# Patient Record
Sex: MALE | Race: WHITE | Employment: FULL TIME | URBAN - METROPOLITAN AREA
[De-identification: names, ages, dates, MRNs, and addresses within clinical notes are randomized per-mention and may not be internally consistent; named-entity substitution may affect disease eponyms.]

---

## 2019-04-08 ENCOUNTER — OFFICE VISIT (OUTPATIENT)
Dept: FAMILY MEDICINE CLINIC | Age: 64
End: 2019-04-08
Payer: COMMERCIAL

## 2019-04-08 VITALS
SYSTOLIC BLOOD PRESSURE: 132 MMHG | TEMPERATURE: 98.5 F | DIASTOLIC BLOOD PRESSURE: 70 MMHG | WEIGHT: 156 LBS | HEART RATE: 80 BPM | OXYGEN SATURATION: 99 % | RESPIRATION RATE: 17 BRPM | HEIGHT: 69 IN | BODY MASS INDEX: 23.11 KG/M2

## 2019-04-08 DIAGNOSIS — H72.92 PERFORATED EAR DRUM, LEFT: Primary | ICD-10-CM

## 2019-04-08 PROCEDURE — 99213 OFFICE O/P EST LOW 20 MIN: CPT | Performed by: NURSE PRACTITIONER

## 2019-04-08 RX ORDER — AMOXICILLIN 500 MG/1
500 CAPSULE ORAL 3 TIMES DAILY
Qty: 30 CAPSULE | Refills: 0 | Status: SHIPPED | OUTPATIENT
Start: 2019-04-08 | End: 2019-04-18

## 2019-04-08 ASSESSMENT — ENCOUNTER SYMPTOMS
COUGH: 0
RHINORRHEA: 1

## 2019-04-08 NOTE — PROGRESS NOTES
[address] [facility]   [address] [facility]  [address] [facility] [address]  Dept: [phone]  Dept Fax: [phone]    Cheri Trevizo is a 59 y.o. male who presents to the urgent care today for his medical conditions/complaints as notedbelow. Cheri Trevizo is c/o of Tinnitus (L ear ringing constantly, pt states yesterday blew nose and cleaned ear with Q-tip and noticed blood on Qtip. )      HPI:     67-year-old male patient presents for complaints of possible perforated left eardrum. Felt some moisture in his ear after showering yesterday, use Q-tip and shortly thereafter had some bloody drainage from the left ear. Has continued to drain off and on today. Does have some cold-like symptoms. Prior to the perforation he did not have any pain. He does have loss of hearing since the perforation. Otalgia    There is pain in the left ear. This is a new problem. The current episode started yesterday. The problem occurs constantly. The problem has been unchanged. There has been no fever. The pain is at a severity of 2/10. The pain is mild. Associated symptoms include ear discharge, hearing loss and rhinorrhea. Pertinent negatives include no coughing or headaches. He has tried nothing for the symptoms. The treatment provided no relief. There is no history of a chronic ear infection, hearing loss or a tympanostomy tube. Past Medical History:   Diagnosis Date    Abdominal pain     Colon polyp     Pulmonary nodules 9/17/2013    Thyroid nodule 9/17/2013        Current Outpatient Medications   Medication Sig Dispense Refill    amoxicillin (AMOXIL) 500 MG capsule Take 1 capsule by mouth 3 times daily for 10 days 30 capsule 0    Ascorbic Acid (VITAMIN C) 500 MG tablet Take 500 mg by mouth daily.  Multiple Vitamin (MULTIVITAMIN PO) Take  by mouth.  Cyanocobalamin (VITAMIN B 12 PO) Take  by mouth.  VITAMIN E PO Take  by mouth Daily.       fish oil-omega-3 fatty acids 1000 MG capsule Take 2 g by mouth daily. No current facility-administered medications for this visit. No Known Allergies    Subjective:      Review of Systems   HENT: Positive for ear discharge, ear pain, hearing loss and rhinorrhea. Respiratory: Negative for cough. Neurological: Negative for headaches. All other systems reviewed and are negative. 14 systems reviewed and negative except as listed in HPI. Objective:     Physical Exam   Constitutional: He is oriented to person, place, and time. He appears well-developed and well-nourished. No distress. nontoxic   HENT:   Head: Normocephalic and atraumatic. Right Ear: External ear normal.   Left Ear: External ear normal.   Mouth/Throat: No oropharyngeal exudate. + post nasal drip, thick yellow  Swallows without difficulty  Clear mucous nemo nares  rt tm pearly grey with good cone light  Left tympanic membrane perforated small amount of bloody drainage noted around the eardrum and on the floor of the canal near the eardrum. Canal itself appears intact. No pinnae or tragal tenderness no swelling. Eyes: Pupils are equal, round, and reactive to light. Conjunctivae and EOM are normal. Right eye exhibits no discharge. Left eye exhibits no discharge. Neck: Normal range of motion. Neck supple. Cardiovascular: Normal rate, regular rhythm and normal heart sounds. Pulmonary/Chest: Effort normal and breath sounds normal. No respiratory distress. He has no wheezes. He has no rales. He exhibits no tenderness. Abdominal: Soft. Bowel sounds are normal. He exhibits no distension. There is no tenderness. Musculoskeletal: Normal range of motion. He exhibits no tenderness or deformity. Lymphadenopathy:     He has no cervical adenopathy. Neurological: He is alert and oriented to person, place, and time. He exhibits normal muscle tone. Coordination normal.   Skin: Skin is warm and dry. No rash noted.  He is not diaphoretic. Psychiatric: He has a normal mood and affect. His behavior is normal.   Nursing note and vitals reviewed. /70 (Site: Right Upper Arm, Position: Sitting, Cuff Size: Medium Adult)   Pulse 80   Temp 98.5 °F (36.9 °C) (Tympanic)   Resp 17   Ht 5' 9.49\" (1.765 m)   Wt 156 lb (70.8 kg)   SpO2 99%   BMI 22.71 kg/m²     Assessment:       Diagnosis Orders   1. Perforated ear drum, left         Plan:    Discussed in detail the care of perforated eardrum need for close follow-up and no fluids in the ear. Prescription for amoxicillin for sinus symptoms and prophylactically for the perforated eardrum. Return for Make an Appt. with your Primary Care in 1 week. Orders Placed This Encounter   Medications    amoxicillin (AMOXIL) 500 MG capsule     Sig: Take 1 capsule by mouth 3 times daily for 10 days     Dispense:  30 capsule     Refill:  0         Patient given educational materials - see patient instructions. Discussed use, benefit, and side effects of prescribed medications. All patient questions answered. Pt voicedunderstanding.     Electronically signed by DUDLEY Hassan CNP on 4/8/2019 at 4:34 PM

## 2019-04-08 NOTE — PATIENT INSTRUCTIONS
No foreign body in ear  No water in your ear until cleared by Dr. Daquan Pascual ball with vaseline set gently inside ear when showering, no fluid in ear  No swimming  Follow up with Dr. Sherif Campos for ear recheck and clearance in 1 week  Over the counter nasal decongestant spray and antihistamine of your choice (ex: allegra, claritin, zyrtec)  Patient Education        Perforated Eardrum: Care Instructions  Your Care Instructions    A tear or hole in the membrane of the middle ear is called a perforated or ruptured eardrum. This can happen if an infection builds up inside the ear or if the eardrum gets injured. You may find it hard to hear out of that ear or may hear a buzzing sound. You may have an earache or have fluids that drain from the ear. Your eardrum should heal on its own in a few weeks, and you should hear normally then. If you have an infection, your doctor may prescribe antibiotics. You may need pain relief medicine for your earache. Your doctor will check to see if your eardrum has healed. If not, you may need surgery to repair the eardrum. Follow-up care is a key part of your treatment and safety. Be sure to make and go to all appointments, and call your doctor if you are having problems. It's also a good idea to know your test results and keep a list of the medicines you take. How can you care for yourself at home? · If your doctor prescribed antibiotics, take them as directed. Do not stop taking them just because you feel better. You need to take the full course of antibiotics. · Take an over-the-counter pain medicine, such as acetaminophen (Tylenol), ibuprofen (Advil, Motrin), or naproxen (Aleve), as needed. Read and follow all instructions on the label. · Do not take two or more pain medicines at the same time unless the doctor told you to. Many pain medicines have acetaminophen, which is Tylenol. Too much acetaminophen (Tylenol) can be harmful.   · To ease pain, put a warm washcloth or a heating this instruction, always ask your healthcare professional. [place] [address] any warranty or liability for your use of this information.

## 2019-04-29 ENCOUNTER — OFFICE VISIT (OUTPATIENT)
Dept: FAMILY MEDICINE CLINIC | Age: 64
End: 2019-04-29
Payer: COMMERCIAL

## 2019-04-29 VITALS
HEART RATE: 77 BPM | OXYGEN SATURATION: 99 % | WEIGHT: 162 LBS | TEMPERATURE: 98.3 F | RESPIRATION RATE: 16 BRPM | BODY MASS INDEX: 23.99 KG/M2 | SYSTOLIC BLOOD PRESSURE: 139 MMHG | HEIGHT: 69 IN | DIASTOLIC BLOOD PRESSURE: 89 MMHG

## 2019-04-29 DIAGNOSIS — H72.92 PERFORATED TYMPANIC MEMBRANE, LEFT: Primary | ICD-10-CM

## 2019-04-29 DIAGNOSIS — H90.12 CONDUCTIVE HEARING LOSS OF LEFT EAR WITH UNRESTRICTED HEARING OF RIGHT EAR: ICD-10-CM

## 2019-04-29 PROCEDURE — 99214 OFFICE O/P EST MOD 30 MIN: CPT | Performed by: NURSE PRACTITIONER

## 2019-04-29 ASSESSMENT — ENCOUNTER SYMPTOMS
SORE THROAT: 1
CHEST TIGHTNESS: 0
COUGH: 0
EYE REDNESS: 0
EYE DISCHARGE: 0
SINUS PRESSURE: 0
SHORTNESS OF BREATH: 0
VOICE CHANGE: 0
WHEEZING: 0

## 2019-04-29 ASSESSMENT — PATIENT HEALTH QUESTIONNAIRE - PHQ9
SUM OF ALL RESPONSES TO PHQ QUESTIONS 1-9: 0
SUM OF ALL RESPONSES TO PHQ QUESTIONS 1-9: 0
2. FEELING DOWN, DEPRESSED OR HOPELESS: 0
1. LITTLE INTEREST OR PLEASURE IN DOING THINGS: 0
SUM OF ALL RESPONSES TO PHQ9 QUESTIONS 1 & 2: 0

## 2019-04-29 NOTE — PATIENT INSTRUCTIONS
Patient Education        Perforated Eardrum: Care Instructions  Your Care Instructions    A tear or hole in the membrane of the middle ear is called a perforated or ruptured eardrum. This can happen if an infection builds up inside the ear or if the eardrum gets injured. You may find it hard to hear out of that ear or may hear a buzzing sound. You may have an earache or have fluids that drain from the ear. Your eardrum should heal on its own in a few weeks, and you should hear normally then. If you have an infection, your doctor may prescribe antibiotics. You may need pain relief medicine for your earache. Your doctor will check to see if your eardrum has healed. If not, you may need surgery to repair the eardrum. Follow-up care is a key part of your treatment and safety. Be sure to make and go to all appointments, and call your doctor if you are having problems. It's also a good idea to know your test results and keep a list of the medicines you take. How can you care for yourself at home? · If your doctor prescribed antibiotics, take them as directed. Do not stop taking them just because you feel better. You need to take the full course of antibiotics. · Take an over-the-counter pain medicine, such as acetaminophen (Tylenol), ibuprofen (Advil, Motrin), or naproxen (Aleve), as needed. Read and follow all instructions on the label. · Do not take two or more pain medicines at the same time unless the doctor told you to. Many pain medicines have acetaminophen, which is Tylenol. Too much acetaminophen (Tylenol) can be harmful. · To ease pain, put a warm washcloth or a heating pad set on low on your ear. You may have some drainage from the ear. · Be careful when taking over-the-counter cold or flu medicines and Tylenol at the same time. Many of these medicines have acetaminophen, which is Tylenol. Read the labels to make sure that you are not taking more than the recommended dose.  Too much Tylenol can be harmful. · Keep your ears dry. ? Take baths until your doctor says you can take showers again. ? When you wash your hair, use cotton lightly coated with petroleum jelly as an earplug. Do not use plastic earplugs. ? Do not swim until your doctor says you can.  ? If you get water in your ears, turn your head to each side and pull the earlobe in different directions. This will help the water run out. If your ears are still wet, use a hair dryer set on the lowest heat. Hold the dryer several inches from your ear. · Do not put anything into your ear canal. For example, do not use a cotton swab to clean the inside of your ear. It can damage your ear. If you think you have something inside your ear, ask your doctor to check it. When should you call for help? Call your doctor now or seek immediate medical care if:    · You have signs of infection, such as:  ? Increased pain, swelling, warmth, or redness. ? Pus draining from the ear. ? A fever.    Watch closely for changes in your health, and be sure to contact your doctor if:    · You have changes in hearing.     · You do not get better as expected. Where can you learn more? Go to https://TotalTakeoutpevendome 1699eb.MarketRiders. org and sign in to your Scarosso account. Enter D379 in the [employer] box to learn more about \"Perforated Eardrum: Care Instructions. \"     If you do not have an account, please click on the \"Sign Up Now\" link. Current as of: March 27, 2018  Content Version: 11.9  © 5682-3301 [employer]. Care instructions adapted under license by [facility] ([facility]). If you have questions about a medical condition or this instruction, always ask your healthcare professional. [place] [address] any warranty or liability for your use of this information.

## 2019-04-29 NOTE — PROGRESS NOTES
[address] [facility] WALK-IN FAMILY MEDICINE   [address] [facility]  [address] [facility] [address]  Dept: [phone]  Dept Fax: [phone]     Victorino Alvarez is a 59 y.o. male who presents to the urgent care today for his medicalconditions/complaints as noted below. Victorino Alvarez is c/o of Hearing Loss (left ear, s/p left ear drum perforation)    HPI:      Otalgia    There is pain in the left ear. This is a new problem. Episode onset: 3 weeks ago. The problem has been gradually worsening. There has been no fever. Associated symptoms include hearing loss (left) and a sore throat (over the past week, almost gone). Pertinent negatives include no coughing, ear discharge, headaches or rash. Treatments tried: amoxicillin, hot tea with honey. The treatment provided no relief. States that he was in our office approximately 3 weeks ago with a perforated ear drum. He was put on amoxicillin and has kept it dry. He states that the symptoms are gradually improving but his hearing out the left ear is not great, although it is better. He would like to have his ear rechecked. Past Medical History:   Diagnosis Date    Abdominal pain     Colon polyp     Pulmonary nodules 9/17/2013    Thyroid nodule 9/17/2013      Current Outpatient Medications   Medication Sig Dispense Refill    Ascorbic Acid (VITAMIN C) 500 MG tablet Take 500 mg by mouth daily.  Multiple Vitamin (MULTIVITAMIN PO) Take  by mouth.  Cyanocobalamin (VITAMIN B 12 PO) Take  by mouth.  VITAMIN E PO Take  by mouth Daily.  fish oil-omega-3 fatty acids 1000 MG capsule Take 2 g by mouth daily. No current facility-administered medications for this visit. No Known Allergies    Reviewed PMH, SH, and FH with the patient and updated. Subjective:      Review of Systems   Constitutional: Negative for chills, fatigue and fever.    HENT: Positive for congestion (improve), ear pain, hearing loss (left) and sore throat (over the past week, almost gone). Negative for ear discharge, postnasal drip, sinus pressure, sneezing and voice change. Eyes: Negative for discharge and redness. Respiratory: Negative for cough, chest tightness, shortness of breath and wheezing. Cardiovascular: Negative. Negative for chest pain. Musculoskeletal: Negative for myalgias. Skin: Negative for rash. Neurological: Negative for dizziness, weakness, light-headedness and headaches. Hematological: Negative for adenopathy. All other systems reviewed and are negative. Objective:      Physical Exam   Constitutional: He appears well-developed and well-nourished. No distress. HENT:   Head: Normocephalic. Right Ear: Tympanic membrane and external ear normal.   Left Ear: External ear normal. Tympanic membrane is injected (mild), scarred (healing perforation noted) and erythematous (mild). Nose: Nose normal. Right sinus exhibits no maxillary sinus tenderness and no frontal sinus tenderness. Left sinus exhibits no maxillary sinus tenderness and no frontal sinus tenderness. Mouth/Throat: Posterior oropharyngeal erythema (mild) present. No oropharyngeal exudate. Eyes: Right eye exhibits no discharge. Left eye exhibits no discharge. Cardiovascular: Normal rate, regular rhythm and normal heart sounds. No murmur heard. Pulmonary/Chest: Effort normal and breath sounds normal. No respiratory distress. He has no wheezes. He has no rales. Lymphadenopathy:     He has no cervical adenopathy. Skin: Skin is warm. No rash noted. He is not diaphoretic. Nursing note and vitals reviewed. /89 (Site: Left Upper Arm, Position: Sitting, Cuff Size: Medium Adult)   Pulse 77   Temp 98.3 °F (36.8 °C) (Oral)   Resp 16   Ht 5' 9\" (1.753 m)   Wt 162 lb (73.5 kg)   SpO2 99%   BMI 23.92 kg/m²     Assessment:       Diagnosis Orders   1. Perforated tympanic membrane, left     2.  Conductive hearing loss of left ear with unrestricted hearing of right ear       Plan:      Perforation of left tympanic membrane healing as anticipated. Continue to keep ear dry. Warm compresses as desired for ear pain. Educational materials provided on AVS.  Follow up if symptoms do not improve/worsen. Patient given educational materials - see patient instructions. Discussed use, benefit, and side effects of prescribed medications. All patientquestions answered. Pt voiced understanding.     Electronically signed by DUDLEY Figueroa CNP on 4/29/2019at 4:26 PM

## 2020-01-17 ENCOUNTER — OFFICE VISIT (OUTPATIENT)
Dept: FAMILY MEDICINE CLINIC | Age: 65
End: 2020-01-17
Payer: COMMERCIAL

## 2020-01-17 VITALS
BODY MASS INDEX: 22.59 KG/M2 | SYSTOLIC BLOOD PRESSURE: 137 MMHG | HEART RATE: 64 BPM | WEIGHT: 153 LBS | TEMPERATURE: 98 F | OXYGEN SATURATION: 98 % | DIASTOLIC BLOOD PRESSURE: 80 MMHG

## 2020-01-17 LAB
INFLUENZA A ANTIBODY: NEGATIVE
INFLUENZA B ANTIBODY: NEGATIVE

## 2020-01-17 PROCEDURE — 99213 OFFICE O/P EST LOW 20 MIN: CPT | Performed by: NURSE PRACTITIONER

## 2020-01-17 PROCEDURE — 87804 INFLUENZA ASSAY W/OPTIC: CPT | Performed by: NURSE PRACTITIONER

## 2020-01-17 ASSESSMENT — ENCOUNTER SYMPTOMS
SWOLLEN GLANDS: 0
WHEEZING: 0
DIARRHEA: 0
COUGH: 1
VOMITING: 0
SORE THROAT: 0
NAUSEA: 0
SINUS PAIN: 0
RHINORRHEA: 1
ABDOMINAL PAIN: 0

## 2020-01-17 ASSESSMENT — PATIENT HEALTH QUESTIONNAIRE - PHQ9: DEPRESSION UNABLE TO ASSESS: PT REFUSES

## 2020-01-17 NOTE — LETTER
[facility]   [facility] [address] [facility] [address]  Phone: [phone]  Fax: [phone]    DUDLEY Santacruz CNP        January 17, 2020     Patient: Salomón Boo   YOB: 1955   Date of Visit: 1/17/2020       To Whom It May Concern: It is my medical opinion that Zana Shah may return to work on 1/20/2020. If you have any questions or concerns, please don't hesitate to call.     Sincerely,        DUDLEY Santacruz CNP

## 2020-01-17 NOTE — PATIENT INSTRUCTIONS
serious illness. · Drink plenty of fluids, enough so that your urine is light yellow or clear like water. Hot fluids, such as tea or soup, may help relieve congestion in your nose and throat. If you have kidney, heart, or liver disease and have to limit fluids, talk with your doctor before you increase the amount of fluids you drink. · Try to clear mucus from your lungs by breathing deeply and coughing. · Gargle with warm salt water once an hour. This can help reduce swelling and throat pain. Use 1 teaspoon of salt mixed in 1 cup of warm water. · Do not smoke or allow others to smoke around you. If you need help quitting, talk to your doctor about stop-smoking programs and medicines. These can increase your chances of quitting for good. To avoid spreading the virus  · Cough or sneeze into a tissue. Then throw the tissue away. · If you don't have a tissue, use your hand to cover your cough or sneeze. Then clean your hand. You can also cough into your sleeve. · Wash your hands often. Use soap and warm water. Wash for 15 to 20 seconds each time. · If you don't have soap and water near you, you can clean your hands with alcohol wipes or gel. When should you call for help? Call your doctor now or seek immediate medical care if:    · You have a new or higher fever.     · Your fever lasts more than 48 hours.     · You have trouble breathing.     · You have a fever with a stiff neck or a severe headache.     · You are sensitive to light.     · You feel very sleepy or confused.    Watch closely for changes in your health, and be sure to contact your doctor if:    · You do not get better as expected. Where can you learn more? Go to https://Shopintoityoueb.OnVantage. org and sign in to your Boloco account. Enter I765 in the Mocoplex box to learn more about \"Viral Respiratory Infection: Care Instructions. \"     If you do not have an account, please click on the \"Sign Up Now\" link.   Current as of: June 9, 2019  Content Version: 12.3  © 8647-6523 Healthwise, Incorporated. Care instructions adapted under license by [facility] ([facility]). If you have questions about a medical condition or this instruction, always ask your healthcare professional. [address] any warranty or liability for your use of this information.

## 2020-01-17 NOTE — PROGRESS NOTES
[place] [address] FAMILY MEDICINE   [address] [address]  Dept: [phone]  Dept Fax: [phone]    Jackie Marcos is a 62 y.o. male who presents to the urgent care today for his medical conditions/complaints as notedbelow. Jackie Marcos is c/o of Generalized Body Aches (runny nose, sneezing, change in stool color, chills, sore throat since yesterday)      HPI:     62 yr old male presents for sneezing, clr runny nose, mild body aches, occasional np cough. No fevers. Sx for 2 days, gradual onset. Tried mucinex cold and flu, mild helpful. No sob or wheezing. No known illness exposure. URI    This is a new problem. The current episode started in the past 7 days (x2 days). The problem has been unchanged. There has been no fever. Associated symptoms include coughing, rhinorrhea and sneezing. Pertinent negatives include no abdominal pain, chest pain, congestion, diarrhea, dysuria, ear pain, headaches, joint pain, joint swelling, nausea, neck pain, plugged ear sensation, rash, sinus pain, sore throat, swollen glands, vomiting or wheezing. The treatment provided mild relief. Past Medical History:   Diagnosis Date    Abdominal pain     Colon polyp     Pulmonary nodules 9/17/2013    Thyroid nodule 9/17/2013        Current Outpatient Medications   Medication Sig Dispense Refill    Phenylephrine-DM-GG-APAP (MUCINEX FAST-MAX COLD FLU PO) Take by mouth      Ascorbic Acid (VITAMIN C) 500 MG tablet Take 500 mg by mouth daily.  Multiple Vitamin (MULTIVITAMIN PO) Take  by mouth.  Cyanocobalamin (VITAMIN B 12 PO) Take  by mouth.  VITAMIN E PO Take  by mouth Daily.  fish oil-omega-3 fatty acids 1000 MG capsule Take 2 g by mouth daily. No current facility-administered medications for this visit. No Known Allergies    Subjective:      Review of Systems   HENT: Positive for rhinorrhea and sneezing.  Negative for

## 2020-09-02 ENCOUNTER — HOSPITAL ENCOUNTER (EMERGENCY)
Age: 65
Discharge: HOME | End: 2020-09-02
Attending: EMERGENCY MEDICINE
Payer: COMMERCIAL

## 2020-09-02 VITALS
TEMPERATURE: 97.9 F | DIASTOLIC BLOOD PRESSURE: 75 MMHG | RESPIRATION RATE: 16 BRPM | HEIGHT: 71 IN | OXYGEN SATURATION: 98 % | HEART RATE: 76 BPM | SYSTOLIC BLOOD PRESSURE: 149 MMHG | WEIGHT: 162 LBS | BODY MASS INDEX: 22.68 KG/M2

## 2020-09-02 LAB
ABSOLUTE EOS #: 0.7 K/UL (ref 0–0.4)
ABSOLUTE IMMATURE GRANULOCYTE: ABNORMAL K/UL (ref 0–0.3)
ABSOLUTE LYMPH #: 0.8 K/UL (ref 1–4.8)
ABSOLUTE MONO #: 0.7 K/UL (ref 0.1–1.3)
ALBUMIN SERPL-MCNC: 3.7 G/DL (ref 3.5–5.2)
ALBUMIN/GLOBULIN RATIO: ABNORMAL (ref 1–2.5)
ALP BLD-CCNC: 68 U/L (ref 40–129)
ALT SERPL-CCNC: 21 U/L (ref 5–41)
ANION GAP SERPL CALCULATED.3IONS-SCNC: 9 MMOL/L (ref 9–17)
AST SERPL-CCNC: 21 U/L
BASOPHILS # BLD: 0 % (ref 0–2)
BASOPHILS ABSOLUTE: 0 K/UL (ref 0–0.2)
BILIRUB SERPL-MCNC: 0.76 MG/DL (ref 0.3–1.2)
BUN BLDV-MCNC: 17 MG/DL (ref 8–23)
BUN/CREAT BLD: ABNORMAL (ref 9–20)
CALCIUM SERPL-MCNC: 9.4 MG/DL (ref 8.6–10.4)
CHLORIDE BLD-SCNC: 105 MMOL/L (ref 98–107)
CO2: 24 MMOL/L (ref 20–31)
CREAT SERPL-MCNC: 0.56 MG/DL (ref 0.7–1.2)
DIFFERENTIAL TYPE: ABNORMAL
EOSINOPHILS RELATIVE PERCENT: 11 % (ref 0–4)
GFR AFRICAN AMERICAN: >60 ML/MIN
GFR NON-AFRICAN AMERICAN: >60 ML/MIN
GFR SERPL CREATININE-BSD FRML MDRD: ABNORMAL ML/MIN/{1.73_M2}
GFR SERPL CREATININE-BSD FRML MDRD: ABNORMAL ML/MIN/{1.73_M2}
GLUCOSE BLD-MCNC: 101 MG/DL (ref 70–99)
HCT VFR BLD CALC: 43.2 % (ref 41–53)
HEMOGLOBIN: 15.1 G/DL (ref 13.5–17.5)
IMMATURE GRANULOCYTES: ABNORMAL %
INR BLD: 1
LYMPHOCYTES # BLD: 12 % (ref 24–44)
MCH RBC QN AUTO: 27.9 PG (ref 26–34)
MCHC RBC AUTO-ENTMCNC: 34.9 G/DL (ref 31–37)
MCV RBC AUTO: 79.9 FL (ref 80–100)
MONOCYTES # BLD: 11 % (ref 1–7)
NRBC AUTOMATED: ABNORMAL PER 100 WBC
PDW BLD-RTO: 16.3 % (ref 11.5–14.9)
PLATELET # BLD: 154 K/UL (ref 150–450)
PLATELET ESTIMATE: ABNORMAL
PMV BLD AUTO: 7.7 FL (ref 6–12)
POTASSIUM SERPL-SCNC: 3.9 MMOL/L (ref 3.7–5.3)
PROTHROMBIN TIME: 12.7 SEC (ref 11.8–14.6)
RBC # BLD: 5.41 M/UL (ref 4.5–5.9)
RBC # BLD: ABNORMAL 10*6/UL
SEG NEUTROPHILS: 66 % (ref 36–66)
SEGMENTED NEUTROPHILS ABSOLUTE COUNT: 4.3 K/UL (ref 1.3–9.1)
SODIUM BLD-SCNC: 138 MMOL/L (ref 135–144)
TOTAL PROTEIN: 6.1 G/DL (ref 6.4–8.3)
WBC # BLD: 6.5 K/UL (ref 3.5–11)
WBC # BLD: ABNORMAL 10*3/UL

## 2020-09-02 PROCEDURE — 90715 TDAP VACCINE 7 YRS/> IM: CPT | Performed by: EMERGENCY MEDICINE

## 2020-09-02 PROCEDURE — 90471 IMMUNIZATION ADMIN: CPT | Performed by: EMERGENCY MEDICINE

## 2020-09-02 PROCEDURE — 99283 EMERGENCY DEPT VISIT LOW MDM: CPT

## 2020-09-02 PROCEDURE — 85610 PROTHROMBIN TIME: CPT

## 2020-09-02 PROCEDURE — 36415 COLL VENOUS BLD VENIPUNCTURE: CPT

## 2020-09-02 PROCEDURE — 85025 COMPLETE CBC W/AUTO DIFF WBC: CPT

## 2020-09-02 PROCEDURE — 80053 COMPREHEN METABOLIC PANEL: CPT

## 2020-09-02 PROCEDURE — 6360000002 HC RX W HCPCS: Performed by: EMERGENCY MEDICINE

## 2020-09-02 RX ADMIN — TETANUS TOXOID, REDUCED DIPHTHERIA TOXOID AND ACELLULAR PERTUSSIS VACCINE, ADSORBED 0.5 ML: 5; 2.5; 8; 8; 2.5 SUSPENSION INTRAMUSCULAR at 09:32

## 2020-09-03 ASSESSMENT — ENCOUNTER SYMPTOMS: SHORTNESS OF BREATH: 0

## 2020-12-21 ENCOUNTER — HOSPITAL ENCOUNTER (INPATIENT)
Age: 65
LOS: 3 days | Discharge: HOME | DRG: 603 | End: 2020-12-24
Attending: EMERGENCY MEDICINE | Admitting: INTERNAL MEDICINE
Payer: COMMERCIAL

## 2020-12-21 PROBLEM — I89.1 LYMPHANGITIS: Status: ACTIVE | Noted: 2020-12-21

## 2020-12-21 LAB
ABSOLUTE EOS #: 0.1 K/UL (ref 0–0.4)
ABSOLUTE IMMATURE GRANULOCYTE: ABNORMAL K/UL (ref 0–0.3)
ABSOLUTE LYMPH #: 0.9 K/UL (ref 1–4.8)
ABSOLUTE MONO #: 1.4 K/UL (ref 0.1–1.3)
ANION GAP SERPL CALCULATED.3IONS-SCNC: 11 MMOL/L (ref 9–17)
BASOPHILS # BLD: 1 % (ref 0–2)
BASOPHILS ABSOLUTE: 0.1 K/UL (ref 0–0.2)
BNP INTERPRETATION: NORMAL
BUN BLDV-MCNC: 18 MG/DL (ref 8–23)
BUN/CREAT BLD: ABNORMAL (ref 9–20)
CALCIUM SERPL-MCNC: 9.1 MG/DL (ref 8.6–10.4)
CHLORIDE BLD-SCNC: 95 MMOL/L (ref 98–107)
CO2: 26 MMOL/L (ref 20–31)
CREAT SERPL-MCNC: 0.74 MG/DL (ref 0.7–1.2)
DIFFERENTIAL TYPE: ABNORMAL
EOSINOPHILS RELATIVE PERCENT: 0 % (ref 0–4)
GFR AFRICAN AMERICAN: >60 ML/MIN
GFR NON-AFRICAN AMERICAN: >60 ML/MIN
GFR SERPL CREATININE-BSD FRML MDRD: ABNORMAL ML/MIN/{1.73_M2}
GFR SERPL CREATININE-BSD FRML MDRD: ABNORMAL ML/MIN/{1.73_M2}
GLUCOSE BLD-MCNC: 123 MG/DL (ref 70–99)
HCT VFR BLD CALC: 44.2 % (ref 41–53)
HEMOGLOBIN: 14.4 G/DL (ref 13.5–17.5)
IMMATURE GRANULOCYTES: ABNORMAL %
LYMPHOCYTES # BLD: 6 % (ref 24–44)
MCH RBC QN AUTO: 26.1 PG (ref 26–34)
MCHC RBC AUTO-ENTMCNC: 32.6 G/DL (ref 31–37)
MCV RBC AUTO: 80.1 FL (ref 80–100)
MONOCYTES # BLD: 10 % (ref 1–7)
NRBC AUTOMATED: ABNORMAL PER 100 WBC
PDW BLD-RTO: 15.9 % (ref 11.5–14.9)
PLATELET # BLD: 185 K/UL (ref 150–450)
PLATELET ESTIMATE: ABNORMAL
PMV BLD AUTO: 7.4 FL (ref 6–12)
POTASSIUM SERPL-SCNC: 4.6 MMOL/L (ref 3.7–5.3)
PRO-BNP: 210 PG/ML
RBC # BLD: 5.52 M/UL (ref 4.5–5.9)
RBC # BLD: ABNORMAL 10*6/UL
SEG NEUTROPHILS: 83 % (ref 36–66)
SEGMENTED NEUTROPHILS ABSOLUTE COUNT: 11.6 K/UL (ref 1.3–9.1)
SODIUM BLD-SCNC: 132 MMOL/L (ref 135–144)
WBC # BLD: 14 K/UL (ref 3.5–11)
WBC # BLD: ABNORMAL 10*3/UL

## 2020-12-21 PROCEDURE — 36415 COLL VENOUS BLD VENIPUNCTURE: CPT

## 2020-12-21 PROCEDURE — 96374 THER/PROPH/DIAG INJ IV PUSH: CPT

## 2020-12-21 PROCEDURE — 1200000000 HC SEMI PRIVATE

## 2020-12-21 PROCEDURE — 83880 ASSAY OF NATRIURETIC PEPTIDE: CPT

## 2020-12-21 PROCEDURE — 6360000002 HC RX W HCPCS: Performed by: PHYSICIAN ASSISTANT

## 2020-12-21 PROCEDURE — 85025 COMPLETE CBC W/AUTO DIFF WBC: CPT

## 2020-12-21 PROCEDURE — 2580000003 HC RX 258: Performed by: PHYSICIAN ASSISTANT

## 2020-12-21 PROCEDURE — 99284 EMERGENCY DEPT VISIT MOD MDM: CPT

## 2020-12-21 PROCEDURE — 87040 BLOOD CULTURE FOR BACTERIA: CPT

## 2020-12-21 PROCEDURE — 80048 BASIC METABOLIC PNL TOTAL CA: CPT

## 2020-12-21 RX ORDER — KETOROLAC TROMETHAMINE 30 MG/ML
30 INJECTION, SOLUTION INTRAMUSCULAR; INTRAVENOUS ONCE
Status: COMPLETED | OUTPATIENT
Start: 2020-12-21 | End: 2020-12-21

## 2020-12-21 RX ORDER — CLINDAMYCIN PHOSPHATE 900 MG/50ML
900 INJECTION INTRAVENOUS ONCE
Status: DISCONTINUED | OUTPATIENT
Start: 2020-12-21 | End: 2020-12-21

## 2020-12-21 RX ORDER — SODIUM CHLORIDE 9 MG/ML
INJECTION, SOLUTION INTRAVENOUS CONTINUOUS
Status: DISCONTINUED | OUTPATIENT
Start: 2020-12-21 | End: 2020-12-24 | Stop reason: HOSPADM

## 2020-12-21 RX ORDER — SODIUM CHLORIDE 0.9 % (FLUSH) 0.9 %
10 SYRINGE (ML) INJECTION PRN
Status: DISCONTINUED | OUTPATIENT
Start: 2020-12-21 | End: 2020-12-24 | Stop reason: HOSPADM

## 2020-12-21 RX ORDER — HYDROCODONE BITARTRATE AND ACETAMINOPHEN 5; 325 MG/1; MG/1
1 TABLET ORAL EVERY 4 HOURS PRN
Status: DISCONTINUED | OUTPATIENT
Start: 2020-12-21 | End: 2020-12-24 | Stop reason: HOSPADM

## 2020-12-21 RX ORDER — SODIUM CHLORIDE 0.9 % (FLUSH) 0.9 %
10 SYRINGE (ML) INJECTION EVERY 12 HOURS SCHEDULED
Status: DISCONTINUED | OUTPATIENT
Start: 2020-12-21 | End: 2020-12-24 | Stop reason: HOSPADM

## 2020-12-21 RX ORDER — ACETAMINOPHEN 325 MG/1
650 TABLET ORAL EVERY 4 HOURS PRN
Status: DISCONTINUED | OUTPATIENT
Start: 2020-12-21 | End: 2020-12-24 | Stop reason: HOSPADM

## 2020-12-21 RX ORDER — HYDROCODONE BITARTRATE AND ACETAMINOPHEN 5; 325 MG/1; MG/1
2 TABLET ORAL EVERY 4 HOURS PRN
Status: DISCONTINUED | OUTPATIENT
Start: 2020-12-21 | End: 2020-12-24 | Stop reason: HOSPADM

## 2020-12-21 RX ORDER — ONDANSETRON 4 MG/1
4 TABLET, ORALLY DISINTEGRATING ORAL EVERY 8 HOURS PRN
Status: DISCONTINUED | OUTPATIENT
Start: 2020-12-21 | End: 2020-12-24 | Stop reason: HOSPADM

## 2020-12-21 RX ADMIN — Medication 10 ML: at 21:32

## 2020-12-21 RX ADMIN — VANCOMYCIN HYDROCHLORIDE 1750 MG: 1 INJECTION, POWDER, LYOPHILIZED, FOR SOLUTION INTRAVENOUS at 21:17

## 2020-12-21 RX ADMIN — KETOROLAC TROMETHAMINE 30 MG: 30 INJECTION, SOLUTION INTRAMUSCULAR; INTRAVENOUS at 17:16

## 2020-12-21 RX ADMIN — SODIUM CHLORIDE: 9 INJECTION, SOLUTION INTRAVENOUS at 21:32

## 2020-12-21 RX ADMIN — AMPICILLIN SODIUM AND SULBACTAM SODIUM 3 G: 2; 1 INJECTION, POWDER, FOR SOLUTION INTRAMUSCULAR; INTRAVENOUS at 19:49

## 2020-12-21 ASSESSMENT — PAIN SCALES - GENERAL
PAINLEVEL_OUTOF10: 7
PAINLEVEL_OUTOF10: 7

## 2020-12-21 NOTE — ED PROVIDER NOTES
[address] [facility]  Emergency Department  Independent Attestation     Pt Name: Marissa Vaughn  MRN: [id number]  [place] 1955  Date of evaluation: 12/21/20       Marissa Vaughn is a 60 y.o. male who presents with Leg Swelling (Left lower extremity.)      I was personally available for consultation in the Emergency Department.     Ok Waddell DO  Attending Emergency Physician  [address] [facility]      (Please note that portions of this note were completed with a voice recognition program.  Efforts were made to edit the dictations but occasionally words are mis-transcribed.)        Ok Waddell DO  12/21/20 7512

## 2020-12-21 NOTE — PROGRESS NOTES
Pharmacy Note  Vancomycin Consult    Devaughn Van is a 60 y.o. male started on Vancomycin for cellulitis; consult received from Terese Rizzo, [address], to manage therapy. Also receiving the following antibiotics: ampicillin-sulbactam.    Patient Active Problem List   Diagnosis    Abdominal pain    Colon polyp    Neurofibromatosis ([address]    Thyroid nodule    Pulmonary nodules    Lymphangitis       Allergies:  Patient has no known allergies. Temp max: 97.8 F (36.6 C)    Recent Labs     12/21/20  1715   BUN 18       Recent Labs     12/21/20  1715   CREATININE 0.74       Recent Labs     12/21/20  1715   WBC 14.0*       No intake or output data in the 24 hours ending 12/21/20 1852    Culture Date      Source                       Results  Pending    Ht Readings from Last 1 Encounters:   12/21/20 5' 11\" (1.803 m)        Wt Readings from Last 1 Encounters:   12/21/20 162 lb (73.5 kg)         Body mass index is 22.59 kg/m². Estimated Creatinine Clearance: 105 mL/min (based on SCr of 0.74 mg/dL). Goal Trough Level: 10-20 mcg/mL    Assessment/Plan:  Will initiate Vancomycin with a one time loading dose of 1750 mg x1, followed by 1000 mg IV every 12 hours. Timing of trough level will be determined based on culture results, renal function, and clinical response. Thank you for the consult. Will continue to follow.   Sherley Donato, PharmD, BCPS

## 2020-12-21 NOTE — ED PROVIDER NOTES
CYANOCOBALAMIN (VITAMIN B 12 PO)    Take  by mouth. FISH OIL-OMEGA-3 FATTY ACIDS 1000 MG CAPSULE    Take 2 g by mouth daily. MULTIPLE VITAMIN (MULTIVITAMIN PO)    Take  by mouth. PHENYLEPHRINE-DM-GG-APAP (MUCINEX FAST-MAX COLD FLU PO)    Take by mouth    VITAMIN E PO    Take  by mouth Daily. ALLERGIES     Patient has no known allergies. FAMILY HISTORY     History reviewed. No pertinent family history. No family status information on file. None otherwise stated in nurses notes    SOCIAL HISTORY      reports that he quit smoking about 19 years ago. He has a 30.00 pack-year smoking history. He has never used smokeless tobacco. He reports current alcohol use. lives at home with others     PHYSICAL EXAM    (up to 7 for level 4, 8 or more for level 5)     ED Triage Vitals   BP Temp Temp Source Pulse Resp SpO2 Height Weight   12/21/20 1628 12/21/20 1627 12/21/20 1627 12/21/20 1627 12/21/20 1627 12/21/20 1627 12/21/20 1627 12/21/20 1627   (!) 140/82 97.8 °F (36.6 °C) Temporal 98 16 96 % 5' 11\" (1.803 m) 162 lb (73.5 kg)       Physical Exam   Nursing note and vitals reviewed. Constitutional: Oriented to person, place, and time and well-developed, well-nourished. Head: Normocephalic and atraumatic. Ear: External ears normal.   Nose: Nose normal and midline. Eyes: Conjunctivae and EOM are normal. Pupils are equal, round, and reactive to light. Neck: Normal range of motion. Neck supple. Throat: Posterior pharynx is without erythema or exudates, airway is patent, no swelling  Cardiovascular: Normal rate, regular rhythm, normal heart sounds and intact distal pulses. Pulmonary/Chest: Effort normal and breath sounds normal. No respiratory distress. No wheezes. No rales. No chest tenderness. Abdominal: Soft. Bowel sounds are normal. No distension and no mass. There is no tenderness. There is no rebound and no guarding.    Musculoskeletal: Examination of left leg reveals Normal range of motion. 5/5 strength. Brisk cap refill. Distal sensation intact. 2/2 dp and pt pulses. Neurological: Alert and oriented to person, place, and time. GCS score is 15. Skin: erythema over the anterior aspect of left lower leg with lymphangitis up inner leg to groin. There is swelling of LLE. No calf tenderness or erythema. Chronic venous stasis noted. Psychiatric: Mood, memory, affect and judgment normal.           DIAGNOSTIC RESULTS     EKG: All EKG's are interpreted by the Emergency Department Physician who either signs or Co-signs this chart in the absence of a cardiologist.        RADIOLOGY:   All plain film, CT, MRI, and formal ultrasound images (except ED bedside ultrasound) are read by the radiologist, see reports below, unless otherwise noted in MDM or here. No orders to display       No results found. LABS:  Labs Reviewed   CBC WITH AUTO DIFFERENTIAL - Abnormal; Notable for the following components:       Result Value    WBC 14.0 (*)     RDW 15.9 (*)     Seg Neutrophils 83 (*)     Lymphocytes 6 (*)     Monocytes 10 (*)     Segs Absolute 11.60 (*)     Absolute Lymph # 0.90 (*)     Absolute Mono # 1.40 (*)     All other components within normal limits   BASIC METABOLIC PANEL - Abnormal; Notable for the following components:    Glucose 123 (*)     Sodium 132 (*)     Chloride 95 (*)     All other components within normal limits   CULTURE, BLOOD 1   CULTURE, BLOOD 1   BRAIN NATRIURETIC PEPTIDE       All other labs were within normal range or not returned as of this dictation.     EMERGENCY DEPARTMENT COURSE and DIFFERENTIAL DIAGNOSIS/MDM:   Vitals:    Vitals:    12/21/20 1627 12/21/20 1628   BP:  (!) 140/82   Pulse: 98    Resp: 16    Temp: 97.8 °F (36.6 °C)    TempSrc: Temporal    SpO2: 96%    Weight: 162 lb (73.5 kg)    Height: 5' 11\" (1.803 m)          Patient instructed to return to the emergency room if symptoms worsen, return, or any other concern right away which is agreed by the patient    ED MEDS:  Orders Placed This Encounter   Medications    ketorolac (TORADOL) injection 30 mg    DISCONTD: clindamycin (CLEOCIN) 900 mg in dextrose 5 % 50 mL IVPB     Order Specific Question:   Antimicrobial Indications     Answer:   Skin and Soft Tissue Infection    ampicillin-sulbactam (UNASYN) 3 g ivpb minibag     Order Specific Question:   Antimicrobial Indications     Answer:   Skin and Soft Tissue Infection         CONSULTS:  IP CONSULT TO INTERNAL MEDICINE  PHARMACY TO DOSE VANCOMYCIN    PROCEDURES:  None      FINAL IMPRESSION      1. Lymphangitis    2. Cellulitis of left lower extremity          DISPOSITION/PLAN   DISPOSITION Admitted    PATIENT REFERRED TO:  No follow-up provider specified. DISCHARGE MEDICATIONS:  New Prescriptions    No medications on file         Summation      Patient Course:  . Cellulitis of LLE with lymphangitis to groin. There is no calf tenderness or erythema. Chronic venous stasis noted. 2/2 dp and pt pulses. Elevated WBC 14.0. Admit to internal medicine. It is after hours for venous doppler scans. Can get in AM if needed. Spoke with Dr. Manasa Espinoza who accepted admission. Will start on unasyn and vanco.   Residents are capped. Pt stable. ED Medications administered this visit:    Medications   ampicillin-sulbactam (UNASYN) 3 g ivpb minibag (has no administration in time range)   ketorolac (TORADOL) injection 30 mg (30 mg Intravenous Given 12/21/20 1716)       New Prescriptions from this visit:    New Prescriptions    No medications on file       Follow-up:  No follow-up provider specified. Final Impression:   1. Lymphangitis    2.  Cellulitis of left lower extremity               (Please note that portions of this note were completed with a voice recognition program.  Efforts were made to edit the dictations but occasionally words are mis-transcribed.)      (Please note that portions of this note were completed with a voice recognition program.  Efforts were made to edit the dictations but occasionally words are mis-transcribed.)    DELFIN Blanco PA-C  12/21/20 1790

## 2020-12-21 NOTE — ED NOTES
Mode of arrival (squad #, walk in, police, etc) : walk in        Chief complaint(s): left leg pain        Arrival Note (brief scenario, treatment PTA, etc). : Pt arrives to ED c/o left lower leg pain. Pt denies any recent injury to the area. Pt states 3 years ago he cut his shin with a pieve of machinery. Pt states it has constantly been a wound and getting worse. Pt is currently c/o pain over the anterior aspect of his left lower leg with swelling. Pt denies any numbness, tingling, calf pain, sob, or fevers. Redness noted to interior aspect of left knee and radiates up thigh. Pt states he thinks he bumped it on something at work and that is why its red. Pt is A&Ox4, eupneic, PWD. GCS=15. Call light in reach. C= \"Have you ever felt that you should Cut down on your drinking? \"  No  A= \"Have people Annoyed you by criticizing your drinking? \"  No  G= \"Have you ever felt bad or Guilty about your drinking? \"  No  E= \"Have you ever had a drink as an Eye-opener first thing in the morning to steady your nerves or to help a hangover? \"  No      Deferred []      Reason for deferring: N/A    *If yes to two or more: probable alcohol abuse. Rajinder Pulliam RN  12/21/20 1967

## 2020-12-22 PROBLEM — L03.116 CELLULITIS OF LEFT LOWER LEG: Status: ACTIVE | Noted: 2020-12-22

## 2020-12-22 PROCEDURE — 6360000002 HC RX W HCPCS: Performed by: INTERNAL MEDICINE

## 2020-12-22 PROCEDURE — 6360000002 HC RX W HCPCS: Performed by: PHYSICIAN ASSISTANT

## 2020-12-22 PROCEDURE — 2580000003 HC RX 258: Performed by: INTERNAL MEDICINE

## 2020-12-22 PROCEDURE — 1200000000 HC SEMI PRIVATE

## 2020-12-22 PROCEDURE — 99254 IP/OBS CNSLTJ NEW/EST MOD 60: CPT | Performed by: INTERNAL MEDICINE

## 2020-12-22 PROCEDURE — 99222 1ST HOSP IP/OBS MODERATE 55: CPT | Performed by: INTERNAL MEDICINE

## 2020-12-22 PROCEDURE — 6370000000 HC RX 637 (ALT 250 FOR IP): Performed by: PHYSICIAN ASSISTANT

## 2020-12-22 RX ADMIN — AMPICILLIN SODIUM AND SULBACTAM SODIUM 3 G: 2; 1 INJECTION, POWDER, FOR SOLUTION INTRAMUSCULAR; INTRAVENOUS at 18:38

## 2020-12-22 RX ADMIN — VANCOMYCIN HYDROCHLORIDE 1000 MG: 1 INJECTION, POWDER, LYOPHILIZED, FOR SOLUTION INTRAVENOUS at 09:14

## 2020-12-22 RX ADMIN — VANCOMYCIN HYDROCHLORIDE 1000 MG: 1 INJECTION, POWDER, LYOPHILIZED, FOR SOLUTION INTRAVENOUS at 21:52

## 2020-12-22 RX ADMIN — HYDROCODONE BITARTRATE AND ACETAMINOPHEN 2 TABLET: 5; 325 TABLET ORAL at 03:48

## 2020-12-22 RX ADMIN — AMPICILLIN SODIUM AND SULBACTAM SODIUM 3 G: 2; 1 INJECTION, POWDER, FOR SOLUTION INTRAMUSCULAR; INTRAVENOUS at 11:35

## 2020-12-22 RX ADMIN — ENOXAPARIN SODIUM 40 MG: 40 INJECTION SUBCUTANEOUS at 07:37

## 2020-12-22 RX ADMIN — HYDROCODONE BITARTRATE AND ACETAMINOPHEN 2 TABLET: 5; 325 TABLET ORAL at 20:05

## 2020-12-22 ASSESSMENT — PAIN DESCRIPTION - PAIN TYPE
TYPE: ACUTE PAIN

## 2020-12-22 ASSESSMENT — PAIN DESCRIPTION - ORIENTATION
ORIENTATION: LEFT
ORIENTATION: LEFT
ORIENTATION: LEFT;LOWER

## 2020-12-22 ASSESSMENT — PAIN DESCRIPTION - FREQUENCY: FREQUENCY: CONTINUOUS

## 2020-12-22 ASSESSMENT — PAIN SCALES - GENERAL
PAINLEVEL_OUTOF10: 7
PAINLEVEL_OUTOF10: 8
PAINLEVEL_OUTOF10: 0
PAINLEVEL_OUTOF10: 3

## 2020-12-22 ASSESSMENT — PAIN DESCRIPTION - LOCATION
LOCATION: LEG

## 2020-12-22 ASSESSMENT — PAIN DESCRIPTION - DESCRIPTORS: DESCRIPTORS: TIGHTNESS

## 2020-12-22 NOTE — ED NOTES
Admission Dx: lymphangitis    Pts Chief Complaints on Arrival: left lower leg pain    ADL's - Self-care    Pending Diagnostics:  Antibiotics, will need doppler tomorrow        Vitals: Current vital signs:  /66   Pulse 86   Temp 98.6 °F (37 °C) (Oral)   Resp 18   Ht 5' 11\" (1.803 m)   Wt 162 lb (73.5 kg)   SpO2 98%   BMI 22.59 kg/m²                             Jose Luis Ny RN  12/21/20 0329

## 2020-12-22 NOTE — PLAN OF CARE
Problem: Pain:  Goal: Pain level will decrease  Description: Pain level will decrease  12/22/2020 1624 by Francisca Villafana RN  Outcome: Ongoing     Problem: Pain:  Goal: Control of acute pain  Description: Control of acute pain  12/22/2020 1624 by Francisca Villafana RN  Outcome: Ongoing     Problem: Pain:  Goal: Control of chronic pain  Description: Control of chronic pain  12/22/2020 1624 by Francisca Villafana RN  Outcome: Ongoing     Problem: Skin Integrity:  Goal: Will show no infection signs and symptoms  Description: Will show no infection signs and symptoms  12/22/2020 1624 by Francisca Villafana RN  Outcome: Ongoing     Problem: Skin Integrity:  Goal: Absence of new skin breakdown  Description: Absence of new skin breakdown  12/22/2020 1624 by Francisca Villafana RN  Outcome: Ongoing

## 2020-12-22 NOTE — CONSULTS
[facility] -   Infectious diseases evaluation  admission date 12/21/2020    reason for consultation:   Left leg cellulitis    Impression :   Current:  · Left leg cellulitis  · Neurofibromatosis    Recommendations   · Continue IV vancomycin and Unasyn for now  · Lower extremity Doppler  · Leg compression and elevation if Doppler negative  · Follow blood cultures  · Follow CBC, vancomycin level and renal function      History of Present Illness:   Initial history:  Kervin Rosas is a 60y.o.-year-old male with history of neurofibromatosis presented to the hospital with worsening distal left leg swelling, pain and redness for several days extend to the upper thigh. He denied any recent trauma or insect bites but he had a remote injury to the left leg at work around 3 years ago. He denied any fever or chills, denied nausea or vomiting, no diarrhea no other complaints. He denied history of diabetes or previous skin and soft tissue infection. The patient denied any animal bites or cat scratch. Interval changes  12/22/2020   Patient Vitals for the past 8 hrs:   BP Temp Temp src Pulse Resp SpO2   12/22/20 1434 126/69 98.4 °F (36.9 °C) Oral 68 16 98 %         I have personally reviewed the past medical history, past surgical history, medications, social history, and family history, and I haveupdated the database accordingly. Allergies:   Patient has no known allergies. Review of Systems:     Review of Systems  As per history present illness, other than above 12 system reviewed were negative  Physical Examination :       Physical Exam  Constitutional:       General: He is not in acute distress. Appearance: Normal appearance. HENT:      Head: Normocephalic and atraumatic. Right Ear: External ear normal.      Left Ear: External ear normal.      Mouth/Throat:      Pharynx: Oropharynx is clear. No posterior oropharyngeal erythema.    Eyes: Conjunctiva/sclera: Conjunctivae normal.      Pupils: Pupils are equal, round, and reactive to light. Neck:      Musculoskeletal: Neck supple. No neck rigidity. Cardiovascular:      Rate and Rhythm: Normal rate and regular rhythm. Heart sounds: Normal heart sounds. Pulmonary:      Effort: Pulmonary effort is normal. No respiratory distress. Abdominal:      General: Abdomen is flat. Bowel sounds are normal.   Musculoskeletal:         General: Swelling present. Left lower leg: Edema present. Skin:     Findings: Erythema present. Comments: Left leg erythema and induration extend to the upper thigh, no significant fluctuation or crepitance, no open wounds. Multiple fibromas   Neurological:      General: No focal deficit present. Mental Status: He is alert and oriented to person, place, and time.    Psychiatric:         Mood and Affect: Mood normal.         Behavior: Behavior normal.         Past Medical History:     Past Medical History:   Diagnosis Date    Abdominal pain     Colon polyp     Pulmonary nodules 9/17/2013    Thyroid nodule 9/17/2013       Past Surgical  History:     Past Surgical History:   Procedure Laterality Date    COLONOSCOPY  12/12/11    POLYPECTOMY    COLONOSCOPY  10/23/07    POLYPECTOMY    CYST REMOVAL      \"back side\"   and face and neck    POLYPECTOMY  12/12/11       Medications:      vancomycin (VANCOCIN) 1250 mg in D5W 250 mL IVPB (ADDAVIAL)  1,000 mg Intravenous Q12H    ampicillin-sulbactam  3 g Intravenous Q8H    sodium chloride flush  10 mL Intravenous 2 times per day    enoxaparin  40 mg Subcutaneous Daily    vancomycin (VANCOCIN) intermittent dosing (placeholder)   Other RX Placeholder    influenza virus vaccine  0.5 mL Intramuscular Prior to discharge       Social History:     Social History     Socioeconomic History    Marital status:      Spouse name: Not on file    Number of children: Not on file    Years of education: Not on file  Highest education level: Not on file   Occupational History    Not on file   Social Needs    Financial resource strain: Not on file    Food insecurity     Worry: Not on file     Inability: Not on file    Transportation needs     Medical: Not on file     Non-medical: Not on file   Tobacco Use    Smoking status: Former Smoker     Packs/day: 1.00     Years: 30.00     Pack years: 30.00     Quit date: 2001     Years since quittin.4    Smokeless tobacco: Never Used   Substance and Sexual Activity    Alcohol use: Yes    Drug use: Not on file    Sexual activity: Not on file   Lifestyle    Physical activity     Days per week: Not on file     Minutes per session: Not on file    Stress: Not on file   Relationships    Social connections     Talks on phone: Not on file     Gets together: Not on file     Attends Protestant service: Not on file     Active member of club or organization: Not on file     Attends meetings of clubs or organizations: Not on file     Relationship status: Not on file    Intimate partner violence     Fear of current or ex partner: Not on file     Emotionally abused: Not on file     Physically abused: Not on file     Forced sexual activity: Not on file   Other Topics Concern    Not on file   Social History Narrative    Not on file       Family History:   History reviewed. No pertinent family history. Medical Decision Making:   I have independently reviewed/ordered the following labs:    CBC with Differential:   Recent Labs     20  1715   WBC 14.0*   HGB 14.4   HCT 44.2      LYMPHOPCT 6*   MONOPCT 10*     BMP:  Recent Labs     20  1715   *   K 4.6   CL 95*   CO2 26   BUN 18   CREATININE 0.74     Hepatic Function Panel: No results for input(s): PROT, LABALBU, BILIDIR, IBILI, BILITOT, ALKPHOS, ALT, AST in the last 72 hours. No results for input(s): RPR in the last 72 hours. No results for input(s): HIV in the last 72 hours.   No results for input(s): BC in the last 72 hours. Lab Results   Component Value Date    CREATININE 0.74 12/21/2020    GLUCOSE 123 12/21/2020    GLUCOSE 87 04/28/2012       Detailed results: Thank you for allowing us to participate in the care of this patient. Please call with questions. This note is created with the assistance of a speech recognition program.  While intending to generate adocument that actually reflects the content of the visit, the document can still have some errors including those of syntax and sound a like substitutions which may escape proof reading. It such instances, actual meaningcan be extrapolated by contextual diversion.     Lisa Dunlap MD  Office: [phone]  Perfect serve / office [phone]

## 2020-12-22 NOTE — CARE COORDINATION
CASE MANAGEMENT NOTE:    Admission Date:  12/21/2020 Odilon Cochran is a 60 y.o.  male    Admitted for : Lymphangitis [I89.1]    Met with:  Patient    PCP:  Dr. Tawny Machuca:  [employer] Miguel A Baltazar)      Current Residence/ Living Arrangements:  independently at home             Current Services PTA:  No    Is patient agreeable to VNS: No    Freedom of choice provided:  Yes    List of [address] provided: No    VNS chosen:  No    DME:  crutches    Home Oxygen: No    Nebulizer: No    CPAP/BIPAP: No    Supplier: N/A    Potential Assistance Needed: No    SNF needed: No    Freedom of choice and list provided: NA    Pharmacy:  [facility]       Does Patient want to use MEDS to BEDS? No    Is patient currently receiving oral anticoagulation therapy? No    Is the Patient an ISIDRO KAN [facility] [place] with Readmission Risk Score greater than 14%? No  If yes, pt needs a follow up appointment made within 7 days. Family Members/Caregivers that pt would like involved in their care:    Yes    If yes, list name here:  Sister Santhosh Schmitt    Transportation Provider:  Patient             Is patient in Isolation/One on One/Altered Mental Status? No  If yes, skip next question. If no, would they like an I-Pad to  use? No  If yes, call [phone]. Discharge Plan:  12/22: Akihl Baltazar) - Patient is from [facility] alone. He is independent and drives. DME - crutches. Declines need for VNS. Would like F/U appt scheduled with Dr. Gaye Lucio. On IV vanco, IV Unasyn and IV fluids - ID consult.  //JAMES                 Electronically signed by: Chandana Morales RN on 12/22/2020 at 1:10 PM

## 2020-12-22 NOTE — PROGRESS NOTES
Medication History completed:    New medications: vitamin D    Medications discontinued: Mucinex fast max     Changes to dosing: none    Stated allergies: NKDA    Other pertinent information: Medications confirmed with patient. He denies taking prescription medications.     Thank you,  Miguel Shaikh, PharmD, BCPS  [phone]

## 2020-12-22 NOTE — FLOWSHEET NOTE
12/22/20 1333   Encounter Summary   Services provided to: Patient   Referral/Consult From: [facility] Family members   Continue Visiting   (12/22/20)   Complexity of Encounter Moderate   Length of Encounter 15 minutes   Spiritual Assessment Completed Yes   Routine   Intervention [place]   Spiritual/Samaritan   Type Spiritual support   Assessment Coping; Approachable; Hopeful; Anxious   Intervention Active listening;Explored feelings, thoughts, concerns;Explored coping resources;Nurtured hope;Prayer;Discussed meaning/purpose;Discussed relationship with God   Outcome Shared reminiscences; Shared life review;Engaged in conversation;Comfort;Expressed gratitude

## 2020-12-22 NOTE — PLAN OF CARE
Problem: Pain:  Goal: Pain level will decrease  Description: Pain level will decrease  Outcome: Ongoing  Note: Patient medicated for pain as ordered with effectivenss   Goal: Control of acute pain  Description: Control of acute pain  Outcome: Ongoing  Goal: Control of chronic pain  Description: Control of chronic pain  Outcome: Ongoing     Problem: Skin Integrity:  Goal: Will show no infection signs and symptoms  Description: Will show no infection signs and symptoms  Outcome: Ongoing  Note: Monitor skin breakdown and wound healing  Goal: Absence of new skin breakdown  Description: Absence of new skin breakdown  Outcome: Ongoing

## 2020-12-22 NOTE — ED NOTES
Rn called pharmacy and let them know we are still waiting for them to send up unasyn.      Lisha Loco RN  12/21/20 2936

## 2020-12-22 NOTE — H&P
History and Physical Service  [facility]    HISTORY AND PHYSICAL EXAMINATION            Date:   12/22/2020  Patient name:  Stewart Guadalupe  MRN:   [id number]  Account:  [de-identified]  YOB: 1955  PCP:    Gracie Pereira MD  Code Status:    Full Code    Chief Complaint:     Left low leg cellulitis    History Obtained From:     patient    History of Present Illness: The patient is a 60 y.o. Non-/non  male who presents swelling pain erythema of the left lower leg  Started a few days ago  Getting worse  As streaks going up the thigh  Patient is feeling ill and sick    Vitals:    12/21/20 1902 12/21/20 2031 12/21/20 2339 12/22/20 0636   BP: 119/66 98/78 110/89 115/60   Pulse: 86 79 97 66   Resp: 18 19 19 17   Temp: 98.6 °F (37 °C) 98.2 °F (36.8 °C) 99.9 °F (37.7 °C) 97.9 °F (36.6 °C)   TempSrc: Oral Oral Oral Oral   SpO2: 98% 97% 98% 96%   Weight:  146 lb 13.2 oz (66.6 kg)     Height:                 Past Medical History:     Past Medical History:   Diagnosis Date    Abdominal pain     Colon polyp     Pulmonary nodules 9/17/2013    Thyroid nodule 9/17/2013        Past Surgical History:     Past Surgical History:   Procedure Laterality Date    COLONOSCOPY  12/12/11    POLYPECTOMY    COLONOSCOPY  10/23/07    POLYPECTOMY    CYST REMOVAL      \"back side\"   and face and neck    POLYPECTOMY  12/12/11        Medications Prior to Admission:     Prior to Admission medications    Medication Sig Start Date End Date Taking? Authorizing Provider   vitamin D (CHOLECALCIFEROL) 25 MCG (1000 UT) TABS tablet Take 1,000 Units by mouth daily   Yes Historical Provider, MD   Ascorbic Acid (VITAMIN C) 500 MG tablet Take 500 mg by mouth daily. Yes Historical Provider, MD   Multiple Vitamin (MULTIVITAMIN PO) Take  by mouth.      Yes Historical Provider, MD   Cyanocobalamin (VITAMIN B 12) 500 MCG TABS Take 500 mcg by mouth daily    Yes Historical Provider, MD vitamin E 400 UNIT capsule Take 400 Units by mouth Daily    Yes José Miguel Canada MD   fish oil-omega-3 fatty acids 1000 MG capsule Take 1 g by mouth daily    Yes Historical Provider, MD        Allergies:     Patient has no known allergies. Social History:     Tobacco:    reports that he quit smoking about 19 years ago. He has a 30.00 pack-year smoking history. He has never used smokeless tobacco.  Alcohol:      reports current alcohol use. Drug Use:  has no history on file for drug. Family History:     History reviewed. No pertinent family history. Review of Systems:     Positive and Negative as described in HPI. CONSTITUTIONAL:  negative for fevers, chills, sweats, fatigue, weight loss  HEENT:  negative for vision, hearing changes, runny nose, throat pain  RESPIRATORY:  negative for shortness of breath, cough, congestion, wheezing. CARDIOVASCULAR:  negative for chest pain, palpitations. GASTROINTESTINAL:  negative for nausea, vomiting, diarrhea, constipation, change in bowel habits, abdominal pain   GENITOURINARY:  negative for difficulty of urination, burning with urination, frequency   INTEGUMENT:  negative for rash, skin lesions, easy bruising   HEMATOLOGIC/LYMPHATIC:  negative for swelling/edema   ALLERGIC/IMMUNOLOGIC:  negative for urticaria , itching  ENDOCRINE:  negative increase in drinking, increase in urination, hot or cold intolerance  MUSCULOSKELETAL:  negative joint pains, muscle aches, swelling of joints  NEUROLOGICAL:  negative for headaches, dizziness, lightheadedness, numbness, pain, tingling extremities  BEHAVIOR/PSYCH:  negative for depression, anxiety    Physical Exam:   /60   Pulse 66   Temp 97.9 °F (36.6 °C) (Oral)   Resp 17   Ht 5' 11\" (1.803 m)   Wt 146 lb 13.2 oz (66.6 kg)   SpO2 96%   BMI 20.48 kg/m²   No results for input(s): POCGLU in the last 72 hours.     General Appearance:  alert, well appearing, and in no acute distress  Mental status: oriented to person, place, and time with normal affect  Head:  normocephalic, atraumatic. Eye: no icterus, redness, pupils equal and reactive, extraocular eye movements intact, conjunctiva clear  Ear: normal external ear, no discharge, hearing intact  Nose:  no drainage noted  Mouth: mucous membranes moist  Neck: supple, no carotid bruits, thyroid not palpable  Lungs: Bilateral equal air entry, clear to ausculation, no wheezing, rales or rhonchi, normal effort  Cardiovascular: normal rate, regular rhythm, no murmur, gallop, rub.   Abdomen: Soft, nontender, nondistended, normal bowel sounds, no hepatomegaly or splenomegaly  Neurologic: There are no new focal motor or sensory deficits, normal muscle tone and bulk, no abnormal sensation, normal speech, cranial nerves II through XII grossly intact  Skin: No gross lesions, rashes, bruising or bleeding on exposed skin area  Extremities: Findings suggestive of cellulitis of the left lower extremity with lymphangitic spread to her thigh  Psych: normal affect     Investigations:      Laboratory Testing:  Recent Results (from the past 24 hour(s))   CBC with DIFF    Collection Time: 12/21/20  5:15 PM   Result Value Ref Range    WBC 14.0 (H) 3.5 - 11.0 k/uL    RBC 5.52 4.5 - 5.9 m/uL    Hemoglobin 14.4 13.5 - 17.5 g/dL    Hematocrit 44.2 41 - 53 %    MCV 80.1 80 - 100 fL    MCH 26.1 26 - 34 pg    MCHC 32.6 31 - 37 g/dL    RDW 15.9 (H) 11.5 - 14.9 %    Platelets 801 592 - 861 k/uL    MPV 7.4 6.0 - 12.0 fL    NRBC Automated NOT REPORTED per 100 WBC    Differential Type NOT REPORTED     Seg Neutrophils 83 (H) 36 - 66 %    Lymphocytes 6 (L) 24 - 44 %    Monocytes 10 (H) 1 - 7 %    Eosinophils % 0 0 - 4 %    Basophils 1 0 - 2 %    Immature Granulocytes NOT REPORTED 0 %    Segs Absolute 11.60 (H) 1.3 - 9.1 k/uL    Absolute Lymph # 0.90 (L) 1.0 - 4.8 k/uL    Absolute Mono # 1.40 (H) 0.1 - 1.3 k/uL    Absolute Eos # 0.10 0.0 - 0.4 k/uL    Basophils Absolute 0.10 0.0 - 0.2 k/uL    Absolute Immature Granulocyte NOT REPORTED 0.00 - 0.30 k/uL    WBC Morphology NOT REPORTED     RBC Morphology NOT REPORTED     Platelet Estimate NOT REPORTED    Basic Metabolic Prof    Collection Time: 12/21/20  5:15 PM   Result Value Ref Range    Glucose 123 (H) 70 - 99 mg/dL    BUN 18 8 - 23 mg/dL    CREATININE 0.74 0.70 - 1.20 mg/dL    Bun/Cre Ratio NOT REPORTED 9 - 20    Calcium 9.1 8.6 - 10.4 mg/dL    Sodium 132 (L) 135 - 144 mmol/L    Potassium 4.6 3.7 - 5.3 mmol/L    Chloride 95 (L) 98 - 107 mmol/L    CO2 26 20 - 31 mmol/L    Anion Gap 11 9 - 17 mmol/L    GFR Non-African American >60 >60 mL/min    GFR African American >60 >60 mL/min    GFR Comment          GFR Staging NOT REPORTED    Brain Natriuretic Peptide    Collection Time: 12/21/20  5:15 PM   Result Value Ref Range    Pro- <300 pg/mL    BNP Interpretation Pro-BNP Reference Range:    Culture, Blood 1    Collection Time: 12/21/20  6:35 PM    Specimen: Blood   Result Value Ref Range    Specimen Description . BLOOD RT HAND 10ML     Special Requests NOT REPORTED     Culture NO GROWTH 14 HOURS    Culture, Blood 1    Collection Time: 12/21/20  6:46 PM    Specimen: Blood   Result Value Ref Range    Specimen Description . BLOOD     Special Requests 10CC EACH LT [facility]     Culture NO GROWTH 14 HOURS        Recent Labs     12/21/20  1715   HGB 14.4   HCT 44.2   WBC 14.0*   MCV 80.1   *   K 4.6   CL 95*   CO2 26   BUN 18   CREATININE 0.74   GLUCOSE 123*       Hematology:  Recent Labs     12/21/20  1715   WBC 14.0*   RBC 5.52   HGB 14.4   HCT 44.2   MCV 80.1   MCH 26.1   MCHC 32.6   RDW 15.9*      MPV 7.4     Chemistry:  Recent Labs     12/21/20  1715   *   K 4.6   CL 95*   CO2 26   GLUCOSE 123*   BUN 18   CREATININE 0.74   ANIONGAP 11   LABGLOM >60   GFRAA >60   CALCIUM 9.1   PROBNP 210     No results for input(s): PROT, LABALBU, LABA1C, P8PQWDJ, S2PUEKT, FT4, TSH, AST, ALT, LDH, GGT, ALKPHOS, LABGGT, BILITOT, BILIDIR, AMMONIA, AMYLASE, LIPASE, LACTATE, CHOL, HDL, LDLCHOLESTEROL, CHOLHDLRATIO, TRIG, VLDL, QGI21KK, PHENYTOIN, PHENYF, URICACID, POCGLU in the last 72 hours. Imaging/Diagnostics:       No results found. Impressions :      1. Active Problems:    Lymphangitis    Cellulitis of left lower leg  Resolved Problems:    * No resolved hospital problems. *        2.  has a past medical history of Abdominal pain, Colon polyp, Pulmonary nodules (9/17/2013), and Thyroid nodule (9/17/2013). Plans:     1.   We will treat with vancomycin and Unasyn  ID consulted    Current Facility-Administered Medications   Medication Dose Route Frequency Provider Last Rate Last Admin    vancomycin (VANCOCIN) 1,000 mg in dextrose 5 % 250 mL IVPB (ADDAVIAL)  1,000 mg Intravenous Q12H Roma Godoy MD   1,000 mg at 12/22/20 0914    ampicillin-sulbactam (UNASYN) 3 g ivpb minibag  3 g Intravenous Yana Brown MD   Stopped at 12/22/20 1216    sodium chloride flush 0.9 % injection 10 mL  10 mL Intravenous 2 times per day JERRI Hoffmann-C   10 mL at 12/21/20 2132    sodium chloride flush 0.9 % injection 10 mL  10 mL Intravenous PRN Rob Prescott PA-C        enoxaparin (LOVENOX) injection 40 mg  40 mg Subcutaneous Daily JERRI Hoffmann-C   40 mg at 12/22/20 5457    acetaminophen (TYLENOL) tablet 650 mg  650 mg Oral Q4H PRN Rob Prescott PA-C        HYDROcodone-acetaminophen (NORCO) 5-325 MG per tablet 1 tablet  1 tablet Oral Q4H PRN Rob Prescott PA-C        Or    HYDROcodone-acetaminophen (NORCO) 5-325 MG per tablet 2 tablet  2 tablet Oral Q4H PRN JERRI Hoffmann-C   2 tablet at 12/22/20 0348    ondansetron (ZOFRAN-ODT) disintegrating tablet 4 mg  4 mg Oral Q8H PRN JERRI Hoffmann-C        0.9 % sodium chloride infusion   Intravenous Continuous JERRI Hoffmann-C 100 mL/hr at 12/21/20 2132 New Bag at 12/21/20 2132    vancomycin (VANCOCIN) intermittent dosing (placeholder)   Other RX Placeholder Rob Prescott PA-C        influenza quadrivalent split vaccine (FLUZONE;FLUARIX;FLULAVAL;AFLURIA) injection 0.5 mL  0.5 mL Intramuscular Prior to discharge MD Giovana Fall MD  12/22/2020  1:36 PM

## 2020-12-22 NOTE — CARE COORDINATION
Follow up appointment made for patient with Dr. Sloane Lora on 1/7 at 11:00 AM.  Notified patient of date and time. Patient verbalizes understanding. Appointment entered into discharge navigator.     Electronically signed by Barry Wisdom RN on 12/22/2020 at 1:21 PM

## 2020-12-23 LAB
BUN BLDV-MCNC: 10 MG/DL (ref 8–23)
CREAT SERPL-MCNC: 0.63 MG/DL (ref 0.7–1.2)
GFR AFRICAN AMERICAN: >60 ML/MIN
GFR NON-AFRICAN AMERICAN: >60 ML/MIN
GFR SERPL CREATININE-BSD FRML MDRD: ABNORMAL ML/MIN/{1.73_M2}
GFR SERPL CREATININE-BSD FRML MDRD: ABNORMAL ML/MIN/{1.73_M2}
VANCOMYCIN TROUGH DATE LAST DOSE: NORMAL
VANCOMYCIN TROUGH DOSE AMOUNT: NORMAL
VANCOMYCIN TROUGH TIME LAST DOSE: 824
VANCOMYCIN TROUGH: 10.8 UG/ML (ref 10–20)

## 2020-12-23 PROCEDURE — 93970 EXTREMITY STUDY: CPT

## 2020-12-23 PROCEDURE — 6370000000 HC RX 637 (ALT 250 FOR IP): Performed by: PHYSICIAN ASSISTANT

## 2020-12-23 PROCEDURE — 36415 COLL VENOUS BLD VENIPUNCTURE: CPT

## 2020-12-23 PROCEDURE — 82565 ASSAY OF CREATININE: CPT

## 2020-12-23 PROCEDURE — 99232 SBSQ HOSP IP/OBS MODERATE 35: CPT | Performed by: INTERNAL MEDICINE

## 2020-12-23 PROCEDURE — 6360000002 HC RX W HCPCS: Performed by: PHYSICIAN ASSISTANT

## 2020-12-23 PROCEDURE — 2580000003 HC RX 258: Performed by: PHYSICIAN ASSISTANT

## 2020-12-23 PROCEDURE — 2580000003 HC RX 258: Performed by: INTERNAL MEDICINE

## 2020-12-23 PROCEDURE — 1200000000 HC SEMI PRIVATE

## 2020-12-23 PROCEDURE — 80202 ASSAY OF VANCOMYCIN: CPT

## 2020-12-23 PROCEDURE — 6360000002 HC RX W HCPCS: Performed by: INTERNAL MEDICINE

## 2020-12-23 PROCEDURE — 84520 ASSAY OF UREA NITROGEN: CPT

## 2020-12-23 RX ORDER — CLINDAMYCIN HYDROCHLORIDE 300 MG/1
300 CAPSULE ORAL 3 TIMES DAILY
Qty: 18 CAPSULE | Refills: 0 | Status: SHIPPED | OUTPATIENT
Start: 2020-12-23 | End: 2020-12-29

## 2020-12-23 RX ADMIN — VANCOMYCIN HYDROCHLORIDE 1000 MG: 1 INJECTION, POWDER, LYOPHILIZED, FOR SOLUTION INTRAVENOUS at 21:44

## 2020-12-23 RX ADMIN — AMPICILLIN SODIUM AND SULBACTAM SODIUM 3 G: 2; 1 INJECTION, POWDER, FOR SOLUTION INTRAMUSCULAR; INTRAVENOUS at 02:59

## 2020-12-23 RX ADMIN — ENOXAPARIN SODIUM 40 MG: 40 INJECTION SUBCUTANEOUS at 08:24

## 2020-12-23 RX ADMIN — HYDROCODONE BITARTRATE AND ACETAMINOPHEN 2 TABLET: 5; 325 TABLET ORAL at 06:09

## 2020-12-23 RX ADMIN — VANCOMYCIN HYDROCHLORIDE 1000 MG: 1 INJECTION, POWDER, LYOPHILIZED, FOR SOLUTION INTRAVENOUS at 08:24

## 2020-12-23 RX ADMIN — AMPICILLIN SODIUM AND SULBACTAM SODIUM 3 G: 2; 1 INJECTION, POWDER, FOR SOLUTION INTRAMUSCULAR; INTRAVENOUS at 10:12

## 2020-12-23 RX ADMIN — SODIUM CHLORIDE: 9 INJECTION, SOLUTION INTRAVENOUS at 06:09

## 2020-12-23 RX ADMIN — SODIUM CHLORIDE: 9 INJECTION, SOLUTION INTRAVENOUS at 20:00

## 2020-12-23 ASSESSMENT — PAIN DESCRIPTION - LOCATION
LOCATION: LEG
LOCATION: LEG

## 2020-12-23 ASSESSMENT — PAIN DESCRIPTION - ORIENTATION
ORIENTATION: LEFT
ORIENTATION: LEFT

## 2020-12-23 ASSESSMENT — PAIN SCALES - GENERAL
PAINLEVEL_OUTOF10: 5
PAINLEVEL_OUTOF10: 0
PAINLEVEL_OUTOF10: 7

## 2020-12-23 ASSESSMENT — PAIN DESCRIPTION - PAIN TYPE
TYPE: ACUTE PAIN
TYPE: ACUTE PAIN

## 2020-12-23 NOTE — PROGRESS NOTES
Pharmacy Note  Vancomycin Consult    Vancomycin Day: 3  Dose = 1000 mg every 12 hours. Plan:  Trough level tonight at 2000 hrs. Patient's labs, cultures, vitals, and vancomycin regimen reviewed. No changes today. Jung Oliver. Ph.  12/23/2020  9:20 AM

## 2020-12-23 NOTE — PLAN OF CARE
Problem: Pain:  Goal: Pain level will decrease  Description: Pain level will decrease  12/23/2020 1516 by Zahraa Cuevas RN  Outcome: Ongoing  Note: Pain assessed with each interaction. Meds given, see MAR. Will continue to monitor. Problem: Skin Integrity:  Goal: Will show no infection signs and symptoms  Description: Will show no infection signs and symptoms  12/23/2020 1516 by Zahraa Cuevas RN  Outcome: Ongoing  Note: Pt skin integrity remained intact, no new alterations noted. Head to toe completed, see chart assessment.

## 2020-12-23 NOTE — PROGRESS NOTES
[facility]    Progress Note     12/23/2020    12:46 PM    Name:   Sloane De La O  MRN:     [id number]     Acct:      [de-identified]   Room:   2061/2061-01  IP Day:  2  Admit Date:  12/21/2020  5:00 PM    PCP:   Jean Bennett MD  Code Status:  Full Code    Subjective:     C/C:   Chief Complaint   Patient presents with    Leg Swelling     Left lower extremity. Active Problems:    Lymphangitis    Cellulitis of left lower leg  Resolved Problems:    * No resolved hospital problems. *      Interval History Status: improved. Improving   On vanco .   Off unasyn    id input noted ;     [facility] -   Infectious diseases evaluation  admission date 12/21/2020     Recommendations ·   Continue IV vancomycin   · Discontinue Unasyn   · Oral clindamycin 300 mg 3 times daily on discharge until 12/29/2020  · Lower extremity Doppler pending  · Follow blood cultures  Follow CBC, vancomycin level and renal function  Discussed with discharge planning       Significant last 24 hr data reviewed ;   Vitals:    12/22/20 0636 12/22/20 1434 12/22/20 1831 12/23/20 0715   BP: 115/60 126/69 126/65 (!) 125/57   Pulse: 66 68 75 72   Resp: 17 16 16 18   Temp: 97.9 °F (36.6 °C) 98.4 °F (36.9 °C) 98.8 °F (37.1 °C) 98 °F (36.7 °C)   TempSrc: Oral Oral Oral Oral   SpO2: 96% 98% 98% 95%   Weight:       Height:          Recent Results (from the past 24 hour(s))   BUN & CREATININE    Collection Time: 12/23/20  5:56 AM   Result Value Ref Range    BUN 10 8 - 23 mg/dL    CREATININE 0.63 (L) 0.70 - 1.20 mg/dL    GFR Non-African American >60 >60 mL/min    GFR African American >60 >60 mL/min    GFR Comment          GFR Staging NOT REPORTED      No results for input(s): POCGLU in the last 72 hours. No results found.           HPI:   See history in H and P      Review of Systems:     Constitutional:  negative for chills, fevers, sweats  Respiratory: negative for cough, dyspnea on exertion, hemoptysis, shortness of breath, wheezing  Cardiovascular:  negative for chest pain, chest pressure/discomfort, lower extremity edema, palpitations  Gastrointestinal:  negative for abdominal pain, constipation, diarrhea, nausea, vomiting  Neurological:  negative for dizziness, headache  Data:     Past Medical History:  no change     Social History:  no change    Family History: @no change    Vitals:      I/O (24Hr):     Intake/Output Summary (Last 24 hours) at 12/23/2020 1246  Last data filed at 12/23/2020 1020  Gross per 24 hour   Intake 3847 ml   Output 1425 ml   Net 2422 ml       Labs:    URINE ANALYSIS: No results found for: LABURIN     CBC:  Lab Results   Component Value Date    WBC 14.0 12/21/2020    HGB 14.4 12/21/2020     12/21/2020     04/28/2012        BMP:    Lab Results   Component Value Date     12/21/2020    K 4.6 12/21/2020    CL 95 12/21/2020    CO2 26 12/21/2020    BUN 10 12/23/2020    CREATININE 0.63 12/23/2020    GLUCOSE 123 12/21/2020    GLUCOSE 87 04/28/2012      LIVER PROFILE:  Lab Results   Component Value Date    ALT 21 09/02/2020    AST 21 09/02/2020    PROT 6.1 09/02/2020    BILITOT 0.76 09/02/2020    BILIDIR 0.24 08/03/2012    LABALBU 3.7 09/02/2020    LABALBU 3.0 04/28/2012               Radiology:      Physical Examination:        General appearance:  alert, cooperative and no distress  Mental Status:  oriented to person, place and time and normal affect  Lungs:  clear to auscultation bilaterally, normal effort  Heart:  regular rate and rhythm, no murmur  Abdomen:  soft, nontender, nondistended, normal bowel sounds, no masses, hepatomegaly, splenomegaly  Extremities:  no edema, redness, tenderness in the calves  Skin:  no gross lesions, rashes, induration    Assessment:        Primary Problem  <principal problem not specified>    Active Hospital Problems    Diagnosis Date Noted    Cellulitis of left lower leg [C38.658] 12/22/2020    Lymphangitis [I89.1] 12/21/2020       Plan:        1. Improved . 2. Id input noted      [facility] -   Infectious diseases evaluation  admission date 12/21/2020     reason for consultation:   Left leg cellulitis     Impression :   Current:  · Left leg cellulitis  · Neurofibromatosis     Recommendations   · Continue IV vancomycin   · Discontinue Unasyn   · Oral clindamycin 300 mg 3 times daily on discharge until 12/29/2020  · Lower extremity Doppler pending  · Follow blood cultures  Follow CBC, vancomycin level and renal function  Discussed with discharge planning  3. Medications:      Allergies:  No Known Allergies    Current Meds:   Scheduled Meds:    vancomycin (VANCOCIN) 1250 mg in D5W 250 mL IVPB (ADDAVIAL)  1,000 mg Intravenous Q12H    sodium chloride flush  10 mL Intravenous 2 times per day    enoxaparin  40 mg Subcutaneous Daily    vancomycin (VANCOCIN) intermittent dosing (placeholder)   Other RX Placeholder    influenza virus vaccine  0.5 mL Intramuscular Prior to discharge     Continuous Infusions:    sodium chloride 100 mL/hr at 12/23/20 0827     PRN Meds: sodium chloride flush, acetaminophen, HYDROcodone 5 mg - acetaminophen **OR** HYDROcodone 5 mg - acetaminophen, ondansetron       Tamar Small MD  12/23/2020  12:46 PM

## 2020-12-23 NOTE — PROGRESS NOTES
Head: Normocephalic and atraumatic. Right Ear: External ear normal.      Left Ear: External ear normal.      Mouth/Throat:      Pharynx: Oropharynx is clear. No posterior oropharyngeal erythema. Eyes:      Conjunctiva/sclera: Conjunctivae normal.      Pupils: Pupils are equal, round, and reactive to light. Neck:      Musculoskeletal: Neck supple. No neck rigidity. Cardiovascular:      Rate and Rhythm: Normal rate and regular rhythm. Heart sounds: Normal heart sounds. Pulmonary:      Effort: Pulmonary effort is normal. No respiratory distress. Abdominal:      General: Abdomen is flat. Bowel sounds are normal.   Musculoskeletal:         General: Swelling present. Left lower leg: Edema present. Skin:     Findings: Erythema present. Comments: Left leg erythema and induration improved, no significant fluctuation or crepitance, no open wounds. Multiple fibromas   Neurological:      General: No focal deficit present. Mental Status: He is alert and oriented to person, place, and time.    Psychiatric:         Mood and Affect: Mood normal.         Behavior: Behavior normal.         Past Medical History:     Past Medical History:   Diagnosis Date    Abdominal pain     Colon polyp     Pulmonary nodules 9/17/2013    Thyroid nodule 9/17/2013       Past Surgical  History:     Past Surgical History:   Procedure Laterality Date    COLONOSCOPY  12/12/11    POLYPECTOMY    COLONOSCOPY  10/23/07    POLYPECTOMY    CYST REMOVAL      \"back side\"   and face and neck    POLYPECTOMY  12/12/11       Medications:      vancomycin (VANCOCIN) 1250 mg in D5W 250 mL IVPB (ADDAVIAL)  1,000 mg Intravenous Q12H    ampicillin-sulbactam  3 g Intravenous Q8H    sodium chloride flush  10 mL Intravenous 2 times per day    enoxaparin  40 mg Subcutaneous Daily    vancomycin (VANCOCIN) intermittent dosing (placeholder)   Other RX Placeholder    influenza virus vaccine  0.5 mL Intramuscular Prior to discharge Social History:     Social History     Socioeconomic History    Marital status:      Spouse name: Not on file    Number of children: Not on file    Years of education: Not on file    Highest education level: Not on file   Occupational History    Not on file   Social Needs    Financial resource strain: Not on file    Food insecurity     Worry: Not on file     Inability: Not on file    Transportation needs     Medical: Not on file     Non-medical: Not on file   Tobacco Use    Smoking status: Former Smoker     Packs/day: 1.00     Years: 30.00     Pack years: 30.00     Quit date: 2001     Years since quittin.4    Smokeless tobacco: Never Used   Substance and Sexual Activity    Alcohol use: Yes    Drug use: Not on file    Sexual activity: Not on file   Lifestyle    Physical activity     Days per week: Not on file     Minutes per session: Not on file    Stress: Not on file   Relationships    Social connections     Talks on phone: Not on file     Gets together: Not on file     Attends Worship service: Not on file     Active member of club or organization: Not on file     Attends meetings of clubs or organizations: Not on file     Relationship status: Not on file    Intimate partner violence     Fear of current or ex partner: Not on file     Emotionally abused: Not on file     Physically abused: Not on file     Forced sexual activity: Not on file   Other Topics Concern    Not on file   Social History Narrative    Not on file       Family History:   History reviewed. No pertinent family history.    Medical Decision Making:   I have independently reviewed/ordered the following labs:    CBC with Differential:   Recent Labs     20  1715   WBC 14.0*   HGB 14.4   HCT 44.2      LYMPHOPCT 6*   MONOPCT 10*     BMP:  Recent Labs     20  1715 20  0556   *  --    K 4.6  --    CL 95*  --    CO2 26  --    BUN 18 10   CREATININE 0.74 0.63*     Hepatic Function Panel: No results for input(s): PROT, LABALBU, BILIDIR, IBILI, BILITOT, ALKPHOS, ALT, AST in the last 72 hours. No results for input(s): RPR in the last 72 hours. No results for input(s): HIV in the last 72 hours. No results for input(s): BC in the last 72 hours. Lab Results   Component Value Date    CREATININE 0.63 12/23/2020    GLUCOSE 123 12/21/2020    GLUCOSE 87 04/28/2012       Detailed results: Thank you for allowing us to participate in the care of this patient. Please call with questions. This note is created with the assistance of a speech recognition program.  While intending to generate adocument that actually reflects the content of the visit, the document can still have some errors including those of syntax and sound a like substitutions which may escape proof reading. It such instances, actual meaningcan be extrapolated by contextual diversion.     Chen Santoro MD  Office: [phone]  Perfect serve / office [phone]

## 2020-12-24 VITALS
DIASTOLIC BLOOD PRESSURE: 78 MMHG | BODY MASS INDEX: 20.56 KG/M2 | RESPIRATION RATE: 17 BRPM | HEART RATE: 73 BPM | WEIGHT: 146.83 LBS | SYSTOLIC BLOOD PRESSURE: 147 MMHG | TEMPERATURE: 98.1 F | HEIGHT: 71 IN | OXYGEN SATURATION: 97 %

## 2020-12-24 LAB
ANION GAP SERPL CALCULATED.3IONS-SCNC: 9 MMOL/L (ref 9–17)
BUN BLDV-MCNC: 8 MG/DL (ref 8–23)
BUN/CREAT BLD: ABNORMAL (ref 9–20)
CALCIUM SERPL-MCNC: 8.5 MG/DL (ref 8.6–10.4)
CHLORIDE BLD-SCNC: 103 MMOL/L (ref 98–107)
CO2: 25 MMOL/L (ref 20–31)
CREAT SERPL-MCNC: 0.65 MG/DL (ref 0.7–1.2)
GFR AFRICAN AMERICAN: >60 ML/MIN
GFR NON-AFRICAN AMERICAN: >60 ML/MIN
GFR SERPL CREATININE-BSD FRML MDRD: ABNORMAL ML/MIN/{1.73_M2}
GFR SERPL CREATININE-BSD FRML MDRD: ABNORMAL ML/MIN/{1.73_M2}
GLUCOSE BLD-MCNC: 92 MG/DL (ref 70–99)
HCT VFR BLD CALC: 37.4 % (ref 41–53)
HEMOGLOBIN: 12.7 G/DL (ref 13.5–17.5)
MCH RBC QN AUTO: 27.1 PG (ref 26–34)
MCHC RBC AUTO-ENTMCNC: 34 G/DL (ref 31–37)
MCV RBC AUTO: 79.6 FL (ref 80–100)
NRBC AUTOMATED: ABNORMAL PER 100 WBC
PDW BLD-RTO: 15.8 % (ref 11.5–14.9)
PLATELET # BLD: 162 K/UL (ref 150–450)
PMV BLD AUTO: 7.8 FL (ref 6–12)
POTASSIUM SERPL-SCNC: 4 MMOL/L (ref 3.7–5.3)
RBC # BLD: 4.69 M/UL (ref 4.5–5.9)
SODIUM BLD-SCNC: 137 MMOL/L (ref 135–144)
WBC # BLD: 8 K/UL (ref 3.5–11)

## 2020-12-24 PROCEDURE — 80048 BASIC METABOLIC PNL TOTAL CA: CPT

## 2020-12-24 PROCEDURE — 2580000003 HC RX 258: Performed by: PHYSICIAN ASSISTANT

## 2020-12-24 PROCEDURE — 6360000002 HC RX W HCPCS: Performed by: INTERNAL MEDICINE

## 2020-12-24 PROCEDURE — 2580000003 HC RX 258: Performed by: INTERNAL MEDICINE

## 2020-12-24 PROCEDURE — 6360000002 HC RX W HCPCS: Performed by: PHYSICIAN ASSISTANT

## 2020-12-24 PROCEDURE — 99239 HOSP IP/OBS DSCHRG MGMT >30: CPT | Performed by: INTERNAL MEDICINE

## 2020-12-24 PROCEDURE — 85027 COMPLETE CBC AUTOMATED: CPT

## 2020-12-24 PROCEDURE — 99232 SBSQ HOSP IP/OBS MODERATE 35: CPT | Performed by: INTERNAL MEDICINE

## 2020-12-24 PROCEDURE — 36415 COLL VENOUS BLD VENIPUNCTURE: CPT

## 2020-12-24 RX ADMIN — VANCOMYCIN HYDROCHLORIDE 1000 MG: 1 INJECTION, POWDER, LYOPHILIZED, FOR SOLUTION INTRAVENOUS at 09:04

## 2020-12-24 RX ADMIN — SODIUM CHLORIDE: 9 INJECTION, SOLUTION INTRAVENOUS at 05:30

## 2020-12-24 RX ADMIN — ENOXAPARIN SODIUM 40 MG: 40 INJECTION SUBCUTANEOUS at 09:04

## 2020-12-24 RX ADMIN — Medication 10 ML: at 13:44

## 2020-12-24 NOTE — PROGRESS NOTES
[facility] -   Infectious diseases evaluation  admission date 12/21/2020    reason for consultation:   Left leg cellulitis    Impression :   Current:  · Left leg cellulitis  · Neurofibromatosis  · Age indeterminate deep venous thrombosis identified in the popliteal vein. Recommendations     · Oral clindamycin 300 mg 3 times daily until 12/29/2020  · The need for anticoagulation to be addressed by primary . · Discussed with discharge planning      History of Present Illness:   Initial history:  Naun Mckenzie is a 60y.o.-year-old male with history of neurofibromatosis presented to the hospital with worsening distal left leg swelling, pain and redness for several days extend to the upper thigh. He denied any recent trauma or insect bites but he had a remote injury to the left leg at work around 3 years ago. He denied history of diabetes or previous skin and soft tissue infection. The patient denied any animal bites or cat scratch. Interval changes  12/24/2020   He continues to improve, less swelling or redness to the left leg, denied pain, denied fever or chills, no nausea or vomiting no other complaints  Venous Doppler showed age indeterminate deep venous thrombosis identified in the popliteal vein on the right, no DVT on the left. Patient Vitals for the past 8 hrs:   BP Temp Temp src Pulse Resp SpO2   12/24/20 0656 (!) 147/78 98.1 °F (36.7 °C) Oral 73 17 97 %         I have personally reviewed the past medical history, past surgical history, medications, social history, and family history, and I haveupdated the database accordingly. Allergies:   Patient has no known allergies. Review of Systems:     Review of Systems  As per history present illness, other than above 12 system reviewed were negative  Physical Examination :       Physical Exam  Constitutional:       General: He is not in acute distress. Appearance: Normal appearance.    HENT: Head: Normocephalic and atraumatic. Right Ear: External ear normal.      Left Ear: External ear normal.      Mouth/Throat:      Pharynx: Oropharynx is clear. No posterior oropharyngeal erythema. Eyes:      Conjunctiva/sclera: Conjunctivae normal.      Pupils: Pupils are equal, round, and reactive to light. Neck:      Musculoskeletal: Neck supple. No neck rigidity. Cardiovascular:      Rate and Rhythm: Normal rate and regular rhythm. Heart sounds: Normal heart sounds. Pulmonary:      Effort: Pulmonary effort is normal. No respiratory distress. Abdominal:      General: Abdomen is flat. Bowel sounds are normal.   Musculoskeletal:         General: Swelling present. Left lower leg: Edema present. Skin:     Findings: Erythema present. Comments: Left leg erythema and induration improved, no significant fluctuation or crepitance, no open wounds. Multiple fibromas   Neurological:      General: No focal deficit present. Mental Status: He is alert and oriented to person, place, and time.    Psychiatric:         Mood and Affect: Mood normal.         Behavior: Behavior normal.         Past Medical History:     Past Medical History:   Diagnosis Date    Abdominal pain     Colon polyp     Pulmonary nodules 9/17/2013    Thyroid nodule 9/17/2013       Past Surgical  History:     Past Surgical History:   Procedure Laterality Date    COLONOSCOPY  12/12/11    POLYPECTOMY    COLONOSCOPY  10/23/07    POLYPECTOMY    CYST REMOVAL      \"back side\"   and face and neck    POLYPECTOMY  12/12/11       Medications:      vancomycin (VANCOCIN) 1250 mg in D5W 250 mL IVPB (ADDAVIAL)  1,000 mg Intravenous Q12H    sodium chloride flush  10 mL Intravenous 2 times per day    enoxaparin  40 mg Subcutaneous Daily    vancomycin (VANCOCIN) intermittent dosing (placeholder)   Other RX Placeholder       Social History:     Social History     Socioeconomic History    Marital status:      Spouse name: Not on file    Number of children: Not on file    Years of education: Not on file    Highest education level: Not on file   Occupational History    Not on file   Social Needs    Financial resource strain: Not on file    Food insecurity     Worry: Not on file     Inability: Not on file    Transportation needs     Medical: Not on file     Non-medical: Not on file   Tobacco Use    Smoking status: Former Smoker     Packs/day: 1.00     Years: 30.00     Pack years: 30.00     Quit date: 2001     Years since quittin.4    Smokeless tobacco: Never Used   Substance and Sexual Activity    Alcohol use: Yes    Drug use: Not on file    Sexual activity: Not on file   Lifestyle    Physical activity     Days per week: Not on file     Minutes per session: Not on file    Stress: Not on file   Relationships    Social connections     Talks on phone: Not on file     Gets together: Not on file     Attends Evangelical service: Not on file     Active member of club or organization: Not on file     Attends meetings of clubs or organizations: Not on file     Relationship status: Not on file    Intimate partner violence     Fear of current or ex partner: Not on file     Emotionally abused: Not on file     Physically abused: Not on file     Forced sexual activity: Not on file   Other Topics Concern    Not on file   Social History Narrative    Not on file       Family History:   History reviewed. No pertinent family history.    Medical Decision Making:   I have independently reviewed/ordered the following labs:    CBC with Differential:   Recent Labs     20  1715 20  0526   WBC 14.0* 8.0   HGB 14.4 12.7*   HCT 44.2 37.4*    162   LYMPHOPCT 6*  --    MONOPCT 10*  --      BMP:  Recent Labs     20  1715 20  0556 20  0526   *  --  137   K 4.6  --  4.0   CL 95*  --  103   CO2 26  --  25   BUN 18 10 8   CREATININE 0.74 0.63* 0.65*     Hepatic Function Panel: No results for input(s): PROT, LABALBU, BILIDIR, IBILI, BILITOT, ALKPHOS, ALT, AST in the last 72 hours. No results for input(s): RPR in the last 72 hours. No results for input(s): HIV in the last 72 hours. No results for input(s): BC in the last 72 hours. Lab Results   Component Value Date    CREATININE 0.65 12/24/2020    GLUCOSE 92 12/24/2020    GLUCOSE 87 04/28/2012       Detailed results: Thank you for allowing us to participate in the care of this patient. Please call with questions. This note is created with the assistance of a speech recognition program.  While intending to generate adocument that actually reflects the content of the visit, the document can still have some errors including those of syntax and sound a like substitutions which may escape proof reading. It such instances, actual meaningcan be extrapolated by contextual diversion.     Fern Landaverde MD  Office: [phone]  Perfect serve / office [phone]

## 2020-12-24 NOTE — DISCHARGE SUMMARY
[place] [address] Internal Medicine    Discharge Summary     Patient ID: Malaika Salas  :  1955   MRN: [id number]     ACCOUNT:  [de-identified]   Patient's PCP: Chad Ledesma MD  Admit Date: 2020   Discharge Date:    Length of Stay: 3  Code Status:  Full Code  Admitting Physician: Lorri Moralez MD  Discharge Physician: Lorri Moralez MD     Active Discharge Diagnoses:     Primary Problem  <principal problem not specified>      [place] Problems    Diagnosis Date Noted    Cellulitis of left lower leg [Q46.658] 2020    Lymphangitis [I89.1] 2020       Admission Condition:  poor     Discharged Condition: fair    Hospital Stay:     Hospital Course:  Malaika Salas is a 60 y.o. male who was admitted for the management of   .     , presented with Leg Swelling (Left lower extremity.)      ,                         <principal problem not specified>;                               Active Problems:    Lymphangitis    Cellulitis of left lower leg  Resolved Problems:    * No resolved hospital problems. *       Significant therapeutic interventions:     Patient was admitted with cellulitis of the left lower lobe with lymphangitis progressing onto his thighs  Initially started on Vanco and Unasyn  Subsequently continued on Vanco and clindamycin  Improved rapidly  Being discharged on clindamycin      Active Problems:    Lymphangitis    Cellulitis of left lower leg  Resolved Problems:    * No resolved hospital problems. *      Interval History Status: improved.        Improving   On vanco .   Off unasyn    id input noted ;     [facility] -   Infectious diseases evaluation  admission date 2020     Recommendations ·   Continue IV vancomycin   · Discontinue Unasyn   · Oral clindamycin 300 mg 3 times daily on discharge until 2020  · Lower extremity Doppler pending  · Follow blood cultures  Follow CBC, vancomycin level and renal function  Discussed with discharge planning            Significant Diagnostic Studies:   Labs / Micro:       Results for orders placed or performed during the hospital encounter of 12/21/20   Culture, Blood 1    Specimen: Blood   Result Value Ref Range    Specimen Description . BLOOD     Special Requests 10CC EACH LT AC     Culture NO GROWTH 3 DAYS    Culture, Blood 1    Specimen: Blood   Result Value Ref Range    Specimen Description . BLOOD RT HAND 10ML     Special Requests NOT REPORTED     Culture NO GROWTH 3 DAYS    CBC with DIFF   Result Value Ref Range    WBC 14.0 (H) 3.5 - 11.0 k/uL    RBC 5.52 4.5 - 5.9 m/uL    Hemoglobin 14.4 13.5 - 17.5 g/dL    Hematocrit 44.2 41 - 53 %    MCV 80.1 80 - 100 fL    MCH 26.1 26 - 34 pg    MCHC 32.6 31 - 37 g/dL    RDW 15.9 (H) 11.5 - 14.9 %    Platelets 474 566 - 041 k/uL    MPV 7.4 6.0 - 12.0 fL    NRBC Automated NOT REPORTED per 100 WBC    Differential Type NOT REPORTED     Seg Neutrophils 83 (H) 36 - 66 %    Lymphocytes 6 (L) 24 - 44 %    Monocytes 10 (H) 1 - 7 %    Eosinophils % 0 0 - 4 %    Basophils 1 0 - 2 %    Immature Granulocytes NOT REPORTED 0 %    Segs Absolute 11.60 (H) 1.3 - 9.1 k/uL    Absolute Lymph # 0.90 (L) 1.0 - 4.8 k/uL    Absolute Mono # 1.40 (H) 0.1 - 1.3 k/uL    Absolute Eos # 0.10 0.0 - 0.4 k/uL    Basophils Absolute 0.10 0.0 - 0.2 k/uL    Absolute Immature Granulocyte NOT REPORTED 0.00 - 0.30 k/uL    WBC Morphology NOT REPORTED     RBC Morphology NOT REPORTED     Platelet Estimate NOT REPORTED    Basic Metabolic Prof   Result Value Ref Range    Glucose 123 (H) 70 - 99 mg/dL    BUN 18 8 - 23 mg/dL    CREATININE 0.74 0.70 - 1.20 mg/dL    Bun/Cre Ratio NOT REPORTED 9 - 20    Calcium 9.1 8.6 - 10.4 mg/dL    Sodium 132 (L) 135 - 144 mmol/L    Potassium 4.6 3.7 - 5.3 mmol/L    Chloride 95 (L) 98 - 107 mmol/L    CO2 26 20 - 31 mmol/L    Anion Gap 11 9 - 17 mmol/L    GFR Non-African American >60 >60 mL/min GFR African American >60 >60 mL/min    GFR Comment          GFR Staging NOT REPORTED    Brain Natriuretic Peptide   Result Value Ref Range    Pro- <300 pg/mL    BNP Interpretation Pro-BNP Reference Range:    BUN & CREATININE   Result Value Ref Range    BUN 10 8 - 23 mg/dL    CREATININE 0.63 (L) 0.70 - 1.20 mg/dL    GFR Non-African American >60 >60 mL/min    GFR African American >60 >60 mL/min    GFR Comment          GFR Staging NOT REPORTED    VANCOMYCIN, TROUGH   Result Value Ref Range    Vancomycin Tr 10.8 10.0 - 20.0 ug/mL    Vancomycin Trough Dose amount 1000 MG     Vancomycin Trough Date last dose 12,232,020     Vancomycin Trough Time last dose 824    CBC   Result Value Ref Range    WBC 8.0 3.5 - 11.0 k/uL    RBC 4.69 4.5 - 5.9 m/uL    Hemoglobin 12.7 (L) 13.5 - 17.5 g/dL    Hematocrit 37.4 (L) 41 - 53 %    MCV 79.6 (L) 80 - 100 fL    MCH 27.1 26 - 34 pg    MCHC 34.0 31 - 37 g/dL    RDW 15.8 (H) 11.5 - 14.9 %    Platelets 098 780 - 487 k/uL    MPV 7.8 6.0 - 12.0 fL    NRBC Automated NOT REPORTED per 100 WBC   Basic Metabolic Prof   Result Value Ref Range    Glucose 92 70 - 99 mg/dL    BUN 8 8 - 23 mg/dL    CREATININE 0.65 (L) 0.70 - 1.20 mg/dL    Bun/Cre Ratio NOT REPORTED 9 - 20    Calcium 8.5 (L) 8.6 - 10.4 mg/dL    Sodium 137 135 - 144 mmol/L    Potassium 4.0 3.7 - 5.3 mmol/L    Chloride 103 98 - 107 mmol/L    CO2 25 20 - 31 mmol/L    Anion Gap 9 9 - 17 mmol/L    GFR Non-African American >60 >60 mL/min    GFR African American >60 >60 mL/min    GFR Comment          GFR Staging NOT REPORTED         {Radiology:    Vl Lower Extremity Bilateral Venous Duplex    Result Date: 12/23/2020    [facility]  Vascular Lower Extremities DVT Study Procedure   Patient Name    [address]      Date of Study             12/23/2020                  JUSTIN BUSTOS   Date of Birth   1955   Gender                    Male   Age             60 year(s)   Race                         Room Number     [address]60 Corporate ID #  [id number]   Patient Acct #  [de-identified]   MR #            [id number]       Sonographer               Shelia Pate   Accession #     [id number]   Interpreting Physician    Ulises Number   Referring Nurse              Referring Physician       Leanne Isaac 134  Practitioner  Procedure Type of Study:   Veins: Lower Extremities DVT Study, Venous Scan Lower Bilateral.  Indications for Study:Leg Swelling. Patient Status: In Patient. Technical Quality:Limited visualization. Limitation reason:Bedside exam .  Conclusions   Summary   Simultaneous real time imaging utilizing B-Mode, color doppler and  spectral waveform analysis was performed on the bilateral lower  extremities for venous examination of the deep and superficial systems. Findings are:   Right:  Technically limited study as stated above. Age indeterminate deep venous thrombosis identified in the popliteal vein. Left:  No evidence of deep or superficial venous thrombosis. Signature   ----------------------------------------------------------------  Electronically signed by Elliott Arzate(Sonographer) on  12/23/2020 12:00 PM  ----------------------------------------------------------------   ----------------------------------------------------------------  Electronically signed by Ana Chávez(Interpreting  physician) on 12/23/2020 02:00 PM  ----------------------------------------------------------------  Findings:   Right Impression:                       Left Impression:  Non visualization of the posterior      The common femoral, femoral,  tibial and peroneal veins. popliteal and tibial veins                                          demonstrate normal compressibility  Partial compressibility of the          and augmentation. popliteal and tibioperoneal veins with  hypoechoic intraluminal content and     Normal compressibility of the  continuous Doppler response. great saphenous vein.    Remaining deep veins +------------------------------------+----------+---------------+----------+ ! GSV Thigh                           ! Yes       ! Yes            ! None      ! +------------------------------------+----------+---------------+----------+ ! GSV Knee                            ! Yes       ! Yes            ! None      ! +------------------------------------+----------+---------------+----------+ ! GSV Ankle                           ! Yes       ! Yes            ! None      ! +------------------------------------+----------+---------------+----------+ ! SSV                                 ! Yes       ! Yes            ! None      ! +------------------------------------+----------+---------------+----------+ Right Doppler Measurements +-------------------------+----------+------+------------------------------+ ! Location                 ! Signal    !Reflux! Reflux (msec)                 ! +-------------------------+----------+------+------------------------------+ ! Common Femoral           !Phasic    !      !                              ! +-------------------------+----------+------+------------------------------+ ! Prox Femoral             !Phasic    !      !                              ! +-------------------------+----------+------+------------------------------+ ! Popliteal                !Continuous!      !                              ! +-------------------------+----------+------+------------------------------+ Left Lower Extremities DVT Study Measurements Left 2D Measurements +------------------------------------+----------+---------------+----------+ ! Location                            ! Visualized! Compressibility! Thrombosis! +------------------------------------+----------+---------------+----------+ ! Common Femoral                      !Yes       ! Yes            ! None      ! +------------------------------------+----------+---------------+----------+ ! Prox Femoral                        !Yes       ! Yes            ! None      ! +------------------------------------+----------+---------------+----------+ ! Mid Femoral                         !Yes       ! Yes            ! None      ! +------------------------------------+----------+---------------+----------+ ! Dist Femoral                        !Yes       ! Yes            ! None      ! +------------------------------------+----------+---------------+----------+ ! Popliteal                           !Yes       ! Yes            ! None      ! +------------------------------------+----------+---------------+----------+ ! Sapheno Femoral Junction            ! Yes       ! Yes            ! None      ! +------------------------------------+----------+---------------+----------+ ! PTV                                 ! Yes       ! Yes            ! None      ! +------------------------------------+----------+---------------+----------+ ! Peroneal                            !Yes       ! Yes            ! None      ! +------------------------------------+----------+---------------+----------+ ! Gastroc                             ! Yes       ! Yes            ! None      ! +------------------------------------+----------+---------------+----------+ ! GSV Thigh                           ! Yes       ! Yes            ! None      ! +------------------------------------+----------+---------------+----------+ ! GSV Knee                            ! Yes       ! Yes            ! None      ! +------------------------------------+----------+---------------+----------+ ! GSV Ankle                           ! Yes       ! Yes            ! None      ! +------------------------------------+----------+---------------+----------+ ! SSV                                 ! Yes       ! Yes            ! None      ! +------------------------------------+----------+---------------+----------+ Left Doppler Measurements +---------------------------+------+------+--------------------------------+ ! Location                   ! Signal!Reflux! Reflux (msec) ! +---------------------------+------+------+--------------------------------+ ! Common Femoral             !Phasic!      !                                ! +---------------------------+------+------+--------------------------------+ ! Prox Femoral               !Phasic!      !                                ! +---------------------------+------+------+--------------------------------+ ! Popliteal                  !Phasic!      !                                ! +---------------------------+------+------+--------------------------------+       Consultations:    Consults:     Final Specialist Recommendations/Findings:   IP CONSULT TO INTERNAL MEDICINE  PHARMACY TO DOSE VANCOMYCIN  IP CONSULT TO INFECTIOUS DISEASES      The patient was seen and examined on day of discharge and this discharge summary is in conjunction with any daily progress note from day of discharge. Discharge plan:     Disposition: Home    Physician Follow Up:   With PCP and as specified     Requiring Further Evaluation/Follow Up POST HOSPITALIZATION/Incidental Findings:    Diet: regular     Activity: As tolerated    I  Discharge Medications:      Medication List      START taking these medications    clindamycin 300 MG capsule  Commonly known as: CLEOCIN  Take 1 capsule by mouth 3 times daily for 6 days        CONTINUE taking these medications    fish oil-omega-3 fatty acids 1000 MG capsule     MULTIVITAMIN PO     Vitamin B 12 500 MCG Tabs     vitamin C 500 MG tablet  Commonly known as: ASCORBIC ACID     vitamin D 25 MCG (1000 UT) Tabs tablet  Commonly known as: CHOLECALCIFEROL     vitamin E 400 UNIT capsule           Where to Get Your Medications      These medications were sent to [facility], [address] [facility]  [address], [address]    Phone: [phone]   · clindamycin 300 MG capsule           Time spent on discharge planning ;          [] less than 30 minutes .         [x]   more  than 30 minutes . Ellectronically signed by   Burke Bennett MD      Thank you Dr. Yoli Greenwood MD for the opportunity to be involved in this patient's care. Please note that this chart was generated using voice recognition Dragon dictation software. Although every effort was made to ensure the accuracy of this automated transcription, some errors in transcription may have occurred.

## 2020-12-24 NOTE — PROGRESS NOTES
[facility]    Progress Note     12/24/2020    12:45 PM    Name:   Nicholas Becerra  MRN:     [id number]     Acct:      [de-identified]   Room:   2061/2061-01   Day:  3  Admit Date:  12/21/2020  5:00 PM    PCP:   Caleb Martinez MD  Code Status:  Full Code    Subjective:     C/C:   Chief Complaint   Patient presents with    Leg Swelling     Left lower extremity. Active Problems:    Lymphangitis    Cellulitis of left lower leg  Resolved Problems:    * No resolved hospital problems. *      Interval History Status: improved.        Improving   On vanco .   Off unasyn    id input noted ;     [facility] -   Infectious diseases evaluation  admission date 12/21/2020     Recommendations ·   Continue IV vancomycin   · Discontinue Unasyn   · Oral clindamycin 300 mg 3 times daily on discharge until 12/29/2020  · Lower extremity Doppler pending  · Follow blood cultures  Follow CBC, vancomycin level and renal function  Discussed with discharge planning       Significant last 24 hr data reviewed ;   Vitals:    12/23/20 0715 12/23/20 1330 12/23/20 1826 12/24/20 0656   BP: (!) 125/57 132/71 (!) 146/58 (!) 147/78   Pulse: 72 68 86 73   Resp: 18 16 18 17   Temp: 98 °F (36.7 °C) 97.9 °F (36.6 °C) 98.7 °F (37.1 °C) 98.1 °F (36.7 °C)   TempSrc: Oral Oral Oral Oral   SpO2: 95% 97% 99% 97%   Weight:       Height:          Recent Results (from the past 24 hour(s))   VANCOMYCIN, TROUGH    Collection Time: 12/23/20  8:02 PM   Result Value Ref Range    Vancomycin Tr 10.8 10.0 - 20.0 ug/mL    Vancomycin Trough Dose amount 1000 MG     Vancomycin Trough Date last dose 12,232,020     Vancomycin Trough Time last dose 824    CBC    Collection Time: 12/24/20  5:26 AM   Result Value Ref Range    WBC 8.0 3.5 - 11.0 k/uL    RBC 4.69 4.5 - 5.9 m/uL    Hemoglobin 12.7 (L) 13.5 - 17.5 g/dL    Hematocrit 37.4 (L) 41 - 53 %    MCV 79.6 (L) 80 - 100 fL    MCH 27.1 GLUCOSE 87 04/28/2012      LIVER PROFILE:  Lab Results   Component Value Date    ALT 21 09/02/2020    AST 21 09/02/2020    PROT 6.1 09/02/2020    BILITOT 0.76 09/02/2020    BILIDIR 0.24 08/03/2012    LABALBU 3.7 09/02/2020    LABALBU 3.0 04/28/2012               Radiology:      Physical Examination:        General appearance:  alert, cooperative and no distress  Mental Status:  oriented to person, place and time and normal affect  Lungs:  clear to auscultation bilaterally, normal effort  Heart:  regular rate and rhythm, no murmur  Abdomen:  soft, nontender, nondistended, normal bowel sounds, no masses, hepatomegaly, splenomegaly  Extremities:  no edema, redness, tenderness in the calves  Skin:  no gross lesions, rashes, induration    Assessment:        Primary Problem  <principal problem not specified>    Active Hospital Problems    Diagnosis Date Noted    Cellulitis of left lower leg [L03.116] 12/22/2020    Lymphangitis [I89.1] 12/21/2020       Plan:        12/24/20    · Discharge home today on clindamycin 300 mg three times a day for seven more days 1.           1. Improved . 2. Id input noted      [facility] -   Infectious diseases evaluation  admission date 12/21/2020     reason for consultation:   Left leg cellulitis     Impression :   Current:  · Left leg cellulitis  · Neurofibromatosis     Recommendations   · Continue IV vancomycin   · Discontinue Unasyn   · Oral clindamycin 300 mg 3 times daily on discharge until 12/29/2020  · Lower extremity Doppler pending  · Follow blood cultures  Follow CBC, vancomycin level and renal function  Discussed with discharge planning  3. Medications:      Allergies:  No Known Allergies    Current Meds:   Scheduled Meds:    vancomycin (VANCOCIN) 1250 mg in D5W 250 mL IVPB (ADDAVIAL)  1,000 mg Intravenous Q12H    sodium chloride flush  10 mL Intravenous 2 times per day    enoxaparin  40 mg Subcutaneous Daily    vancomycin (VANCOCIN) intermittent dosing (placeholder)   Other RX Placeholder    influenza virus vaccine  0.5 mL Intramuscular Prior to discharge     Continuous Infusions:    sodium chloride 100 mL/hr at 12/24/20 0530     PRN Meds: sodium chloride flush, acetaminophen, HYDROcodone 5 mg - acetaminophen **OR** HYDROcodone 5 mg - acetaminophen, ondansetron       Marlene Woods MD  12/24/2020  12:45 PM

## 2020-12-24 NOTE — PLAN OF CARE
Problem: Pain:  Description: Pain management should include both nonpharmacologic and pharmacologic interventions. Goal: Pain level will decrease  Description: Pain level will decrease  12/24/2020 1400 by Catalina Johnson RN  Outcome: Completed  12/24/2020 0430 by Andi Valle RN  Outcome: Ongoing  Note: No complaints of pain or discomfort this shift. Goal: Control of acute pain  Description: Control of acute pain  12/24/2020 1400 by Catalina Johnson RN  Outcome: Completed  12/24/2020 0430 by Andi Valle RN  Outcome: Ongoing  Goal: Control of chronic pain  Description: Control of chronic pain  12/24/2020 1400 by Catalina Johnson RN  Outcome: Completed  12/24/2020 0430 by Andi Valle RN  Outcome: Ongoing     Problem: Skin Integrity:  Goal: Will show no infection signs and symptoms  Description: Will show no infection signs and symptoms  12/24/2020 1400 by Catalina Johnson RN  Outcome: Completed  12/24/2020 0430 by Andi Valle RN  Outcome: Ongoing  Note: Patient has no signs or symptoms of infection. Goal: Absence of new skin breakdown  Description: Absence of new skin breakdown  12/24/2020 1400 by Catalina Johnson RN  Outcome: Completed  12/24/2020 0430 by Andi Valle RN  Outcome: Ongoing  Note: Skin assessment as charted.

## 2020-12-24 NOTE — CARE COORDINATION
DISCHARGE PLANNING NOTE:    Plan is for this patient to return to home. Should discharge home later today on PO clindamycin.      Electronically signed by Phu Jane RN on 12/24/2020 at 10:53 AM

## 2020-12-24 NOTE — PLAN OF CARE
Problem: Pain:  Description: Pain management should include both nonpharmacologic and pharmacologic interventions. Goal: Pain level will decrease  Description: Pain level will decrease  12/24/2020 0430 by Virginia Ellington RN  Outcome: Ongoing  Note: No complaints of pain or discomfort this shift. 12/23/2020 1516 by Vivien Ornelas RN  Outcome: Ongoing  Note: Pain assessed with each interaction. Meds given, see MAR. Will continue to monitor. Problem: Skin Integrity:  Goal: Will show no infection signs and symptoms  Description: Will show no infection signs and symptoms  12/24/2020 0430 by Virginia Ellington RN  Outcome: Ongoing  Note: Patient has no signs or symptoms of infection. 12/23/2020 1516 by Vivien Ornelas RN  Outcome: Ongoing  Note: Pt skin integrity remained intact, no new alterations noted. Head to toe completed, see chart assessment. Problem: Skin Integrity:  Goal: Absence of new skin breakdown  Description: Absence of new skin breakdown  Outcome: Ongoing  Note: Skin assessment as charted.

## 2020-12-27 LAB
CULTURE: NORMAL
CULTURE: NORMAL
Lab: NORMAL
Lab: NORMAL
SPECIMEN DESCRIPTION: NORMAL
SPECIMEN DESCRIPTION: NORMAL

## 2021-01-07 ENCOUNTER — HOSPITAL ENCOUNTER (OUTPATIENT)
Age: 66
Setting detail: SPECIMEN
Discharge: HOME | End: 2021-01-07
Payer: COMMERCIAL

## 2021-01-07 ENCOUNTER — OFFICE VISIT (OUTPATIENT)
Dept: FAMILY MEDICINE CLINIC | Age: 66
End: 2021-01-07
Payer: COMMERCIAL

## 2021-01-07 VITALS
DIASTOLIC BLOOD PRESSURE: 76 MMHG | WEIGHT: 147 LBS | SYSTOLIC BLOOD PRESSURE: 120 MMHG | HEART RATE: 74 BPM | TEMPERATURE: 97.2 F | HEIGHT: 70 IN | RESPIRATION RATE: 18 BRPM | BODY MASS INDEX: 21.05 KG/M2

## 2021-01-07 DIAGNOSIS — L03.116 CELLULITIS OF LEFT LOWER LEG: ICD-10-CM

## 2021-01-07 DIAGNOSIS — R30.0 DYSURIA: Primary | ICD-10-CM

## 2021-01-07 DIAGNOSIS — R35.0 URINARY FREQUENCY: ICD-10-CM

## 2021-01-07 LAB
BILIRUBIN, POC: NEGATIVE
BLOOD URINE, POC: NEGATIVE
CLARITY, POC: ABNORMAL
COLOR, POC: ABNORMAL
GLUCOSE URINE, POC: NEGATIVE
KETONES, POC: ABNORMAL
LEUKOCYTE EST, POC: NEGATIVE
NITRITE, POC: NEGATIVE
PH, POC: 6.5
PROTEIN, POC: NEGATIVE
SPECIFIC GRAVITY, POC: 1.02
UROBILINOGEN, POC: NEGATIVE

## 2021-01-07 PROCEDURE — 81003 URINALYSIS AUTO W/O SCOPE: CPT | Performed by: FAMILY MEDICINE

## 2021-01-07 PROCEDURE — 99203 OFFICE O/P NEW LOW 30 MIN: CPT | Performed by: FAMILY MEDICINE

## 2021-01-07 SDOH — ECONOMIC STABILITY: FOOD INSECURITY: WITHIN THE PAST 12 MONTHS, YOU WORRIED THAT YOUR FOOD WOULD RUN OUT BEFORE YOU GOT MONEY TO BUY MORE.: NEVER TRUE

## 2021-01-07 SDOH — ECONOMIC STABILITY: FOOD INSECURITY: WITHIN THE PAST 12 MONTHS, THE FOOD YOU BOUGHT JUST DIDN'T LAST AND YOU DIDN'T HAVE MONEY TO GET MORE.: NEVER TRUE

## 2021-01-07 ASSESSMENT — PATIENT HEALTH QUESTIONNAIRE - PHQ9
SUM OF ALL RESPONSES TO PHQ QUESTIONS 1-9: 0
2. FEELING DOWN, DEPRESSED OR HOPELESS: 0
SUM OF ALL RESPONSES TO PHQ QUESTIONS 1-9: 0

## 2021-01-07 ASSESSMENT — ENCOUNTER SYMPTOMS
BLOOD IN STOOL: 0
CHEST TIGHTNESS: 0
ABDOMINAL PAIN: 0
SHORTNESS OF BREATH: 0

## 2021-01-07 NOTE — PROGRESS NOTES
Subjective:      Patient ID: Karlee Grimes is a 60 y.o. male. HPI  Visit Information    Have you changed or started any medications since your last visit including any over-the-counter medicines, vitamins, or herbal medicines? no   Have you stopped taking any of your medications? Is so, why? -  no  Are you having any side effects from any of your medications? - no    Have you seen any other physician or provider since your last visit? yes -    Have you had any other diagnostic tests since your last visit? yes -    Have you been seen in the emergency room and/or had an admission in a hospital since we last saw you?  yes -    Have you had your routine dental cleaning in the past 6 months?  no     Do you have an active MyChart account? If no, what is the barrier? No:     Patient Care Team:  Mariann Hernandez MD as PCP - General (Family Medicine)  Mariann Hernandez MD as PCP [facility]  Mary Jane Haley MD as Consulting Physician (Gastroenterology)  Brunilda Kanner (Endocrinology)  Tera Sparks MD as Consulting Physician (Pulmonology)    Medical History Review  Past Medical, Family, and Social History reviewed and does contribute to the patient presenting condition    Health Maintenance   Topic Date Due    Hepatitis C screen  1955    HIV screen  08/30/1971    Shingles Vaccine (1 of 2) 08/30/2006    Lipid screen  08/30/2016    Colon cancer screen colonoscopy  10/23/2017    Flu vaccine (1) 09/01/2020    DTaP/Tdap/Td vaccine (2 - Td) 09/02/2030    Hepatitis A vaccine  Aged Out    Hepatitis B vaccine  Aged Out    Hib vaccine  Aged Out    Meningococcal (ACWY) vaccine  Aged Out    Pneumococcal 0-64 years Vaccine  Aged Out     Patient is a 71-year-old white male who presents for his initial office visit here for follow-up of recent hospitalization for cellulitis of left leg. He states that his cellulitis has resolved. Patient was last seen by me over 7 years ago.   He states he is currently on no routine medication but takes some OTC supplements. He also states that about 2 days ago he started having some urinary frequency with mild burning on urination. He states that he drank some cranberry juice yesterday and is symptoms have improved. He denies any fever, chills, flank pain, hematuria, chest pain or shortness of breath. Review of Systems   Constitutional: Negative for chills and fever. HENT: Negative for congestion. Respiratory: Negative for chest tightness and shortness of breath. Cardiovascular: Negative for chest pain. Gastrointestinal: Negative for abdominal pain and blood in stool. Genitourinary: Positive for dysuria and frequency. Negative for flank pain and hematuria. Skin: Negative for rash. Neurological: Negative for dizziness. Objective:   Physical Exam  Vitals signs and nursing note reviewed. Constitutional:       General: He is not in acute distress. Appearance: He is well-developed. HENT:      Head: Normocephalic and atraumatic. Right Ear: Tympanic membrane, ear canal and external ear normal.      Left Ear: Tympanic membrane, ear canal and external ear normal.      Nose: Nose normal.      Mouth/Throat:      Mouth: Mucous membranes are moist.      Pharynx: Oropharynx is clear. Eyes:      General: No scleral icterus. Right eye: No discharge. Left eye: No discharge. Conjunctiva/sclera: Conjunctivae normal.   Neck:      Musculoskeletal: Neck supple. Cardiovascular:      Rate and Rhythm: Normal rate and regular rhythm. Heart sounds: Normal heart sounds. Pulmonary:      Effort: Pulmonary effort is normal. No respiratory distress. Breath sounds: Normal breath sounds. No wheezing. Abdominal:      General: There is no distension. Palpations: Abdomen is soft. Tenderness: There is no abdominal tenderness. Musculoskeletal:      Left lower leg: He exhibits no tenderness. No edema.    Skin: General: Skin is warm and dry. Findings: No erythema or rash. Neurological:      Mental Status: He is alert and oriented to person, place, and time. Psychiatric:         Mood and Affect: Mood normal.         Behavior: Behavior normal.         Assessment:        Diagnosis Orders   1. Dysuria  POCT Urinalysis No Micro (Auto)    Culture, Urine   2. Urinary frequency  POCT Urinalysis No Micro (Auto)    Culture, Urine   3. Cellulitis of left lower leg               Plan:      Orders Placed This Encounter   Procedures    Culture, Urine     Standing Status:   Future     Standing Expiration Date:   1/7/2022     Order Specific Question:   Specify (ex-cath, midstream, cysto, etc)? Answer:   CLEAN CATCH    POCT Urinalysis No Micro (Auto)   UA, C&S ordered   Patient declines colonoscopy and lab work because he states his insurance will not cover it and he cannot afford it.    Patient encouraged to maintain proper hydration  Follow-up in 6 months or sooner if needed

## 2021-01-08 DIAGNOSIS — R35.0 URINARY FREQUENCY: ICD-10-CM

## 2021-01-08 DIAGNOSIS — R30.0 DYSURIA: ICD-10-CM

## 2021-01-09 LAB
CULTURE: ABNORMAL
Lab: ABNORMAL
SPECIMEN DESCRIPTION: ABNORMAL

## 2021-01-11 RX ORDER — SULFAMETHOXAZOLE AND TRIMETHOPRIM 800; 160 MG/1; MG/1
1 TABLET ORAL 2 TIMES DAILY
Qty: 20 TABLET | Refills: 0 | Status: SHIPPED | OUTPATIENT
Start: 2021-01-11 | End: 2021-01-21

## 2021-07-20 ENCOUNTER — OFFICE VISIT (OUTPATIENT)
Dept: FAMILY MEDICINE CLINIC | Age: 66
End: 2021-07-20

## 2021-07-20 VITALS
WEIGHT: 145.2 LBS | RESPIRATION RATE: 16 BRPM | BODY MASS INDEX: 21.51 KG/M2 | TEMPERATURE: 98.2 F | HEIGHT: 69 IN | HEART RATE: 64 BPM | DIASTOLIC BLOOD PRESSURE: 76 MMHG | SYSTOLIC BLOOD PRESSURE: 100 MMHG

## 2021-07-20 DIAGNOSIS — Z00.00 ANNUAL PHYSICAL EXAM: Primary | ICD-10-CM

## 2021-07-20 PROCEDURE — 99396 PREV VISIT EST AGE 40-64: CPT | Performed by: FAMILY MEDICINE

## 2021-07-20 ASSESSMENT — ENCOUNTER SYMPTOMS
CHEST TIGHTNESS: 0
SHORTNESS OF BREATH: 0
BLOOD IN STOOL: 0
ABDOMINAL PAIN: 0

## 2021-07-20 NOTE — PROGRESS NOTES
Subjective:      Patient ID: Naun Mckenzie is a 60 y.o. male. Visit Information    Have you changed or started any medications since your last visit including any over-the-counter medicines, vitamins, or herbal medicines? no   Are you having any side effects from any of your medications? -  no  Have you stopped taking any of your medications? Is so, why? -  no    Have you seen any other physician or provider since your last visit? No  Have you had any other diagnostic tests since your last visit? No  Have you been seen in the emergency room and/or had an admission to a hospital since we last saw you? No  Have you had your routine dental cleaning in the past 6 months? no    Have you activated your [employer] account? If not, what are your barriers? No: declined     Patient Care Team:  Wai Campbell MD as PCP - General (Family Medicine)  Wai Campbell MD as PCP - [facility]  Loc Bryant MD as Consulting Physician (Gastroenterology)  Marlene Multani (Endocrinology)  Messi Alston MD as Consulting Physician (Pulmonology)    Medical History Review  Past Medical, Family, and Social History reviewed and does contribute to the patient presenting condition    Health Maintenance   Topic Date Due    Hepatitis C screen  Never done    HIV screen  Never done    Shingles Vaccine (1 of 2) Never done    Lipid screen  08/30/2016    Colon cancer screen colonoscopy  10/23/2017    Flu vaccine (1) 09/01/2021    DTaP/Tdap/Td vaccine (2 - Td or Tdap) 09/02/2030    COVID-19 Vaccine  Completed    Hepatitis A vaccine  Aged Out    Hepatitis B vaccine  Aged Out    Hib vaccine  Aged Out    Meningococcal (ACWY) vaccine  Aged Out    Pneumococcal 0-64 years Vaccine  Aged Out     HPI  Patient is a 60-year-old white male with neurofibromatosis who presents for his annual physical exam.  He states overall he is feeling well and denies any fever, chills, chest pain, abdominal pain, shortness of breath.   He has a good appetite and remains active. Review of Systems   Constitutional: Negative for chills and fever. HENT: Negative for congestion. Respiratory: Negative for chest tightness and shortness of breath. Cardiovascular: Negative for chest pain. Gastrointestinal: Negative for abdominal pain and blood in stool. Genitourinary: Negative for dysuria and hematuria. Skin: Negative for rash. Neurological: Negative for dizziness. Psychiatric/Behavioral: Negative for confusion and dysphoric mood. Objective:   Physical Exam  Vitals and nursing note reviewed. Constitutional:       General: He is not in acute distress. Appearance: He is well-developed. HENT:      Head: Normocephalic and atraumatic. Right Ear: Tympanic membrane, ear canal and external ear normal.      Left Ear: Tympanic membrane, ear canal and external ear normal.      Nose: Nose normal.      Mouth/Throat:      Mouth: Mucous membranes are moist.      Pharynx: Oropharynx is clear. Eyes:      General: No scleral icterus. Right eye: No discharge. Left eye: No discharge. Extraocular Movements: Extraocular movements intact. Conjunctiva/sclera: Conjunctivae normal.      Pupils: Pupils are equal, round, and reactive to light. Cardiovascular:      Rate and Rhythm: Normal rate and regular rhythm. Heart sounds: Normal heart sounds. Pulmonary:      Effort: Pulmonary effort is normal. No respiratory distress. Breath sounds: Normal breath sounds. No wheezing. Abdominal:      General: There is no distension. Palpations: Abdomen is soft. Tenderness: There is no abdominal tenderness. Hernia: There is no hernia in the left inguinal area or right inguinal area. Genitourinary:     Penis: Normal.       Testes: Normal.   Musculoskeletal:      Cervical back: Neck supple. Right lower leg: No edema. Left lower leg: No edema. Skin:     General: Skin is warm and dry.       Findings: Lesion (  Multiple neurofibromas) present. No rash. Neurological:      General: No focal deficit present. Mental Status: He is alert and oriented to person, place, and time. Cranial Nerves: No cranial nerve deficit. Deep Tendon Reflexes: Reflexes normal.   Psychiatric:         Mood and Affect: Mood normal.         Behavior: Behavior normal.         Assessment:       Diagnosis Orders   1. Annual physical exam  CBC Auto Differential    Comprehensive Metabolic Panel    Lipid Panel    TSH with Reflex           Plan:      Orders Placed This Encounter   Procedures    CBC Auto Differential     Standing Status:   Future     Standing Expiration Date:   7/20/2022    Comprehensive Metabolic Panel     Fasting     Standing Status:   Future     Standing Expiration Date:   7/20/2022    Lipid Panel     Standing Status:   Future     Standing Expiration Date:   7/20/2022     Order Specific Question:   Is Patient Fasting?/# of Hours     Answer:    Fast 8-10 hours    TSH with Reflex     Standing Status:   Future     Standing Expiration Date:   7/20/2022         Follow-up in 6 months or sooner if needed

## 2021-09-07 DIAGNOSIS — Z00.00 ANNUAL PHYSICAL EXAM: ICD-10-CM

## 2021-09-07 LAB
ALBUMIN SERPL-MCNC: 4 G/DL
ALP BLD-CCNC: 65 U/L
ALT SERPL-CCNC: 19 U/L
ANION GAP SERPL CALCULATED.3IONS-SCNC: ABNORMAL MMOL/L
AST SERPL-CCNC: 26 U/L
BASOPHILS ABSOLUTE: 0.04 /ΜL
BASOPHILS RELATIVE PERCENT: 0.5 %
BILIRUB SERPL-MCNC: 1.7 MG/DL (ref 0.1–1.4)
BUN BLDV-MCNC: 16 MG/DL
CALCIUM SERPL-MCNC: 9.6 MG/DL
CHLORIDE BLD-SCNC: 102 MMOL/L
CHOLESTEROL, TOTAL: 153 MG/DL
CHOLESTEROL/HDL RATIO: 2.2
CO2: 27 MMOL/L
CREAT SERPL-MCNC: 0.8 MG/DL
EOSINOPHILS ABSOLUTE: 0.29 /ΜL
EOSINOPHILS RELATIVE PERCENT: 3.3 %
GFR CALCULATED: 97
GLUCOSE BLD-MCNC: 94 MG/DL
HCT VFR BLD CALC: 52.8 % (ref 41–53)
HDLC SERPL-MCNC: 69 MG/DL (ref 35–70)
HEMOGLOBIN: 17.2 G/DL (ref 13.5–17.5)
LDL CHOLESTEROL CALCULATED: 65 MG/DL (ref 0–160)
LYMPHOCYTES ABSOLUTE: 1.28 /ΜL
LYMPHOCYTES RELATIVE PERCENT: 14.6 %
MCH RBC QN AUTO: 27 PG
MCHC RBC AUTO-ENTMCNC: 32.6 G/DL
MCV RBC AUTO: 82.9 FL
MONOCYTES ABSOLUTE: 0.79 /ΜL
MONOCYTES RELATIVE PERCENT: 9 %
NEUTROPHILS ABSOLUTE: 6.33 /ΜL
NEUTROPHILS RELATIVE PERCENT: 72.4 %
NONHDLC SERPL-MCNC: ABNORMAL MG/DL
PDW BLD-RTO: 47 %
PLATELET # BLD: 208 K/ΜL
PMV BLD AUTO: ABNORMAL FL
POTASSIUM SERPL-SCNC: 3.9 MMOL/L
RBC # BLD: 6.37 10^6/ΜL
SODIUM BLD-SCNC: 142 MMOL/L
TOTAL PROTEIN: 6.5
TRIGL SERPL-MCNC: 94 MG/DL
TSH SERPL DL<=0.05 MIU/L-ACNC: 4.04 UIU/ML
VLDLC SERPL CALC-MCNC: 19 MG/DL
WBC # BLD: 8.74 10^3/ML

## 2022-01-24 ENCOUNTER — OFFICE VISIT (OUTPATIENT)
Dept: FAMILY MEDICINE CLINIC | Age: 67
End: 2022-01-24
Payer: MEDICARE

## 2022-01-24 VITALS
BODY MASS INDEX: 23.22 KG/M2 | HEART RATE: 74 BPM | DIASTOLIC BLOOD PRESSURE: 88 MMHG | SYSTOLIC BLOOD PRESSURE: 138 MMHG | TEMPERATURE: 96.6 F | RESPIRATION RATE: 18 BRPM | WEIGHT: 155 LBS

## 2022-01-24 DIAGNOSIS — Q85.00 NEUROFIBROMATOSIS (HCC): ICD-10-CM

## 2022-01-24 DIAGNOSIS — L30.9 DERMATITIS OF LOWER EXTREMITY: Primary | ICD-10-CM

## 2022-01-24 PROCEDURE — 99213 OFFICE O/P EST LOW 20 MIN: CPT | Performed by: FAMILY MEDICINE

## 2022-01-24 RX ORDER — TRIAMCINOLONE ACETONIDE 1 MG/G
CREAM TOPICAL
Qty: 45 G | Refills: 0 | Status: SHIPPED | OUTPATIENT
Start: 2022-01-24

## 2022-01-24 SDOH — ECONOMIC STABILITY: FOOD INSECURITY: WITHIN THE PAST 12 MONTHS, THE FOOD YOU BOUGHT JUST DIDN'T LAST AND YOU DIDN'T HAVE MONEY TO GET MORE.: NEVER TRUE

## 2022-01-24 SDOH — ECONOMIC STABILITY: FOOD INSECURITY: WITHIN THE PAST 12 MONTHS, YOU WORRIED THAT YOUR FOOD WOULD RUN OUT BEFORE YOU GOT MONEY TO BUY MORE.: NEVER TRUE

## 2022-01-24 ASSESSMENT — PATIENT HEALTH QUESTIONNAIRE - PHQ9
2. FEELING DOWN, DEPRESSED OR HOPELESS: 0
SUM OF ALL RESPONSES TO PHQ QUESTIONS 1-9: 0
SUM OF ALL RESPONSES TO PHQ9 QUESTIONS 1 & 2: 0
1. LITTLE INTEREST OR PLEASURE IN DOING THINGS: 0
SUM OF ALL RESPONSES TO PHQ QUESTIONS 1-9: 0

## 2022-01-24 ASSESSMENT — ENCOUNTER SYMPTOMS
ABDOMINAL PAIN: 0
CHEST TIGHTNESS: 0
SHORTNESS OF BREATH: 0

## 2022-01-24 ASSESSMENT — SOCIAL DETERMINANTS OF HEALTH (SDOH): HOW HARD IS IT FOR YOU TO PAY FOR THE VERY BASICS LIKE FOOD, HOUSING, MEDICAL CARE, AND HEATING?: NOT HARD AT ALL

## 2022-01-24 NOTE — PROGRESS NOTES
Subjective:      Patient ID: Nara Harley is a 73 y.o. male. Visit Information    Have you changed or started any medications since your last visit including any over-the-counter medicines, vitamins, or herbal medicines? no   Have you stopped taking any of your medications? Is so, why? -  no  Are you having any side effects from any of your medications? - no    Have you seen any other physician or provider since your last visit?  no   Have you had any other diagnostic tests since your last visit?  no   Have you been seen in the emergency room and/or had an admission in a hospital since we last saw you?  no   Have you had your routine dental cleaning in the past 6 months?  no     Do you have an active MyChart account? If no, what is the barrier?   No:     Patient Care Team:  Elizabeth Blank MD as PCP - General (Family Medicine)  Elizabeth Blank MD as PCP - [address] [facility] [place] [address] [facility]  Mercy Joseph MD as Consulting Physician (Gastroenterology)  Silvano Kign (Endocrinology)  Yolis Moe MD as Consulting Physician (Pulmonology)    Medical History Review  Past Medical, Family, and Social History reviewed and does contribute to the patient presenting condition    Health Maintenance   Topic Date Due    AAA screen  Never done    Hepatitis C screen  Never done    Depression Screen  Never done    HIV screen  Never done    Shingles Vaccine (1 of 2) Never done    Colon cancer screen colonoscopy  10/23/2017    Pneumococcal 65+ years Vaccine (1 of 1 - PPSV23) Never done    Flu vaccine (1) Never done    COVID-19 Vaccine (3 - Booster for Ho Peter series) 09/07/2021    Lipid screen  09/07/2026    DTaP/Tdap/Td vaccine (2 - Td or Tdap) 09/02/2030    Hepatitis A vaccine  Aged Out    Hepatitis B vaccine  Aged Out    Hib vaccine  Aged Out    Meningococcal (ACWY) vaccine  Aged Out               HPI  Patient is a 58-year-old white male with neurofibromatosis who presents for complaint of itchy rash on his left lower leg. He states that a year ago he had cellulitis of the same leg but he has no cellulitis or infection presently. He denies any fever, chills, chest pain or shortness of breath. He states that he is currently not taking any prescription medication. Review of Systems   Constitutional: Negative for chills and fever. Respiratory: Negative for chest tightness and shortness of breath. Cardiovascular: Negative for chest pain. Gastrointestinal: Negative for abdominal pain. Skin: Positive for rash (  Left lower leg). Objective:   Physical Exam  Vitals and nursing note reviewed. Constitutional:       General: He is not in acute distress. Appearance: He is well-developed. HENT:      Head: Normocephalic and atraumatic. Right Ear: External ear normal.      Left Ear: External ear normal.      Nose: Nose normal.   Eyes:      General: No scleral icterus. Right eye: No discharge. Left eye: No discharge. Conjunctiva/sclera: Conjunctivae normal.   Cardiovascular:      Rate and Rhythm: Normal rate and regular rhythm. Heart sounds: Normal heart sounds. Pulmonary:      Effort: Pulmonary effort is normal. No respiratory distress. Breath sounds: Normal breath sounds. No wheezing. Abdominal:      General: There is no distension. Palpations: Abdomen is soft. Tenderness: There is no abdominal tenderness. Musculoskeletal:      Cervical back: Neck supple. Skin:     General: Skin is warm and dry. Findings: Rash (  Dry, pinkish rash over anterior aspect of left lower leg. No evidence of infection noted.) present. Neurological:      Mental Status: He is alert and oriented to person, place, and time. Psychiatric:         Mood and Affect: Mood normal.         Behavior: Behavior normal.         Assessment:       Diagnosis Orders   1. Dermatitis of lower extremity  triamcinolone (KENALOG) 0.1 % cream   2.  Neurofibromatosis ([facility]             Plan: Orders Placed This Encounter   Medications    triamcinolone (KENALOG) 0.1 % cream     Sig: Apply topically 2 times daily.      Dispense:  45 g     Refill:  0      Follow-up in 6 months or sooner if if needed

## 2022-04-22 ENCOUNTER — TELEPHONE (OUTPATIENT)
Dept: FAMILY MEDICINE CLINIC | Age: 67
End: 2022-04-22

## 2022-04-22 NOTE — TELEPHONE ENCOUNTER
----- Message from Poppy Smith sent at 4/22/2022  1:02 PM EDT -----  Subject: Message to Provider    QUESTIONS  Information for Provider? Pt called back attempting to return a missed   call. Unsure of what call may have been about and there were no encounters   to provide insight. Please return call to pt, may also leave a voice mail.     ---------------------------------------------------------------------------  --------------  [address]  What is the best way for the office to contact you? OK to leave message on   voicemail  Preferred Call Back Phone Number? [phone]  ---------------------------------------------------------------------------  --------------  SCRIPT ANSWERS  Relationship to Patient?  Self

## 2022-04-22 NOTE — TELEPHONE ENCOUNTER
PATIENT WAS INFORMED THAT THE CALL WAS PROBABLY A REMINDER TO GET A COLONOSCOPY DONE WHICH PATIENT ADAMANTLY DECLINED.

## 2022-07-25 ENCOUNTER — OFFICE VISIT (OUTPATIENT)
Dept: FAMILY MEDICINE CLINIC | Age: 67
End: 2022-07-25
Payer: MEDICARE

## 2022-07-25 VITALS
WEIGHT: 159.2 LBS | TEMPERATURE: 97.7 F | HEIGHT: 70 IN | BODY MASS INDEX: 22.79 KG/M2 | SYSTOLIC BLOOD PRESSURE: 126 MMHG | DIASTOLIC BLOOD PRESSURE: 82 MMHG | HEART RATE: 84 BPM

## 2022-07-25 DIAGNOSIS — Q85.00 NEUROFIBROMATOSIS (HCC): Primary | ICD-10-CM

## 2022-07-25 DIAGNOSIS — Z13.6 SCREENING FOR AAA (ABDOMINAL AORTIC ANEURYSM): ICD-10-CM

## 2022-07-25 DIAGNOSIS — Z12.5 SCREENING PSA (PROSTATE SPECIFIC ANTIGEN): ICD-10-CM

## 2022-07-25 DIAGNOSIS — Z12.11 COLON CANCER SCREENING: Primary | ICD-10-CM

## 2022-07-25 DIAGNOSIS — Z87.891 EX-CIGARETTE SMOKER: ICD-10-CM

## 2022-07-25 PROCEDURE — 1123F ACP DISCUSS/DSCN MKR DOCD: CPT | Performed by: FAMILY MEDICINE

## 2022-07-25 PROCEDURE — 99213 OFFICE O/P EST LOW 20 MIN: CPT | Performed by: FAMILY MEDICINE

## 2022-07-25 PROCEDURE — 3288F FALL RISK ASSESSMENT DOCD: CPT | Performed by: FAMILY MEDICINE

## 2022-07-25 ASSESSMENT — PATIENT HEALTH QUESTIONNAIRE - PHQ9
SUM OF ALL RESPONSES TO PHQ QUESTIONS 1-9: 0
SUM OF ALL RESPONSES TO PHQ QUESTIONS 1-9: 0
2. FEELING DOWN, DEPRESSED OR HOPELESS: 0
SUM OF ALL RESPONSES TO PHQ QUESTIONS 1-9: 0
SUM OF ALL RESPONSES TO PHQ QUESTIONS 1-9: 0
SUM OF ALL RESPONSES TO PHQ9 QUESTIONS 1 & 2: 0
1. LITTLE INTEREST OR PLEASURE IN DOING THINGS: 0

## 2022-07-25 ASSESSMENT — ENCOUNTER SYMPTOMS
BLOOD IN STOOL: 0
SHORTNESS OF BREATH: 0
CHEST TIGHTNESS: 0
ABDOMINAL PAIN: 0

## 2022-07-25 NOTE — PROGRESS NOTES
Subjective:      Patient ID: Jono Rivera is a 73 y.o. male. HPI  Visit Information    Have you changed or started any medications since your last visit including any over-the-counter medicines, vitamins, or herbal medicines? no   Are you having any side effects from any of your medications? -  no  Have you stopped taking any of your medications? Is so, why? -  no    Have you seen any other physician or provider since your last visit? No  Have you had any other diagnostic tests since your last visit? No  Have you been seen in the emergency room and/or had an admission to a hospital since we last saw you? No  Have you had your routine dental cleaning in the past 6 months? no    Have you activated your [employer] account? If not, what are your barriers? No:      Patient Care Team:  Jazzmine Bennett MD as PCP - General (Family Medicine)  Jazzmine Bennett MD as PCP - [facility]  Kiara Adame MD as Consulting Physician (Gastroenterology)  Gabriella Reyes (Endocrinology)  Lori Yoo MD as Consulting Physician (Pulmonology)    Medical History Review  Past Medical, Family, and Social History reviewed and does contribute to the patient presenting condition    Health Maintenance   Topic Date Due    HIV screen  Never done    Hepatitis C screen  Never done    Prostate Specific Antigen (PSA) Screening or Monitoring  Never done    Colorectal Cancer Screen  10/23/2017    Pneumococcal 65+ years Vaccine (1 - PCV) Never done    AAA screen  Never done    COVID-19 Vaccine (4 - Booster for Ho Peter series) 04/23/2022    Flu vaccine (1) 09/01/2022    Depression Screen  01/24/2023    Lipids  09/07/2026    DTaP/Tdap/Td vaccine (2 - Td or Tdap) 09/02/2030    Shingles vaccine  Completed    Hepatitis A vaccine  Aged Out    Hepatitis B vaccine  Aged Out    Hib vaccine  Aged Out    Meningococcal (ACWY) vaccine  Aged Out     Patient is a 78-year-old white male who presents for neurofibromatosis.   Patient is also an ex Neurofibromatosis ([facility] [address]        2. Ex-cigarette smoker  US SCREENING FOR AAA      3. Screening PSA (prostate specific antigen)  PSA Screening      4. Screening for AAA (abdominal aortic aneurysm)  US SCREENING FOR AAA              Plan:      Orders Placed This Encounter   Procedures    US SCREENING FOR AAA     This procedure can be scheduled via [employer]. Access your [employer] account by visiting [email or web address].      Standing Status:   Future     Standing Expiration Date:   7/25/2023    PSA Screening     Standing Status:   Future     Standing Expiration Date:   7/25/2023            Patient referred to GI for screening colonoscopy  Follow-up in 2 to 3 months

## 2022-07-29 ENCOUNTER — HOSPITAL ENCOUNTER (OUTPATIENT)
Dept: VASCULAR LAB | Age: 67
Discharge: HOME | End: 2022-07-29
Payer: MEDICARE

## 2022-07-29 DIAGNOSIS — Z87.891 EX-CIGARETTE SMOKER: ICD-10-CM

## 2022-07-29 DIAGNOSIS — Z13.6 SCREENING FOR AAA (ABDOMINAL AORTIC ANEURYSM): ICD-10-CM

## 2022-07-29 PROCEDURE — 76706 US ABDL AORTA SCREEN AAA: CPT

## 2022-08-15 ENCOUNTER — OFFICE VISIT (OUTPATIENT)
Dept: FAMILY MEDICINE CLINIC | Age: 67
End: 2022-08-15
Payer: MEDICARE

## 2022-08-15 VITALS
BODY MASS INDEX: 23.46 KG/M2 | HEIGHT: 69 IN | TEMPERATURE: 97.9 F | SYSTOLIC BLOOD PRESSURE: 130 MMHG | DIASTOLIC BLOOD PRESSURE: 88 MMHG | WEIGHT: 158.4 LBS | HEART RATE: 74 BPM

## 2022-08-15 DIAGNOSIS — Z00.00 MEDICARE ANNUAL WELLNESS VISIT, INITIAL: Primary | ICD-10-CM

## 2022-08-15 PROCEDURE — 1123F ACP DISCUSS/DSCN MKR DOCD: CPT | Performed by: NURSE PRACTITIONER

## 2022-08-15 PROCEDURE — G0438 PPPS, INITIAL VISIT: HCPCS | Performed by: NURSE PRACTITIONER

## 2022-08-15 SDOH — ECONOMIC STABILITY: FOOD INSECURITY: WITHIN THE PAST 12 MONTHS, THE FOOD YOU BOUGHT JUST DIDN'T LAST AND YOU DIDN'T HAVE MONEY TO GET MORE.: NEVER TRUE

## 2022-08-15 SDOH — ECONOMIC STABILITY: FOOD INSECURITY: WITHIN THE PAST 12 MONTHS, YOU WORRIED THAT YOUR FOOD WOULD RUN OUT BEFORE YOU GOT MONEY TO BUY MORE.: NEVER TRUE

## 2022-08-15 ASSESSMENT — PATIENT HEALTH QUESTIONNAIRE - PHQ9
1. LITTLE INTEREST OR PLEASURE IN DOING THINGS: 0
SUM OF ALL RESPONSES TO PHQ QUESTIONS 1-9: 0
2. FEELING DOWN, DEPRESSED OR HOPELESS: 0
SUM OF ALL RESPONSES TO PHQ QUESTIONS 1-9: 0
SUM OF ALL RESPONSES TO PHQ QUESTIONS 1-9: 0
SUM OF ALL RESPONSES TO PHQ9 QUESTIONS 1 & 2: 0
SUM OF ALL RESPONSES TO PHQ QUESTIONS 1-9: 0

## 2022-08-15 ASSESSMENT — SOCIAL DETERMINANTS OF HEALTH (SDOH): HOW HARD IS IT FOR YOU TO PAY FOR THE VERY BASICS LIKE FOOD, HOUSING, MEDICAL CARE, AND HEATING?: NOT HARD AT ALL

## 2022-08-15 NOTE — PROGRESS NOTES
Medicare Annual Wellness Visit    [facility] is here for Medicare AWV (Welcome to [facility])    Assessment & Plan    Recommendations for Preventive Services Due: see orders and patient instructions/AVS.  Recommended screening schedule for the next 5-10 years is provided to the patient in written form: see Patient Instructions/AVS.     No follow-ups on file. Subjective   Medicare wellness visit     Patient's complete Health Risk Assessment and screening values have been reviewed and are found in Flowsheets. The following problems were reviewed today and where indicated follow up appointments were made and/or referrals ordered. Positive Risk Factor Screenings with Interventions:    Fall Risk:  Do you feel unsteady or are you worried about falling? : no  2 or more falls in past year?: (!) yes  Fall with injury in past year?: no   Fall Risk Interventions:    Home safety tips provided    Cognitive:   Words recalled: 1 Word Recalled  Clock Drawing Test (CDT): (!) Abnormal  Total Score Interpretation: Abnormal Mini-Cog  Did the patient refuse to take the cognition test?: No  Cognitive Impairment Interventions:  Patient declines any further evaluation/treatment for cognitive impairment           General Health and ACP:  General  In general, how would you say your health is?: Very Good  In the past 7 days, have you experienced any of the following: New or Increased Pain, New or Increased Fatigue, Loneliness, Social Isolation, Stress or Anger?: No  Do you get the social and emotional support that you need?: Yes  Do you have a Living Will?: (!) No    Advance Directives       Power of [address] Will ACP-Advance Directive ACP-Power of     Not on File Not on File Not on File Not on File        General Health Risk Interventions:  Pt is provided with materials regarding living will    Health Habits/Nutrition:  Physical Activity: Inactive    Days of Exercise per Week: 1 day    Minutes of Exercise per Session: 0 min     Have you lost any weight without trying in the past 3 months?: No  Body mass index: 23.73  Have you seen the dentist within the past year?: (!) No  Health Habits/Nutrition Interventions:  Dental exam overdue:  patient encouraged to make appointment with his/her dentist    Hearing/Vision:  Do you or your family notice any trouble with your hearing that hasn't been managed with hearing aids?: (!) Yes  Do you have difficulty driving, watching TV, or doing any of your daily activities because of your eyesight?: No  Have you had an eye exam within the past year?: Yes  No results found. Hearing/Vision Interventions:  Hearing concerns:  patient declines any further evaluation/treatment for hearing issues            Objective   Vitals:    08/15/22 1043   BP: 130/88   Pulse: 74   Temp: 97.9 °F (36.6 °C)   Weight: 158 lb 6.4 oz (71.8 kg)   Height: 5' 8.5\" (1.74 m)      Body mass index is 23.73 kg/m². General Appearance: obesity,  alert and oriented tx3, well developed and well- nourished, in no acute distress  Skin: warm and dry, no rash or erythema  Head: normocephalic and atraumatic  Eyes: pupils equal, round, and reactive to light, extraocular eye movements intact, conjunctivae normal  Pulmonary/Chest: clear to auscultation bilaterally- no wheezes, rales or rhonchi, normal air movement, no respiratory distress  Cardiovascular: normal rate, regular rhythm, normal S1 and S2, no murmurs, rubs, clicks, or gallops, distal pulses intact, no carotid bruits  Abdomen: soft, non-tender, non-distended, normal bowel sounds, no masses or organomegaly  Extremities: no cyanosis, clubbing or edema  Musculoskeletal: normal range of motion, no joint swelling, deformity or tenderness  Neurologic: Foot exam: no sores ulcers in bilateral feet. Monofilament test normal bilaterally. No Known Allergies  Prior to Visit Medications    Medication Sig Taking?  Authorizing Provider   vitamin D (CHOLECALCIFEROL) 25 MCG (1000 UT) TABS tablet Take 1,000 Units by mouth daily Yes Historical Provider, MD   Ascorbic Acid (VITAMIN C) 500 MG tablet Take 500 mg by mouth daily. Yes Historical Provider, MD   Multiple Vitamin (MULTIVITAMIN PO) Take  by mouth. Yes Historical Provider, MD   Cyanocobalamin (VITAMIN B 12) 500 MCG TABS Take 500 mcg by mouth daily  Yes Historical Provider, MD   vitamin E 400 UNIT capsule Take 400 Units by mouth Daily  Yes Cameron Booker MD   fish oil-omega-3 fatty acids 1000 MG capsule Take 1 g by mouth daily  Yes Historical Provider, MD   triamcinolone (KENALOG) 0.1 % cream Apply topically 2 times daily.   Patient not taking: No sig reported  Cameron Booker MD       CareTeam (Including outside providers/suppliers regularly involved in providing care):   Patient Care Team:  Cameron Booker MD as PCP - General (Family Medicine)  Cameron Booker MD as PCP - [facility] Empaneled Provider  Davian Portillo MD as Consulting Physician (Gastroenterology)  Jodi Peralta (Endocrinology)  Mario Telles MD as Consulting Physician (Pulmonology)     Reviewed and updated this visit:  Tobacco  Allergies  Meds  Med Hx  Surg Hx  Soc Hx  Fam Hx

## 2022-10-07 ENCOUNTER — HOSPITAL ENCOUNTER (OUTPATIENT)
Age: 67
Setting detail: SPECIMEN
Discharge: HOME | End: 2022-10-07

## 2022-10-07 ENCOUNTER — OFFICE VISIT (OUTPATIENT)
Dept: FAMILY MEDICINE CLINIC | Age: 67
End: 2022-10-07
Payer: MEDICARE

## 2022-10-07 VITALS
DIASTOLIC BLOOD PRESSURE: 84 MMHG | HEART RATE: 79 BPM | SYSTOLIC BLOOD PRESSURE: 137 MMHG | TEMPERATURE: 97.1 F | OXYGEN SATURATION: 99 %

## 2022-10-07 DIAGNOSIS — R39.15 URGENCY OF URINATION: Primary | ICD-10-CM

## 2022-10-07 DIAGNOSIS — R39.9 UTI SYMPTOMS: ICD-10-CM

## 2022-10-07 DIAGNOSIS — R30.0 DYSURIA: ICD-10-CM

## 2022-10-07 LAB
BILIRUBIN, POC: ABNORMAL
BLOOD URINE, POC: ABNORMAL
CLARITY, POC: ABNORMAL
COLOR, POC: ABNORMAL
GLUCOSE URINE, POC: NEGATIVE
KETONES, POC: NEGATIVE
LEUKOCYTE EST, POC: ABNORMAL
NITRITE, POC: NEGATIVE
PH, POC: 5.5
PROTEIN, POC: 100
SPECIFIC GRAVITY, POC: >=1.03
UROBILINOGEN, POC: 1

## 2022-10-07 PROCEDURE — 81003 URINALYSIS AUTO W/O SCOPE: CPT | Performed by: NURSE PRACTITIONER

## 2022-10-07 PROCEDURE — 99213 OFFICE O/P EST LOW 20 MIN: CPT | Performed by: NURSE PRACTITIONER

## 2022-10-07 PROCEDURE — 1123F ACP DISCUSS/DSCN MKR DOCD: CPT | Performed by: NURSE PRACTITIONER

## 2022-10-07 RX ORDER — CEPHALEXIN 500 MG/1
500 CAPSULE ORAL 3 TIMES DAILY
Qty: 30 CAPSULE | Refills: 0 | Status: SHIPPED | OUTPATIENT
Start: 2022-10-07 | End: 2022-10-17

## 2022-10-07 ASSESSMENT — ENCOUNTER SYMPTOMS
NAUSEA: 1
VOMITING: 0

## 2022-10-07 NOTE — PROGRESS NOTES
[address] [address]  Dept: [phone]  Dept Fax: [phone]    Pablo Suárez is a 78 y.o. male who presents to the urgent care today for his medical conditions/complaints as notedbelow. Pablo Suárez is c/o of Urinary Urgency (Pt stated that he has also had burning w/urination, onset 3 days )      HPI:     78 yr old male presents for burning with urination for 3 days. No back pain, mild nausea 1 day, no fevers  Hx uti in past, last was approx 1 year ago, was not drinking a lot and feels dehydration cause his sx. Feels well. Denies chance std    Dysuria   This is a new problem. The current episode started in the past 7 days (x3d). The problem occurs every urination. The problem has been unchanged. The quality of the pain is described as burning. The pain is at a severity of 1/10. The pain is mild. There has been no fever. He is Not sexually active. Associated symptoms include frequency, hematuria, hesitancy, nausea and urgency. Pertinent negatives include no chills, discharge, flank pain, possible pregnancy (dark, no clots), sweats or vomiting. He has tried nothing for the symptoms. The treatment provided no relief. There is no history of kidney stones, recurrent UTIs, a single kidney or urinary stasis. Past Medical History:   Diagnosis Date    Abdominal pain     Colon polyp     Pulmonary nodules 9/17/2013    Thyroid nodule 9/17/2013        Current Outpatient Medications   Medication Sig Dispense Refill    cephALEXin (KEFLEX) 500 MG capsule Take 1 capsule by mouth 3 times daily for 10 days 30 capsule 0    triamcinolone (KENALOG) 0.1 % cream Apply topically 2 times daily.  (Patient not taking: No sig reported) 45 g 0    vitamin D (CHOLECALCIFEROL) 25 MCG (1000 UT) TABS tablet Take 1,000 Units by mouth daily (Patient not taking: Reported on 10/7/2022)      Ascorbic Acid (VITAMIN C) 500 MG tablet Take 500 mg by mouth daily. (Patient not taking: Reported on 10/7/2022)      Multiple Vitamin (MULTIVITAMIN PO) Take  by mouth. (Patient not taking: Reported on 10/7/2022)      Cyanocobalamin (VITAMIN B 12) 500 MCG TABS Take 500 mcg by mouth daily  (Patient not taking: Reported on 10/7/2022)      vitamin E 400 UNIT capsule Take 400 Units by mouth Daily  (Patient not taking: Reported on 10/7/2022)      fish oil-omega-3 fatty acids 1000 MG capsule Take 1 g by mouth daily  (Patient not taking: Reported on 10/7/2022)       No current facility-administered medications for this visit. No Known Allergies    Subjective:      Review of Systems   Constitutional:  Negative for chills. Gastrointestinal:  Positive for nausea. Negative for vomiting. Genitourinary:  Positive for dysuria, frequency, hematuria, hesitancy and urgency. Negative for flank pain. All other systems reviewed and are negative. 14 systems reviewed and negative except as listed in HPI. Objective:     Physical Exam  Vitals and nursing note reviewed. Constitutional:       General: He is not in acute distress. Appearance: Normal appearance. He is well-developed. He is not ill-appearing, toxic-appearing or diaphoretic. Comments: nontoxic   HENT:      Head: Normocephalic and atraumatic. Right Ear: External ear normal.      Left Ear: External ear normal.   Eyes:      General: No scleral icterus. Right eye: No discharge. Left eye: No discharge. Conjunctiva/sclera: Conjunctivae normal.      Pupils: Pupils are equal, round, and reactive to light. Cardiovascular:      Rate and Rhythm: Normal rate. Pulmonary:      Effort: Pulmonary effort is normal.   Abdominal:      General: Bowel sounds are normal. There is no distension. Palpations: Abdomen is soft. There is no mass. Tenderness: There is no abdominal tenderness.  There is no right CVA tenderness, left CVA tenderness, guarding or rebound. Hernia: No hernia is present. Genitourinary:     Comments: Deferred, denies issues  Musculoskeletal:         General: No tenderness or deformity. Normal range of motion. Skin:     General: Skin is warm and dry. Capillary Refill: Capillary refill takes less than 2 seconds. Findings: No rash ( no rash to visible skin). Neurological:      General: No focal deficit present. Mental Status: He is alert. Motor: No abnormal muscle tone. Coordination: Coordination normal.   Psychiatric:         Mood and Affect: Mood normal.         Behavior: Behavior normal.     /84   Pulse 79   Temp 97.1 °F (36.2 °C)   SpO2 99%     Assessment:       Diagnosis Orders   1. Urgency of urination  POCT Urinalysis No Micro (Auto)    Culture, Urine      2. Dysuria  Culture, Urine      3. UTI symptoms            Plan:      Results for POC orders placed in visit on 10/07/22   POCT Urinalysis No Micro (Auto)   Result Value Ref Range    Color, UA april     Clarity, UA cloudy     Glucose, UA POC negative     Bilirubin, UA small     Ketones, UA negative     Spec Grav, UA >=1.030     Blood, UA POC large     pH, UA 5.5     Protein, UA      Urobilinogen, UA 1.0     Leukocytes, UA small     Nitrite, UA negative      POCT as above  Urine culture pending  Based on sx and PMH uti in distant past, will tx as uti while waiting for culture  Keflex rx  Increase water intake  Return for make appt for urine recheck after antibiotics done. Orders Placed This Encounter   Medications    cephALEXin (KEFLEX) 500 MG capsule     Sig: Take 1 capsule by mouth 3 times daily for 10 days     Dispense:  30 capsule     Refill:  0         Patient given educational materials - see patient instructions. Discussed use, benefit, and side effects of prescribed medications. All patient questions answered. Pt voicedunderstanding.     Electronically signed by DUDLEY Sales CNP on 10/7/2022 at 2:04 PM

## 2022-10-10 LAB
CULTURE: ABNORMAL
SPECIMEN DESCRIPTION: ABNORMAL

## 2022-10-10 RX ORDER — CIPROFLOXACIN 500 MG/1
500 TABLET, FILM COATED ORAL 2 TIMES DAILY
Qty: 20 TABLET | Refills: 0 | Status: SHIPPED | OUTPATIENT
Start: 2022-10-10 | End: 2022-10-20

## 2022-10-31 ENCOUNTER — TELEPHONE (OUTPATIENT)
Dept: GASTROENTEROLOGY | Age: 67
End: 2022-10-31

## 2022-11-01 ENCOUNTER — TELEPHONE (OUTPATIENT)
Dept: GASTROENTEROLOGY | Age: 67
End: 2022-11-01

## 2022-11-01 NOTE — TELEPHONE ENCOUNTER
Established patient of Dr Gamaliel Jain last seen 8/3/12 for history of abdominal pain. Called patient to schedule and declined for now. He is not working and not sure if he wants to go through with another one. Writer thanked and call ended. First attempt and sending back to the referring provider.     History of colon polyps

## 2022-11-01 NOTE — TELEPHONE ENCOUNTER
Established patient of Dr Misha Evans last seen 8/3/12 for history of abdominal pain. Called patient to schedule and declined for now. He is not working and not sure if he wants to go through with another one. Writer thanked and call ended. First attempt and sending back to the referring provider.     History of colon polyps

## 2022-11-01 NOTE — TELEPHONE ENCOUNTER
There is no reason for urgent colon, pt is established with Jenifer Farrar, he can be scheduled for Jenifer Farrar next available.

## 2023-01-27 ENCOUNTER — OFFICE VISIT (OUTPATIENT)
Dept: FAMILY MEDICINE CLINIC | Age: 68
End: 2023-01-27

## 2023-01-27 VITALS
DIASTOLIC BLOOD PRESSURE: 74 MMHG | WEIGHT: 168.4 LBS | TEMPERATURE: 97 F | HEART RATE: 77 BPM | OXYGEN SATURATION: 96 % | RESPIRATION RATE: 14 BRPM | BODY MASS INDEX: 25.23 KG/M2 | SYSTOLIC BLOOD PRESSURE: 120 MMHG

## 2023-01-27 DIAGNOSIS — Q85.00 NEUROFIBROMATOSIS (HCC): Primary | ICD-10-CM

## 2023-01-27 DIAGNOSIS — R42 VERTIGO: Primary | ICD-10-CM

## 2023-01-27 DIAGNOSIS — R42 VERTIGO: ICD-10-CM

## 2023-01-27 RX ORDER — MECLIZINE HYDROCHLORIDE 25 MG/1
25 TABLET ORAL 3 TIMES DAILY PRN
Qty: 30 TABLET | Refills: 0 | Status: SHIPPED | OUTPATIENT
Start: 2023-01-27 | End: 2023-02-06

## 2023-01-27 ASSESSMENT — PATIENT HEALTH QUESTIONNAIRE - PHQ9
SUM OF ALL RESPONSES TO PHQ QUESTIONS 1-9: 0
1. LITTLE INTEREST OR PLEASURE IN DOING THINGS: 0
SUM OF ALL RESPONSES TO PHQ QUESTIONS 1-9: 0

## 2023-01-27 ASSESSMENT — ENCOUNTER SYMPTOMS
BLOOD IN STOOL: 0
ABDOMINAL PAIN: 0
SHORTNESS OF BREATH: 0
CHEST TIGHTNESS: 0

## 2023-01-27 NOTE — PROGRESS NOTES
Subjective:      Patient ID: Po Arauz is a 78 y.o. male. Visit Information    Have you changed or started any medications since your last visit including any over-the-counter medicines, vitamins, or herbal medicines? yes - see med list   Are you having any side effects from any of your medications? -  no  Have you stopped taking any of your medications? Is so, why? -  yes - see med last    Have you seen any other physician or provider since your last visit? No  Have you had any other diagnostic tests since your last visit? No  Have you been seen in the emergency room and/or had an admission to a hospital since we last saw you? No  Have you had your routine dental cleaning in the past 6 months? no    Have you activated your [employer] account? If not, what are your barriers? No: no capability       Patient Care Team:  Nila Ruiz MD as PCP - General (Family Medicine)  Nila Ruiz MD as PCP - [facility]  Rommel Cabrera MD as Consulting Physician (Gastroenterology)  Hiral Bajwa (Endocrinology)  Dave Multani MD as Consulting Physician (Pulmonology)    Medical History Review  Past Medical, Family, and Social History reviewed and does contribute to the patient presenting condition    Health Maintenance   Topic Date Due    Hepatitis C screen  Never done    Colorectal Cancer Screen  10/23/2017    Pneumococcal 65+ years Vaccine (1 - PCV) Never done    Flu vaccine (1) 08/01/2022    Depression Screen  08/15/2023    Lipids  09/07/2026    DTaP/Tdap/Td vaccine (2 - Td or Tdap) 09/02/2030    Shingles vaccine  Completed    COVID-19 Vaccine  Completed    AAA screen  Completed    Hepatitis A vaccine  Aged Out    Hib vaccine  Aged Out    Meningococcal (ACWY) vaccine  Aged Out       HPI  Patient is a 79-year-old white male who presents for neurofibromatosis. He also states that for over a month he has been having dizziness sometimes when he moves his head.   He states that his surroundings will start spinning. He denies any trauma to his head. He is currently not taking any medication. Review of Systems   Constitutional:  Negative for chills and fever. Respiratory:  Negative for chest tightness and shortness of breath. Cardiovascular:  Negative for chest pain. Gastrointestinal:  Negative for abdominal pain and blood in stool. Genitourinary:  Negative for dysuria and hematuria. Skin:  Negative for rash. Neurological:  Positive for dizziness. Psychiatric/Behavioral:  Negative for confusion. Objective:   Physical Exam  Vitals and nursing note reviewed. Constitutional:       General: He is not in acute distress. Appearance: He is well-developed. HENT:      Head: Normocephalic and atraumatic. Right Ear: Tympanic membrane, ear canal and external ear normal.      Left Ear: Tympanic membrane, ear canal and external ear normal.      Nose: Nose normal.      Mouth/Throat:      Mouth: Mucous membranes are moist.      Pharynx: Oropharynx is clear. Eyes:      General: No scleral icterus. Right eye: No discharge. Left eye: No discharge. Extraocular Movements: Extraocular movements intact. Conjunctiva/sclera: Conjunctivae normal.   Cardiovascular:      Rate and Rhythm: Normal rate and regular rhythm. Heart sounds: Normal heart sounds. Pulmonary:      Effort: Pulmonary effort is normal. No respiratory distress. Breath sounds: Normal breath sounds. No wheezing. Abdominal:      General: There is no distension. Palpations: Abdomen is soft. Tenderness: There is no abdominal tenderness. Musculoskeletal:      Cervical back: Neck supple. Skin:     General: Skin is warm and dry. Findings: No rash. Neurological:      Mental Status: He is alert and oriented to person, place, and time. Psychiatric:         Mood and Affect: Mood normal.         Behavior: Behavior normal.       Assessment:      1. Neurofibromatosis ([facility] [address]      2. Vertigo  Patient referred to ENT  - meclizine (ANTIVERT) 25 MG tablet; Take 1 tablet by mouth 3 times daily as needed for Dizziness (Dizziness)  Dispense: 30 tablet;  Refill: 0          Plan:      Orders Placed This Encounter   Medications    meclizine (ANTIVERT) 25 MG tablet     Sig: Take 1 tablet by mouth 3 times daily as needed for Dizziness (Dizziness)     Dispense:  30 tablet     Refill:  0   Patient referred to ENT  Follow-up in 6 months or sooner if needed

## 2023-06-19 ENCOUNTER — OFFICE VISIT (OUTPATIENT)
Dept: FAMILY MEDICINE CLINIC | Age: 68
End: 2023-06-19
Payer: MEDICARE

## 2023-06-19 VITALS
HEART RATE: 84 BPM | RESPIRATION RATE: 20 BRPM | TEMPERATURE: 98.8 F | OXYGEN SATURATION: 94 % | WEIGHT: 174.8 LBS | SYSTOLIC BLOOD PRESSURE: 138 MMHG | BODY MASS INDEX: 26.19 KG/M2 | DIASTOLIC BLOOD PRESSURE: 86 MMHG

## 2023-06-19 DIAGNOSIS — R03.0 ELEVATED BP WITHOUT DIAGNOSIS OF HYPERTENSION: ICD-10-CM

## 2023-06-19 DIAGNOSIS — Z12.11 SCREENING FOR COLON CANCER: ICD-10-CM

## 2023-06-19 DIAGNOSIS — B36.8 TINEA INCOGNITO: Primary | ICD-10-CM

## 2023-06-19 PROCEDURE — 1123F ACP DISCUSS/DSCN MKR DOCD: CPT | Performed by: NURSE PRACTITIONER

## 2023-06-19 PROCEDURE — 99213 OFFICE O/P EST LOW 20 MIN: CPT | Performed by: NURSE PRACTITIONER

## 2023-06-19 RX ORDER — CLOTRIMAZOLE 1 %
CREAM (GRAM) TOPICAL
Qty: 45 G | Refills: 1 | Status: SHIPPED | OUTPATIENT
Start: 2023-06-19 | End: 2023-06-26

## 2023-06-19 SDOH — ECONOMIC STABILITY: FOOD INSECURITY: WITHIN THE PAST 12 MONTHS, THE FOOD YOU BOUGHT JUST DIDN'T LAST AND YOU DIDN'T HAVE MONEY TO GET MORE.: NEVER TRUE

## 2023-06-19 SDOH — ECONOMIC STABILITY: FOOD INSECURITY: WITHIN THE PAST 12 MONTHS, YOU WORRIED THAT YOUR FOOD WOULD RUN OUT BEFORE YOU GOT MONEY TO BUY MORE.: NEVER TRUE

## 2023-06-19 SDOH — ECONOMIC STABILITY: INCOME INSECURITY: HOW HARD IS IT FOR YOU TO PAY FOR THE VERY BASICS LIKE FOOD, HOUSING, MEDICAL CARE, AND HEATING?: NOT HARD AT ALL

## 2023-06-19 SDOH — ECONOMIC STABILITY: HOUSING INSECURITY
IN THE LAST 12 MONTHS, WAS THERE A TIME WHEN YOU DID NOT HAVE A STEADY PLACE TO SLEEP OR SLEPT IN A SHELTER (INCLUDING NOW)?: NO

## 2023-06-19 ASSESSMENT — ENCOUNTER SYMPTOMS
SHORTNESS OF BREATH: 0
NAUSEA: 0
COUGH: 0
ABDOMINAL PAIN: 0

## 2023-06-19 ASSESSMENT — PATIENT HEALTH QUESTIONNAIRE - PHQ9
SUM OF ALL RESPONSES TO PHQ QUESTIONS 1-9: 0
2. FEELING DOWN, DEPRESSED OR HOPELESS: 0
SUM OF ALL RESPONSES TO PHQ QUESTIONS 1-9: 0
SUM OF ALL RESPONSES TO PHQ QUESTIONS 1-9: 0
1. LITTLE INTEREST OR PLEASURE IN DOING THINGS: 0
SUM OF ALL RESPONSES TO PHQ QUESTIONS 1-9: 0
SUM OF ALL RESPONSES TO PHQ9 QUESTIONS 1 & 2: 0

## 2023-06-19 NOTE — PROGRESS NOTES
Subjective:      Patient ID: Neftali Dorman is a 78 y.o. male. Visit Information    Have you changed or started any medications since your last visit including any over-the-counter medicines, vitamins, or herbal medicines? no   Are you having any side effects from any of your medications? -  no  Have you stopped taking any of your medications? Is so, why? -  no    Have you seen any other physician or provider since your last visit? Yes - Records Obtained  Have you had any other diagnostic tests since your last visit? Yes - Records Obtained  Have you been seen in the emergency room and/or had an admission to a hospital since we last saw you? No  Have you had your routine dental cleaning in the past 6 months? no    Have you activated your [employer] account? If not, what are your barriers? Yes     Patient Care Team:  Selwyn Hernandez MD as PCP - General (Family Medicine)  Selwyn Hernandez MD as PCP - Empaneled Provider  Lissy Easton MD as Consulting Physician (Gastroenterology)  Maria Teresa Granger (Endocrinology)  Elgin Ashraf MD as Consulting Physician (Pulmonology)    Medical History Review  Past Medical, Family, and Social History reviewed and does contribute to the patient presenting condition    Health Maintenance   Topic Date Due    Hepatitis C screen  Never done    Colorectal Cancer Screen  10/23/2017    Pneumococcal 65+ years Vaccine (1 - PCV) Never done    Flu vaccine (Season Ended) 08/01/2023    Depression Screen  01/27/2024    Lipids  09/07/2026    DTaP/Tdap/Td vaccine (2 - Td or Tdap) 09/02/2030    Shingles vaccine  Completed    COVID-19 Vaccine  Completed    AAA screen  Completed    Hepatitis A vaccine  Aged Out    Hib vaccine  Aged Out    Meningococcal (ACWY) vaccine  Aged Out       HPI    78year old male presents with rash in left groin for a month and states it is itching at times. Pt doesn't know how he got it but admits he tends to sweat a lot.  He hasn't changed soaps detergents or skin

## 2023-07-27 ENCOUNTER — OFFICE VISIT (OUTPATIENT)
Dept: FAMILY MEDICINE CLINIC | Age: 68
End: 2023-07-27

## 2023-07-27 VITALS
SYSTOLIC BLOOD PRESSURE: 144 MMHG | OXYGEN SATURATION: 95 % | DIASTOLIC BLOOD PRESSURE: 80 MMHG | BODY MASS INDEX: 26.28 KG/M2 | HEIGHT: 69 IN | HEART RATE: 73 BPM | WEIGHT: 177.4 LBS

## 2023-07-27 DIAGNOSIS — R42 DIZZINESS: ICD-10-CM

## 2023-07-27 DIAGNOSIS — Z12.5 SCREENING PSA (PROSTATE SPECIFIC ANTIGEN): ICD-10-CM

## 2023-07-27 DIAGNOSIS — Q85.00 NEUROFIBROMATOSIS (HCC): Primary | ICD-10-CM

## 2023-07-27 ASSESSMENT — ENCOUNTER SYMPTOMS
CHEST TIGHTNESS: 0
BLOOD IN STOOL: 0
SHORTNESS OF BREATH: 0
ABDOMINAL PAIN: 0

## 2023-07-27 NOTE — PROGRESS NOTES
Comprehensive Metabolic Panel    Magnesium      2. Dizziness  CBC with Auto Differential    Comprehensive Metabolic Panel    Magnesium      3.  Screening PSA (prostate specific antigen)  PSA Screening              Plan:      Orders Placed This Encounter   Procedures    CBC with Auto Differential     Standing Status:   Future     Standing Expiration Date:   7/27/2024    Comprehensive Metabolic Panel     Fasting     Standing Status:   Future     Standing Expiration Date:   7/27/2024    Magnesium     Standing Status:   Future     Standing Expiration Date:   7/27/2024    PSA Screening     Standing Status:   Future     Standing Expiration Date:   7/27/2024          Follow-up as planned with ENT for his dizziness  Follow-up  here in 6 months or sooner if needed

## 2023-08-04 ENCOUNTER — HOSPITAL ENCOUNTER (OUTPATIENT)
Age: 68
Discharge: HOME | End: 2023-08-04
Payer: MEDICARE

## 2023-08-04 DIAGNOSIS — Q85.00 NEUROFIBROMATOSIS (HCC): ICD-10-CM

## 2023-08-04 DIAGNOSIS — R42 DIZZINESS: ICD-10-CM

## 2023-08-04 DIAGNOSIS — D75.1 POLYCYTHEMIA: Primary | ICD-10-CM

## 2023-08-04 DIAGNOSIS — Z12.5 SCREENING PSA (PROSTATE SPECIFIC ANTIGEN): ICD-10-CM

## 2023-08-04 LAB
ALBUMIN SERPL-MCNC: 4.1 G/DL (ref 3.5–5.2)
ALP SERPL-CCNC: 88 U/L (ref 40–129)
ALT SERPL-CCNC: 20 U/L (ref 5–41)
ANION GAP SERPL CALCULATED.3IONS-SCNC: 11 MMOL/L (ref 9–17)
AST SERPL-CCNC: 18 U/L
BASOPHILS # BLD: 0.1 K/UL (ref 0–0.2)
BASOPHILS NFR BLD: 1 % (ref 0–2)
BILIRUB SERPL-MCNC: 1.9 MG/DL (ref 0.3–1.2)
BUN SERPL-MCNC: 17 MG/DL (ref 8–23)
CALCIUM SERPL-MCNC: 9.2 MG/DL (ref 8.6–10.4)
CHLORIDE SERPL-SCNC: 104 MMOL/L (ref 98–107)
CO2 SERPL-SCNC: 24 MMOL/L (ref 20–31)
CREAT SERPL-MCNC: 0.9 MG/DL (ref 0.7–1.2)
EOSINOPHIL # BLD: 0.4 K/UL (ref 0–0.4)
EOSINOPHILS RELATIVE PERCENT: 6 % (ref 0–4)
ERYTHROCYTE [DISTWIDTH] IN BLOOD BY AUTOMATED COUNT: 16.4 % (ref 11.5–14.9)
GFR SERPL CREATININE-BSD FRML MDRD: >60 ML/MIN/1.73M2
GLUCOSE SERPL-MCNC: 92 MG/DL (ref 70–99)
HCT VFR BLD AUTO: 55.2 % (ref 41–53)
HGB BLD-MCNC: 18.4 G/DL (ref 13.5–17.5)
LYMPHOCYTES NFR BLD: 1.2 K/UL (ref 1–4.8)
LYMPHOCYTES RELATIVE PERCENT: 17 % (ref 24–44)
MAGNESIUM SERPL-MCNC: 2.2 MG/DL (ref 1.6–2.6)
MCH RBC QN AUTO: 26.9 PG (ref 26–34)
MCHC RBC AUTO-ENTMCNC: 33.4 G/DL (ref 31–37)
MCV RBC AUTO: 80.5 FL (ref 80–100)
MONOCYTES NFR BLD: 0.7 K/UL (ref 0.1–1.3)
MONOCYTES NFR BLD: 10 % (ref 1–7)
NEUTROPHILS NFR BLD: 66 % (ref 36–66)
NEUTS SEG NFR BLD: 4.7 K/UL (ref 1.3–9.1)
PLATELET # BLD AUTO: 183 K/UL (ref 150–450)
PMV BLD AUTO: 7.8 FL (ref 6–12)
POTASSIUM SERPL-SCNC: 3.9 MMOL/L (ref 3.7–5.3)
PROT SERPL-MCNC: 6.7 G/DL (ref 6.4–8.3)
PSA SERPL-MCNC: 1.14 NG/ML
RBC # BLD AUTO: 6.86 M/UL (ref 4.5–5.9)
SODIUM SERPL-SCNC: 139 MMOL/L (ref 135–144)
WBC OTHER # BLD: 7.1 K/UL (ref 3.5–11)

## 2023-08-04 PROCEDURE — 83735 ASSAY OF MAGNESIUM: CPT

## 2023-08-04 PROCEDURE — 80053 COMPREHEN METABOLIC PANEL: CPT

## 2023-08-04 PROCEDURE — 36415 COLL VENOUS BLD VENIPUNCTURE: CPT

## 2023-08-04 PROCEDURE — G0103 PSA SCREENING: HCPCS

## 2023-08-04 PROCEDURE — 85025 COMPLETE CBC W/AUTO DIFF WBC: CPT

## 2023-10-20 ENCOUNTER — HOSPITAL ENCOUNTER (OUTPATIENT)
Dept: MRI IMAGING | Age: 68
End: 2023-10-20
Payer: MEDICARE

## 2023-10-20 DIAGNOSIS — R94.120 ABNORMAL AUDITORY FUNCTION STUDY: ICD-10-CM

## 2023-10-20 DIAGNOSIS — R42 DIZZINESS AND GIDDINESS: ICD-10-CM

## 2023-10-20 PROCEDURE — 70553 MRI BRAIN STEM W/O & W/DYE: CPT

## 2023-10-20 PROCEDURE — 6360000004 HC RX CONTRAST MEDICATION: Performed by: NURSE PRACTITIONER

## 2023-10-20 PROCEDURE — A9579 GAD-BASE MR CONTRAST NOS,1ML: HCPCS | Performed by: NURSE PRACTITIONER

## 2023-10-20 RX ORDER — SODIUM CHLORIDE 0.9 % (FLUSH) 0.9 %
10 SYRINGE (ML) INJECTION 2 TIMES DAILY
Status: DISCONTINUED | OUTPATIENT
Start: 2023-10-20 | End: 2023-10-23 | Stop reason: HOSPADM

## 2023-10-20 RX ADMIN — GADOTERIDOL 15 ML: 279.3 INJECTION, SOLUTION INTRAVENOUS at 16:29

## 2024-01-29 ENCOUNTER — OFFICE VISIT (OUTPATIENT)
Dept: FAMILY MEDICINE CLINIC | Age: 69
End: 2024-01-29
Payer: MEDICARE

## 2024-01-29 VITALS
WEIGHT: 175 LBS | DIASTOLIC BLOOD PRESSURE: 60 MMHG | OXYGEN SATURATION: 94 % | HEART RATE: 102 BPM | BODY MASS INDEX: 26.22 KG/M2 | SYSTOLIC BLOOD PRESSURE: 86 MMHG | RESPIRATION RATE: 16 BRPM

## 2024-01-29 DIAGNOSIS — D75.1 POLYCYTHEMIA: ICD-10-CM

## 2024-01-29 DIAGNOSIS — Z11.59 NEED FOR HEPATITIS C SCREENING TEST: ICD-10-CM

## 2024-01-29 DIAGNOSIS — Z13.220 LIPID SCREENING: ICD-10-CM

## 2024-01-29 DIAGNOSIS — Q85.00 NEUROFIBROMATOSIS (HCC): Primary | ICD-10-CM

## 2024-01-29 PROCEDURE — 99214 OFFICE O/P EST MOD 30 MIN: CPT | Performed by: FAMILY MEDICINE

## 2024-01-29 PROCEDURE — 1123F ACP DISCUSS/DSCN MKR DOCD: CPT | Performed by: FAMILY MEDICINE

## 2024-01-29 SDOH — ECONOMIC STABILITY: INCOME INSECURITY: HOW HARD IS IT FOR YOU TO PAY FOR THE VERY BASICS LIKE FOOD, HOUSING, MEDICAL CARE, AND HEATING?: NOT HARD AT ALL

## 2024-01-29 SDOH — ECONOMIC STABILITY: FOOD INSECURITY: WITHIN THE PAST 12 MONTHS, THE FOOD YOU BOUGHT JUST DIDN'T LAST AND YOU DIDN'T HAVE MONEY TO GET MORE.: NEVER TRUE

## 2024-01-29 SDOH — ECONOMIC STABILITY: FOOD INSECURITY: WITHIN THE PAST 12 MONTHS, YOU WORRIED THAT YOUR FOOD WOULD RUN OUT BEFORE YOU GOT MONEY TO BUY MORE.: NEVER TRUE

## 2024-01-29 ASSESSMENT — PATIENT HEALTH QUESTIONNAIRE - PHQ9
SUM OF ALL RESPONSES TO PHQ QUESTIONS 1-9: 0
SUM OF ALL RESPONSES TO PHQ QUESTIONS 1-9: 0
SUM OF ALL RESPONSES TO PHQ9 QUESTIONS 1 & 2: 0
1. LITTLE INTEREST OR PLEASURE IN DOING THINGS: 0
2. FEELING DOWN, DEPRESSED OR HOPELESS: 0
SUM OF ALL RESPONSES TO PHQ QUESTIONS 1-9: 0
SUM OF ALL RESPONSES TO PHQ QUESTIONS 1-9: 0

## 2024-01-29 ASSESSMENT — ENCOUNTER SYMPTOMS
CHEST TIGHTNESS: 0
ABDOMINAL PAIN: 0
BLOOD IN STOOL: 0
SHORTNESS OF BREATH: 0

## 2024-01-29 NOTE — PROGRESS NOTES
Prostate Specific Antigen (PSA) Screening or Monitoring  Discontinued   Patient is a 68-year-old white male who presents for neurofibromatosis.  He also has a history of polycythemia for which he sees his hematologist.  He denies any fever, chills, chest pain or shortness of breath.  Review of Systems   Constitutional:  Negative for chills and fever.   HENT:  Negative for congestion.    Respiratory:  Negative for chest tightness and shortness of breath.    Cardiovascular:  Negative for chest pain.   Gastrointestinal:  Negative for abdominal pain and blood in stool.   Genitourinary:  Negative for dysuria and hematuria.   Skin:  Negative for rash.   Psychiatric/Behavioral:  Negative for confusion and dysphoric mood.        Objective:   Physical Exam  Vitals and nursing note reviewed.   Constitutional:       General: He is not in acute distress.     Appearance: He is well-developed.   HENT:      Head: Normocephalic and atraumatic.      Right Ear: Tympanic membrane, ear canal and external ear normal.      Left Ear: Tympanic membrane, ear canal and external ear normal.      Nose: Nose normal.      Mouth/Throat:      Mouth: Mucous membranes are moist.      Pharynx: Oropharynx is clear.   Eyes:      General: No scleral icterus.        Right eye: No discharge.         Left eye: No discharge.      Conjunctiva/sclera: Conjunctivae normal.   Cardiovascular:      Rate and Rhythm: Normal rate and regular rhythm.      Heart sounds: Normal heart sounds.   Pulmonary:      Effort: Pulmonary effort is normal. No respiratory distress.      Breath sounds: Normal breath sounds. No wheezing.   Abdominal:      General: There is no distension.      Palpations: Abdomen is soft.      Tenderness: There is no abdominal tenderness.   Musculoskeletal:      Cervical back: Neck supple.   Skin:     General: Skin is warm and dry.      Findings: No rash.   Neurological:      Mental Status: He is alert and oriented to person, place, and time.

## 2024-01-31 ENCOUNTER — HOSPITAL ENCOUNTER (OUTPATIENT)
Age: 69
Discharge: HOME | End: 2024-01-31
Payer: MEDICARE

## 2024-01-31 DIAGNOSIS — Q85.00 NEUROFIBROMATOSIS (HCC): ICD-10-CM

## 2024-01-31 DIAGNOSIS — D75.1 POLYCYTHEMIA: ICD-10-CM

## 2024-01-31 DIAGNOSIS — Z13.220 LIPID SCREENING: ICD-10-CM

## 2024-01-31 DIAGNOSIS — Z11.59 NEED FOR HEPATITIS C SCREENING TEST: ICD-10-CM

## 2024-01-31 LAB
ALBUMIN SERPL-MCNC: 3.5 G/DL (ref 3.5–5.2)
ALP SERPL-CCNC: 103 U/L (ref 40–129)
ALT SERPL-CCNC: 24 U/L (ref 5–41)
ANION GAP SERPL CALCULATED.3IONS-SCNC: 15 MMOL/L (ref 9–17)
AST SERPL-CCNC: 17 U/L
BASOPHILS # BLD: 0 K/UL (ref 0–0.2)
BASOPHILS NFR BLD: 0 % (ref 0–2)
BILIRUB SERPL-MCNC: 2.6 MG/DL (ref 0.3–1.2)
BUN SERPL-MCNC: 21 MG/DL (ref 8–23)
CALCIUM SERPL-MCNC: 9.3 MG/DL (ref 8.6–10.4)
CHLORIDE SERPL-SCNC: 104 MMOL/L (ref 98–107)
CHOLEST SERPL-MCNC: 145 MG/DL
CHOLESTEROL/HDL RATIO: 3.6
CO2 SERPL-SCNC: 20 MMOL/L (ref 20–31)
CREAT SERPL-MCNC: 1.1 MG/DL (ref 0.7–1.2)
EOSINOPHIL # BLD: 3.18 K/UL (ref 0–0.4)
EOSINOPHILS RELATIVE PERCENT: 25 % (ref 0–4)
ERYTHROCYTE [DISTWIDTH] IN BLOOD BY AUTOMATED COUNT: 16.4 % (ref 11.5–14.9)
GFR SERPL CREATININE-BSD FRML MDRD: >60 ML/MIN/1.73M2
GLUCOSE SERPL-MCNC: 98 MG/DL (ref 70–99)
HCT VFR BLD AUTO: 55.6 % (ref 41–53)
HCV AB SERPL QL IA: NONREACTIVE
HDLC SERPL-MCNC: 40 MG/DL
HGB BLD-MCNC: 18.4 G/DL (ref 13.5–17.5)
LDLC SERPL CALC-MCNC: 77 MG/DL (ref 0–130)
LYMPHOCYTES NFR BLD: 0.89 K/UL (ref 1–4.8)
LYMPHOCYTES RELATIVE PERCENT: 7 % (ref 24–44)
MCH RBC QN AUTO: 26.5 PG (ref 26–34)
MCHC RBC AUTO-ENTMCNC: 33 G/DL (ref 31–37)
MCV RBC AUTO: 80.3 FL (ref 80–100)
MONOCYTES NFR BLD: 0.76 K/UL (ref 0.1–1.3)
MONOCYTES NFR BLD: 6 % (ref 1–7)
MORPHOLOGY: ABNORMAL
NEUTROPHILS NFR BLD: 62 % (ref 36–66)
NEUTS SEG NFR BLD: 7.87 K/UL (ref 1.3–9.1)
PLATELET # BLD AUTO: 160 K/UL (ref 150–450)
PMV BLD AUTO: 8.3 FL (ref 6–12)
POTASSIUM SERPL-SCNC: 3.7 MMOL/L (ref 3.7–5.3)
PROT SERPL-MCNC: 6.4 G/DL (ref 6.4–8.3)
RBC # BLD AUTO: 6.93 M/UL (ref 4.5–5.9)
RETICS # AUTO: 0.06 M/UL (ref 0.02–0.1)
RETICS/RBC NFR AUTO: 0.9 % (ref 0.5–2)
SODIUM SERPL-SCNC: 139 MMOL/L (ref 135–144)
TRIGL SERPL-MCNC: 140 MG/DL
WBC OTHER # BLD: 12.7 K/UL (ref 3.5–11)

## 2024-01-31 PROCEDURE — 36415 COLL VENOUS BLD VENIPUNCTURE: CPT

## 2024-01-31 PROCEDURE — 85045 AUTOMATED RETICULOCYTE COUNT: CPT

## 2024-01-31 PROCEDURE — 86803 HEPATITIS C AB TEST: CPT

## 2024-01-31 PROCEDURE — 80053 COMPREHEN METABOLIC PANEL: CPT

## 2024-01-31 PROCEDURE — 85025 COMPLETE CBC W/AUTO DIFF WBC: CPT

## 2024-01-31 PROCEDURE — 80061 LIPID PANEL: CPT

## 2024-02-01 LAB
PATH REV BLD -IMP: NORMAL
SURGICAL PATHOLOGY REPORT: NORMAL

## 2024-08-02 ENCOUNTER — OFFICE VISIT (OUTPATIENT)
Dept: FAMILY MEDICINE CLINIC | Age: 69
End: 2024-08-02

## 2024-08-02 VITALS
RESPIRATION RATE: 16 BRPM | DIASTOLIC BLOOD PRESSURE: 82 MMHG | SYSTOLIC BLOOD PRESSURE: 130 MMHG | HEART RATE: 93 BPM | BODY MASS INDEX: 23.68 KG/M2 | HEIGHT: 70 IN | WEIGHT: 165.4 LBS | OXYGEN SATURATION: 94 %

## 2024-08-02 DIAGNOSIS — Z53.20 COLONOSCOPY REFUSED: ICD-10-CM

## 2024-08-02 DIAGNOSIS — Q85.00 NEUROFIBROMATOSIS (HCC): ICD-10-CM

## 2024-08-02 DIAGNOSIS — J40 BRONCHITIS: Primary | ICD-10-CM

## 2024-08-02 DIAGNOSIS — Z28.21 PNEUMOCOCCAL VACCINATION DECLINED: ICD-10-CM

## 2024-08-02 RX ORDER — GUAIFENESIN AND DEXTROMETHORPHAN HYDROBROMIDE 600; 30 MG/1; MG/1
TABLET, EXTENDED RELEASE ORAL
Qty: 28 TABLET | Refills: 1 | Status: SHIPPED | OUTPATIENT
Start: 2024-08-02

## 2024-08-02 RX ORDER — AZITHROMYCIN 250 MG/1
TABLET, FILM COATED ORAL
Qty: 6 TABLET | Refills: 0 | Status: SHIPPED | OUTPATIENT
Start: 2024-08-02

## 2024-08-02 SDOH — ECONOMIC STABILITY: FOOD INSECURITY: WITHIN THE PAST 12 MONTHS, YOU WORRIED THAT YOUR FOOD WOULD RUN OUT BEFORE YOU GOT MONEY TO BUY MORE.: NEVER TRUE

## 2024-08-02 SDOH — ECONOMIC STABILITY: INCOME INSECURITY: HOW HARD IS IT FOR YOU TO PAY FOR THE VERY BASICS LIKE FOOD, HOUSING, MEDICAL CARE, AND HEATING?: NOT HARD AT ALL

## 2024-08-02 SDOH — ECONOMIC STABILITY: FOOD INSECURITY: WITHIN THE PAST 12 MONTHS, THE FOOD YOU BOUGHT JUST DIDN'T LAST AND YOU DIDN'T HAVE MONEY TO GET MORE.: NEVER TRUE

## 2024-08-02 ASSESSMENT — ENCOUNTER SYMPTOMS
CHEST TIGHTNESS: 0
ABDOMINAL PAIN: 0
BLOOD IN STOOL: 0
WHEEZING: 0
COUGH: 1
SHORTNESS OF BREATH: 0

## 2024-08-02 ASSESSMENT — PATIENT HEALTH QUESTIONNAIRE - PHQ9
SUM OF ALL RESPONSES TO PHQ QUESTIONS 1-9: 0
2. FEELING DOWN, DEPRESSED OR HOPELESS: NOT AT ALL
SUM OF ALL RESPONSES TO PHQ9 QUESTIONS 1 & 2: 0
SUM OF ALL RESPONSES TO PHQ QUESTIONS 1-9: 0
SUM OF ALL RESPONSES TO PHQ QUESTIONS 1-9: 0
1. LITTLE INTEREST OR PLEASURE IN DOING THINGS: NOT AT ALL
SUM OF ALL RESPONSES TO PHQ QUESTIONS 1-9: 0

## 2024-08-02 NOTE — PROGRESS NOTES
Polycythemia    Pneumococcal vaccination declined    Colonoscopy refused       Past Medical History:   Diagnosis Date    Abdominal pain     Colon polyp     Pulmonary nodules 2013    Thyroid nodule 2013       Past Surgical History:   Procedure Laterality Date    COLONOSCOPY  11    POLYPECTOMY    COLONOSCOPY  10/23/07    POLYPECTOMY    CYST REMOVAL      \"back side\"   and face and neck    POLYPECTOMY  11        Social History     Socioeconomic History    Marital status:      Spouse name: None    Number of children: None    Years of education: None    Highest education level: None   Tobacco Use    Smoking status: Former     Current packs/day: 0.00     Average packs/day: 1 pack/day for 30.0 years (30.0 ttl pk-yrs)     Types: Cigarettes     Start date: 1971     Quit date: 2001     Years since quittin.1     Passive exposure: Never    Smokeless tobacco: Never   Vaping Use    Vaping Use: Never used   Substance and Sexual Activity    Alcohol use: Yes     Comment: occasionally     Drug use: Never     Social Determinants of Health     Financial Resource Strain: Low Risk  (2024)    Overall Financial Resource Strain (CARDIA)     Difficulty of Paying Living Expenses: Not hard at all   Food Insecurity: No Food Insecurity (2024)    Hunger Vital Sign     Worried About Running Out of Food in the Last Year: Never true     Ran Out of Food in the Last Year: Never true   Transportation Needs: Unknown (2024)    PRAPARE - Transportation     Lack of Transportation (Non-Medical): No   Physical Activity: Inactive (8/15/2022)    Exercise Vital Sign     Days of Exercise per Week: 1 day     Minutes of Exercise per Session: 0 min   Housing Stability: Unknown (2024)    Housing Stability Vital Sign     Unstable Housing in the Last Year: No        Family History   Problem Relation Age of Onset    Cancer Mother     Cancer Father     Heart Attack Father         PHYSICAL EXAM:     /82

## 2024-08-23 ENCOUNTER — TELEPHONE (OUTPATIENT)
Dept: FAMILY MEDICINE CLINIC | Age: 69
End: 2024-08-23

## 2024-08-23 RX ORDER — ALBUTEROL SULFATE 90 UG/1
2 AEROSOL, METERED RESPIRATORY (INHALATION) 4 TIMES DAILY PRN
Qty: 18 G | Refills: 1 | Status: SHIPPED | OUTPATIENT
Start: 2024-08-23

## 2024-08-23 RX ORDER — GUAIFENESIN AND DEXTROMETHORPHAN HYDROBROMIDE 600; 30 MG/1; MG/1
TABLET, EXTENDED RELEASE ORAL
Qty: 28 TABLET | Refills: 1 | Status: SHIPPED | OUTPATIENT
Start: 2024-08-23

## 2024-08-23 RX ORDER — METHYLPREDNISOLONE 4 MG/1
TABLET ORAL
Qty: 1 KIT | Refills: 0 | Status: SHIPPED | OUTPATIENT
Start: 2024-08-23

## 2024-08-23 NOTE — TELEPHONE ENCOUNTER
The patient completed the Zpak that was prescribed for bronchitis.  He is having a cough with clear drainage and congestion with clear drainage.  He is having wheezing and says sometimes it is difficult to catch his breath.  His pharmacy is [facility].  Please advise.

## 2024-08-23 NOTE — TELEPHONE ENCOUNTER
Mucinex DM, albuterol HFA inhaler, Medrol Dosepak ordered.  If no improvement, patient is to go to the ER and follow-up with his pulmonologist.

## 2024-10-15 PROBLEM — D45 POLYCYTHEMIA VERA (HCC): Status: ACTIVE | Noted: 2024-03-22

## 2024-10-16 ENCOUNTER — OFFICE VISIT (OUTPATIENT)
Dept: FAMILY MEDICINE CLINIC | Age: 69
End: 2024-10-16
Payer: MEDICARE

## 2024-10-16 VITALS
BODY MASS INDEX: 23.48 KG/M2 | HEIGHT: 70 IN | WEIGHT: 164 LBS | DIASTOLIC BLOOD PRESSURE: 64 MMHG | RESPIRATION RATE: 14 BRPM | SYSTOLIC BLOOD PRESSURE: 130 MMHG | HEART RATE: 80 BPM | TEMPERATURE: 98.6 F | OXYGEN SATURATION: 94 %

## 2024-10-16 DIAGNOSIS — Z12.12 SCREENING FOR COLORECTAL CANCER: ICD-10-CM

## 2024-10-16 DIAGNOSIS — Z12.11 SCREENING FOR COLORECTAL CANCER: ICD-10-CM

## 2024-10-16 DIAGNOSIS — D45 POLYCYTHEMIA VERA (HCC): ICD-10-CM

## 2024-10-16 DIAGNOSIS — Z00.00 MEDICARE ANNUAL WELLNESS VISIT, SUBSEQUENT: Primary | ICD-10-CM

## 2024-10-16 PROCEDURE — 1123F ACP DISCUSS/DSCN MKR DOCD: CPT | Performed by: NURSE PRACTITIONER

## 2024-10-16 PROCEDURE — G0439 PPPS, SUBSEQ VISIT: HCPCS | Performed by: NURSE PRACTITIONER

## 2024-10-16 ASSESSMENT — PATIENT HEALTH QUESTIONNAIRE - PHQ9
SUM OF ALL RESPONSES TO PHQ QUESTIONS 1-9: 0
1. LITTLE INTEREST OR PLEASURE IN DOING THINGS: NOT AT ALL
SUM OF ALL RESPONSES TO PHQ QUESTIONS 1-9: 0
2. FEELING DOWN, DEPRESSED OR HOPELESS: NOT AT ALL
SUM OF ALL RESPONSES TO PHQ QUESTIONS 1-9: 0
SUM OF ALL RESPONSES TO PHQ QUESTIONS 1-9: 0
SUM OF ALL RESPONSES TO PHQ9 QUESTIONS 1 & 2: 0

## 2024-10-16 ASSESSMENT — LIFESTYLE VARIABLES
HOW OFTEN DO YOU HAVE A DRINK CONTAINING ALCOHOL: 2-4 TIMES A MONTH
HOW MANY STANDARD DRINKS CONTAINING ALCOHOL DO YOU HAVE ON A TYPICAL DAY: 3 OR 4

## 2024-10-16 NOTE — PATIENT INSTRUCTIONS
list of these orders (if applicable) as well as your Preventive Care list are included within your After Visit Summary for your review.    Other Preventive Recommendations:    A preventive eye exam performed by an eye specialist is recommended every 1-2 years to screen for glaucoma; cataracts, macular degeneration, and other eye disorders.  A preventive dental visit is recommended every 6 months.  Try to get at least 150 minutes of exercise per week or 10,000 steps per day on a pedometer .  Order or download the FREE \"Exercise & Physical Activity: Your Everyday Guide\" from [employer] [place] [employer]. Call 1-681.883.4583 or search [employer] [place] [employer] online.  You need 2845-6689 mg of calcium and 7637-4971 IU of vitamin D per day. It is possible to meet your calcium requirement with diet alone, but a vitamin D supplement is usually necessary to meet this goal.  When exposed to the sun, use a sunscreen that protects against both UVA and UVB radiation with an SPF of 30 or greater. Reapply every 2 to 3 hours or after sweating, drying off with a towel, or swimming.  Always wear a seat belt when traveling in a car. Always wear a helmet when riding a bicycle or motorcycle.

## 2024-11-07 ENCOUNTER — APPOINTMENT (OUTPATIENT)
Dept: MRI IMAGING | Age: 69
DRG: 065 | End: 2024-11-07
Payer: MEDICARE

## 2024-11-07 ENCOUNTER — APPOINTMENT (OUTPATIENT)
Dept: CT IMAGING | Age: 69
DRG: 065 | End: 2024-11-07
Payer: MEDICARE

## 2024-11-07 ENCOUNTER — HOSPITAL ENCOUNTER (INPATIENT)
Age: 69
LOS: 12 days | Discharge: NURSING, REHAB OR LONG-TERM CARE | DRG: 065 | End: 2024-11-19
Attending: EMERGENCY MEDICINE | Admitting: FAMILY MEDICINE
Payer: MEDICARE

## 2024-11-07 DIAGNOSIS — R53.1 LEFT-SIDED WEAKNESS: ICD-10-CM

## 2024-11-07 DIAGNOSIS — R26.81 UNSTABLE GAIT: ICD-10-CM

## 2024-11-07 DIAGNOSIS — I63.9 CEREBROVASCULAR ACCIDENT (CVA), UNSPECIFIED MECHANISM (HCC): Primary | ICD-10-CM

## 2024-11-07 LAB
ALBUMIN SERPL-MCNC: 3.2 G/DL (ref 3.5–5.2)
ALP SERPL-CCNC: 88 U/L (ref 40–129)
ALT SERPL-CCNC: 14 U/L (ref 10–50)
ANION GAP SERPL CALCULATED.3IONS-SCNC: 9 MMOL/L (ref 9–16)
AST SERPL-CCNC: 20 U/L (ref 10–50)
BASOPHILS # BLD: 0 K/UL (ref 0–0.2)
BASOPHILS NFR BLD: 1 % (ref 0–2)
BILIRUB SERPL-MCNC: 1 MG/DL (ref 0–1.2)
BUN SERPL-MCNC: 17 MG/DL (ref 8–23)
CALCIUM SERPL-MCNC: 8.3 MG/DL (ref 8.6–10.4)
CHLORIDE SERPL-SCNC: 104 MMOL/L (ref 98–107)
CO2 SERPL-SCNC: 22 MMOL/L (ref 20–31)
CREAT SERPL-MCNC: 0.9 MG/DL (ref 0.7–1.2)
EKG ATRIAL RATE: 71 BPM
EKG P AXIS: 39 DEGREES
EKG P-R INTERVAL: 184 MS
EKG Q-T INTERVAL: 432 MS
EKG QRS DURATION: 90 MS
EKG QTC CALCULATION (BAZETT): 469 MS
EKG R AXIS: 51 DEGREES
EKG T AXIS: 65 DEGREES
EKG VENTRICULAR RATE: 71 BPM
EOSINOPHIL # BLD: 0.5 K/UL (ref 0–0.4)
EOSINOPHILS RELATIVE PERCENT: 8 % (ref 0–4)
ERYTHROCYTE [DISTWIDTH] IN BLOOD BY AUTOMATED COUNT: 15.5 % (ref 11.5–14.9)
GFR, ESTIMATED: >90 ML/MIN/1.73M2
GLUCOSE BLD-MCNC: 128 MG/DL (ref 75–110)
GLUCOSE SERPL-MCNC: 106 MG/DL (ref 74–99)
HCT VFR BLD AUTO: 51.3 % (ref 41–53)
HGB BLD-MCNC: 17.5 G/DL (ref 13.5–17.5)
INR PPP: 1.1
LYMPHOCYTES NFR BLD: 0.9 K/UL (ref 1–4.8)
LYMPHOCYTES RELATIVE PERCENT: 16 % (ref 24–44)
MCH RBC QN AUTO: 28.1 PG (ref 26–34)
MCHC RBC AUTO-ENTMCNC: 34.1 G/DL (ref 31–37)
MCV RBC AUTO: 82.4 FL (ref 80–100)
MONOCYTES NFR BLD: 0.6 K/UL (ref 0.1–1.3)
MONOCYTES NFR BLD: 11 % (ref 1–7)
NEUTROPHILS NFR BLD: 64 % (ref 36–66)
NEUTS SEG NFR BLD: 3.5 K/UL (ref 1.3–9.1)
PLATELET # BLD AUTO: 164 K/UL (ref 150–450)
PMV BLD AUTO: 7.8 FL (ref 6–12)
POTASSIUM SERPL-SCNC: 4 MMOL/L (ref 3.7–5.3)
PROT SERPL-MCNC: 5.5 G/DL (ref 6.6–8.7)
PROTHROMBIN TIME: 14.7 SEC (ref 11.8–14.6)
RBC # BLD AUTO: 6.22 M/UL (ref 4.5–5.9)
SODIUM SERPL-SCNC: 135 MMOL/L (ref 136–145)
TROPONIN I SERPL HS-MCNC: 8 NG/L (ref 0–22)
WBC OTHER # BLD: 5.4 K/UL (ref 3.5–11)

## 2024-11-07 PROCEDURE — 80053 COMPREHEN METABOLIC PANEL: CPT

## 2024-11-07 PROCEDURE — 2580000003 HC RX 258: Performed by: EMERGENCY MEDICINE

## 2024-11-07 PROCEDURE — 6370000000 HC RX 637 (ALT 250 FOR IP): Performed by: EMERGENCY MEDICINE

## 2024-11-07 PROCEDURE — 2060000000 HC ICU INTERMEDIATE R&B

## 2024-11-07 PROCEDURE — 92610 EVALUATE SWALLOWING FUNCTION: CPT

## 2024-11-07 PROCEDURE — 93005 ELECTROCARDIOGRAM TRACING: CPT | Performed by: EMERGENCY MEDICINE

## 2024-11-07 PROCEDURE — 2580000003 HC RX 258: Performed by: PSYCHIATRY & NEUROLOGY

## 2024-11-07 PROCEDURE — G0426 INPT/ED TELECONSULT50: HCPCS | Performed by: PSYCHIATRY & NEUROLOGY

## 2024-11-07 PROCEDURE — 70450 CT HEAD/BRAIN W/O DYE: CPT

## 2024-11-07 PROCEDURE — 93010 ELECTROCARDIOGRAM REPORT: CPT | Performed by: INTERNAL MEDICINE

## 2024-11-07 PROCEDURE — 70498 CT ANGIOGRAPHY NECK: CPT

## 2024-11-07 PROCEDURE — 70551 MRI BRAIN STEM W/O DYE: CPT

## 2024-11-07 PROCEDURE — 92523 SPEECH SOUND LANG COMPREHEN: CPT

## 2024-11-07 PROCEDURE — 99285 EMERGENCY DEPT VISIT HI MDM: CPT

## 2024-11-07 PROCEDURE — 84484 ASSAY OF TROPONIN QUANT: CPT

## 2024-11-07 PROCEDURE — 99223 1ST HOSP IP/OBS HIGH 75: CPT | Performed by: PSYCHIATRY & NEUROLOGY

## 2024-11-07 PROCEDURE — 6360000004 HC RX CONTRAST MEDICATION: Performed by: EMERGENCY MEDICINE

## 2024-11-07 PROCEDURE — 85610 PROTHROMBIN TIME: CPT

## 2024-11-07 PROCEDURE — 82947 ASSAY GLUCOSE BLOOD QUANT: CPT

## 2024-11-07 PROCEDURE — 85025 COMPLETE CBC W/AUTO DIFF WBC: CPT

## 2024-11-07 PROCEDURE — 36415 COLL VENOUS BLD VENIPUNCTURE: CPT

## 2024-11-07 PROCEDURE — 4A03X5D MEASUREMENT OF ARTERIAL FLOW, INTRACRANIAL, EXTERNAL APPROACH: ICD-10-PCS

## 2024-11-07 RX ORDER — VITAMIN B COMPLEX
1000 TABLET ORAL DAILY
Status: ON HOLD | COMMUNITY

## 2024-11-07 RX ORDER — CHLORAL HYDRATE 500 MG
CAPSULE ORAL DAILY
Status: ON HOLD | COMMUNITY

## 2024-11-07 RX ORDER — POLYETHYLENE GLYCOL 3350 17 G/17G
17 POWDER, FOR SOLUTION ORAL DAILY PRN
Status: DISCONTINUED | OUTPATIENT
Start: 2024-11-07 | End: 2024-11-19 | Stop reason: HOSPADM

## 2024-11-07 RX ORDER — SODIUM CHLORIDE 0.9 % (FLUSH) 0.9 %
5-40 SYRINGE (ML) INJECTION PRN
Status: DISCONTINUED | OUTPATIENT
Start: 2024-11-07 | End: 2024-11-19 | Stop reason: HOSPADM

## 2024-11-07 RX ORDER — ROSUVASTATIN CALCIUM 40 MG/1
40 TABLET, COATED ORAL NIGHTLY
Status: DISCONTINUED | OUTPATIENT
Start: 2024-11-08 | End: 2024-11-19 | Stop reason: HOSPADM

## 2024-11-07 RX ORDER — LABETALOL HYDROCHLORIDE 5 MG/ML
10 INJECTION, SOLUTION INTRAVENOUS EVERY 10 MIN PRN
Status: DISCONTINUED | OUTPATIENT
Start: 2024-11-07 | End: 2024-11-19 | Stop reason: HOSPADM

## 2024-11-07 RX ORDER — CLOPIDOGREL BISULFATE 75 MG/1
300 TABLET ORAL ONCE
Status: COMPLETED | OUTPATIENT
Start: 2024-11-07 | End: 2024-11-07

## 2024-11-07 RX ORDER — ROSUVASTATIN CALCIUM 40 MG/1
40 TABLET, COATED ORAL ONCE
Status: COMPLETED | OUTPATIENT
Start: 2024-11-07 | End: 2024-11-07

## 2024-11-07 RX ORDER — CLOPIDOGREL BISULFATE 75 MG/1
75 TABLET ORAL DAILY
Status: DISCONTINUED | OUTPATIENT
Start: 2024-11-07 | End: 2024-11-07

## 2024-11-07 RX ORDER — ASPIRIN 81 MG/1
81 TABLET ORAL DAILY
Status: DISCONTINUED | OUTPATIENT
Start: 2024-11-08 | End: 2024-11-19 | Stop reason: HOSPADM

## 2024-11-07 RX ORDER — 0.9 % SODIUM CHLORIDE 0.9 %
100 INTRAVENOUS SOLUTION INTRAVENOUS ONCE
Status: COMPLETED | OUTPATIENT
Start: 2024-11-07 | End: 2024-11-07

## 2024-11-07 RX ORDER — ASPIRIN 81 MG/1
81 TABLET ORAL DAILY
Status: DISCONTINUED | OUTPATIENT
Start: 2024-11-07 | End: 2024-11-07

## 2024-11-07 RX ORDER — MULTIVIT WITH MINERALS/LUTEIN
1000 TABLET ORAL DAILY
Status: ON HOLD | COMMUNITY

## 2024-11-07 RX ORDER — SODIUM CHLORIDE 0.9 % (FLUSH) 0.9 %
10 SYRINGE (ML) INJECTION PRN
Status: DISCONTINUED | OUTPATIENT
Start: 2024-11-07 | End: 2024-11-19 | Stop reason: HOSPADM

## 2024-11-07 RX ORDER — ASPIRIN 325 MG
325 TABLET ORAL DAILY
Status: DISCONTINUED | OUTPATIENT
Start: 2024-11-07 | End: 2024-11-07

## 2024-11-07 RX ORDER — CLOPIDOGREL BISULFATE 75 MG/1
75 TABLET ORAL DAILY
Status: DISCONTINUED | OUTPATIENT
Start: 2024-11-08 | End: 2024-11-19 | Stop reason: HOSPADM

## 2024-11-07 RX ORDER — SODIUM CHLORIDE 9 MG/ML
INJECTION, SOLUTION INTRAVENOUS PRN
Status: DISCONTINUED | OUTPATIENT
Start: 2024-11-07 | End: 2024-11-19 | Stop reason: HOSPADM

## 2024-11-07 RX ORDER — ONDANSETRON 4 MG/1
4 TABLET, ORALLY DISINTEGRATING ORAL EVERY 8 HOURS PRN
Status: DISCONTINUED | OUTPATIENT
Start: 2024-11-07 | End: 2024-11-19 | Stop reason: HOSPADM

## 2024-11-07 RX ORDER — ROSUVASTATIN CALCIUM 40 MG/1
40 TABLET, COATED ORAL NIGHTLY
Status: DISCONTINUED | OUTPATIENT
Start: 2024-11-07 | End: 2024-11-07

## 2024-11-07 RX ORDER — ONDANSETRON 2 MG/ML
4 INJECTION INTRAMUSCULAR; INTRAVENOUS EVERY 6 HOURS PRN
Status: DISCONTINUED | OUTPATIENT
Start: 2024-11-07 | End: 2024-11-19 | Stop reason: HOSPADM

## 2024-11-07 RX ORDER — SODIUM CHLORIDE 0.9 % (FLUSH) 0.9 %
5-40 SYRINGE (ML) INJECTION EVERY 12 HOURS SCHEDULED
Status: DISCONTINUED | OUTPATIENT
Start: 2024-11-07 | End: 2024-11-19 | Stop reason: HOSPADM

## 2024-11-07 RX ORDER — IOPAMIDOL 755 MG/ML
75 INJECTION, SOLUTION INTRAVASCULAR
Status: COMPLETED | OUTPATIENT
Start: 2024-11-07 | End: 2024-11-07

## 2024-11-07 RX ADMIN — ASPIRIN 325 MG: 325 TABLET ORAL at 12:27

## 2024-11-07 RX ADMIN — SODIUM CHLORIDE, PRESERVATIVE FREE 10 ML: 5 INJECTION INTRAVENOUS at 12:08

## 2024-11-07 RX ADMIN — SODIUM CHLORIDE 100 ML: 9 INJECTION, SOLUTION INTRAVENOUS at 12:08

## 2024-11-07 RX ADMIN — CLOPIDOGREL BISULFATE 300 MG: 75 TABLET ORAL at 12:27

## 2024-11-07 RX ADMIN — IOPAMIDOL 75 ML: 755 INJECTION, SOLUTION INTRAVENOUS at 12:07

## 2024-11-07 RX ADMIN — SODIUM CHLORIDE, PRESERVATIVE FREE 10 ML: 5 INJECTION INTRAVENOUS at 20:09

## 2024-11-07 RX ADMIN — ROSUVASTATIN 40 MG: 40 TABLET, FILM COATED ORAL at 15:38

## 2024-11-07 ASSESSMENT — LIFESTYLE VARIABLES
HOW MANY STANDARD DRINKS CONTAINING ALCOHOL DO YOU HAVE ON A TYPICAL DAY: 1 OR 2
HOW OFTEN DO YOU HAVE A DRINK CONTAINING ALCOHOL: MONTHLY OR LESS

## 2024-11-07 ASSESSMENT — PAIN - FUNCTIONAL ASSESSMENT: PAIN_FUNCTIONAL_ASSESSMENT: NONE - DENIES PAIN

## 2024-11-07 ASSESSMENT — PAIN SCALES - GENERAL: PAINLEVEL_OUTOF10: 0

## 2024-11-07 NOTE — ED PROVIDER NOTES
including hemorrhage or ischemia. [MM]   1232 I spoke again with Dr. Suarez this is to go ahead with the previous plan of permissive hypertension with an MRI of the brain and a loading dose of Plavix and aspirin.  He has also recommended Crestor 40 mg. [MM]      ED Course User Index  [MM] Domingo Domingo Razo I, DO   I spoke to Dr. Khan who accepts admission.  Patient and his wife are agreeable with the above plan.    CRITICAL CARE:         PROCEDURES:    Procedures      DATA FOR LAB AND RADIOLOGY TESTS ORDERED BELOW ARE REVIEWED BY THE ED CLINICIAN:    RADIOLOGY: All x-rays, CT, MRI, and formal ultrasound images (except ED bedside ultrasound) are read by the radiologist, see reports below, unless otherwise noted in MDM or here.  Reports below are reviewed by myself.  CT CERVICAL SPINE WO CONTRAST   Final Result   Multilevel degenerative changes of the cervical spine as detailed above.         MRI BRAIN WO CONTRAST   Preliminary Result   2 small areas of restricted diffusion in the right basal ganglia consistent   with acute areas of infarct.  No other areas restricted diffusion are   identified.      Cerebral atrophy.  Mild chronic small vessel ischemic disease.      Multiple subcutaneous nodules in the soft tissues of the head and upper neck.         CTA HEAD NECK W CONTRAST   Final Result   Mild atherosclerosis, otherwise unremarkable CT angiogram of the head and   neck.         CT HEAD WO CONTRAST   Final Result   No acute intracranial process identified.      The findings were sent to the Radiology Results Communication Center at 12:18   pm on 11/7/2024 to be communicated to a licensed caregiver.  Negative results   were subsequently relayed to Dr. Lane by the CORE team at 12:23 p.m. on   11/07/2024.             LABS: Lab orders shown below, the results are reviewed by myself, and all abnormals are listed below.  Labs Reviewed   CBC WITH AUTO DIFFERENTIAL - Abnormal; Notable for the following

## 2024-11-07 NOTE — CONSULTS
[facility] Neurology   IN-PATIENT SERVICE      NEUROLOGY CONSULT  NOTE            Date:   11/7/2024  Patient name:  Nitesh Ren  Date of admission:  11/7/2024  YOB: 1955      Chief Complaint:     Chief Complaint   Patient presents with    Extremity Weakness       Reason for Consult:      L sided weakness     History of Present Illness:     The patient is a 69 y.o. male who presents with Extremity Weakness  . The patient was seen and examined and the chart was reviewed.  Patient woke up this morning at 7 AM and noticed that the left side of his body was weaker than usual.  Noticed that he was unable to  things with his left hand.  Also was having some difficulty walking.  States he went to bed last night without any issues.  He presented to the ED later on in the morning.  He was evaluated by the stroke team and loaded on dual antiplatelets.  CT of the head without acute abnormalities.  CTA head and neck with mild atherosclerotic disease.  No LVO or significant stenosis noted.  Initial SBP in the 150s.  Highest reading has been 172/88.  Reportedly having NIHSS of 5.  Undergoing MRI of the brain confirming acute infarct in the right basal ganglia, corona radiata region.  He has history of smoking for several years although he quit back in 2001.  Denies any cardiac history.  States he may have hypertension, hyperlipidemia although undiagnosed.  He does have history of neurofibromatosis although states he does not follow-up with anyone for this.  He has just come up to the floor after having his MRI of the brain done.    Past Medical History:     Past Medical History:   Diagnosis Date    Abdominal pain     Colon polyp     Pulmonary nodules 9/17/2013    Thyroid nodule 9/17/2013        Past Surgical History:     Past Surgical History:   Procedure Laterality Date    COLONOSCOPY  12/12/11    POLYPECTOMY    COLONOSCOPY  10/23/07    POLYPECTOMY    CYST REMOVAL      \"back side\"   and face and neck

## 2024-11-07 NOTE — H&P
Hospital Medicine  History and Physical                                                                                                                                                 Full Code    Patient:  Nitesh Ren  MRN: [id number]                                                                                                                                                                     CHIEF COMPLAINT:  left sided weakness    History Obtained From:  patient, and sister  PCP: Bhupendra Hinds MD    HISTORY OF PRESENT ILLNESS:   The patient is a 69 y.o. male who presents with h/o of left side weakness, pt is left handed who was at home and was unable to hold cup of coffe in left hand, pt was seen in ER, pt has been smoker who quit smoking in 2012, was a smoker for >39 yrs.   Pt says he was diagnosed with polycythemia and see  hematology in [facility].    H/o of neurofibroma    Past Medical History:        Diagnosis Date    Abdominal pain     Colon polyp     Pulmonary nodules 9/17/2013    Thyroid nodule 9/17/2013       Past Surgical History:        Procedure Laterality Date    COLONOSCOPY  12/12/11    POLYPECTOMY    COLONOSCOPY  10/23/07    POLYPECTOMY    CYST REMOVAL      \"back side\"   and face and neck    POLYPECTOMY  12/12/11       Medications Prior to Admission:    Prior to Admission medications    Medication Sig Start Date End Date Taking? Authorizing Provider   vitamin E 1000 units capsule Take 1 capsule by mouth daily   Yes ProviderFady MD   Vitamin D (CHOLECALCIFEROL) 25 MCG (1000 UT) TABS tablet Take 1 tablet by mouth daily   Yes Provider, MD Fady   Omega-3 Fatty Acids (FISH OIL) 1000 MG capsule Take by mouth daily   Yes ProviderFady MD   albuterol sulfate HFA (VENTOLIN HFA) 108 (90 Base) MCG/ACT inhaler Inhale 2 puffs into the lungs 4 times daily as needed for Wheezing  Patient not taking: Reported on 10/16/2024 8/23/24   Bhupendra Hinds MD       Current

## 2024-11-08 ENCOUNTER — APPOINTMENT (OUTPATIENT)
Dept: CT IMAGING | Age: 69
DRG: 065 | End: 2024-11-08
Payer: MEDICARE

## 2024-11-08 ENCOUNTER — APPOINTMENT (OUTPATIENT)
Age: 69
DRG: 065 | End: 2024-11-08
Attending: PSYCHIATRY & NEUROLOGY
Payer: MEDICARE

## 2024-11-08 PROBLEM — I63.9 CEREBROVASCULAR ACCIDENT (CVA) (HCC): Status: ACTIVE | Noted: 2024-11-08

## 2024-11-08 LAB
ANION GAP SERPL CALCULATED.3IONS-SCNC: 8 MMOL/L (ref 9–16)
BUN SERPL-MCNC: 12 MG/DL (ref 8–23)
CALCIUM SERPL-MCNC: 9.1 MG/DL (ref 8.6–10.4)
CHLORIDE SERPL-SCNC: 105 MMOL/L (ref 98–107)
CHOLEST SERPL-MCNC: 134 MG/DL (ref 0–199)
CHOLESTEROL/HDL RATIO: 2.6
CO2 SERPL-SCNC: 27 MMOL/L (ref 20–31)
CREAT SERPL-MCNC: 1 MG/DL (ref 0.7–1.2)
ECHO AO ROOT DIAM: 3.3 CM
ECHO AO ROOT INDEX: 1.72 CM/M2
ECHO AV AREA PEAK VELOCITY: 4 CM2
ECHO AV AREA VTI: 3.9 CM2
ECHO AV AREA/BSA PEAK VELOCITY: 2.1 CM2/M2
ECHO AV AREA/BSA VTI: 2 CM2/M2
ECHO AV MEAN GRADIENT: 2 MMHG
ECHO AV MEAN VELOCITY: 0.7 M/S
ECHO AV PEAK GRADIENT: 4 MMHG
ECHO AV PEAK VELOCITY: 1 M/S
ECHO AV VELOCITY RATIO: 0.8
ECHO AV VTI: 19.7 CM
ECHO BSA: 1.92 M2
ECHO EST RA PRESSURE: 3 MMHG
ECHO LA AREA 2C: 14.9 CM2
ECHO LA AREA 4C: 14.6 CM2
ECHO LA DIAMETER INDEX: 1.72 CM/M2
ECHO LA DIAMETER: 3.3 CM
ECHO LA MAJOR AXIS: 5 CM
ECHO LA MINOR AXIS: 5.1 CM
ECHO LA TO AORTIC ROOT RATIO: 1
ECHO LA VOL BP: 35 ML (ref 18–58)
ECHO LA VOL MOD A2C: 36 ML (ref 18–58)
ECHO LA VOL MOD A4C: 33 ML (ref 18–58)
ECHO LA VOL/BSA BIPLANE: 18 ML/M2 (ref 16–34)
ECHO LA VOLUME INDEX MOD A2C: 19 ML/M2 (ref 16–34)
ECHO LA VOLUME INDEX MOD A4C: 17 ML/M2 (ref 16–34)
ECHO LV E' LATERAL VELOCITY: 9.8 CM/S
ECHO LV E' SEPTAL VELOCITY: 6 CM/S
ECHO LV EDV A2C: 69 ML
ECHO LV EDV A4C: 113 ML
ECHO LV EDV INDEX A4C: 59 ML/M2
ECHO LV EDV NDEX A2C: 36 ML/M2
ECHO LV EF PHYSICIAN: 65 %
ECHO LV EJECTION FRACTION A2C: 61 %
ECHO LV EJECTION FRACTION A4C: 63 %
ECHO LV EJECTION FRACTION BIPLANE: 62 % (ref 55–100)
ECHO LV ESV A2C: 27 ML
ECHO LV ESV A4C: 42 ML
ECHO LV ESV INDEX A2C: 14 ML/M2
ECHO LV ESV INDEX A4C: 22 ML/M2
ECHO LV FRACTIONAL SHORTENING: 38 % (ref 28–44)
ECHO LV INTERNAL DIMENSION DIASTOLE INDEX: 2.5 CM/M2
ECHO LV INTERNAL DIMENSION DIASTOLIC: 4.8 CM (ref 4.2–5.9)
ECHO LV INTERNAL DIMENSION SYSTOLIC INDEX: 1.56 CM/M2
ECHO LV INTERNAL DIMENSION SYSTOLIC: 3 CM
ECHO LV IVSD: 1.4 CM (ref 0.6–1)
ECHO LV MASS 2D: 259.6 G (ref 88–224)
ECHO LV MASS INDEX 2D: 135.2 G/M2 (ref 49–115)
ECHO LV POSTERIOR WALL DIASTOLIC: 1.3 CM (ref 0.6–1)
ECHO LV RELATIVE WALL THICKNESS RATIO: 0.54
ECHO LVOT AREA: 4.9 CM2
ECHO LVOT AV VTI INDEX: 0.79
ECHO LVOT DIAM: 2.5 CM
ECHO LVOT MEAN GRADIENT: 1 MMHG
ECHO LVOT PEAK GRADIENT: 3 MMHG
ECHO LVOT PEAK VELOCITY: 0.8 M/S
ECHO LVOT STROKE VOLUME INDEX: 39.6 ML/M2
ECHO LVOT SV: 76 ML
ECHO LVOT VTI: 15.5 CM
ECHO MV A VELOCITY: 1.15 M/S
ECHO MV E DECELERATION TIME (DT): 197 MS
ECHO MV E VELOCITY: 0.73 M/S
ECHO MV E/A RATIO: 0.63
ECHO MV E/E' LATERAL: 7.45
ECHO MV E/E' RATIO (AVERAGED): 9.81
ECHO MV E/E' SEPTAL: 12.17
ECHO RA AREA 4C: 10.3 CM2
ECHO RA END SYSTOLIC VOLUME APICAL 4 CHAMBER INDEX BSA: 11 ML/M2
ECHO RA VOLUME: 21 ML
ECHO RIGHT VENTRICULAR SYSTOLIC PRESSURE (RVSP): 22 MMHG
ECHO RV TAPSE: 2 CM (ref 1.7–?)
ECHO TV REGURGITANT MAX VELOCITY: 2.2 M/S
ECHO TV REGURGITANT PEAK GRADIENT: 19 MMHG
ERYTHROCYTE [DISTWIDTH] IN BLOOD BY AUTOMATED COUNT: 15.7 % (ref 11.5–14.9)
EST. AVERAGE GLUCOSE BLD GHB EST-MCNC: 103 MG/DL
GFR, ESTIMATED: 82 ML/MIN/1.73M2
GLUCOSE SERPL-MCNC: 91 MG/DL (ref 74–99)
HBA1C MFR BLD: 5.2 % (ref 4–6)
HCT VFR BLD AUTO: 54.5 % (ref 41–53)
HDLC SERPL-MCNC: 52 MG/DL
HGB BLD-MCNC: 18.3 G/DL (ref 13.5–17.5)
LDLC SERPL CALC-MCNC: 64 MG/DL (ref 0–100)
MCH RBC QN AUTO: 27.9 PG (ref 26–34)
MCHC RBC AUTO-ENTMCNC: 33.5 G/DL (ref 31–37)
MCV RBC AUTO: 83.2 FL (ref 80–100)
NONINV COLON CA DNA+OCC BLD SCRN STL QL: POSITIVE
PLATELET # BLD AUTO: 175 K/UL (ref 150–450)
PMV BLD AUTO: 7.8 FL (ref 6–12)
POTASSIUM SERPL-SCNC: 4.4 MMOL/L (ref 3.7–5.3)
RBC # BLD AUTO: 6.56 M/UL (ref 4.5–5.9)
SODIUM SERPL-SCNC: 140 MMOL/L (ref 136–145)
TRIGL SERPL-MCNC: 89 MG/DL (ref 0–149)
WBC OTHER # BLD: 7.2 K/UL (ref 3.5–11)

## 2024-11-08 PROCEDURE — 80048 BASIC METABOLIC PNL TOTAL CA: CPT

## 2024-11-08 PROCEDURE — 72125 CT NECK SPINE W/O DYE: CPT

## 2024-11-08 PROCEDURE — 97530 THERAPEUTIC ACTIVITIES: CPT

## 2024-11-08 PROCEDURE — 99232 SBSQ HOSP IP/OBS MODERATE 35: CPT | Performed by: PSYCHIATRY & NEUROLOGY

## 2024-11-08 PROCEDURE — 92526 ORAL FUNCTION THERAPY: CPT

## 2024-11-08 PROCEDURE — 97116 GAIT TRAINING THERAPY: CPT

## 2024-11-08 PROCEDURE — 2060000000 HC ICU INTERMEDIATE R&B

## 2024-11-08 PROCEDURE — 6360000004 HC RX CONTRAST MEDICATION: Performed by: PSYCHIATRY & NEUROLOGY

## 2024-11-08 PROCEDURE — 97129 THER IVNTJ 1ST 15 MIN: CPT

## 2024-11-08 PROCEDURE — 97162 PT EVAL MOD COMPLEX 30 MIN: CPT

## 2024-11-08 PROCEDURE — C8929 TTE W OR WO FOL WCON,DOPPLER: HCPCS

## 2024-11-08 PROCEDURE — 85027 COMPLETE CBC AUTOMATED: CPT

## 2024-11-08 PROCEDURE — 99223 1ST HOSP IP/OBS HIGH 75: CPT | Performed by: GENERAL PRACTICE

## 2024-11-08 PROCEDURE — 2580000003 HC RX 258: Performed by: PSYCHIATRY & NEUROLOGY

## 2024-11-08 PROCEDURE — 80061 LIPID PANEL: CPT

## 2024-11-08 PROCEDURE — 6370000000 HC RX 637 (ALT 250 FOR IP): Performed by: PSYCHIATRY & NEUROLOGY

## 2024-11-08 PROCEDURE — 83036 HEMOGLOBIN GLYCOSYLATED A1C: CPT

## 2024-11-08 PROCEDURE — 97166 OT EVAL MOD COMPLEX 45 MIN: CPT

## 2024-11-08 PROCEDURE — 6370000000 HC RX 637 (ALT 250 FOR IP): Performed by: FAMILY MEDICINE

## 2024-11-08 PROCEDURE — 6360000002 HC RX W HCPCS: Performed by: FAMILY MEDICINE

## 2024-11-08 PROCEDURE — 97535 SELF CARE MNGMENT TRAINING: CPT

## 2024-11-08 PROCEDURE — 36415 COLL VENOUS BLD VENIPUNCTURE: CPT

## 2024-11-08 RX ORDER — ENOXAPARIN SODIUM 100 MG/ML
40 INJECTION SUBCUTANEOUS DAILY
Status: DISCONTINUED | OUTPATIENT
Start: 2024-11-08 | End: 2024-11-19 | Stop reason: HOSPADM

## 2024-11-08 RX ADMIN — PERFLUTREN 2.5 ML: 6.52 INJECTION, SUSPENSION INTRAVENOUS at 11:52

## 2024-11-08 RX ADMIN — ROSUVASTATIN 40 MG: 40 TABLET, FILM COATED ORAL at 20:39

## 2024-11-08 RX ADMIN — ASPIRIN 81 MG: 81 TABLET, COATED ORAL at 08:44

## 2024-11-08 RX ADMIN — SODIUM CHLORIDE, PRESERVATIVE FREE 10 ML: 5 INJECTION INTRAVENOUS at 08:44

## 2024-11-08 RX ADMIN — SODIUM CHLORIDE, PRESERVATIVE FREE 10 ML: 5 INJECTION INTRAVENOUS at 20:39

## 2024-11-08 RX ADMIN — ENOXAPARIN SODIUM 40 MG: 100 INJECTION SUBCUTANEOUS at 18:16

## 2024-11-08 RX ADMIN — Medication 3 MG: at 20:39

## 2024-11-08 RX ADMIN — CLOPIDOGREL BISULFATE 75 MG: 75 TABLET ORAL at 08:44

## 2024-11-08 NOTE — DISCHARGE INSTR - COC
Continuity of Care Form    Patient Name: Nitesh Ren   :  1955  MRN:  [id number]    Admit date:  2024  Discharge date:     Code Status Order: Full Code   Advance Directives:   Advance Care Flowsheet Documentation             Admitting Physician:  David Khan MD  PCP: Bhupendra Hinds MD    Discharging Nurse:  Discharging Hospital Unit/Room#: 2125/2125-01  Discharging Unit Phone Number: [phone]    Emergency Contact:   Extended Emergency Contact Information  Primary Emergency Contact: Greer Angeles  Home Phone: [phone]  Relation: Brother/Sister  Secondary Emergency Contact: Everardo Montes  Home Phone: [phone]  Relation: Brother/Sister    Past Surgical History:  Past Surgical History:   Procedure Laterality Date    COLONOSCOPY  11    POLYPECTOMY    COLONOSCOPY  10/23/07    POLYPECTOMY    CYST REMOVAL      \"back side\"   and face and neck    POLYPECTOMY  11       Immunization History:   Immunization History   Administered Date(s) Administered    COVID-19, PFIZER Bivalent, DO NOT Dilute, (age 12y+), IM, 30 mcg/0.3 mL 01/10/2023    COVID-19, PFIZER PURPLE top, DILUTE for use, (age 12 y+), 30mcg/0.3mL 2021, 2021, 2021    COVID-19, PFIZER, (age 12y+), IM, 30mcg/0.3mL 10/12/2023    Influenza, FLUAD, (age 65 y+), IM, Quadv, 0.5mL 2022    TDaP, ADACEL (age 10y-64y), BOOSTRIX (age 10y+), IM, 0.5mL 2020    Zoster Recombinant (Shingrix) 2022, 2022       Active Problems:  Patient Active Problem List   Diagnosis Code    Abdominal pain R10.9    Colon polyp K63.5    Neurofibromatosis (HCC) Q85.00    Thyroid nodule E04.1    Pulmonary nodules R91.8    Lymphangitis I89.1    Cellulitis of left lower leg L03.116    Ex-cigarette smoker Z87.891    Polycythemia D75.1    Pneumococcal vaccination declined Z28.21    Colonoscopy refused Z53.20    Polycythemia vera (HCC) D45    Left-sided weakness R53.1       Isolation/Infection:   Isolation

## 2024-11-08 NOTE — CARE COORDINATION
Case Management Assessment  Initial Evaluation    Date/Time of Evaluation: 11/8/2024 1:15 PM  Assessment Completed by: Diane Rosenthal RN    If patient is discharged prior to next notation, then this note serves as note for discharge by case management.    Patient Name: Nitesh Ren                   YOB: 1955  Diagnosis: Left-sided weakness [R53.1]                   Date / Time: 11/7/2024 11:46 AM    Patient Admission Status: Inpatient   Readmission Risk (Low < 19, Mod (19-27), High > 27): Readmission Risk Score: 11.1    Current PCP: Bhupendra Hinds MD  PCP verified by CM? No    Chart Reviewed: Yes      History Provided by: Patient  Patient Orientation: Alert and Oriented    Patient Cognition: Alert    Hospitalization in the last 30 days (Readmission):  No    If yes, Readmission Assessment in CM Navigator will be completed.    Advance Directives:      Code Status: Full Code   Patient's Primary Decision Maker is: Legal Next of Kin    Primary Decision Maker: BridgetteGreer - Brother/Sister - [phone]    Discharge Planning:    Patient lives with: Alone Type of Home: House  Primary Care Giver: Self  Patient Support Systems include: Family Members   Current Financial resources: Medicare  Current community resources: None  Current services prior to admission: None            Current DME:              Type of Home Care services:  None    ADLS  Prior functional level: Independent in ADLs/IADLs  Current functional level: Independent in ADLs/IADLs    PT AM-PAC: 14 /24  OT AM-PAC: 15 /24    Family can provide assistance at DC: Yes  Would you like Case Management to discuss the discharge plan with any other family members/significant others, and if so, who? No  Plans to Return to Present Housing: Unknown at present  Other Identified Issues/Barriers to RETURNING to current housing: no  Potential Assistance needed at discharge: N/A            Potential DME:    Patient expects to discharge to:

## 2024-11-08 NOTE — CONSULTS
Physical Medicine & Rehabilitation  Consult Note      Admitting Physician:   David Khan MD    Primary Care Provider:   Bhupendra Hinds MD     Reason for Consult:  Acute Inpatient Rehabilitation    Chief Complaint: Left sided weakness    History of Present Illness:  Referring Provider is requesting an evaluation for appropriate placement upon discharge from acute care. History from chart review and patient interview.    Nitesh Ren is a 69 y.o. LHD male admitted to [facility] on 11/7/2024.      69 year old male with a history of hypertension, hyperlipidemia, tobacco use, polycythemia, neurofibromatosis and retinal detachment in May 2024 s/p reattachment who presented to the emergency department on 11/07/2024 with complaints of left sided weakness upon waking. He reports he was unable to pick things up with his left hand and was having difficulty with ambulation. Stroke alert was called in the ED and patient was loaded on dual antiplatelet. Initial CT head showed no acute abnormalities and CTA head/neck showed mild atherosclerotic disease. Subsequent brain MRI identified acute infarction in right basal ganglia. Patient evaluated by Neurology who ordered dual antiplatelet regimen x21 days followed by monotherapy.     Review of Systems:  Constitutional: negative for anorexia, chills, fatigue, fevers, sweats and weight loss  Eyes: negative for redness and visual disturbance  Ears, nose, mouth, throat, and face: negative for earaches, sore throat and tinnitus  Respiratory: negative for cough and shortness of breath  Cardiovascular: negative for chest pain, dyspnea and palpitations  Gastrointestinal: negative for abdominal pain, change in bowel habits, constipation, nausea and vomiting  Genitourinary:negative for dysuria, frequency, hesitancy and urinary incontinence  Integument/breast: negative for pruritus and rash  Musculoskeletal:negative for stiff joints, left sided weakness  Neurological:

## 2024-11-09 PROBLEM — I63.81 RIGHT-SIDED LACUNAR STROKE (HCC): Status: ACTIVE | Noted: 2024-11-09

## 2024-11-09 LAB
ANION GAP SERPL CALCULATED.3IONS-SCNC: 9 MMOL/L (ref 9–16)
BASOPHILS # BLD: 0 K/UL (ref 0–0.2)
BASOPHILS NFR BLD: 1 % (ref 0–2)
BUN SERPL-MCNC: 15 MG/DL (ref 8–23)
CALCIUM SERPL-MCNC: 9.2 MG/DL (ref 8.6–10.4)
CHLORIDE SERPL-SCNC: 106 MMOL/L (ref 98–107)
CO2 SERPL-SCNC: 23 MMOL/L (ref 20–31)
CREAT SERPL-MCNC: 1 MG/DL (ref 0.7–1.2)
EOSINOPHIL # BLD: 0.5 K/UL (ref 0–0.4)
EOSINOPHILS RELATIVE PERCENT: 8 % (ref 0–4)
ERYTHROCYTE [DISTWIDTH] IN BLOOD BY AUTOMATED COUNT: 16 % (ref 11.5–14.9)
GFR, ESTIMATED: 82 ML/MIN/1.73M2
GLUCOSE SERPL-MCNC: 90 MG/DL (ref 74–99)
HCT VFR BLD AUTO: 55.2 % (ref 41–53)
HGB BLD-MCNC: 18.8 G/DL (ref 13.5–17.5)
LYMPHOCYTES NFR BLD: 1 K/UL (ref 1–4.8)
LYMPHOCYTES RELATIVE PERCENT: 15 % (ref 24–44)
MCH RBC QN AUTO: 27.9 PG (ref 26–34)
MCHC RBC AUTO-ENTMCNC: 34.1 G/DL (ref 31–37)
MCV RBC AUTO: 81.7 FL (ref 80–100)
MONOCYTES NFR BLD: 0.7 K/UL (ref 0.1–1.3)
MONOCYTES NFR BLD: 11 % (ref 1–7)
NEUTROPHILS NFR BLD: 65 % (ref 36–66)
NEUTS SEG NFR BLD: 4.3 K/UL (ref 1.3–9.1)
PLATELET # BLD AUTO: 173 K/UL (ref 150–450)
PMV BLD AUTO: 7.8 FL (ref 6–12)
POTASSIUM SERPL-SCNC: 3.9 MMOL/L (ref 3.7–5.3)
RBC # BLD AUTO: 6.76 M/UL (ref 4.5–5.9)
SODIUM SERPL-SCNC: 138 MMOL/L (ref 136–145)
WBC OTHER # BLD: 6.5 K/UL (ref 3.5–11)

## 2024-11-09 PROCEDURE — 97116 GAIT TRAINING THERAPY: CPT

## 2024-11-09 PROCEDURE — 36415 COLL VENOUS BLD VENIPUNCTURE: CPT

## 2024-11-09 PROCEDURE — 97530 THERAPEUTIC ACTIVITIES: CPT

## 2024-11-09 PROCEDURE — 80048 BASIC METABOLIC PNL TOTAL CA: CPT

## 2024-11-09 PROCEDURE — 85025 COMPLETE CBC W/AUTO DIFF WBC: CPT

## 2024-11-09 PROCEDURE — 6360000002 HC RX W HCPCS: Performed by: FAMILY MEDICINE

## 2024-11-09 PROCEDURE — 2580000003 HC RX 258: Performed by: PSYCHIATRY & NEUROLOGY

## 2024-11-09 PROCEDURE — 2060000000 HC ICU INTERMEDIATE R&B

## 2024-11-09 PROCEDURE — 6370000000 HC RX 637 (ALT 250 FOR IP): Performed by: PSYCHIATRY & NEUROLOGY

## 2024-11-09 RX ADMIN — ROSUVASTATIN 40 MG: 40 TABLET, FILM COATED ORAL at 19:33

## 2024-11-09 RX ADMIN — SODIUM CHLORIDE, PRESERVATIVE FREE 10 ML: 5 INJECTION INTRAVENOUS at 08:50

## 2024-11-09 RX ADMIN — CLOPIDOGREL BISULFATE 75 MG: 75 TABLET ORAL at 08:50

## 2024-11-09 RX ADMIN — SODIUM CHLORIDE, PRESERVATIVE FREE 10 ML: 5 INJECTION INTRAVENOUS at 19:34

## 2024-11-09 RX ADMIN — ENOXAPARIN SODIUM 40 MG: 100 INJECTION SUBCUTANEOUS at 08:50

## 2024-11-09 RX ADMIN — ASPIRIN 81 MG: 81 TABLET, COATED ORAL at 08:50

## 2024-11-10 PROCEDURE — 2060000000 HC ICU INTERMEDIATE R&B

## 2024-11-10 PROCEDURE — 97530 THERAPEUTIC ACTIVITIES: CPT

## 2024-11-10 PROCEDURE — 2580000003 HC RX 258: Performed by: PSYCHIATRY & NEUROLOGY

## 2024-11-10 PROCEDURE — 97535 SELF CARE MNGMENT TRAINING: CPT

## 2024-11-10 PROCEDURE — 6370000000 HC RX 637 (ALT 250 FOR IP): Performed by: PSYCHIATRY & NEUROLOGY

## 2024-11-10 PROCEDURE — 6360000002 HC RX W HCPCS: Performed by: FAMILY MEDICINE

## 2024-11-10 RX ORDER — CARBOXYMETHYLCELLULOSE SODIUM 5 MG/ML
1 SOLUTION/ DROPS OPHTHALMIC 3 TIMES DAILY
Status: DISCONTINUED | OUTPATIENT
Start: 2024-11-10 | End: 2024-11-19 | Stop reason: HOSPADM

## 2024-11-10 RX ADMIN — SODIUM CHLORIDE, PRESERVATIVE FREE 10 ML: 5 INJECTION INTRAVENOUS at 07:17

## 2024-11-10 RX ADMIN — ROSUVASTATIN 40 MG: 40 TABLET, FILM COATED ORAL at 21:38

## 2024-11-10 RX ADMIN — ASPIRIN 81 MG: 81 TABLET, COATED ORAL at 07:15

## 2024-11-10 RX ADMIN — CLOPIDOGREL BISULFATE 75 MG: 75 TABLET ORAL at 07:15

## 2024-11-10 RX ADMIN — ENOXAPARIN SODIUM 40 MG: 100 INJECTION SUBCUTANEOUS at 07:15

## 2024-11-10 RX ADMIN — SODIUM CHLORIDE, PRESERVATIVE FREE 10 ML: 5 INJECTION INTRAVENOUS at 21:38

## 2024-11-10 NOTE — CARE COORDINATION
ONGOING DISCHARGE PLAN:    Patient is alert and oriented x4.    Spoke with patient regarding discharge plan and patient confirms that plan is still to follow for DC to ARU.    Pt. Had PM & R consult. Appears Pt. Is appropriated.     Referral has been sent.     Will need Pre-cert.     Will continue to follow for additional discharge needs.    If patient is discharged prior to next notation, then this note serves as note for discharge by case management.    Electronically signed by Veronica Jaimes RN on 11/10/2024 at 12:35 PM

## 2024-11-11 ENCOUNTER — APPOINTMENT (OUTPATIENT)
Dept: CT IMAGING | Age: 69
DRG: 065 | End: 2024-11-11
Payer: MEDICARE

## 2024-11-11 PROCEDURE — 97530 THERAPEUTIC ACTIVITIES: CPT

## 2024-11-11 PROCEDURE — 6370000000 HC RX 637 (ALT 250 FOR IP): Performed by: PSYCHIATRY & NEUROLOGY

## 2024-11-11 PROCEDURE — 2060000000 HC ICU INTERMEDIATE R&B

## 2024-11-11 PROCEDURE — 97110 THERAPEUTIC EXERCISES: CPT

## 2024-11-11 PROCEDURE — 70450 CT HEAD/BRAIN W/O DYE: CPT

## 2024-11-11 PROCEDURE — 6370000000 HC RX 637 (ALT 250 FOR IP): Performed by: FAMILY MEDICINE

## 2024-11-11 PROCEDURE — 99232 SBSQ HOSP IP/OBS MODERATE 35: CPT | Performed by: PHYSICAL MEDICINE & REHABILITATION

## 2024-11-11 PROCEDURE — 6360000002 HC RX W HCPCS: Performed by: FAMILY MEDICINE

## 2024-11-11 PROCEDURE — 97116 GAIT TRAINING THERAPY: CPT

## 2024-11-11 PROCEDURE — 92507 TX SP LANG VOICE COMM INDIV: CPT

## 2024-11-11 PROCEDURE — 2580000003 HC RX 258: Performed by: PSYCHIATRY & NEUROLOGY

## 2024-11-11 PROCEDURE — 6370000000 HC RX 637 (ALT 250 FOR IP)

## 2024-11-11 PROCEDURE — 97129 THER IVNTJ 1ST 15 MIN: CPT

## 2024-11-11 RX ADMIN — CARBOXYMETHYLCELLULOSE SODIUM 1 DROP: 5 SOLUTION/ DROPS OPHTHALMIC at 09:26

## 2024-11-11 RX ADMIN — CARBOXYMETHYLCELLULOSE SODIUM 1 DROP: 5 SOLUTION/ DROPS OPHTHALMIC at 21:09

## 2024-11-11 RX ADMIN — SODIUM CHLORIDE, PRESERVATIVE FREE 10 ML: 5 INJECTION INTRAVENOUS at 21:08

## 2024-11-11 RX ADMIN — ROSUVASTATIN 40 MG: 40 TABLET, FILM COATED ORAL at 21:08

## 2024-11-11 RX ADMIN — CLOPIDOGREL BISULFATE 75 MG: 75 TABLET ORAL at 09:25

## 2024-11-11 RX ADMIN — ASPIRIN 81 MG: 81 TABLET, COATED ORAL at 09:25

## 2024-11-11 RX ADMIN — ENOXAPARIN SODIUM 40 MG: 100 INJECTION SUBCUTANEOUS at 09:25

## 2024-11-11 RX ADMIN — Medication 3 MG: at 22:34

## 2024-11-11 RX ADMIN — SODIUM CHLORIDE, PRESERVATIVE FREE 10 ML: 5 INJECTION INTRAVENOUS at 09:26

## 2024-11-11 RX ADMIN — CARBOXYMETHYLCELLULOSE SODIUM 1 DROP: 5 SOLUTION/ DROPS OPHTHALMIC at 15:42

## 2024-11-11 NOTE — CARE COORDINATION
ACUTE INPATIENT REHABILITATION  Financial Disclosure Statement: Eligibility and Benefit Verification    Patient Name: Nitesh Ren MRN: [id number]     Disclosure Statement  [facility] Acute Inpatient Rehabilitation at [facility] provides 24 hour individualized service to patients with functional limitations due to, but not limited to stroke, brain injury, spinal cord injury, major multiple trauma, hip fracture, amputation, and neurological disorders.  Acute Inpatient Rehabilitation provides rehabilitative nursing, physician coverage, as well as physical therapy, occupational therapy, speech therapy, recreational therapy and other services as deemed necessary by our Board Certified Physical Medicine and Rehabilitation Physicians, Dr. William Chin and Dr. Ele Melendez and Dr. Svetlana Henriquez.    [facility] Acute [facility]patient [facility] is fully accredited by the Commission on Accreditation of Rehabilitation Facilities (CARF) and The Joint Commission (TJC).    At a minimum, you will receive 5 days of therapy services totaling at least 15 hours per week. Your treatment program will consist of physical therapy at least 7.5 hours per week; occupational therapy 7.5 hours per week; and speech therapy 1.5 hours per week, as applicable.    Your estimated length of stay is currently:  Approximately 2 Weeks  Please Note: Estimated length of stay is based on individual condition and Acute Inpatient Rehabilitation specific needs. Length of stay may vary based upon interdisciplinary team assessment, insurance approval, and patient progression.    Your insurance coverage has been verified as follows:   Date Verified: 11/11/2024   Method:  Phone Call: Call Ref# [id number], Representative: Lilly OLIVEROS     Primary Insurance: [employer] Medicare  Member/Subscriber ID: [id number]   Coverage: Per Benefit Period  Plan Effective Date: 02/01/2022 Status: Active  Deductible: None  Co-Pay:   Days 1-6:

## 2024-11-11 NOTE — CARE COORDINATION
Per PM&R physiatrist Dr. Ele Melendez, patient appropriate for Acute Inpatient Rehabilitation level of care.    Eligibility & Benefits Verified - See Note    Prior authorization request for admission initiated with primary insurance [employer] via: Phone Call: Call Ref# [id number], Representative: Feliberto BURRIS    Pending Case Reference/Authorization # [id number]    Clinical documentation submitted via Fax to [phone]    Updated Marta LSW:  [employer]  at this time.

## 2024-11-11 NOTE — CARE COORDINATION
DISCHARGE PLANNING NOTE:    LSW following for discharge to ARU. PM&R completed, patient is appropriate for inpatient rehab per Dr. Melendez. Message from Coretta in admissions, insurance authorization was submitted today.

## 2024-11-11 NOTE — CARE COORDINATION
Acute Inpatient Rehabilitation referral received via Fax    \"Inpatient Consult to PM&R - Physiatry [[id number]]\" Consult Order Status: Verified as ordered, patient confirmed on consult list    PM&R physiatrist Dr. Ele Melendez on service for PM&R consult.    Updated Marta LSW:  [employer]ia [employer]  at this time.

## 2024-11-12 PROBLEM — I63.81 BASAL GANGLIA STROKE (HCC): Status: ACTIVE | Noted: 2024-11-08

## 2024-11-12 LAB
ANION GAP SERPL CALCULATED.3IONS-SCNC: 11 MMOL/L (ref 9–16)
BASOPHILS # BLD: 0.09 K/UL (ref 0–0.2)
BASOPHILS NFR BLD: 1 % (ref 0–2)
BUN SERPL-MCNC: 23 MG/DL (ref 8–23)
CALCIUM SERPL-MCNC: 9.4 MG/DL (ref 8.6–10.4)
CHLORIDE SERPL-SCNC: 103 MMOL/L (ref 98–107)
CO2 SERPL-SCNC: 23 MMOL/L (ref 20–31)
CREAT SERPL-MCNC: 1 MG/DL (ref 0.7–1.2)
EOSINOPHIL # BLD: 0.37 K/UL (ref 0–0.4)
EOSINOPHILS RELATIVE PERCENT: 4 % (ref 0–4)
ERYTHROCYTE [DISTWIDTH] IN BLOOD BY AUTOMATED COUNT: 16.5 % (ref 11.5–14.9)
GFR, ESTIMATED: 82 ML/MIN/1.73M2
GLUCOSE SERPL-MCNC: 134 MG/DL (ref 74–99)
HCT VFR BLD AUTO: 57.7 % (ref 41–53)
HGB BLD-MCNC: 19.5 G/DL (ref 13.5–17.5)
LYMPHOCYTES NFR BLD: 1.3 K/UL (ref 1–4.8)
LYMPHOCYTES RELATIVE PERCENT: 14 % (ref 24–44)
MCH RBC QN AUTO: 27.8 PG (ref 26–34)
MCHC RBC AUTO-ENTMCNC: 33.8 G/DL (ref 31–37)
MCV RBC AUTO: 82.3 FL (ref 80–100)
MONOCYTES NFR BLD: 1.02 K/UL (ref 0.1–1.3)
MONOCYTES NFR BLD: 11 % (ref 1–7)
MORPHOLOGY: ABNORMAL
NEUTROPHILS NFR BLD: 70 % (ref 36–66)
NEUTS SEG NFR BLD: 6.52 K/UL (ref 1.3–9.1)
PLATELET # BLD AUTO: 189 K/UL (ref 150–450)
PMV BLD AUTO: 8 FL (ref 6–12)
POTASSIUM SERPL-SCNC: 4.3 MMOL/L (ref 3.7–5.3)
RBC # BLD AUTO: 7.01 M/UL (ref 4.5–5.9)
SODIUM SERPL-SCNC: 137 MMOL/L (ref 136–145)
WBC OTHER # BLD: 9.3 K/UL (ref 3.5–11)

## 2024-11-12 PROCEDURE — 97110 THERAPEUTIC EXERCISES: CPT

## 2024-11-12 PROCEDURE — 6370000000 HC RX 637 (ALT 250 FOR IP): Performed by: FAMILY MEDICINE

## 2024-11-12 PROCEDURE — 6360000002 HC RX W HCPCS: Performed by: FAMILY MEDICINE

## 2024-11-12 PROCEDURE — 97129 THER IVNTJ 1ST 15 MIN: CPT

## 2024-11-12 PROCEDURE — 6370000000 HC RX 637 (ALT 250 FOR IP): Performed by: NURSE PRACTITIONER

## 2024-11-12 PROCEDURE — 80048 BASIC METABOLIC PNL TOTAL CA: CPT

## 2024-11-12 PROCEDURE — 2580000003 HC RX 258: Performed by: PSYCHIATRY & NEUROLOGY

## 2024-11-12 PROCEDURE — 2060000000 HC ICU INTERMEDIATE R&B

## 2024-11-12 PROCEDURE — 92507 TX SP LANG VOICE COMM INDIV: CPT

## 2024-11-12 PROCEDURE — 85025 COMPLETE CBC W/AUTO DIFF WBC: CPT

## 2024-11-12 PROCEDURE — 99223 1ST HOSP IP/OBS HIGH 75: CPT | Performed by: INTERNAL MEDICINE

## 2024-11-12 PROCEDURE — 6370000000 HC RX 637 (ALT 250 FOR IP)

## 2024-11-12 PROCEDURE — 97116 GAIT TRAINING THERAPY: CPT

## 2024-11-12 PROCEDURE — 97535 SELF CARE MNGMENT TRAINING: CPT

## 2024-11-12 PROCEDURE — 97530 THERAPEUTIC ACTIVITIES: CPT

## 2024-11-12 PROCEDURE — 36415 COLL VENOUS BLD VENIPUNCTURE: CPT

## 2024-11-12 PROCEDURE — 99232 SBSQ HOSP IP/OBS MODERATE 35: CPT

## 2024-11-12 PROCEDURE — 97168 OT RE-EVAL EST PLAN CARE: CPT

## 2024-11-12 PROCEDURE — 99233 SBSQ HOSP IP/OBS HIGH 50: CPT | Performed by: PSYCHIATRY & NEUROLOGY

## 2024-11-12 PROCEDURE — 6370000000 HC RX 637 (ALT 250 FOR IP): Performed by: PSYCHIATRY & NEUROLOGY

## 2024-11-12 RX ORDER — ERYTHROMYCIN 5 MG/G
OINTMENT OPHTHALMIC EVERY 6 HOURS SCHEDULED
Status: DISCONTINUED | OUTPATIENT
Start: 2024-11-12 | End: 2024-11-19 | Stop reason: HOSPADM

## 2024-11-12 RX ORDER — AMLODIPINE BESYLATE 2.5 MG/1
2.5 TABLET ORAL DAILY
Status: DISCONTINUED | OUTPATIENT
Start: 2024-11-12 | End: 2024-11-13

## 2024-11-12 RX ORDER — ACETAMINOPHEN 325 MG/1
650 TABLET ORAL EVERY 4 HOURS PRN
Status: DISCONTINUED | OUTPATIENT
Start: 2024-11-12 | End: 2024-11-19 | Stop reason: HOSPADM

## 2024-11-12 RX ADMIN — SODIUM CHLORIDE, PRESERVATIVE FREE 10 ML: 5 INJECTION INTRAVENOUS at 20:24

## 2024-11-12 RX ADMIN — CARBOXYMETHYLCELLULOSE SODIUM 1 DROP: 5 SOLUTION/ DROPS OPHTHALMIC at 12:41

## 2024-11-12 RX ADMIN — ACETAMINOPHEN 650 MG: 325 TABLET ORAL at 03:28

## 2024-11-12 RX ADMIN — CARBOXYMETHYLCELLULOSE SODIUM 1 DROP: 5 SOLUTION/ DROPS OPHTHALMIC at 20:24

## 2024-11-12 RX ADMIN — ERYTHROMYCIN: 5 OINTMENT OPHTHALMIC at 19:51

## 2024-11-12 RX ADMIN — ACETAMINOPHEN 650 MG: 325 TABLET ORAL at 15:50

## 2024-11-12 RX ADMIN — CLOPIDOGREL BISULFATE 75 MG: 75 TABLET ORAL at 09:58

## 2024-11-12 RX ADMIN — Medication 3 MG: at 20:23

## 2024-11-12 RX ADMIN — AMLODIPINE BESYLATE 2.5 MG: 2.5 TABLET ORAL at 14:11

## 2024-11-12 RX ADMIN — CARBOXYMETHYLCELLULOSE SODIUM 1 DROP: 5 SOLUTION/ DROPS OPHTHALMIC at 09:58

## 2024-11-12 RX ADMIN — SODIUM CHLORIDE, PRESERVATIVE FREE 10 ML: 5 INJECTION INTRAVENOUS at 09:58

## 2024-11-12 RX ADMIN — ENOXAPARIN SODIUM 40 MG: 100 INJECTION SUBCUTANEOUS at 09:58

## 2024-11-12 RX ADMIN — ASPIRIN 81 MG: 81 TABLET, COATED ORAL at 09:58

## 2024-11-12 RX ADMIN — ROSUVASTATIN 40 MG: 40 TABLET, FILM COATED ORAL at 20:23

## 2024-11-12 ASSESSMENT — PAIN SCALES - GENERAL
PAINLEVEL_OUTOF10: 0
PAINLEVEL_OUTOF10: 1
PAINLEVEL_OUTOF10: 0
PAINLEVEL_OUTOF10: 3
PAINLEVEL_OUTOF10: 0
PAINLEVEL_OUTOF10: 0
PAINLEVEL_OUTOF10: 1
PAINLEVEL_OUTOF10: 3
PAINLEVEL_OUTOF10: 0

## 2024-11-12 ASSESSMENT — PAIN DESCRIPTION - LOCATION
LOCATION: NECK

## 2024-11-12 ASSESSMENT — PAIN DESCRIPTION - DESCRIPTORS
DESCRIPTORS: ACHING
DESCRIPTORS: ACHING

## 2024-11-12 ASSESSMENT — PAIN DESCRIPTION - PAIN TYPE: TYPE: CHRONIC PAIN

## 2024-11-12 NOTE — CARE COORDINATION
DISCHARGE PLANNING NOTE:    LSW following for discharge to ARU. PM&R completed, patient is appropriate for inpatient rehab per Dr. Melendez. insurance authorization was submitted 11/11. Message left for admissions to follow up on status of pre cert. Still pending at this time per Keysha in admissions.

## 2024-11-13 LAB
ANION GAP SERPL CALCULATED.3IONS-SCNC: 9 MMOL/L (ref 9–16)
BASOPHILS # BLD: 0 K/UL (ref 0–0.2)
BASOPHILS NFR BLD: 1 % (ref 0–2)
BUN SERPL-MCNC: 26 MG/DL (ref 8–23)
CALCIUM SERPL-MCNC: 8.9 MG/DL (ref 8.6–10.4)
CHLORIDE SERPL-SCNC: 105 MMOL/L (ref 98–107)
CO2 SERPL-SCNC: 24 MMOL/L (ref 20–31)
CREAT SERPL-MCNC: 1.1 MG/DL (ref 0.7–1.2)
EOSINOPHIL # BLD: 0.5 K/UL (ref 0–0.4)
EOSINOPHILS RELATIVE PERCENT: 7 % (ref 0–4)
ERYTHROCYTE [DISTWIDTH] IN BLOOD BY AUTOMATED COUNT: 16 % (ref 11.5–14.9)
GFR, ESTIMATED: 73 ML/MIN/1.73M2
GLUCOSE SERPL-MCNC: 118 MG/DL (ref 74–99)
HCT VFR BLD AUTO: 54.2 % (ref 41–53)
HGB BLD-MCNC: 18.7 G/DL (ref 13.5–17.5)
LYMPHOCYTES NFR BLD: 1.5 K/UL (ref 1–4.8)
LYMPHOCYTES RELATIVE PERCENT: 19 % (ref 24–44)
MCH RBC QN AUTO: 28.1 PG (ref 26–34)
MCHC RBC AUTO-ENTMCNC: 34.4 G/DL (ref 31–37)
MCV RBC AUTO: 81.7 FL (ref 80–100)
MONOCYTES NFR BLD: 0.9 K/UL (ref 0.1–1.3)
MONOCYTES NFR BLD: 11 % (ref 1–7)
NEUTROPHILS NFR BLD: 62 % (ref 36–66)
NEUTS SEG NFR BLD: 5.1 K/UL (ref 1.3–9.1)
PLATELET # BLD AUTO: 173 K/UL (ref 150–450)
PMV BLD AUTO: 8.2 FL (ref 6–12)
POTASSIUM SERPL-SCNC: 3.9 MMOL/L (ref 3.7–5.3)
RBC # BLD AUTO: 6.64 M/UL (ref 4.5–5.9)
SODIUM SERPL-SCNC: 138 MMOL/L (ref 136–145)
WBC OTHER # BLD: 8.1 K/UL (ref 3.5–11)

## 2024-11-13 PROCEDURE — 6360000002 HC RX W HCPCS: Performed by: FAMILY MEDICINE

## 2024-11-13 PROCEDURE — 6370000000 HC RX 637 (ALT 250 FOR IP): Performed by: FAMILY MEDICINE

## 2024-11-13 PROCEDURE — 36415 COLL VENOUS BLD VENIPUNCTURE: CPT

## 2024-11-13 PROCEDURE — 97530 THERAPEUTIC ACTIVITIES: CPT

## 2024-11-13 PROCEDURE — 97164 PT RE-EVAL EST PLAN CARE: CPT

## 2024-11-13 PROCEDURE — 97129 THER IVNTJ 1ST 15 MIN: CPT

## 2024-11-13 PROCEDURE — 2060000000 HC ICU INTERMEDIATE R&B

## 2024-11-13 PROCEDURE — 6370000000 HC RX 637 (ALT 250 FOR IP): Performed by: PSYCHIATRY & NEUROLOGY

## 2024-11-13 PROCEDURE — 99232 SBSQ HOSP IP/OBS MODERATE 35: CPT | Performed by: INTERNAL MEDICINE

## 2024-11-13 PROCEDURE — 85025 COMPLETE CBC W/AUTO DIFF WBC: CPT

## 2024-11-13 PROCEDURE — 2580000003 HC RX 258: Performed by: PSYCHIATRY & NEUROLOGY

## 2024-11-13 PROCEDURE — 99232 SBSQ HOSP IP/OBS MODERATE 35: CPT

## 2024-11-13 PROCEDURE — 80048 BASIC METABOLIC PNL TOTAL CA: CPT

## 2024-11-13 PROCEDURE — 6370000000 HC RX 637 (ALT 250 FOR IP)

## 2024-11-13 PROCEDURE — 97535 SELF CARE MNGMENT TRAINING: CPT

## 2024-11-13 PROCEDURE — 92507 TX SP LANG VOICE COMM INDIV: CPT

## 2024-11-13 PROCEDURE — 99231 SBSQ HOSP IP/OBS SF/LOW 25: CPT | Performed by: PSYCHIATRY & NEUROLOGY

## 2024-11-13 RX ORDER — AMLODIPINE BESYLATE 5 MG/1
5 TABLET ORAL DAILY
Status: DISCONTINUED | OUTPATIENT
Start: 2024-11-13 | End: 2024-11-14

## 2024-11-13 RX ORDER — LISINOPRIL 5 MG/1
2.5 TABLET ORAL DAILY
Status: DISCONTINUED | OUTPATIENT
Start: 2024-11-13 | End: 2024-11-19 | Stop reason: HOSPADM

## 2024-11-13 RX ADMIN — ERYTHROMYCIN: 5 OINTMENT OPHTHALMIC at 17:19

## 2024-11-13 RX ADMIN — ROSUVASTATIN 40 MG: 40 TABLET, FILM COATED ORAL at 20:06

## 2024-11-13 RX ADMIN — ENOXAPARIN SODIUM 40 MG: 100 INJECTION SUBCUTANEOUS at 09:02

## 2024-11-13 RX ADMIN — ERYTHROMYCIN: 5 OINTMENT OPHTHALMIC at 05:35

## 2024-11-13 RX ADMIN — AMLODIPINE BESYLATE 5 MG: 5 TABLET ORAL at 10:02

## 2024-11-13 RX ADMIN — ASPIRIN 81 MG: 81 TABLET, COATED ORAL at 09:02

## 2024-11-13 RX ADMIN — CARBOXYMETHYLCELLULOSE SODIUM 1 DROP: 5 SOLUTION/ DROPS OPHTHALMIC at 09:03

## 2024-11-13 RX ADMIN — CARBOXYMETHYLCELLULOSE SODIUM 1 DROP: 5 SOLUTION/ DROPS OPHTHALMIC at 20:07

## 2024-11-13 RX ADMIN — Medication 3 MG: at 20:06

## 2024-11-13 RX ADMIN — CARBOXYMETHYLCELLULOSE SODIUM 1 DROP: 5 SOLUTION/ DROPS OPHTHALMIC at 14:26

## 2024-11-13 RX ADMIN — CLOPIDOGREL BISULFATE 75 MG: 75 TABLET ORAL at 09:02

## 2024-11-13 RX ADMIN — SODIUM CHLORIDE, PRESERVATIVE FREE 10 ML: 5 INJECTION INTRAVENOUS at 09:02

## 2024-11-13 RX ADMIN — ERYTHROMYCIN: 5 OINTMENT OPHTHALMIC at 12:11

## 2024-11-13 RX ADMIN — ERYTHROMYCIN: 5 OINTMENT OPHTHALMIC at 00:00

## 2024-11-13 RX ADMIN — LISINOPRIL 2.5 MG: 5 TABLET ORAL at 10:02

## 2024-11-13 NOTE — CONSULTS
_                         Today's Date: 11/12/2024  Patient Name: Nitesh Ren  Date of admission: 11/7/2024 11:46 AM  Patient's age: 69 y.o., 1955  Admission Dx: Left-sided weakness [R53.1]      Requesting Physician: David Khan MD    CHIEF COMPLAINT: Left-sided weakness.    History Obtained From:  patient, electronic medical record    HISTORY OF PRESENT ILLNESS:      The patient is a 69 y.o.  male who is admitted to the hospital for further management of left-sided weakness.  Patient presented with weakness and numbness of the left side of the body.  Symptoms were getting worse and he had left hemiplegia and patient is unable to move the left arm.  Still having slight movement of the leg.  He had facial palsy.  No dysarthria.  No headaches.  No seizure activities.  Patient was hospitalized in had neurology evaluation and follow-up.  Patient's labs showed polycythemia with hemoglobin of 19.5.  Patient is a former smoker.  He quit smoking 23 years ago.  He had 20-pack-year smoking.  Patient had persistent erythrocytosis.  He had full evaluation by [facility] hematology which included JAK2 gene mutation and bone marrow tests.  He had further evaluation with next generation sequencing.  Although myeloproliferative disorder was not completely ruled out but at the same time it was never confirmed.    Past Medical History:   has a past medical history of Abdominal pain, Cerebrovascular accident (CVA) (HCC), Colon polyp, Pulmonary nodules, and Thyroid nodule.    Past Surgical History:   has a past surgical history that includes polypectomy (12/12/11); cyst removal; Colonoscopy (12/12/11); and Colonoscopy (10/23/07).   Family History: family history includes Cancer in his father and mother; Heart Attack in his father.  Social History:   reports that he quit smoking about 23 years ago. His smoking use included cigarettes. He started smoking about

## 2024-11-13 NOTE — CARE COORDINATION
DISCHARGE PLANNING NOTE:    Writer is following for potential discharge to [facility]. Writer placed call to Coretta in admissions to check authorization status, authorization is pending at this time. Writer met with pt for update on insurance authorization. All questions answered at this time.  Electronically signed by JULIAN Flores on 11/13/2024 at 12:25 PM

## 2024-11-14 LAB
ANION GAP SERPL CALCULATED.3IONS-SCNC: 11 MMOL/L (ref 9–16)
BASOPHILS # BLD: 0.1 K/UL (ref 0–0.2)
BASOPHILS NFR BLD: 1 % (ref 0–2)
BUN SERPL-MCNC: 29 MG/DL (ref 8–23)
CALCIUM SERPL-MCNC: 8.8 MG/DL (ref 8.6–10.4)
CHLORIDE SERPL-SCNC: 106 MMOL/L (ref 98–107)
CO2 SERPL-SCNC: 20 MMOL/L (ref 20–31)
CREAT SERPL-MCNC: 1.1 MG/DL (ref 0.7–1.2)
EOSINOPHIL # BLD: 0.5 K/UL (ref 0–0.4)
EOSINOPHILS RELATIVE PERCENT: 6 % (ref 0–4)
ERYTHROCYTE [DISTWIDTH] IN BLOOD BY AUTOMATED COUNT: 15.9 % (ref 11.5–14.9)
GFR, ESTIMATED: 73 ML/MIN/1.73M2
GLUCOSE SERPL-MCNC: 97 MG/DL (ref 74–99)
HCT VFR BLD AUTO: 53.2 % (ref 41–53)
HGB BLD-MCNC: 18.1 G/DL (ref 13.5–17.5)
LYMPHOCYTES NFR BLD: 1.6 K/UL (ref 1–4.8)
LYMPHOCYTES RELATIVE PERCENT: 18 % (ref 24–44)
MCH RBC QN AUTO: 27.4 PG (ref 26–34)
MCHC RBC AUTO-ENTMCNC: 34.1 G/DL (ref 31–37)
MCV RBC AUTO: 80.4 FL (ref 80–100)
MONOCYTES NFR BLD: 0.9 K/UL (ref 0.1–1.3)
MONOCYTES NFR BLD: 10 % (ref 1–7)
NEUTROPHILS NFR BLD: 65 % (ref 36–66)
NEUTS SEG NFR BLD: 6 K/UL (ref 1.3–9.1)
PLATELET # BLD AUTO: 169 K/UL (ref 150–450)
PMV BLD AUTO: 8.3 FL (ref 6–12)
POTASSIUM SERPL-SCNC: 4 MMOL/L (ref 3.7–5.3)
RBC # BLD AUTO: 6.61 M/UL (ref 4.5–5.9)
SODIUM SERPL-SCNC: 137 MMOL/L (ref 136–145)
WBC OTHER # BLD: 9 K/UL (ref 3.5–11)

## 2024-11-14 PROCEDURE — 36415 COLL VENOUS BLD VENIPUNCTURE: CPT

## 2024-11-14 PROCEDURE — 97535 SELF CARE MNGMENT TRAINING: CPT

## 2024-11-14 PROCEDURE — 6370000000 HC RX 637 (ALT 250 FOR IP): Performed by: NURSE PRACTITIONER

## 2024-11-14 PROCEDURE — 2060000000 HC ICU INTERMEDIATE R&B

## 2024-11-14 PROCEDURE — 6370000000 HC RX 637 (ALT 250 FOR IP): Performed by: FAMILY MEDICINE

## 2024-11-14 PROCEDURE — 6360000002 HC RX W HCPCS: Performed by: FAMILY MEDICINE

## 2024-11-14 PROCEDURE — 2580000003 HC RX 258: Performed by: PSYCHIATRY & NEUROLOGY

## 2024-11-14 PROCEDURE — 6370000000 HC RX 637 (ALT 250 FOR IP): Performed by: PSYCHIATRY & NEUROLOGY

## 2024-11-14 PROCEDURE — 92507 TX SP LANG VOICE COMM INDIV: CPT

## 2024-11-14 PROCEDURE — 85025 COMPLETE CBC W/AUTO DIFF WBC: CPT

## 2024-11-14 PROCEDURE — 6370000000 HC RX 637 (ALT 250 FOR IP)

## 2024-11-14 PROCEDURE — 80048 BASIC METABOLIC PNL TOTAL CA: CPT

## 2024-11-14 PROCEDURE — 97530 THERAPEUTIC ACTIVITIES: CPT

## 2024-11-14 PROCEDURE — 99239 HOSP IP/OBS DSCHRG MGMT >30: CPT

## 2024-11-14 PROCEDURE — 99232 SBSQ HOSP IP/OBS MODERATE 35: CPT | Performed by: INTERNAL MEDICINE

## 2024-11-14 PROCEDURE — 99232 SBSQ HOSP IP/OBS MODERATE 35: CPT | Performed by: STUDENT IN AN ORGANIZED HEALTH CARE EDUCATION/TRAINING PROGRAM

## 2024-11-14 RX ORDER — ROSUVASTATIN CALCIUM 40 MG/1
40 TABLET, COATED ORAL NIGHTLY
Qty: 30 TABLET | Refills: 3 | Status: ON HOLD | OUTPATIENT
Start: 2024-11-14

## 2024-11-14 RX ORDER — ERYTHROMYCIN 5 MG/G
OINTMENT OPHTHALMIC
Qty: 1 EACH | Refills: 0 | Status: ON HOLD | OUTPATIENT
Start: 2024-11-14 | End: 2024-11-24

## 2024-11-14 RX ORDER — LISINOPRIL 2.5 MG/1
2.5 TABLET ORAL DAILY
Qty: 30 TABLET | Refills: 1 | Status: ON HOLD | OUTPATIENT
Start: 2024-11-15

## 2024-11-14 RX ORDER — AMLODIPINE BESYLATE 10 MG/1
10 TABLET ORAL DAILY
Status: DISCONTINUED | OUTPATIENT
Start: 2024-11-15 | End: 2024-11-19 | Stop reason: HOSPADM

## 2024-11-14 RX ORDER — CARBOXYMETHYLCELLULOSE SODIUM 5 MG/ML
1 SOLUTION/ DROPS OPHTHALMIC 3 TIMES DAILY
Qty: 15 ML | Refills: 0 | Status: ON HOLD | OUTPATIENT
Start: 2024-11-14

## 2024-11-14 RX ORDER — CLOPIDOGREL BISULFATE 75 MG/1
75 TABLET ORAL DAILY
Qty: 14 TABLET | Refills: 0 | Status: ON HOLD | OUTPATIENT
Start: 2024-11-15 | End: 2024-11-29

## 2024-11-14 RX ORDER — AMLODIPINE BESYLATE 10 MG/1
10 TABLET ORAL DAILY
Qty: 30 TABLET | Refills: 1 | Status: ON HOLD | OUTPATIENT
Start: 2024-11-15

## 2024-11-14 RX ORDER — ASPIRIN 81 MG/1
81 TABLET ORAL DAILY
Qty: 30 TABLET | Refills: 2 | Status: ON HOLD | OUTPATIENT
Start: 2024-11-15

## 2024-11-14 RX ADMIN — ROSUVASTATIN 40 MG: 40 TABLET, FILM COATED ORAL at 20:14

## 2024-11-14 RX ADMIN — AMLODIPINE BESYLATE 5 MG: 5 TABLET ORAL at 09:23

## 2024-11-14 RX ADMIN — ERYTHROMYCIN: 5 OINTMENT OPHTHALMIC at 12:40

## 2024-11-14 RX ADMIN — ACETAMINOPHEN 650 MG: 325 TABLET ORAL at 04:13

## 2024-11-14 RX ADMIN — ERYTHROMYCIN: 5 OINTMENT OPHTHALMIC at 06:09

## 2024-11-14 RX ADMIN — CLOPIDOGREL BISULFATE 75 MG: 75 TABLET ORAL at 09:22

## 2024-11-14 RX ADMIN — ACETAMINOPHEN 650 MG: 325 TABLET ORAL at 23:57

## 2024-11-14 RX ADMIN — Medication 3 MG: at 20:56

## 2024-11-14 RX ADMIN — ENOXAPARIN SODIUM 40 MG: 100 INJECTION SUBCUTANEOUS at 09:23

## 2024-11-14 RX ADMIN — ERYTHROMYCIN: 5 OINTMENT OPHTHALMIC at 00:40

## 2024-11-14 RX ADMIN — ERYTHROMYCIN: 5 OINTMENT OPHTHALMIC at 23:57

## 2024-11-14 RX ADMIN — SODIUM CHLORIDE, PRESERVATIVE FREE 10 ML: 5 INJECTION INTRAVENOUS at 09:23

## 2024-11-14 RX ADMIN — ASPIRIN 81 MG: 81 TABLET, COATED ORAL at 09:22

## 2024-11-14 RX ADMIN — CARBOXYMETHYLCELLULOSE SODIUM 1 DROP: 5 SOLUTION/ DROPS OPHTHALMIC at 09:25

## 2024-11-14 RX ADMIN — CARBOXYMETHYLCELLULOSE SODIUM 1 DROP: 5 SOLUTION/ DROPS OPHTHALMIC at 15:27

## 2024-11-14 RX ADMIN — SODIUM CHLORIDE, PRESERVATIVE FREE 10 ML: 5 INJECTION INTRAVENOUS at 20:15

## 2024-11-14 RX ADMIN — ERYTHROMYCIN: 5 OINTMENT OPHTHALMIC at 18:04

## 2024-11-14 RX ADMIN — CARBOXYMETHYLCELLULOSE SODIUM 1 DROP: 5 SOLUTION/ DROPS OPHTHALMIC at 20:15

## 2024-11-14 RX ADMIN — LISINOPRIL 2.5 MG: 5 TABLET ORAL at 09:22

## 2024-11-14 ASSESSMENT — PAIN SCALES - GENERAL
PAINLEVEL_OUTOF10: 1
PAINLEVEL_OUTOF10: 4
PAINLEVEL_OUTOF10: 4

## 2024-11-14 ASSESSMENT — PAIN - FUNCTIONAL ASSESSMENT: PAIN_FUNCTIONAL_ASSESSMENT: ACTIVITIES ARE NOT PREVENTED

## 2024-11-14 ASSESSMENT — PAIN DESCRIPTION - ORIENTATION
ORIENTATION: DISTAL
ORIENTATION: LOWER

## 2024-11-14 ASSESSMENT — PAIN DESCRIPTION - LOCATION
LOCATION: NECK
LOCATION: NECK

## 2024-11-14 ASSESSMENT — PAIN DESCRIPTION - DESCRIPTORS
DESCRIPTORS: ACHING
DESCRIPTORS: ACHING

## 2024-11-14 NOTE — CARE COORDINATION
DISCHARGE PLANNING NOTE:    LSW following for discharge to ARU. PM&R completed, patient is appropriate for inpatient rehab per Dr. Melendez. insurance authorization was submitted 11/11. Still pending at this time per Keysha in admissions

## 2024-11-14 NOTE — DISCHARGE INSTRUCTIONS
Continue taking the medications as prescribed.  You need to follow-up with your PCP.  Continue working with PT/OT and speech therapy.

## 2024-11-14 NOTE — CARE COORDINATION
Writer geovani Neuro apt w/ Dr. Coretta Garcia, on 2/17/24 @ 11:00 AM. Information was placed on AVS.

## 2024-11-14 NOTE — CARE COORDINATION
11:14: Per [employer] virtual clinical services, patient's prior authorization remains pending at this time. Will continue to follow for updates.    Electronically signed by Keysha Palomino RN on 11/14/2024 at 11:19 AM

## 2024-11-15 PROCEDURE — 2580000003 HC RX 258: Performed by: PSYCHIATRY & NEUROLOGY

## 2024-11-15 PROCEDURE — 6370000000 HC RX 637 (ALT 250 FOR IP): Performed by: PSYCHIATRY & NEUROLOGY

## 2024-11-15 PROCEDURE — 97110 THERAPEUTIC EXERCISES: CPT

## 2024-11-15 PROCEDURE — 97530 THERAPEUTIC ACTIVITIES: CPT

## 2024-11-15 PROCEDURE — 6370000000 HC RX 637 (ALT 250 FOR IP)

## 2024-11-15 PROCEDURE — 97112 NEUROMUSCULAR REEDUCATION: CPT

## 2024-11-15 PROCEDURE — 99232 SBSQ HOSP IP/OBS MODERATE 35: CPT | Performed by: INTERNAL MEDICINE

## 2024-11-15 PROCEDURE — 6360000002 HC RX W HCPCS: Performed by: FAMILY MEDICINE

## 2024-11-15 PROCEDURE — 6370000000 HC RX 637 (ALT 250 FOR IP): Performed by: FAMILY MEDICINE

## 2024-11-15 PROCEDURE — 99233 SBSQ HOSP IP/OBS HIGH 50: CPT | Performed by: INTERNAL MEDICINE

## 2024-11-15 PROCEDURE — 92507 TX SP LANG VOICE COMM INDIV: CPT

## 2024-11-15 PROCEDURE — 97116 GAIT TRAINING THERAPY: CPT

## 2024-11-15 PROCEDURE — 2060000000 HC ICU INTERMEDIATE R&B

## 2024-11-15 RX ADMIN — SODIUM CHLORIDE, PRESERVATIVE FREE 10 ML: 5 INJECTION INTRAVENOUS at 20:20

## 2024-11-15 RX ADMIN — ERYTHROMYCIN: 5 OINTMENT OPHTHALMIC at 13:06

## 2024-11-15 RX ADMIN — CARBOXYMETHYLCELLULOSE SODIUM 1 DROP: 5 SOLUTION/ DROPS OPHTHALMIC at 14:50

## 2024-11-15 RX ADMIN — AMLODIPINE BESYLATE 10 MG: 10 TABLET ORAL at 09:26

## 2024-11-15 RX ADMIN — SODIUM CHLORIDE, PRESERVATIVE FREE 10 ML: 5 INJECTION INTRAVENOUS at 09:26

## 2024-11-15 RX ADMIN — CARBOXYMETHYLCELLULOSE SODIUM 1 DROP: 5 SOLUTION/ DROPS OPHTHALMIC at 09:27

## 2024-11-15 RX ADMIN — CLOPIDOGREL BISULFATE 75 MG: 75 TABLET ORAL at 09:26

## 2024-11-15 RX ADMIN — Medication 3 MG: at 22:47

## 2024-11-15 RX ADMIN — ASPIRIN 81 MG: 81 TABLET, COATED ORAL at 09:26

## 2024-11-15 RX ADMIN — ENOXAPARIN SODIUM 40 MG: 100 INJECTION SUBCUTANEOUS at 09:26

## 2024-11-15 RX ADMIN — CARBOXYMETHYLCELLULOSE SODIUM 1 DROP: 5 SOLUTION/ DROPS OPHTHALMIC at 20:21

## 2024-11-15 RX ADMIN — ROSUVASTATIN 40 MG: 40 TABLET, FILM COATED ORAL at 20:20

## 2024-11-15 RX ADMIN — ERYTHROMYCIN: 5 OINTMENT OPHTHALMIC at 18:34

## 2024-11-15 RX ADMIN — LISINOPRIL 2.5 MG: 5 TABLET ORAL at 09:26

## 2024-11-15 RX ADMIN — ERYTHROMYCIN: 5 OINTMENT OPHTHALMIC at 05:24

## 2024-11-15 ASSESSMENT — PAIN SCALES - WONG BAKER: WONGBAKER_NUMERICALRESPONSE: NO HURT

## 2024-11-15 NOTE — CARE COORDINATION
8:23 Called [employer] regarding status of Authorization [id number]. Authorization remains pending clinical review at this time. Per automated machine, auth determinations can take up to 14 days.     Updated JULIAN Lozano, at this time via [employer]    4:09 Called [employer] regarding status of Authorization [id number]. Per automated assistant, authorization remains pending clinical review at this time.     Updated JULIAN Lozano, at this time via [employer]

## 2024-11-15 NOTE — CARE COORDINATION
DISCHARGE PLANNING NOTE:      LSW following for discharge to ARU. PM&R completed, patient is appropriate for inpatient rehab per Dr. Melendez. insurance authorization was submitted 11/11. Still pending at this time per Coretta in admissions

## 2024-11-16 PROCEDURE — 2580000003 HC RX 258: Performed by: PSYCHIATRY & NEUROLOGY

## 2024-11-16 PROCEDURE — 6370000000 HC RX 637 (ALT 250 FOR IP): Performed by: PSYCHIATRY & NEUROLOGY

## 2024-11-16 PROCEDURE — 92507 TX SP LANG VOICE COMM INDIV: CPT

## 2024-11-16 PROCEDURE — 97530 THERAPEUTIC ACTIVITIES: CPT

## 2024-11-16 PROCEDURE — 97110 THERAPEUTIC EXERCISES: CPT

## 2024-11-16 PROCEDURE — 2060000000 HC ICU INTERMEDIATE R&B

## 2024-11-16 PROCEDURE — 99232 SBSQ HOSP IP/OBS MODERATE 35: CPT | Performed by: INTERNAL MEDICINE

## 2024-11-16 PROCEDURE — 6370000000 HC RX 637 (ALT 250 FOR IP): Performed by: FAMILY MEDICINE

## 2024-11-16 PROCEDURE — 6370000000 HC RX 637 (ALT 250 FOR IP)

## 2024-11-16 PROCEDURE — 97112 NEUROMUSCULAR REEDUCATION: CPT

## 2024-11-16 PROCEDURE — 6370000000 HC RX 637 (ALT 250 FOR IP): Performed by: NURSE PRACTITIONER

## 2024-11-16 PROCEDURE — 6360000002 HC RX W HCPCS: Performed by: FAMILY MEDICINE

## 2024-11-16 RX ADMIN — LISINOPRIL 2.5 MG: 5 TABLET ORAL at 08:59

## 2024-11-16 RX ADMIN — ROSUVASTATIN 40 MG: 40 TABLET, FILM COATED ORAL at 20:18

## 2024-11-16 RX ADMIN — ENOXAPARIN SODIUM 40 MG: 100 INJECTION SUBCUTANEOUS at 08:57

## 2024-11-16 RX ADMIN — CARBOXYMETHYLCELLULOSE SODIUM 1 DROP: 5 SOLUTION/ DROPS OPHTHALMIC at 14:54

## 2024-11-16 RX ADMIN — CARBOXYMETHYLCELLULOSE SODIUM 1 DROP: 5 SOLUTION/ DROPS OPHTHALMIC at 20:18

## 2024-11-16 RX ADMIN — ERYTHROMYCIN: 5 OINTMENT OPHTHALMIC at 16:48

## 2024-11-16 RX ADMIN — SODIUM CHLORIDE, PRESERVATIVE FREE 10 ML: 5 INJECTION INTRAVENOUS at 20:19

## 2024-11-16 RX ADMIN — ERYTHROMYCIN: 5 OINTMENT OPHTHALMIC at 04:29

## 2024-11-16 RX ADMIN — ACETAMINOPHEN 650 MG: 325 TABLET ORAL at 04:28

## 2024-11-16 RX ADMIN — AMLODIPINE BESYLATE 10 MG: 10 TABLET ORAL at 08:59

## 2024-11-16 RX ADMIN — ERYTHROMYCIN: 5 OINTMENT OPHTHALMIC at 22:01

## 2024-11-16 RX ADMIN — Medication 3 MG: at 23:15

## 2024-11-16 RX ADMIN — SODIUM CHLORIDE, PRESERVATIVE FREE 10 ML: 5 INJECTION INTRAVENOUS at 08:57

## 2024-11-16 RX ADMIN — CLOPIDOGREL BISULFATE 75 MG: 75 TABLET ORAL at 09:00

## 2024-11-16 RX ADMIN — CARBOXYMETHYLCELLULOSE SODIUM 1 DROP: 5 SOLUTION/ DROPS OPHTHALMIC at 08:55

## 2024-11-16 RX ADMIN — ASPIRIN 81 MG: 81 TABLET, COATED ORAL at 08:59

## 2024-11-16 RX ADMIN — ACETAMINOPHEN 650 MG: 325 TABLET ORAL at 16:48

## 2024-11-16 ASSESSMENT — PAIN SCALES - GENERAL
PAINLEVEL_OUTOF10: 4
PAINLEVEL_OUTOF10: 5
PAINLEVEL_OUTOF10: 0
PAINLEVEL_OUTOF10: 2

## 2024-11-16 ASSESSMENT — PAIN - FUNCTIONAL ASSESSMENT
PAIN_FUNCTIONAL_ASSESSMENT: ACTIVITIES ARE NOT PREVENTED
PAIN_FUNCTIONAL_ASSESSMENT: PREVENTS OR INTERFERES SOME ACTIVE ACTIVITIES AND ADLS

## 2024-11-16 ASSESSMENT — PAIN DESCRIPTION - DESCRIPTORS
DESCRIPTORS: ACHING
DESCRIPTORS: DISCOMFORT

## 2024-11-16 ASSESSMENT — PAIN DESCRIPTION - ORIENTATION
ORIENTATION: MID
ORIENTATION: POSTERIOR

## 2024-11-16 ASSESSMENT — PAIN DESCRIPTION - LOCATION
LOCATION: NECK
LOCATION: NECK

## 2024-11-17 LAB — URATE SERPL-MCNC: 7 MG/DL (ref 3.4–7)

## 2024-11-17 PROCEDURE — 2060000000 HC ICU INTERMEDIATE R&B

## 2024-11-17 PROCEDURE — 84550 ASSAY OF BLOOD/URIC ACID: CPT

## 2024-11-17 PROCEDURE — 6370000000 HC RX 637 (ALT 250 FOR IP)

## 2024-11-17 PROCEDURE — 6370000000 HC RX 637 (ALT 250 FOR IP): Performed by: FAMILY MEDICINE

## 2024-11-17 PROCEDURE — 2580000003 HC RX 258: Performed by: PSYCHIATRY & NEUROLOGY

## 2024-11-17 PROCEDURE — 99232 SBSQ HOSP IP/OBS MODERATE 35: CPT | Performed by: INTERNAL MEDICINE

## 2024-11-17 PROCEDURE — 92507 TX SP LANG VOICE COMM INDIV: CPT

## 2024-11-17 PROCEDURE — 6360000002 HC RX W HCPCS: Performed by: FAMILY MEDICINE

## 2024-11-17 PROCEDURE — 6370000000 HC RX 637 (ALT 250 FOR IP): Performed by: PSYCHIATRY & NEUROLOGY

## 2024-11-17 PROCEDURE — 36415 COLL VENOUS BLD VENIPUNCTURE: CPT

## 2024-11-17 PROCEDURE — 99233 SBSQ HOSP IP/OBS HIGH 50: CPT | Performed by: INTERNAL MEDICINE

## 2024-11-17 RX ADMIN — SODIUM CHLORIDE, PRESERVATIVE FREE 10 ML: 5 INJECTION INTRAVENOUS at 20:24

## 2024-11-17 RX ADMIN — LISINOPRIL 2.5 MG: 5 TABLET ORAL at 08:36

## 2024-11-17 RX ADMIN — ERYTHROMYCIN: 5 OINTMENT OPHTHALMIC at 12:37

## 2024-11-17 RX ADMIN — CARBOXYMETHYLCELLULOSE SODIUM 1 DROP: 5 SOLUTION/ DROPS OPHTHALMIC at 08:37

## 2024-11-17 RX ADMIN — CARBOXYMETHYLCELLULOSE SODIUM 1 DROP: 5 SOLUTION/ DROPS OPHTHALMIC at 20:24

## 2024-11-17 RX ADMIN — AMLODIPINE BESYLATE 10 MG: 10 TABLET ORAL at 08:36

## 2024-11-17 RX ADMIN — CLOPIDOGREL BISULFATE 75 MG: 75 TABLET ORAL at 08:36

## 2024-11-17 RX ADMIN — ASPIRIN 81 MG: 81 TABLET, COATED ORAL at 08:36

## 2024-11-17 RX ADMIN — CARBOXYMETHYLCELLULOSE SODIUM 1 DROP: 5 SOLUTION/ DROPS OPHTHALMIC at 15:37

## 2024-11-17 RX ADMIN — ERYTHROMYCIN: 5 OINTMENT OPHTHALMIC at 06:44

## 2024-11-17 RX ADMIN — SODIUM CHLORIDE, PRESERVATIVE FREE 10 ML: 5 INJECTION INTRAVENOUS at 08:28

## 2024-11-17 RX ADMIN — ROSUVASTATIN 40 MG: 40 TABLET, FILM COATED ORAL at 20:23

## 2024-11-17 RX ADMIN — ERYTHROMYCIN: 5 OINTMENT OPHTHALMIC at 23:43

## 2024-11-17 RX ADMIN — Medication 3 MG: at 20:24

## 2024-11-17 RX ADMIN — ERYTHROMYCIN: 5 OINTMENT OPHTHALMIC at 17:30

## 2024-11-17 RX ADMIN — ENOXAPARIN SODIUM 40 MG: 100 INJECTION SUBCUTANEOUS at 08:33

## 2024-11-17 ASSESSMENT — PAIN DESCRIPTION - LOCATION: LOCATION: NECK

## 2024-11-17 ASSESSMENT — PAIN DESCRIPTION - DESCRIPTORS: DESCRIPTORS: ACHING

## 2024-11-17 ASSESSMENT — PAIN DESCRIPTION - PAIN TYPE: TYPE: ACUTE PAIN

## 2024-11-17 ASSESSMENT — PAIN SCALES - GENERAL
PAINLEVEL_OUTOF10: 2
PAINLEVEL_OUTOF10: 2
PAINLEVEL_OUTOF10: 1

## 2024-11-17 ASSESSMENT — PAIN SCALES - WONG BAKER: WONGBAKER_NUMERICALRESPONSE: HURTS A LITTLE BIT

## 2024-11-18 LAB
ANION GAP SERPL CALCULATED.3IONS-SCNC: 9 MMOL/L (ref 9–16)
BUN SERPL-MCNC: 24 MG/DL (ref 8–23)
CALCIUM SERPL-MCNC: 9.1 MG/DL (ref 8.6–10.4)
CHLORIDE SERPL-SCNC: 103 MMOL/L (ref 98–107)
CO2 SERPL-SCNC: 24 MMOL/L (ref 20–31)
CREAT SERPL-MCNC: 0.9 MG/DL (ref 0.7–1.2)
ERYTHROCYTE [DISTWIDTH] IN BLOOD BY AUTOMATED COUNT: 15.9 % (ref 11.5–14.9)
GFR, ESTIMATED: >90 ML/MIN/1.73M2
GLUCOSE SERPL-MCNC: 88 MG/DL (ref 74–99)
HCT VFR BLD AUTO: 55.2 % (ref 41–53)
HGB BLD-MCNC: 18.7 G/DL (ref 13.5–17.5)
MCH RBC QN AUTO: 27.8 PG (ref 26–34)
MCHC RBC AUTO-ENTMCNC: 33.8 G/DL (ref 31–37)
MCV RBC AUTO: 82.2 FL (ref 80–100)
PLATELET # BLD AUTO: 188 K/UL (ref 150–450)
PMV BLD AUTO: 8.2 FL (ref 6–12)
POTASSIUM SERPL-SCNC: 4.2 MMOL/L (ref 3.7–5.3)
RBC # BLD AUTO: 6.72 M/UL (ref 4.5–5.9)
SODIUM SERPL-SCNC: 136 MMOL/L (ref 136–145)
WBC OTHER # BLD: 9.2 K/UL (ref 3.5–11)

## 2024-11-18 PROCEDURE — 2060000000 HC ICU INTERMEDIATE R&B

## 2024-11-18 PROCEDURE — 80048 BASIC METABOLIC PNL TOTAL CA: CPT

## 2024-11-18 PROCEDURE — 92507 TX SP LANG VOICE COMM INDIV: CPT

## 2024-11-18 PROCEDURE — 6370000000 HC RX 637 (ALT 250 FOR IP)

## 2024-11-18 PROCEDURE — 6370000000 HC RX 637 (ALT 250 FOR IP): Performed by: NURSE PRACTITIONER

## 2024-11-18 PROCEDURE — 99232 SBSQ HOSP IP/OBS MODERATE 35: CPT | Performed by: INTERNAL MEDICINE

## 2024-11-18 PROCEDURE — 97112 NEUROMUSCULAR REEDUCATION: CPT

## 2024-11-18 PROCEDURE — 2580000003 HC RX 258: Performed by: PSYCHIATRY & NEUROLOGY

## 2024-11-18 PROCEDURE — 6370000000 HC RX 637 (ALT 250 FOR IP): Performed by: PSYCHIATRY & NEUROLOGY

## 2024-11-18 PROCEDURE — 97530 THERAPEUTIC ACTIVITIES: CPT

## 2024-11-18 PROCEDURE — 97535 SELF CARE MNGMENT TRAINING: CPT

## 2024-11-18 PROCEDURE — 6370000000 HC RX 637 (ALT 250 FOR IP): Performed by: INTERNAL MEDICINE

## 2024-11-18 PROCEDURE — 97110 THERAPEUTIC EXERCISES: CPT

## 2024-11-18 PROCEDURE — 36415 COLL VENOUS BLD VENIPUNCTURE: CPT

## 2024-11-18 PROCEDURE — 99232 SBSQ HOSP IP/OBS MODERATE 35: CPT | Performed by: PHYSICAL MEDICINE & REHABILITATION

## 2024-11-18 PROCEDURE — 85027 COMPLETE CBC AUTOMATED: CPT

## 2024-11-18 PROCEDURE — 6370000000 HC RX 637 (ALT 250 FOR IP): Performed by: FAMILY MEDICINE

## 2024-11-18 PROCEDURE — 6360000002 HC RX W HCPCS: Performed by: FAMILY MEDICINE

## 2024-11-18 RX ORDER — BACLOFEN 10 MG/1
5 TABLET ORAL EVERY 8 HOURS PRN
Status: DISCONTINUED | OUTPATIENT
Start: 2024-11-18 | End: 2024-11-19 | Stop reason: HOSPADM

## 2024-11-18 RX ADMIN — ERYTHROMYCIN: 5 OINTMENT OPHTHALMIC at 23:49

## 2024-11-18 RX ADMIN — CLOPIDOGREL BISULFATE 75 MG: 75 TABLET ORAL at 07:57

## 2024-11-18 RX ADMIN — ROSUVASTATIN 40 MG: 40 TABLET, FILM COATED ORAL at 20:42

## 2024-11-18 RX ADMIN — BACLOFEN 5 MG: 10 TABLET ORAL at 21:37

## 2024-11-18 RX ADMIN — CARBOXYMETHYLCELLULOSE SODIUM 1 DROP: 5 SOLUTION/ DROPS OPHTHALMIC at 08:04

## 2024-11-18 RX ADMIN — ERYTHROMYCIN: 5 OINTMENT OPHTHALMIC at 12:21

## 2024-11-18 RX ADMIN — SODIUM CHLORIDE, PRESERVATIVE FREE 10 ML: 5 INJECTION INTRAVENOUS at 20:51

## 2024-11-18 RX ADMIN — CARBOXYMETHYLCELLULOSE SODIUM 1 DROP: 5 SOLUTION/ DROPS OPHTHALMIC at 14:04

## 2024-11-18 RX ADMIN — ASPIRIN 81 MG: 81 TABLET, COATED ORAL at 07:57

## 2024-11-18 RX ADMIN — ERYTHROMYCIN: 5 OINTMENT OPHTHALMIC at 05:36

## 2024-11-18 RX ADMIN — ACETAMINOPHEN 650 MG: 325 TABLET ORAL at 20:41

## 2024-11-18 RX ADMIN — ENOXAPARIN SODIUM 40 MG: 100 INJECTION SUBCUTANEOUS at 07:57

## 2024-11-18 RX ADMIN — ACETAMINOPHEN 650 MG: 325 TABLET ORAL at 03:11

## 2024-11-18 RX ADMIN — LISINOPRIL 2.5 MG: 5 TABLET ORAL at 07:57

## 2024-11-18 RX ADMIN — AMLODIPINE BESYLATE 10 MG: 10 TABLET ORAL at 07:57

## 2024-11-18 RX ADMIN — BACLOFEN 5 MG: 10 TABLET ORAL at 14:03

## 2024-11-18 RX ADMIN — Medication 3 MG: at 21:37

## 2024-11-18 RX ADMIN — CARBOXYMETHYLCELLULOSE SODIUM 1 DROP: 5 SOLUTION/ DROPS OPHTHALMIC at 23:48

## 2024-11-18 RX ADMIN — SODIUM CHLORIDE, PRESERVATIVE FREE 10 ML: 5 INJECTION INTRAVENOUS at 07:57

## 2024-11-18 ASSESSMENT — PAIN SCALES - GENERAL
PAINLEVEL_OUTOF10: 5
PAINLEVEL_OUTOF10: 5
PAINLEVEL_OUTOF10: 0
PAINLEVEL_OUTOF10: 0
PAINLEVEL_OUTOF10: 5
PAINLEVEL_OUTOF10: 4
PAINLEVEL_OUTOF10: 3

## 2024-11-18 ASSESSMENT — PAIN DESCRIPTION - LOCATION
LOCATION: LEG
LOCATION: LEG
LOCATION: NECK;LEG
LOCATION: ANKLE

## 2024-11-18 ASSESSMENT — PAIN DESCRIPTION - ORIENTATION
ORIENTATION: LEFT

## 2024-11-18 ASSESSMENT — PAIN DESCRIPTION - DESCRIPTORS
DESCRIPTORS: ACHING
DESCRIPTORS: ACHING

## 2024-11-18 NOTE — CARE COORDINATION
1:03 Called [employer] regarding status of Authorization [id number]. Authorization remains pending clinical review at this time.     Updated JULIAN Lozano, at this time via [employer]

## 2024-11-18 NOTE — CARE COORDINATION
Received Telephone Call from kiana Mcintyre [employer] representative Donnie. Call Ref#11/18/2024 4:03 PM    Intent to deny admission to Acute Inpatient Rehabilitation Stay under Auth/CaseRef# [id number]     Rationale to deny Acute Inpatient Rehabilitation level of care: \"No Rehab needs, defecits unclear, no order for complex wound care or IV meds\"    Peer to Peer Deadline: Schedule by 11/19/2024 12PM EST    Peer to Peer Instructions: Call [phone] op 4 to schedule peer to peer    Per [facility] physiatrist Dr. Ele Melendez, Pursue peer to peer to attempt to overturn denial: Scheduled for 11/19/2024 9AM to noon or 11/20/2024 12pm to 5pm    If applicable, number for appeals is [phone]    Updated Marta LSW:  Via Baihe  at this time.

## 2024-11-19 ENCOUNTER — HOSPITAL ENCOUNTER (INPATIENT)
Age: 69
LOS: 23 days | Discharge: HOME HEALTH | DRG: 057 | End: 2024-12-12
Attending: GENERAL PRACTICE | Admitting: GENERAL PRACTICE
Payer: MEDICARE

## 2024-11-19 VITALS
HEART RATE: 92 BPM | WEIGHT: 165.12 LBS | DIASTOLIC BLOOD PRESSURE: 113 MMHG | SYSTOLIC BLOOD PRESSURE: 129 MMHG | RESPIRATION RATE: 18 BRPM | HEIGHT: 70 IN | TEMPERATURE: 97.9 F | BODY MASS INDEX: 23.64 KG/M2 | OXYGEN SATURATION: 94 %

## 2024-11-19 PROBLEM — I69.352 HEMIPLEGIA AND HEMIPARESIS FOLLOWING CEREBRAL INFARCTION AFFECTING LEFT DOMINANT SIDE (HCC): Status: ACTIVE | Noted: 2024-11-19

## 2024-11-19 LAB
ANION GAP SERPL CALCULATED.3IONS-SCNC: 10 MMOL/L (ref 9–16)
BUN SERPL-MCNC: 26 MG/DL (ref 8–23)
CALCIUM SERPL-MCNC: 9.2 MG/DL (ref 8.6–10.4)
CHLORIDE SERPL-SCNC: 102 MMOL/L (ref 98–107)
CO2 SERPL-SCNC: 23 MMOL/L (ref 20–31)
CREAT SERPL-MCNC: 1 MG/DL (ref 0.7–1.2)
ERYTHROCYTE [DISTWIDTH] IN BLOOD BY AUTOMATED COUNT: 15.6 % (ref 11.5–14.9)
GFR, ESTIMATED: 82 ML/MIN/1.73M2
GLUCOSE SERPL-MCNC: 93 MG/DL (ref 74–99)
HCT VFR BLD AUTO: 52.5 % (ref 41–53)
HGB BLD-MCNC: 17.7 G/DL (ref 13.5–17.5)
MCH RBC QN AUTO: 27.5 PG (ref 26–34)
MCHC RBC AUTO-ENTMCNC: 33.8 G/DL (ref 31–37)
MCV RBC AUTO: 81.3 FL (ref 80–100)
PLATELET # BLD AUTO: 174 K/UL (ref 150–450)
PMV BLD AUTO: 8.1 FL (ref 6–12)
POTASSIUM SERPL-SCNC: 4.4 MMOL/L (ref 3.7–5.3)
RBC # BLD AUTO: 6.45 M/UL (ref 4.5–5.9)
SODIUM SERPL-SCNC: 135 MMOL/L (ref 136–145)
WBC OTHER # BLD: 9.6 K/UL (ref 3.5–11)

## 2024-11-19 PROCEDURE — 36415 COLL VENOUS BLD VENIPUNCTURE: CPT

## 2024-11-19 PROCEDURE — 6370000000 HC RX 637 (ALT 250 FOR IP): Performed by: INTERNAL MEDICINE

## 2024-11-19 PROCEDURE — 99239 HOSP IP/OBS DSCHRG MGMT >30: CPT | Performed by: INTERNAL MEDICINE

## 2024-11-19 PROCEDURE — 85027 COMPLETE CBC AUTOMATED: CPT

## 2024-11-19 PROCEDURE — 97535 SELF CARE MNGMENT TRAINING: CPT

## 2024-11-19 PROCEDURE — 6370000000 HC RX 637 (ALT 250 FOR IP)

## 2024-11-19 PROCEDURE — 2580000003 HC RX 258: Performed by: PSYCHIATRY & NEUROLOGY

## 2024-11-19 PROCEDURE — 97530 THERAPEUTIC ACTIVITIES: CPT

## 2024-11-19 PROCEDURE — 1180000000 HC REHAB R&B

## 2024-11-19 PROCEDURE — 6370000000 HC RX 637 (ALT 250 FOR IP): Performed by: PSYCHIATRY & NEUROLOGY

## 2024-11-19 PROCEDURE — 92507 TX SP LANG VOICE COMM INDIV: CPT

## 2024-11-19 PROCEDURE — 2580000003 HC RX 258: Performed by: INTERNAL MEDICINE

## 2024-11-19 PROCEDURE — 6360000002 HC RX W HCPCS: Performed by: FAMILY MEDICINE

## 2024-11-19 PROCEDURE — 80048 BASIC METABOLIC PNL TOTAL CA: CPT

## 2024-11-19 RX ORDER — ASPIRIN 81 MG/1
81 TABLET ORAL DAILY
Status: DISCONTINUED | OUTPATIENT
Start: 2024-11-20 | End: 2024-12-12 | Stop reason: HOSPADM

## 2024-11-19 RX ORDER — AMLODIPINE BESYLATE 10 MG/1
10 TABLET ORAL DAILY
Status: CANCELLED | OUTPATIENT
Start: 2024-11-20

## 2024-11-19 RX ORDER — CARBOXYMETHYLCELLULOSE SODIUM 5 MG/ML
1 SOLUTION/ DROPS OPHTHALMIC 3 TIMES DAILY
Status: DISCONTINUED | OUTPATIENT
Start: 2024-11-19 | End: 2024-12-12 | Stop reason: HOSPADM

## 2024-11-19 RX ORDER — SODIUM CHLORIDE 9 MG/ML
INJECTION, SOLUTION INTRAVENOUS PRN
Status: CANCELLED | OUTPATIENT
Start: 2024-11-19

## 2024-11-19 RX ORDER — ROSUVASTATIN CALCIUM 40 MG/1
40 TABLET, COATED ORAL NIGHTLY
Status: DISCONTINUED | OUTPATIENT
Start: 2024-11-19 | End: 2024-12-12 | Stop reason: HOSPADM

## 2024-11-19 RX ORDER — ACETAMINOPHEN 325 MG/1
650 TABLET ORAL EVERY 4 HOURS PRN
Status: CANCELLED | OUTPATIENT
Start: 2024-11-19

## 2024-11-19 RX ORDER — SODIUM CHLORIDE 0.9 % (FLUSH) 0.9 %
5-40 SYRINGE (ML) INJECTION EVERY 12 HOURS SCHEDULED
Status: CANCELLED | OUTPATIENT
Start: 2024-11-19

## 2024-11-19 RX ORDER — LISINOPRIL 5 MG/1
2.5 TABLET ORAL DAILY
Status: CANCELLED | OUTPATIENT
Start: 2024-11-20

## 2024-11-19 RX ORDER — SODIUM CHLORIDE 0.9 % (FLUSH) 0.9 %
10 SYRINGE (ML) INJECTION PRN
Status: CANCELLED | OUTPATIENT
Start: 2024-11-19

## 2024-11-19 RX ORDER — AMLODIPINE BESYLATE 10 MG/1
10 TABLET ORAL DAILY
Status: DISCONTINUED | OUTPATIENT
Start: 2024-11-20 | End: 2024-12-12 | Stop reason: HOSPADM

## 2024-11-19 RX ORDER — POLYETHYLENE GLYCOL 3350 17 G/17G
17 POWDER, FOR SOLUTION ORAL DAILY PRN
Status: CANCELLED | OUTPATIENT
Start: 2024-11-19

## 2024-11-19 RX ORDER — ASPIRIN 81 MG/1
81 TABLET ORAL DAILY
Status: CANCELLED | OUTPATIENT
Start: 2024-11-20

## 2024-11-19 RX ORDER — ERYTHROMYCIN 5 MG/G
OINTMENT OPHTHALMIC EVERY 6 HOURS SCHEDULED
Status: CANCELLED | OUTPATIENT
Start: 2024-11-19

## 2024-11-19 RX ORDER — BISACODYL 10 MG
10 SUPPOSITORY, RECTAL RECTAL DAILY PRN
Status: DISCONTINUED | OUTPATIENT
Start: 2024-11-19 | End: 2024-12-12 | Stop reason: HOSPADM

## 2024-11-19 RX ORDER — CARBOXYMETHYLCELLULOSE SODIUM 5 MG/ML
1 SOLUTION/ DROPS OPHTHALMIC 3 TIMES DAILY
Status: CANCELLED | OUTPATIENT
Start: 2024-11-19

## 2024-11-19 RX ORDER — POLYETHYLENE GLYCOL 3350 17 G/17G
17 POWDER, FOR SOLUTION ORAL DAILY
Status: CANCELLED | OUTPATIENT
Start: 2024-11-19

## 2024-11-19 RX ORDER — SODIUM CHLORIDE 0.9 % (FLUSH) 0.9 %
5-40 SYRINGE (ML) INJECTION PRN
Status: CANCELLED | OUTPATIENT
Start: 2024-11-19

## 2024-11-19 RX ORDER — ENOXAPARIN SODIUM 100 MG/ML
40 INJECTION SUBCUTANEOUS DAILY
Status: DISCONTINUED | OUTPATIENT
Start: 2024-11-20 | End: 2024-12-12 | Stop reason: HOSPADM

## 2024-11-19 RX ORDER — SODIUM CHLORIDE 9 MG/ML
INJECTION, SOLUTION INTRAVENOUS PRN
Status: DISCONTINUED | OUTPATIENT
Start: 2024-11-19 | End: 2024-12-12 | Stop reason: HOSPADM

## 2024-11-19 RX ORDER — ERYTHROMYCIN 5 MG/G
OINTMENT OPHTHALMIC EVERY 6 HOURS SCHEDULED
Status: DISCONTINUED | OUTPATIENT
Start: 2024-11-19 | End: 2024-12-12 | Stop reason: HOSPADM

## 2024-11-19 RX ORDER — ACETAMINOPHEN 325 MG/1
650 TABLET ORAL EVERY 4 HOURS PRN
Status: DISCONTINUED | OUTPATIENT
Start: 2024-11-19 | End: 2024-12-12 | Stop reason: HOSPADM

## 2024-11-19 RX ORDER — SENNOSIDES A AND B 8.6 MG/1
2 TABLET, FILM COATED ORAL DAILY PRN
Status: DISCONTINUED | OUTPATIENT
Start: 2024-11-19 | End: 2024-12-12 | Stop reason: HOSPADM

## 2024-11-19 RX ORDER — CLOPIDOGREL BISULFATE 75 MG/1
75 TABLET ORAL DAILY
Status: CANCELLED | OUTPATIENT
Start: 2024-11-20 | End: 2024-11-29

## 2024-11-19 RX ORDER — ROSUVASTATIN CALCIUM 40 MG/1
40 TABLET, COATED ORAL NIGHTLY
Status: CANCELLED | OUTPATIENT
Start: 2024-11-19

## 2024-11-19 RX ORDER — ACETAMINOPHEN 325 MG/1
650 TABLET ORAL EVERY 4 HOURS PRN
Status: DISCONTINUED | OUTPATIENT
Start: 2024-11-19 | End: 2024-11-19 | Stop reason: SDUPTHER

## 2024-11-19 RX ORDER — SODIUM CHLORIDE 0.9 % (FLUSH) 0.9 %
10 SYRINGE (ML) INJECTION PRN
Status: DISCONTINUED | OUTPATIENT
Start: 2024-11-19 | End: 2024-11-20

## 2024-11-19 RX ORDER — BACLOFEN 10 MG/1
5 TABLET ORAL EVERY 8 HOURS PRN
Status: DISCONTINUED | OUTPATIENT
Start: 2024-11-19 | End: 2024-11-23

## 2024-11-19 RX ORDER — BACLOFEN 5 MG/1
5 TABLET ORAL EVERY 8 HOURS PRN
Qty: 30 TABLET | Refills: 0 | Status: ON HOLD | OUTPATIENT
Start: 2024-11-19

## 2024-11-19 RX ORDER — SENNOSIDES A AND B 8.6 MG/1
2 TABLET, FILM COATED ORAL DAILY PRN
Status: CANCELLED | OUTPATIENT
Start: 2024-11-19

## 2024-11-19 RX ORDER — BISACODYL 10 MG
10 SUPPOSITORY, RECTAL RECTAL DAILY PRN
Status: CANCELLED | OUTPATIENT
Start: 2024-11-19

## 2024-11-19 RX ORDER — SODIUM CHLORIDE 0.9 % (FLUSH) 0.9 %
5-40 SYRINGE (ML) INJECTION EVERY 12 HOURS SCHEDULED
Status: DISCONTINUED | OUTPATIENT
Start: 2024-11-19 | End: 2024-11-20

## 2024-11-19 RX ORDER — BACLOFEN 10 MG/1
5 TABLET ORAL EVERY 8 HOURS PRN
Status: CANCELLED | OUTPATIENT
Start: 2024-11-19

## 2024-11-19 RX ORDER — LISINOPRIL 5 MG/1
2.5 TABLET ORAL DAILY
Status: DISCONTINUED | OUTPATIENT
Start: 2024-11-20 | End: 2024-11-20

## 2024-11-19 RX ORDER — CLOPIDOGREL BISULFATE 75 MG/1
75 TABLET ORAL DAILY
Status: COMPLETED | OUTPATIENT
Start: 2024-11-20 | End: 2024-11-28

## 2024-11-19 RX ORDER — ENOXAPARIN SODIUM 100 MG/ML
40 INJECTION SUBCUTANEOUS DAILY
Status: CANCELLED | OUTPATIENT
Start: 2024-11-20

## 2024-11-19 RX ORDER — POLYETHYLENE GLYCOL 3350 17 G/17G
17 POWDER, FOR SOLUTION ORAL DAILY PRN
Status: DISCONTINUED | OUTPATIENT
Start: 2024-11-19 | End: 2024-12-12 | Stop reason: HOSPADM

## 2024-11-19 RX ORDER — SODIUM CHLORIDE 0.9 % (FLUSH) 0.9 %
5-40 SYRINGE (ML) INJECTION PRN
Status: DISCONTINUED | OUTPATIENT
Start: 2024-11-19 | End: 2024-11-20

## 2024-11-19 RX ORDER — POLYETHYLENE GLYCOL 3350 17 G/17G
17 POWDER, FOR SOLUTION ORAL DAILY
Status: DISCONTINUED | OUTPATIENT
Start: 2024-11-19 | End: 2024-12-12 | Stop reason: HOSPADM

## 2024-11-19 RX ADMIN — SODIUM CHLORIDE, PRESERVATIVE FREE 10 ML: 5 INJECTION INTRAVENOUS at 08:21

## 2024-11-19 RX ADMIN — ROSUVASTATIN 40 MG: 40 TABLET, FILM COATED ORAL at 20:10

## 2024-11-19 RX ADMIN — ERYTHROMYCIN: 5 OINTMENT OPHTHALMIC at 12:00

## 2024-11-19 RX ADMIN — CARBOXYMETHYLCELLULOSE SODIUM 1 DROP: 5 SOLUTION/ DROPS OPHTHALMIC at 14:31

## 2024-11-19 RX ADMIN — CARBOXYMETHYLCELLULOSE SODIUM 1 DROP: 5 SOLUTION/ DROPS OPHTHALMIC at 08:22

## 2024-11-19 RX ADMIN — Medication 3 MG: at 21:24

## 2024-11-19 RX ADMIN — CLOPIDOGREL BISULFATE 75 MG: 75 TABLET ORAL at 08:21

## 2024-11-19 RX ADMIN — ASPIRIN 81 MG: 81 TABLET, COATED ORAL at 08:21

## 2024-11-19 RX ADMIN — BACLOFEN 5 MG: 10 TABLET ORAL at 20:10

## 2024-11-19 RX ADMIN — LISINOPRIL 2.5 MG: 5 TABLET ORAL at 08:21

## 2024-11-19 RX ADMIN — AMLODIPINE BESYLATE 10 MG: 10 TABLET ORAL at 08:21

## 2024-11-19 RX ADMIN — CARBOXYMETHYLCELLULOSE SODIUM 1 DROP: 5 SOLUTION/ DROPS OPHTHALMIC at 20:12

## 2024-11-19 RX ADMIN — ERYTHROMYCIN: 5 OINTMENT OPHTHALMIC at 05:27

## 2024-11-19 RX ADMIN — ENOXAPARIN SODIUM 40 MG: 100 INJECTION SUBCUTANEOUS at 08:21

## 2024-11-19 RX ADMIN — SODIUM CHLORIDE, PRESERVATIVE FREE 10 ML: 5 INJECTION INTRAVENOUS at 20:13

## 2024-11-19 ASSESSMENT — PAIN SCALES - GENERAL
PAINLEVEL_OUTOF10: 0
PAINLEVEL_OUTOF10: 2

## 2024-11-19 ASSESSMENT — PAIN DESCRIPTION - LOCATION: LOCATION: LEG

## 2024-11-19 ASSESSMENT — PAIN DESCRIPTION - DESCRIPTORS: DESCRIPTORS: SPASM

## 2024-11-19 ASSESSMENT — PAIN DESCRIPTION - ORIENTATION: ORIENTATION: LEFT;RIGHT;LOWER

## 2024-11-19 ASSESSMENT — PAIN - FUNCTIONAL ASSESSMENT: PAIN_FUNCTIONAL_ASSESSMENT: ACTIVITIES ARE NOT PREVENTED

## 2024-11-19 NOTE — PRE-CERTIFICATION NOTE
Spoke with [employer] representative, Gabbi, to update with Dr. Melendez's contact information for peer to peer. Provided updated availability to allow for peer to peer completion prior to deadline of 11/20/2024 at 12:00 pm. Dr. Melendez updated on deadline constraints.    Vm left for CM to update at [phone].

## 2024-11-19 NOTE — PROGRESS NOTES
Today's Date: 11/16/2024  Patient Name: Nitesh Ren  Date of admission: 11/7/2024 11:46 AM  Patient's age: 69 y.o., 1955  Admission Dx: Left-sided weakness [R53.1]      Requesting Physician: David Khan MD    CHIEF COMPLAINT: Left-sided weakness.    SUBJECTIVE:  The patient was seen and examined.    NO new events  NO fever chills  NO NV    BRIEF CASE HISTORY:    The patient is a 69 y.o.  male who is admitted to the hospital for further management of left-sided weakness.  Patient presented with weakness and numbness of the left side of the body.  Symptoms were getting worse and he had left hemiplegia and patient is unable to move the left arm.  Still having slight movement of the leg.  He had facial palsy.  No dysarthria.  No headaches.  No seizure activities.  Patient was hospitalized in had neurology evaluation and follow-up.  Patient's labs showed polycythemia with hemoglobin of 19.5.  Patient is a former smoker.  He quit smoking 23 years ago.  He had 20-pack-year smoking.  Patient had persistent erythrocytosis.  He had full evaluation by [facility] which included JAK2 gene mutation and bone marrow tests.  He had further evaluation with next generation sequencing.  Although myeloproliferative disorder was not completely ruled out but at the same time it was never confirmed.    Past Medical History:   has a past medical history of Abdominal pain, Cerebrovascular accident (CVA) (HCC), Colon polyp, Pulmonary nodules, and Thyroid nodule.    Past Surgical History:   has a past surgical history that includes polypectomy (12/12/11); cyst removal; Colonoscopy (12/12/11); and Colonoscopy (10/23/07).   Family History: family history includes Cancer in his father and mother; Heart Attack in his father.  Social History:   reports that he quit smoking about 23 years ago. His smoking use included cigarettes. He started smoking about 54 
                                                    Today's Date: 11/17/2024  Patient Name: Nitesh Ren  Date of admission: 11/7/2024 11:46 AM  Patient's age: 69 y.o., 1955  Admission Dx: Left-sided weakness [R53.1]      Requesting Physician: David Khan MD    CHIEF COMPLAINT: Left-sided weakness.    SUBJECTIVE:  The patient was seen and examined.    NO new events  NO fever chills  NO NV    BRIEF CASE HISTORY:    The patient is a 69 y.o.  male who is admitted to the hospital for further management of left-sided weakness.  Patient presented with weakness and numbness of the left side of the body.  Symptoms were getting worse and he had left hemiplegia and patient is unable to move the left arm.  Still having slight movement of the leg.  He had facial palsy.  No dysarthria.  No headaches.  No seizure activities.  Patient was hospitalized in had neurology evaluation and follow-up.  Patient's labs showed polycythemia with hemoglobin of 19.5.  Patient is a former smoker.  He quit smoking 23 years ago.  He had 20-pack-year smoking.  Patient had persistent erythrocytosis.  He had full evaluation by [facility] which included JAK2 gene mutation and bone marrow tests.  He had further evaluation with next generation sequencing.  Although myeloproliferative disorder was not completely ruled out but at the same time it was never confirmed.    Past Medical History:   has a past medical history of Abdominal pain, Cerebrovascular accident (CVA) (HCC), Colon polyp, Pulmonary nodules, and Thyroid nodule.    Past Surgical History:   has a past surgical history that includes polypectomy (12/12/11); cyst removal; Colonoscopy (12/12/11); and Colonoscopy (10/23/07).   Family History: family history includes Cancer in his father and mother; Heart Attack in his father.  Social History:   reports that he quit smoking about 23 years ago. His smoking use included cigarettes. He started smoking about 54 
                                                  _                         Today's Date: 11/14/2024  Patient Name: Nitesh Ren  Date of admission: 11/7/2024 11:46 AM  Patient's age: 69 y.o., 1955  Admission Dx: Left-sided weakness [R53.1]      Requesting Physician: David Khan MD    CHIEF COMPLAINT: Left-sided weakness.    SUBJECTIVE:  .  The patient was seen and examined.  He is clinically stable.  Continues to have dense hemiplegia of the left side.  No headaches.  No seizure activities.  Patient has no active bleeding.  No fever.    BRIEF CASE HISTORY:    The patient is a 69 y.o.  male who is admitted to the hospital for further management of left-sided weakness.  Patient presented with weakness and numbness of the left side of the body.  Symptoms were getting worse and he had left hemiplegia and patient is unable to move the left arm.  Still having slight movement of the leg.  He had facial palsy.  No dysarthria.  No headaches.  No seizure activities.  Patient was hospitalized in had neurology evaluation and follow-up.  Patient's labs showed polycythemia with hemoglobin of 19.5.  Patient is a former smoker.  He quit smoking 23 years ago.  He had 20-pack-year smoking.  Patient had persistent erythrocytosis.  He had full evaluation by [facility] which included JAK2 gene mutation and bone marrow tests.  He had further evaluation with next generation sequencing.  Although myeloproliferative disorder was not completely ruled out but at the same time it was never confirmed.    Past Medical History:   has a past medical history of Abdominal pain, Cerebrovascular accident (CVA) (HCC), Colon polyp, Pulmonary nodules, and Thyroid nodule.    Past Surgical History:   has a past surgical history that includes polypectomy (12/12/11); cyst removal; Colonoscopy (12/12/11); and Colonoscopy (10/23/07).   Family History: family history includes Cancer in his father and mother; Heart Attack 
                                                  _                         Today's Date: 11/15/2024  Patient Name: Nitesh Ren  Date of admission: 11/7/2024 11:46 AM  Patient's age: 69 y.o., 1955  Admission Dx: Left-sided weakness [R53.1]      Requesting Physician: David Khan MD    CHIEF COMPLAINT: Left-sided weakness.    SUBJECTIVE:  .  The patient was seen and examined.  He is clinically stable.  Continues to have dense hemiplegia of the left side.  No headaches.  No seizure activities.  Patient has no active bleeding.  No fever.    BRIEF CASE HISTORY:    The patient is a 69 y.o.  male who is admitted to the hospital for further management of left-sided weakness.  Patient presented with weakness and numbness of the left side of the body.  Symptoms were getting worse and he had left hemiplegia and patient is unable to move the left arm.  Still having slight movement of the leg.  He had facial palsy.  No dysarthria.  No headaches.  No seizure activities.  Patient was hospitalized in had neurology evaluation and follow-up.  Patient's labs showed polycythemia with hemoglobin of 19.5.  Patient is a former smoker.  He quit smoking 23 years ago.  He had 20-pack-year smoking.  Patient had persistent erythrocytosis.  He had full evaluation by [facility] which included JAK2 gene mutation and bone marrow tests.  He had further evaluation with next generation sequencing.  Although myeloproliferative disorder was not completely ruled out but at the same time it was never confirmed.    Past Medical History:   has a past medical history of Abdominal pain, Cerebrovascular accident (CVA) (HCC), Colon polyp, Pulmonary nodules, and Thyroid nodule.    Past Surgical History:   has a past surgical history that includes polypectomy (12/12/11); cyst removal; Colonoscopy (12/12/11); and Colonoscopy (10/23/07).   Family History: family history includes Cancer in his father and mother; Heart Attack 
        [facility] Neurology   IN-PATIENT SERVICE      NEUROLOGY PROGRESS  NOTE                Interval History:     Doing well, no current complaints.  Awaiting placement.  Authorization currently pending.    History of Present Illness:     The patient is a 69 y.o. male who presents with Extremity Weakness  . The patient was seen and examined and the chart was reviewed.  Patient woke up this morning at 7 AM and noticed that the left side of his body was weaker than usual.  Noticed that he was unable to  things with his left hand.  Also was having some difficulty walking.  States he went to bed last night without any issues.  He presented to the ED later on in the morning.  He was evaluated by the stroke team and loaded on dual antiplatelets.  CT of the head without acute abnormalities.  CTA head and neck with mild atherosclerotic disease.  No LVO or significant stenosis noted.  Initial SBP in the 150s.  Highest reading has been 172/88.  Reportedly having NIHSS of 5.  Undergoing MRI of the brain confirming acute infarct in the right basal ganglia, corona radiata region.  He has history of smoking for several years although he quit back in .  Denies any cardiac history.  States he may have hypertension, hyperlipidemia although undiagnosed.  He does have history of neurofibromatosis although states he does not follow-up with anyone for this.  He has just come up to the floor after having his MRI of the brain done.    Physical Exam:   BP (!) 146/92   Pulse 90   Temp 97.2 °F (36.2 °C) (Axillary)   Resp 18   Ht 1.778 m (5' 10\")   Wt 74.4 kg (164 lb)   SpO2 94%   BMI 23.53 kg/m²   Temp (24hrs), Av.8 °F (36.6 °C), Min:97.2 °F (36.2 °C), Max:98.4 °F (36.9 °C)      Neurological examination:    Mental status    Alert and oriented x 3; following all commands; speech is mildly dysarthric.      Cranial nerves    II - visual fields intact to confrontation; pupils reactive  III, IV, VI - extraocular muscles 
     [facility] Internal Medicine  David Evans MD; Sd Loving MD; Jose Maria Mckeon MD; MD Jayashree La MD; Dee Rahman MD; Anusha Miller MD; Edgardo Gordon MD    [facility] Internal Medicine   IN-PATIENT SERVICE   [facility]    Progress note.            Date:   11/12/2024  Patient name:  Nitesh Ren  Date of admission:  11/7/2024 11:46 AM  MRN:   [id number]  Account:  [id number]  YOB: 1955  PCP:    Bhupendra Hinds MD  Room:   212/[facility]  Code Status:    Full Code    Chief Complaint:     Chief Complaint   Patient presents with    Extremity Weakness       History Obtained From:     patient, electronic medical record    History of Present Illness:     Nitesh Ren is a 69 y.o. Non- / non  male who presents with Extremity Weakness   and is admitted to the hospital for the management of Left-sided weakness.  69-year-old male with past medical history of neurofibromatosis came in on 11/7 with left extremity weakness.  Was found to have acute right basal ganglia stroke, corona radiata small vessel infarct.  Patient was started on aspirin, Plavix and Lipitor.  A1c within normal limits.  When he came in it was left upper extremity weakness, but symptoms worsened on Sunday night.  Repeat CT head was done on 11/11 which showed slight enlargement of acute infarct in the right frontal corona radiata.   Was asked today to cover for Dr. Khan, who had been taking care of the patient before this.   Past Medical History:     Past Medical History:   Diagnosis Date    Abdominal pain     Cerebrovascular accident (CVA) (HCC) 11/8/2024    Colon polyp     Pulmonary nodules 9/17/2013    Thyroid nodule 9/17/2013        Past Surgical History:     Past Surgical History:   Procedure Laterality Date    COLONOSCOPY  12/12/11    POLYPECTOMY    COLONOSCOPY  10/23/07    POLYPECTOMY    CYST REMOVAL      \"back side\"   and face and 
   11/11/24 1555   Encounter Summary   Encounter Overview/Reason  Encounter   Last Encounter  11/11/24   Rituals, Rites and Sacraments   Type Sacrament of Sick;Anointing       
   11/12/24 1323   Encounter Summary   Encounter Overview/Reason Volunteer Encounter   Last Encounter  11/12/24   Assessment/Intervention/Outcome   Intervention Sustaining Presence/Ministry of presence       
  [facility] Neurology   IN-PATIENT SERVICE      NEUROLOGY PROGRESS  NOTE            Date:   11/12/2024  Patient name:  Nitesh Ren  Date of admission:  11/7/2024  YOB: 1955      Interval History:     Neurology reconsulted as nursing staff noted patient's weakness was worsened last night.  When asking the patient he states around Sunday morning he felt as though his left arm became more plegic and was totally unable to move it.  He states he reported this to staff although no obvious documentation regarding this.  Repeat CT head done last night showing evolving infarct in the right corona radiata without any hemorrhage.  Upon review of blood pressures they have been stable throughout his hospitalization around 140-160s.    History of Present Illness:     The patient is a 69 y.o. male who presents with Extremity Weakness  . The patient was seen and examined and the chart was reviewed.  Patient woke up this morning at 7 AM and noticed that the left side of his body was weaker than usual.  Noticed that he was unable to  things with his left hand.  Also was having some difficulty walking.  States he went to bed last night without any issues.  He presented to the ED later on in the morning.  He was evaluated by the stroke team and loaded on dual antiplatelets.  CT of the head without acute abnormalities.  CTA head and neck with mild atherosclerotic disease.  No LVO or significant stenosis noted.  Initial SBP in the 150s.  Highest reading has been 172/88.  Reportedly having NIHSS of 5.  Undergoing MRI of the brain confirming acute infarct in the right basal ganglia, corona radiata region.  He has history of smoking for several years although he quit back in 2001.  Denies any cardiac history.  States he may have hypertension, hyperlipidemia although undiagnosed.  He does have history of neurofibromatosis although states he does not follow-up with anyone for this.  He has just come up to the floor 
Advance Care Planning     Advance Care Planning Inpatient Note  Spiritual Care Department    Today's Date: 11/7/2024  Unit: [facility]    Received request from patient.  Upon review of chart and communication with care team, patient's decision making abilities are not in question.. Patient and nephew  was/were present in the room during visit.    Goals of ACP Conversation:  Discuss advance care planning documents    Health Care Decision Makers:       Summary:  No Decision Maker named by patient at this time  PT is  and has no children. He has a brother and a sister. Her sister, Greer, is listed as a primary decision maker, but it might be changed after he talk to his brother whose wife is a retired nurse. Writer encouraged PT to talk to his brother about his wishes and asked him to get a permission to be his POA.     Advance Care Planning Documents (Patient Wishes):  None     Assessment:  Writer educated PT with the document and gave it to him to look over more. Writer asked him to call a  to complete the document when he is ready.   Interventions:  Provided education on documents for clarity and greater understanding  Discussed and provided education on state decision maker hierarchy    Care Preferences Communicated:   No    Outcomes/Plan:  ACP Discussion: Completed    Electronically signed by Chaplain Phuong on 11/7/2024 at 5:15 PM            
Advance Care Planning     Advance Care Planning Inpatient Note  [facility] Department    Today's Date: 11/18/2024  Unit: [facility]    Received request from family.  Upon review of chart and communication with care team, patient's decision making abilities are not in question.. Patient and Healthcare Decision Maker was/were present in the room during visit.    Goals of ACP Conversation:  Discuss advance care planning documents  Facilitate a discussion related to patient's goals of care as they align with the patient's values and beliefs.    Health Care Decision Makers:       Primary Decision Maker: Everardo Montes - Brother/Sister - [phone]    Secondary Decision Maker: Greer Schaeffer - Brother/Sister - [phone]  Summary:  Completed New Documents    Advance Care Planning Documents (Patient Wishes):  Healthcare Power of /Advance Directive Appointment of Health Care Agent  Living Will/Advance Directive     Assessment:  Pt had given his choices for HPOA and wishes for care preferences much thought over the past few days. He had also discussed these issues with his HPOA agents.  We had a discussion about care preferences after pt made the statement he would not want to be kept alive on machines and his family knew that. After explaining code statuses, pt elected a DNR-CCA status. Writer relayed this information to LAUREN Jj, so physician could be notified to verify with pt and order code status change.     Interventions:  Provided education on documents for clarity and greater understanding  Discussed and provided education on state decision maker hierarchy  Assisted in the completion of documents according to patient's wishes at this time  Encouraged ongoing ACP conversation with future decision makers and loved ones    Care Preferences Communicated:     Hospitalization:  If the patient's health worsens and it becomes clear that the chance of recovery is unlikely,     the patient wants 
Comprehensive Nutrition Assessment    Type and Reason for Visit:  Initial, LOS    Nutrition Recommendations/Plan:   Continue current diet.  Start Ensure Plus high protein 2x daily.      Malnutrition Assessment:  Malnutrition Status:  At risk for malnutrition (11/14/24 1449)    Context:  Acute Illness     Findings of the 6 clinical characteristics of malnutrition:  Energy Intake:  No decrease in energy intake  Weight Loss:  No weight loss     Body Fat Loss:  No body fat loss     Muscle Mass Loss:  No muscle mass loss    Fluid Accumulation:  No fluid accumulation     Strength:  Not Performed    Nutrition Assessment:    Patient seen for length of stay. Current admission is related to left-sides weakness with CVA. Patient denies any chewing or swallowing problem, able to feed self. Patient said sometimes he bites on his lip but not very often, getting speech therapy. No recent weight loss. Appetite good. Patient said sometimes don't have much appetite. Will start Ensure 2x daily for optimum nutrition intake.    Nutrition Related Findings:    No edema. Bowel sounds active. Labs and meds reviewed. Wound Type: None       Current Nutrition Intake & Therapies:    Average Meal Intake: 51-75%  Average Supplements Intake: None Ordered  ADULT DIET; Regular    Anthropometric Measures:  Height: 177.8 cm (5' 10\")  Ideal Body Weight (IBW): 166 lbs (75 kg)    Admission Body Weight: 72.6 kg (160 lb 0.9 oz)  Current Body Weight: 74.9 kg (165 lb 2 oz), 99.5 % IBW. Weight Source: Bed scale  Current BMI (kg/m2): 23.7  Usual Body Weight: 74.8 kg (165 lb)     % Weight Change (Calculated): 0.1  Weight Adjustment For: No Adjustment                 BMI Categories: Normal Weight (BMI 18.5-24.9)    Estimated Daily Nutrient Needs:  Energy Requirements Based On: Formula  Weight Used for Energy Requirements: Current  Energy (kcal/day): 1984 kcal/day based on MSJ and 1.3 activity factor  Weight Used for Protein Requirements: Current  Protein 
Dr. Celeste (hem/onc) notified of new consult via [facility].   
Dr. Melendez (PM&R) notified of consult.  
Dr. Thompson notified of CT brain results.   
Dr. Thompson notified of reconsult due to left arm being flaccid. Pt states this happened yesterday morning. NIHSS scale done at shift change and offgoing nurse states L arm was flaccid this AM when scale was done. Also notified of current blood pressures.   
EYAD Hitchcock notified of most recent NIHS scale.   
[facility]   INPATIENT OCCUPATIONAL THERAPY  PROGRESS NOTE  Date: 2024  Patient Name: Nitseh Ren       Room: -  MRN: [id number]    : 1955  (69 y.o.)  Gender: male      Diagnosis: Left-sided weaknes      Discharge Recommendations:  Further Occupational Therapy is recommended upon facility discharge.    OT Equipment Recommendations  Other: TBD    Restrictions/Precautions  Restrictions/Precautions  Restrictions/Precautions: Up as Tolerated;General Precautions  Required Braces or Orthoses?: No  Implants present? :  (pt denies)  Position Activity Restriction  Other position/activity restrictions: MRI : 2 small areas of restricted diffusion in the right basal ganglia consistent  with acute areas of infarct.  No other areas restricted diffusion are  identified. ; CT head : 19 mm acute infarct in the right frontal corona radiata is  redemonstrated involving a larger area than previously.    O2 Device: None (Room air)    Subjective  Subjective  Subjective: patient pleasant and agreeable to OT/PT session  Subjective  Pain: patient reporting L foot/ankle pain when attempt to weightbear, did not provide pain number, but reporting decreased pain after stabilization provided and decreasing as session progressed  Comments: Ok per RN for co-tx with PTA Lakeisha    Objective  Orientation  Overall Orientation Status: Within Functional Limits  Orientation Level: Oriented X4  Cognition  Overall Cognitive Status: Exceptions  Arousal/Alertness: Appears intact  Following Commands: Follows multistep commands with increased time;Follows multistep commands with repitition  Attention Span: Appears intact  Memory: Appears intact  Safety Judgement: Decreased awareness of need for assistance;Decreased awareness of need for safety  Problem Solving: Assistance required to implement solutions;Assistance required to correct errors made;Assistance required to identify errors made  Insights: 
Facility/Department: [facility]  Initial Speech/Language/Cognitive Assessment    NAME: Nitesh Ren  : 1955   MRN: [id number]  ADMISSION DATE: 2024  ADMITTING DIAGNOSIS: has Abdominal pain; Colon polyp; Neurofibromatosis (HCC); Thyroid nodule; Pulmonary nodules; Lymphangitis; Cellulitis of left lower leg; Ex-cigarette smoker; Polycythemia; Pneumococcal vaccination declined; Colonoscopy refused; Polycythemia vera (HCC); and Left-sided weakness on their problem list.      Date of Eval: 2024   Evaluating Therapist: ANJEL Ontiveros    RECENT RESULTS  CT OF HEAD/MRI: 24 MRI BRAIN WO CONTRAST    IMPRESSION:  -2 small areas of restricted diffusion in the right basal ganglia consistent  with acute areas of infarct.  No other areas restricted diffusion are  identified.   -Cerebral atrophy.  Mild chronic small vessel ischemic disease.   -Multiple subcutaneous nodules in the soft tissues of the head and upper neck.       Primary Complaint- H&P not yet in medical chart hx pulled from ED Provider Notes:    This is a 69-year-old male with past medical history of neurofibromatosis who is presenting for strokelike symptoms.  His last known normal was 4:30 AM.  He woke up to go get something and then went back to sleep for an hour.  He woke up at 6 AM feeling pain in his left arm.  He said he was trying to walk to the kitchen where he felt off balance.  He was having difficulty making his breakfast.  His symptoms continued to progress throughout the day.  Currently he feels off balance anytime he tries to stand up.  He feels some weakness in his left leg and his left arm.  Patient is not on a blood thinner.    Per Neurology note:   Nitesh Ren is a 69 y.o. male with past medical history of NF1 presents for acute onset left arm dysmetria, numbness, and imbalance. Patient woke up at 4 AM on 24 with no symptoms, and went to get the newspaper. He went back to sleep, and woke up around 7 AM 
Hospitalist Progress Note  11/8/2024 4:51 PM  Subjective:   Admit Date: 11/7/2024  PCP: Bhupendra Hinds MD     Full Code      C/c:  Chief Complaint   Patient presents with    Extremity Weakness         Interval History: pt doing better, on dual therapy, able to ambulate with help o therapy, pt on lovenox    Diet: ADULT DIET; Regular                                ip days:1  Medications:   Scheduled Meds:   enoxaparin  40 mg SubCUTAneous Daily    sodium chloride flush  5-40 mL IntraVENous 2 times per day    clopidogrel  75 mg Oral Daily    rosuvastatin  40 mg Oral Nightly    aspirin  81 mg Oral Daily     Continuous Infusions:   sodium chloride       PRN Meds:.sodium chloride flush, sodium chloride flush, sodium chloride, ondansetron **OR** ondansetron, polyethylene glycol, labetalol     CBC:   Recent Labs     11/07/24  1212 11/08/24  0453   WBC 5.4 7.2   HGB 17.5 18.3*    175     BMP:    Recent Labs     11/07/24  1212 11/08/24  0453   * 140   K 4.0 4.4    105   CO2 22 27   BUN 17 12   CREATININE 0.9 1.0   GLUCOSE 106* 91     Hepatic:   Recent Labs     11/07/24  1212   AST 20   ALT 14   BILITOT 1.0   ALKPHOS 88     Troponin: No results for input(s): \"TROPONINI\" in the last 72 hours.  BNP: No results for input(s): \"BNP\" in the last 72 hours.  Lipids:   Recent Labs     11/08/24  0453   CHOL 134   HDL 52     INR:   Recent Labs     11/07/24  1212   INR 1.1       Objective:   Vitals: BP (!) 161/87   Pulse 90   Temp 98 °F (36.7 °C) (Oral)   Resp 18   Ht 1.778 m (5' 10\")   Wt 74.4 kg (164 lb)   SpO2 94%   BMI 23.53 kg/m²   General appearance: alert, appears stated age and cooperative  Skin: Skin color, texture, turgor normal. No rashes or lesions  Lungs: clear to auscultation bilaterally  Heart: regular rate and rhythm, S1, S2 normal, no murmur, click, rub or gallop  Abdomen: soft, non-tender; bowel sounds normal; no masses,  no organomegaly  Extremities: extremities normal, atraumatic, no 
IN-PATIENT SERVICE   [facility] Internal Medicine    Progress Note     11/17/2024    8:16 AM    Name:   Nitesh Ren  MRN:     [id number]     Acct:      [id number]   Room:   2125/2125-01   Day:  10  Admit Date:  11/7/2024 11:46 AM    PCP:   Bhupendra Hinds MD  Code Status:  Full Code    Subjective:     C/C:   Chief Complaint   Patient presents with    Extremity Weakness     Principal Problem:    Left-sided weakness  Active Problems:    Cerebrovascular accident (CVA) (HCC)    Right-sided lacunar stroke (HCC)  Resolved Problems:    * No resolved hospital problems. *    11/16/2024  Patient seen and examined bedside  Afebrile, hemodynamically stable blood pressure better controlled today 139/94, on room admitting saturation 96%  No acute complaints otherwise, working with PT/OT  Neurology has signed off, stable for discharge awaiting insurance authorizations for rehab      No results found for this or any previous visit (from the past 24 hour(s)).  No results for input(s): \"POCGLU\" in the last 72 hours.     No results found.          On admission     69 y.o. Non- / non  male who presents with Extremity Weakness   and is admitted to the hospital for the management of Left-sided weakness.  69-year-old male with past medical history of neurofibromatosis came in on 11/7 with left extremity weakness.  Was found to have acute right basal ganglia stroke, corona radiata small vessel infarct.  Patient was started on aspirin, Plavix and Lipitor.  A1c within normal limits.  When he came in it was left upper extremity weakness, but symptoms worsened on Sunday night.  Repeat CT head was done on 11/11 which showed slight enlargement of acute infarct in the right frontal corona radiata.     Review of Systems:     As recorded in HPI          Physical Examination:        Vitals:    11/16/24 1604 11/16/24 1945 11/16/24 2315 11/17/24 0345   BP: 104/67 134/75 127/75 113/67   Pulse: 80 76 65 52 
IN-PATIENT SERVICE   [facility] Internal Medicine    Progress Note     11/19/2024    11:29 AM    Name:   Nitesh Ren  MRN:     [id number]     Acct:      [id number]   Room:   2125/2125-01   Day:  12  Admit Date:  11/7/2024 11:46 AM    PCP:   Bhupendra Hinds MD  Code Status:  DNR-CCA    Subjective:     C/C:   Chief Complaint   Patient presents with    Extremity Weakness     Principal Problem:    Left-sided weakness  Active Problems:    Cerebrovascular accident (CVA) (HCC)    Right-sided lacunar stroke (HCC)  Resolved Problems:    * No resolved hospital problems. *    11/16/2024  Patient seen and examined bedside  Afebrile, hemodynamically stable blood pressure better controlled today 139/94, on room admitting saturation 96%  No acute complaints otherwise, working with PT/OT  Neurology has signed off, stable for discharge awaiting insurance authorizations for rehab      Recent Results (from the past 24 hour(s))   CBC    Collection Time: 11/19/24  4:51 AM   Result Value Ref Range    WBC 9.6 3.5 - 11.0 k/uL    RBC 6.45 (H) 4.5 - 5.9 m/uL    Hemoglobin 17.7 (H) 13.5 - 17.5 g/dL    Hematocrit 52.5 41 - 53 %    MCV 81.3 80 - 100 fL    MCH 27.5 26 - 34 pg    MCHC 33.8 31 - 37 g/dL    RDW 15.6 (H) 11.5 - 14.9 %    Platelets 174 150 - 450 k/uL    MPV 8.1 6.0 - 12.0 fL   Basic Metabolic Panel w/ Reflex to MG    Collection Time: 11/19/24  4:51 AM   Result Value Ref Range    Sodium 135 (L) 136 - 145 mmol/L    Potassium 4.4 3.7 - 5.3 mmol/L    Chloride 102 98 - 107 mmol/L    CO2 23 20 - 31 mmol/L    Anion Gap 10 9 - 16 mmol/L    Glucose 93 74 - 99 mg/dL    BUN 26 (H) 8 - 23 mg/dL    Creatinine 1.0 0.7 - 1.2 mg/dL    Est, Glom Filt Rate 82 >60 mL/min/1.73m2    Calcium 9.2 8.6 - 10.4 mg/dL     No results for input(s): \"POCGLU\" in the last 72 hours.     No results found.          On admission     69 y.o. Non- / non  male who presents with Extremity Weakness   and is admitted to the hospital 
Notified Dr Gordon of /105  
Nurse updated Dr. Khan of new admission, nurse reviewed admission orders and asked if physician wants chem dvt prophylaxis. Dr. Khan will review chart and place orders.   
Occupational Therapy Initial Evaluation  Facility/Department: [facility] PROGRESSIVE CARE     Patient Name: Nitesh Ren        MRN: [id number]    : 1955    Date of Service: 2024    Discharge Recommendations  Discharge Recommendations: Patient would benefit from continued therapy after discharge  OT Equipment Recommendations  Other: TBD    Further therapy recommended at discharge.The patient should be able to tolerate at least 3 hours of therapy per day over 5 days or 15 hours over 7 days. This patient may benefit from a Physical Medicine and Rehab consult.       Chief Complaint   Patient presents with    Extremity Weakness     Obtained from medical chart:  Per neuro consult  \" The patient is a 69 y.o. male who presents with Extremity Weakness  . The patient was seen and examined and the chart was reviewed.  Patient woke up this morning at 7 AM and noticed that the left side of his body was weaker than usual.  Noticed that he was unable to  things with his left hand.  Also was having some difficulty walking.  States he went to bed last night without any issues.  He presented to the ED later on in the morning.  He was evaluated by the stroke team and loaded on dual antiplatelets.  CT of the head without acute abnormalities.  CTA head and neck with mild atherosclerotic disease.  No LVO or significant stenosis noted.  Initial SBP in the 150s.  Highest reading has been 172/88.  Reportedly having NIHSS of 5.  Undergoing MRI of the brain confirming acute infarct in the right basal ganglia, corona radiata region.  He has history of smoking for several years although he quit back in .  Denies any cardiac history.  States he may have hypertension, hyperlipidemia although undiagnosed.  He does have history of neurofibromatosis although states he does not follow-up with anyone for this.  He has just come up to the floor after having his MRI of the brain done. \"    Past Medical History:  has a past medical 
Occupational Therapy Re-Assessment  Facility/Department: [facility] PROGRESSIVE CARE     Patient Name: Nitesh Ren        MRN: [id number]    : 1955    Date of Service: 2024    Discharge Recommendations  Discharge Recommendations: Patient would benefit from continued therapy after discharge  OT Equipment Recommendations  Other: TBD    Chief Complaint   Patient presents with    Extremity Weakness     Past Medical History:  has a past medical history of Abdominal pain, Cerebrovascular accident (CVA) (HCC), Colon polyp, Pulmonary nodules, and Thyroid nodule.  Past Surgical History:  has a past surgical history that includes polypectomy (11); cyst removal; Colonoscopy (11); and Colonoscopy (10/23/07).    Assessment  Performance deficits / Impairments: Decreased functional mobility ;Decreased ADL status;Decreased strength;Decreased safe awareness;Decreased endurance;Decreased sensation;Decreased balance;Decreased high-level IADLs;Decreased coordination;Decreased ROM;Decreased fine motor control;Decreased posture  Assessment: OT re-assessment completed this date due to change in function from initial evaluation. Pt presents with flaccid L UE and decreased sitting/standing balance, and decreased ADL IND from initial evaluation. At baseline, pt is IND with ADLs, IADLs, and functional mobility. Currently, pt is requiring MIN A for bed mobility, MOD A for transfers and side steps along EOB, MOD A for dressing, toileting tasks. Pt does not currently display the safe functional abilities to return to prior living arrangements with continued OT services recommended while hospitalized and upon d/c to maximize IND and safety.  Prognosis: Good  Decision Making: Medium Complexity  REQUIRES OT FOLLOW-UP: Yes  Activity Tolerance  Activity Tolerance: Patient Tolerated treatment well;Patient limited by fatigue  Safety Devices  Type of Devices: All fall risk precautions in place;Call light within reach;Gait 
[facility]   INPATIENT OCCUPATIONAL THERAPY  PROGRESS NOTE  Date: 11/10/2024  Patient Name: Nitesh Ren       Room: -  MRN: [id number]    : 1955  (69 y.o.)  Gender: male             Discharge Recommendations:  Further Occupational Therapy is recommended upon facility discharge.    OT Equipment Recommendations  Other: TBD    Restrictions/Precautions  Restrictions/Precautions  Restrictions/Precautions: Up as Tolerated;General Precautions  Required Braces or Orthoses?: No  Implants present? :  (pt denies)  Position Activity Restriction  Other position/activity restrictions: MRI brain- IMPRESSION:  2 small areas of restricted diffusion in the right basal ganglia consistent  with acute areas of infarct.    O2 Device: None (Room air)    Subjective  Subjective  Subjective: patient pleasant and agreeabele for OT session  Subjective  Pain: declines pain this date  Comments: Ok per RN for OT session Ensured optimal positioning and comfort in chair with pillow behind neck at session end.    Objective  Orientation  Overall Orientation Status: Within Functional Limits  Orientation Level: Oriented X4;Oriented to place;Oriented to time;Oriented to situation;Oriented to person  Cognition  Arousal/Alertness: Appears intact  Following Commands: Follows multistep commands with increased time;Follows multistep commands with repitition  Attention Span: Appears intact  Memory: Appears intact  Safety Judgement: Decreased awareness of need for assistance;Decreased awareness of need for safety  Problem Solving: Assistance required to implement solutions;Assistance required to correct errors made;Assistance required to identify errors made  Insights: Decreased awareness of deficits  Initiation: Requires cues for some  Sequencing: Requires cues for some    Activities of Daily Living  Putting On/Taking Off Footwear: Minimal assistance  Putting On/Taking Off Footwear Skilled Clinical Factors: 
[facility]   INPATIENT OCCUPATIONAL THERAPY  PROGRESS NOTE  Date: 2024  Patient Name: Nitesh Ren       Room: -  MRN: [id number]    : 1955  (69 y.o.)  Gender: male             Discharge Recommendations:  Further Occupational Therapy is recommended upon facility discharge.    OT Equipment Recommendations  Other: TBD    Restrictions/Precautions  Restrictions/Precautions  Restrictions/Precautions: Up as Tolerated;General Precautions  Required Braces or Orthoses?: No  Implants present? :  (pt denies)  Position Activity Restriction  Other position/activity restrictions: MRI : 2 small areas of restricted diffusion in the right basal ganglia consistent  with acute areas of infarct.  No other areas restricted diffusion are  identified. ; CT head : 19 mm acute infarct in the right frontal corona radiata is  redemonstrated involving a larger area than previously.    O2 Device: None (Room air)    Subjective  Subjective  Subjective: Patient pleasant and agreeable to OT/PT session  Subjective  Pain: patient denies pain  Comments: Ok per RN for OT/PT session-OT reassesment completed on  due to decreased movement in L UE- now flaccid.    Objective  Orientation  Overall Orientation Status: Within Functional Limits  Orientation Level: Oriented X4;Oriented to place;Oriented to time;Oriented to situation;Oriented to person  Cognition  Overall Cognitive Status: Exceptions  Arousal/Alertness: Appears intact  Following Commands: Follows multistep commands with increased time;Follows multistep commands with repitition  Attention Span: Appears intact  Memory: Appears intact  Safety Judgement: Decreased awareness of need for assistance;Decreased awareness of need for safety  Problem Solving: Assistance required to implement solutions;Assistance required to correct errors made;Assistance required to identify errors made  Insights: Decreased awareness of deficits  Initiation: 
Patient to CT per bed with RN and PCT.    
Peer to peer review with Dr. Conley from [facility]. Their last therapy notes from 11/9/24 had patient at higher functional level. Reviewed current functional status and he indicated that he will approve acute rehab admission. Admissions coordinator updated.   
Pharmacy Medication History Note      List of current medications patient is taking is complete.    Source of information: patient, sure script, care everywhere, OARRS    Changes made to medication list:  Medications removed (include reason, ex. therapy complete or physician discontinued, noncompliance):  Mucinex DM- patient completed therapy    Medications flagged for provider review:  Albuterol Sulfate- patient reports not taking    Medications added/doses adjusted:  Fish oil 1000mg daily  Vitamin D 25mcg daily  Vitamin E 1000U daily      Other notes (ex. Recent course of antibiotics, Coumadin dosing):  OARRS Negative      Current Home Medication List at Time of Admission:  Prior to Admission medications    Medication Sig   vitamin E 1000 units capsule Take 1 capsule by mouth daily   Vitamin D (CHOLECALCIFEROL) 25 MCG (1000 UT) TABS tablet Take 1 tablet by mouth daily   Omega-3 Fatty Acids (FISH OIL) 1000 MG capsule Take by mouth daily   albuterol sulfate HFA (VENTOLIN HFA) 108 (90 Base) MCG/ACT inhaler Inhale 2 puffs into the lungs 4 times daily as needed for Wheezing  Patient not taking: Reported on 11/7/2024         Please let me know if you have any questions about this encounter. Thank you!    Electronically signed by Hugh Snowden on 11/7/2024 at 2:28 PM    
Physical Medicine & Rehabilitation  Progress Note        Admitting Physician:  Edgardo Gordon MD    Primary Care Provider:  Bhupendra Hinds MD     Chief Complaint:  Left-sided weakness    Brief History:    This is a follow up to the initial consult on Mr. Nitseh Ren who is a 69 y.o. left handed male admitted to [facility] on 11/7/2024 with Extremity Weakness    He initially presented with left-sided weakness.  NIHSS 5.  He was loaded with dual antiplatelets.  MRI brain showed 2 small areas of restricted diffusion in the right basal ganglia consistent with acute areas of infarct.  He had worsening left-sided weakness a few days later, and CT head showed slight enlargement of acute infarct in the right frontal corona radiata.  Neurology recommended dual antiplatelet therapy for 21 days followed by aspirin monotherapy.  Hem/Onc was consulted due to polycythemia and recommended outpatient therapeutic phlebotomy.    Subjective:  He reports ongoing left-sided weakness, worse in the upper limb than the lower limb.  He denies any numbness/tingling and headache.  He notes mild dizziness while working with therapists, which only lasted a brief period of time.    ROS:  Review of Systems   Constitutional:  Negative for fever.   Respiratory:  Negative for shortness of breath.    Neurological:  Positive for dizziness and weakness. Negative for sensory change and headaches.     Rehabilitation:    Physical Therapy    Restrictions/Precautions: Up as Tolerated, General Precautions  Implants present? :  (pt denies)  Other position/activity restrictions: MRI 11/7: 2 small areas of restricted diffusion in the right basal ganglia consistent  with acute areas of infarct.  No other areas restricted diffusion are  identified. ; CT head 11/11: 19 mm acute infarct in the right frontal corona radiata is  redemonstrated involving a larger area than previously.    Bed mobility  Supine to Sit: Minimal assistance  Sit to Supine: 
Physical Therapy  Facility/Department: [facility] PROGRESSIVE CARE  Physical Therapy Initial Assessment    Name: Nitesh Ren  : 1955  MRN: [id number]  Date of Service: 2024    Discharge Recommendations:  Patient able to tolerate 3 hours of therapy per day          Patient Diagnosis(es): The encounter diagnosis was Cerebrovascular accident (CVA), unspecified mechanism (HCC).  Past Medical History:  has a past medical history of Abdominal pain, Colon polyp, Pulmonary nodules, and Thyroid nodule.  Past Surgical History:  has a past surgical history that includes polypectomy (11); cyst removal; Colonoscopy (11); and Colonoscopy (10/23/07).    Assessment  Assessment: Presents with left side weakness from CVA. Pt with decreased safety awareness, slight impulsive. Will tolerate 3 hr of therapies at IRF. Pt motivated.  Treatment Diagnosis: Impaired function  Specific Instructions for Next Treatment: Transfer trining, safety, ambulation with RW  Therapy Prognosis: Good  Decision Making: Medium Complexity  Requires PT Follow-Up: Yes  Activity Tolerance  Activity Tolerance: Patient tolerated treatment well    Plan  Physical Therapy Plan  General Plan: 6-7 times per week  Specific Instructions for Next Treatment: Transfer trining, safety, ambulation with RW  Current Treatment Recommendations: Strengthening, Balance training, Functional mobility training, Transfer training, Cognitive/Perceptual training, Endurance training, Gait training, Neuromuscular re-education, Safety education & training, Patient/Caregiver education & training, Equipment evaluation, education, & procurement, Positioning, Therapeutic activities  Safety Devices  Type of Devices: All fall risk precautions in place, Call light within reach, Chair alarm in place, Gait belt, Left in chair, Patient at risk for falls  Restraints  Restraints Initially in Place: No    Restrictions  Restrictions/Precautions  Restrictions/Precautions: Up as 
Physical Therapy  [facility]   Physical Therapy Treatment  Date: 24  Patient Name: Nitesh Ren       Room: 2125/2125-01  MRN: [id number]  Account: [id number]   : 1955  (69 y.o.) Gender: male     Discharge Recommendations:  Further Physical Therapy is recommended upon facility discharge    PT D/C Equipment  Other: TBD  PT Equipment Recommendations  Other: TBD    General  Chart Reviewed: Yes  Patient assessed for rehabilitation services?: Yes  Additional Pertinent Hx: Per PM&R consult:: Nitesh Ren is a 69 y.o. LHD male admitted to [facility] on 2024.       69 year old male with a history of hypertension, hyperlipidemia, tobacco use, polycythemia, and neurofibromatosis who presented to the emergency department on 2024 with complaints of left sided weakness upon waking. He reports he was unable to pick things up with his left hand and was having difficulty with ambulation. Stroke alert was called in the ED and patient was loaded on dual antiplatelet. Initial CT head showed no acute abnormalities and CTA head/neck showed mild atherosclerotic disease. Subsequent brain MRI identified acute infarction in right basal ganglia. Patient evaluated by Neurology who ordered dual antiplatelet regimen x21 days followed by monotherapy.  Repeat CT head 24 showing extension of infarct as below.  Response To Previous Treatment: Patient with no complaints from previous session.  Family / Caregiver Present: No  Referral Date : 24  Diagnosis: Left sided weakness-CVA  Follows Commands: Within Functional Limits      Restrictions  Restrictions/Precautions  Restrictions/Precautions: Up as Tolerated, General Precautions  Required Braces or Orthoses?: No  Implants present? :  (pt denies)  Position Activity Restriction  Other position/activity restrictions: MRI : 2 small areas of restricted diffusion in the right basal ganglia consistent  with acute areas of infarct.  No 
Pt requesting to complete paperwork for POA with his family at bedside, Chaplain Velázquez notified. Melania came to bedside and paperwork was completed. Pt is requesting to change his code status from full code to DNR-CCA no Intubation. Анна CASTANO notified of this.   
RN messaged Dr. Christopher regarding  telemetry order. He stated that he was on vacation and [facility] was covering his patients. RN informed him that he was still listed as the attending. He requested that RN discuss with Dr. Gordon and change him to attending. Dr. Gordon agreed and was added as attending.   
Rehabilitation Physical Therapy    Date: 11/15/2024  Patient Name: Nitesh Ren      Room: 5/2125-01  MRN: [id number]    : 1955  (69 y.o.)  Gender: male      Diagnosis: Left sided weakness-CVA  Additional Pertinent Hx: Per PM&R consult:: Nitesh Ren is a 69 y.o. LHD male admitted to [facility] on 2024.       69 year old male with a history of hypertension, hyperlipidemia, tobacco use, polycythemia, and neurofibromatosis who presented to the emergency department on 2024 with complaints of left sided weakness upon waking. He reports he was unable to pick things up with his left hand and was having difficulty with ambulation. Stroke alert was called in the ED and patient was loaded on dual antiplatelet. Initial CT head showed no acute abnormalities and CTA head/neck showed mild atherosclerotic disease. Subsequent brain MRI identified acute infarction in right basal ganglia. Patient evaluated by Neurology who ordered dual antiplatelet regimen x21 days followed by monotherapy.  Repeat CT head 24 showing extension of infarct as below.    Discharge Recommendations:  Patient able to tolerate 3 hours of therapy per day, Patient would benefit from continued therapy after discharge, Therapy recommended at discharge  Other: TBD    Assessment  Assessment  Activity Tolerance: Patient tolerated treatment well;Patient limited by fatigue;Patient limited by endurance;Treatment limited secondary to decreased cognition  Discharge Recommendations: Patient able to tolerate 3 hours of therapy per day;Patient would benefit from continued therapy after discharge;Therapy recommended at discharge    Plan  Physical Therapy Plan  General Plan: 6-7 times per week  Specific Instructions for Next Treatment: co-tx with OTR/SMITH, Transfer trianing, safety, ambulation with hemiwalker or left Platform rolling walker  Current Treatment Recommendations: Strengthening, Balance training, Functional mobility training, 
Rehabilitation Physical Therapy    Date: 2024  Patient Name: Nitesh Ren      Room: 5/2125-01  MRN: [id number]    : 1955  (69 y.o.)  Gender: male      Diagnosis: Left sided weakness-CVA  Additional Pertinent Hx: Per PM&R consult:: Nitesh Ren is a 69 y.o. LHD male admitted to [facility] on 2024.       69 year old male with a history of hypertension, hyperlipidemia, tobacco use, polycythemia, and neurofibromatosis who presented to the emergency department on 2024 with complaints of left sided weakness upon waking. He reports he was unable to pick things up with his left hand and was having difficulty with ambulation. Stroke alert was called in the ED and patient was loaded on dual antiplatelet. Initial CT head showed no acute abnormalities and CTA head/neck showed mild atherosclerotic disease. Subsequent brain MRI identified acute infarction in right basal ganglia. Patient evaluated by Neurology who ordered dual antiplatelet regimen x21 days followed by monotherapy.  Repeat CT head 24 showing extension of infarct as below.    Discharge Recommendations:  Patient able to tolerate 3 hours of therapy per day, Patient would benefit from continued therapy after discharge, Therapy recommended at discharge  Other: TBD    Assessment  Assessment  Activity Tolerance: Patient tolerated treatment well  Discharge Recommendations: Patient able to tolerate 3 hours of therapy per day;Patient would benefit from continued therapy after discharge;Therapy recommended at discharge    Plan  Physical Therapy Plan  General Plan: 6-7 times per week  Specific Instructions for Next Treatment: co-tx with OTR/SMITH, Transfer trianing, safety, ambulation with hemiwalker or left Platform rolling walker  Current Treatment Recommendations: Strengthening, Balance training, Functional mobility training, Transfer training, Cognitive/Perceptual training, Endurance training, Gait training, Neuromuscular 
Rehabilitation Physical Therapy    Date: 2024  Patient Name: Nitesh Ren      Room: 5/2125-01  MRN: [id number]    : 1955  (69 y.o.)  Gender: male      Diagnosis: Left sided weakness-CVA  Additional Pertinent Hx: Per PM&R consult:: Nitesh Ren is a 69 y.o. LHD male admitted to [facility] on 2024.       69 year old male with a history of hypertension, hyperlipidemia, tobacco use, polycythemia, and neurofibromatosis who presented to the emergency department on 2024 with complaints of left sided weakness upon waking. He reports he was unable to pick things up with his left hand and was having difficulty with ambulation. Stroke alert was called in the ED and patient was loaded on dual antiplatelet. Initial CT head showed no acute abnormalities and CTA head/neck showed mild atherosclerotic disease. Subsequent brain MRI identified acute infarction in right basal ganglia. Patient evaluated by Neurology who ordered dual antiplatelet regimen x21 days followed by monotherapy.    Discharge Recommendations:  Patient able to tolerate 3 hours of therapy per day       Assessment  Assessment  Activity Tolerance: Patient tolerated treatment well  Discharge Recommendations: Patient able to tolerate 3 hours of therapy per day    Plan  Physical Therapy Plan  General Plan: 6-7 times per week  Specific Instructions for Next Treatment: Transfer trining, safety, ambulation with RW  Current Treatment Recommendations: Strengthening, Balance training, Functional mobility training, Transfer training, Cognitive/Perceptual training, Endurance training, Gait training, Neuromuscular re-education, Safety education & training, Patient/Caregiver education & training, Equipment evaluation, education, & procurement, Positioning, Therapeutic activities     Restrictions  Restrictions/Precautions: Up as Tolerated, General Precautions  Implants present? :  (pt denies)  Other position/activity restrictions: MRI brain- 
Speech Language Pathology  [facility]ARE    Cognitive/Speech Treatment Note    Date: 11/12/2024  Patient’s Name: Nitesh Ren  MRN: [id number]  Diagnosis:   Patient Active Problem List   Diagnosis Code    Abdominal pain R10.9    Colon polyp K63.5    Neurofibromatosis (HCC) Q85.00    Thyroid nodule E04.1    Pulmonary nodules R91.8    Lymphangitis I89.1    Cellulitis of left lower leg L03.116    Ex-cigarette smoker Z87.891    Polycythemia D75.1    Pneumococcal vaccination declined Z28.21    Colonoscopy refused Z53.20    Polycythemia vera (HCC) D45    Left-sided weakness R53.1    Cerebrovascular accident (CVA) ([facility]) I63.9    Right-sided lacunar stroke ([facility]) I63.81       Pain: 0/10, denies    Cognitive Treatment    Treatment time: 2129-8108      Subjective: [x] Alert [x] Cooperative     [] Confused     [] Agitated    [] Easily distracted      Objective/Assessment:  Attention:   Sustained t/o despite multiple distractions in room    Recall:  Paragraph recall- 90%, 100% c cues.   4 word category inclusion- 92%, 100% c cues    Problem Solving/Reasoning/organization:   N/a    Speech:    Pt. Completed oral motor exercises c min cues, x5, 10 reps each.   Good B ROM noted.     Other: Pt. C friends x2 visiting.    Plan:  [x] Continue ST services    [] Discharge from ST:      Discharge recommendations: [] Inpatient Rehab   [] Skilled Nursing Facility   [] Outpatient Therapy  [] Follow up at trauma clinic   [] Other:       Treatment completed by: Jumana Calderon M.A.CCC/SLP      
Speech Language Pathology  [facility]GRESSIVE [facility]    Cognitive/Speech Treatment Note    Date: 11/11/2024  Patient’s Name: Nitesh Rne  MRN: [id number]  Diagnosis:   Patient Active Problem List   Diagnosis Code    Abdominal pain R10.9    Colon polyp K63.5    Neurofibromatosis (HCC) Q85.00    Thyroid nodule E04.1    Pulmonary nodules R91.8    Lymphangitis I89.1    Cellulitis of left lower leg L03.116    Ex-cigarette smoker Z87.891    Polycythemia D75.1    Pneumococcal vaccination declined Z28.21    Colonoscopy refused Z53.20    Polycythemia vera (HCC) D45    Left-sided weakness R53.1    Cerebrovascular accident (CVA) (Former[facility]) I63.9    Right-sided lacunar stroke ([facility]) I63.81       Pain: 0/10, denies    Cognitive Treatment    Treatment time: 4790-2908      Subjective: [x] Alert [x] Cooperative     [] Confused     [] Agitated    [] Easily distracted      Objective/Assessment:  Attention:   Sustained t/o    Recall:  3 word memory and mental manipulation- uncontrolled- 100%, controlled- 67%, 100% c cues.  Paragraph recall- 100%    Speech: Pt has a L facial droop which he reports is partly from \"what I have been like my whole life\"  d/t neurofibromatosis. Pt. Sister describes increased L facial droop since CVA.    Pt. Completed oral motor exercises c min cues, x5, 10 reps each.   Good B ROM noted.     Problem Solving/Reasoning/organization:   N/a    Other: Pt. Sister and brother in law present.    Plan:  [x] Continue ST services    [] Discharge from ST:      Discharge recommendations: [] Inpatient Rehab   [] Skilled Nursing Facility   [] Outpatient Therapy  [] Follow up at trauma clinic   [] Other:       Treatment completed by: Jumana Calderon M.A.CCC/SLP      
Speech Language Pathology  [facility] PROGRESSIVE [facility]    Cognitive/Speech Treatment Note    Date: 2024  Patient’s Name: Nitesh Ren  MRN: [id number]  Diagnosis:   Patient Active Problem List   Diagnosis Code    Abdominal pain R10.9    Colon polyp K63.5    Neurofibromatosis (HCC) Q85.00    Thyroid nodule E04.1    Pulmonary nodules R91.8    Lymphangitis I89.1    Cellulitis of left lower leg L03.116    Ex-cigarette smoker Z87.891    Polycythemia D75.1    Pneumococcal vaccination declined Z28.21    Colonoscopy refused Z53.20    Polycythemia vera (HCC) D45    Left-sided weakness R53.1    Cerebrovascular accident (CVA) ([facility]) I63.9    Right-sided lacunar stroke ([facility]) I63.81       Pain: 0/10, denies    Cognitive Treatment    Treatment time: 7809-2831      Subjective: [x] Alert [x] Cooperative     [] Confused     [] Agitated    [] Easily distracted      Objective/Assessment:  Attention:   Sustained t/o, occasionally tangential    Recall:  Paragraph recall- 100%.  Pt. Recalled 3 units immediately and given a delay.       Problem Solving/Reasoning/organization:   Multiprocess reasoning/problem solvin%    Speech:    Pt. Completed oral motor exercises c min cues, x5, 10 reps each.   Good B ROM noted.   Pt. Reports continuing exercises on his own.  ST reinforced this.     Other: Pt. C friend visiting.   Pt. Cognitive impairment has resolved.   Recommend discontinuation of memory/cognitive goals at this time.        Plan:  [x] Continue ST services    [] Discharge from ST:      Discharge recommendations: [] Inpatient Rehab   [] Skilled Nursing Facility   [] Outpatient Therapy  [] Follow up at trauma clinic   [] Other:       Treatment completed by: Jumana Calderon M.A.CCC/SLP      
Speech Language Pathology  [facility] PROGRESSIVE [facility]    Cognitive/dysphagia Treatment Note    Date: 11/8/2024  Patient’s Name: Nitesh Ren  MRN: [id number]  Diagnosis:   Patient Active Problem List   Diagnosis Code    Abdominal pain R10.9    Colon polyp K63.5    Neurofibromatosis (HCC) Q85.00    Thyroid nodule E04.1    Pulmonary nodules R91.8    Lymphangitis I89.1    Cellulitis of left lower leg L03.116    Ex-cigarette smoker Z87.891    Polycythemia D75.1    Pneumococcal vaccination declined Z28.21    Colonoscopy refused Z53.20    Polycythemia vera (HCC) D45    Left-sided weakness R53.1       Pain: 0/10, denies    Cognitive Treatment    Treatment time: 0905-2634      Subjective: [x] Alert [x] Cooperative     [] Confused     [] Agitated    [x] Easily distracted      Objective/Assessment:  Attention:   Benefits from low-stim environment (needs cue to turn TV volume down, easily distracted by news this date). Benefits from re-direction.    Orientation: Ox4 w/ use of visual aid.    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    Pt has reduced recall of upcoming test this date. Pt thinks getting ultrasound (even after staff talking at length with him about CT) vs CT of neck that is occurring today.    Oral Motor excersies: Pt has a L facial droop. Completed OMEx x5 with 3 sets with mild-mod cuing. Left handout within reach and encouraged implementing throughout the day to improve overall coordination, strength and reduce drooping.    Problem Solving/Reasoning/organization:   Pt reports difficulty with self-feeding d/t being L hand dominant. Pt attempts to mute TV when asked and the pt hits \"guide\" button incorrectly after SLP pointed to mute button, showing reduced independence with use of TV/bed remote and vision may be impacting accuracy and overall independence.    Pt has difficulty asking STNA to 
Speech Language Pathology  [facility]GRESSIVE [facility]    Speech Language Treatment Note    Date: 11/14/2024  Patient’s Name: Nitesh Ren  MRN: [id number]  Diagnosis:   Patient Active Problem List   Diagnosis Code    Abdominal pain R10.9    Colon polyp K63.5    Neurofibromatosis (HCC) Q85.00    Thyroid nodule E04.1    Pulmonary nodules R91.8    Lymphangitis I89.1    Cellulitis of left lower leg L03.116    Ex-cigarette smoker Z87.891    Polycythemia D75.1    Pneumococcal vaccination declined Z28.21    Colonoscopy refused Z53.20    Polycythemia vera (HCC) D45    Left-sided weakness R53.1    Basal ganglia stroke (HCC) I63.81    Right-sided lacunar stroke (HCC) I63.81       Pain Rating: Does not report    Speech and Language Treatment  Treatment time: 7155-2784    Subjective: [x] Alert [x] Cooperative     [] Confused     [] Agitated    [] Lethargic      Objective/Assessment:    Speech: Pt. Completed OMEX to address labial ROM and strength. Pt. Completed exercises x5 sets x10 reps each with min verbal/visual cues. Pt. With increased difficulty completing labial strengthening exercises, states he occasionally has anterior spillage on L side when eating/drinking.     Pt. reporting L droop at baseline, states he is unsure if it is worse since hospital admission. Written exercises provided and left at bedside. Pt. Encouraged to complete exercises independently t/o the day with pt. Verbalizing understanding.     Other: No family present, call light left within reach.    Plan:  [x] Continue ST services    [x] Discharge from ST:      Discharge recommendations:  [] Further therapy recommended at discharge.   [] No therapy recommended at discharge.       Treatment completed by: Deonna Capps M.S. CF-SLP    
Speech Language Pathology  [facility]ESS[facility]    Speech Language Treatment Note    Date: 11/15/2024  Patient’s Name: Nitesh Ren  MRN: [id number]  Diagnosis:   Patient Active Problem List   Diagnosis Code    Abdominal pain R10.9    Colon polyp K63.5    Neurofibromatosis (HCC) Q85.00    Thyroid nodule E04.1    Pulmonary nodules R91.8    Lymphangitis I89.1    Cellulitis of left lower leg L03.116    Ex-cigarette smoker Z87.891    Polycythemia D75.1    Pneumococcal vaccination declined Z28.21    Colonoscopy refused Z53.20    Polycythemia vera (HCC) D45    Left-sided weakness R53.1    Cerebrovascular accident (CVA) ([facility]) I63.9    Right-sided lacunar stroke ([facility]) I63.81       Pain Rating: Does not report    Speech and Language Treatment  Treatment time: 3229-0044    Subjective: [x] Alert [x] Cooperative     [] Confused     [] Agitated    [] Lethargic      Objective/Assessment:    Speech: Pt. Completed OMEX targeting labial strength/ROM x4 sets x10 reps each with min verbal/visual cues. Exercise program remains at bedside. Pt. States he has been practicing OMEX independently, ST reinforced.     Other: No family present. Call light left within reach.     Plan:  [x] Continue ST services    [] Discharge from ST:      Discharge recommendations:  [x] Further therapy recommended at discharge.   [] No therapy recommended at discharge.       Treatment completed by: Deonna Capps M.S. CF-SLP    
Speech Language Pathology  [facility]ESS[facility]    Speech Language Treatment Note    Date: 11/16/2024  Patient’s Name: Nitesh Ren  MRN: [id number]  Diagnosis:   Patient Active Problem List   Diagnosis Code    Abdominal pain R10.9    Colon polyp K63.5    Neurofibromatosis (HCC) Q85.00    Thyroid nodule E04.1    Pulmonary nodules R91.8    Lymphangitis I89.1    Cellulitis of left lower leg L03.116    Ex-cigarette smoker Z87.891    Polycythemia D75.1    Pneumococcal vaccination declined Z28.21    Colonoscopy refused Z53.20    Polycythemia vera (HCC) D45    Left-sided weakness R53.1    Cerebrovascular accident (CVA) ([facility]) I63.9    Right-sided lacunar stroke ([facility]) I63.81       Pain Rating: Does not report    Speech and Language Treatment  Treatment time: 1438 -1451    Subjective: [x] Alert [x] Cooperative     [] Confused     [] Agitated    [] Lethargic      Objective/Assessment:    Speech: Pt. Completed oral motor exercises targeting labial strength/ROM x5 sets x10 reps each with min verbal/visual cues. Exercise program remains at bedside. Pt. States he has been practicing exercises independently, ST reinforced.     Other: No family present. Call light left within reach.     Plan:  [x] Continue ST services    [] Discharge from ST:      Discharge recommendations:  [x] Further therapy recommended at discharge.   [] No therapy recommended at discharge.       Treatment completed by: Jumana Calderon M.A.CCC/SLP      
Speech Language Pathology  [facility]ESS[facility]    Speech Language Treatment Note    Date: 11/17/2024  Patient’s Name: Nitesh Ren  MRN: [id number]  Diagnosis:   Patient Active Problem List   Diagnosis Code    Abdominal pain R10.9    Colon polyp K63.5    Neurofibromatosis (HCC) Q85.00    Thyroid nodule E04.1    Pulmonary nodules R91.8    Lymphangitis I89.1    Cellulitis of left lower leg L03.116    Ex-cigarette smoker Z87.891    Polycythemia D75.1    Pneumococcal vaccination declined Z28.21    Colonoscopy refused Z53.20    Polycythemia vera (HCC) D45    Left-sided weakness R53.1    Cerebrovascular accident (CVA) ([facility]) I63.9    Right-sided lacunar stroke ([facility]) I63.81       Pain Rating: Does not report    Speech and Language Treatment  Treatment time: 5964-7794    Subjective: [x] Alert [x] Cooperative     [] Confused     [] Agitated    [] Lethargic      Objective/Assessment:    Speech: Pt. Completed oral motor exercises targeting labial strength/ROM x5 sets x10 reps each with min verbal/visual cues. Exercise program remains at bedside. Pt. States he has been practicing exercises independently, ST reinforced.     Other: Pt. C visitors present x2.    Call light left within reach.     Plan:  [x] Continue ST services    [] Discharge from ST:      Discharge recommendations:  [x] Further therapy recommended at discharge.   [] No therapy recommended at discharge.       Treatment completed by: Jumana Calderon M.A.CCC/SLP      
Speech Language Pathology  [facility] PROGRESS[facility]ARE    Speech Language Treatment Note    Date: 11/18/2024  Patient’s Name: Nitesh Ren  MRN: [id number]  Diagnosis:   Patient Active Problem List   Diagnosis Code    Abdominal pain R10.9    Colon polyp K63.5    Neurofibromatosis (HCC) Q85.00    Thyroid nodule E04.1    Pulmonary nodules R91.8    Lymphangitis I89.1    Cellulitis of left lower leg L03.116    Ex-cigarette smoker Z87.891    Polycythemia D75.1    Pneumococcal vaccination declined Z28.21    Colonoscopy refused Z53.20    Polycythemia vera (HCC) D45    Left-sided weakness R53.1    Cerebrovascular accident (CVA) ([facility]) I63.9    Right-sided lacunar stroke ([facility]) I63.81       Pain Rating: Does not report    Speech and Language Treatment  Treatment time: 7477-5176    Subjective: [x] Alert [x] Cooperative     [] Confused     [] Agitated    [] Lethargic      Objective/Assessment:    Speech: Pt. Completed OMEX targeting labial strength/ROM x5 sets x10 reps each with min verbal cues to reduce speed at which exercises are completed. Pt. Appears to have slightly increased labial strength, L ROM remains the same. Exercise program remains at bedside, pt. Reports he completes 1-2x/day. ST reinforced.     Other: Pt. Became tearful toward end of session, stated his cousin just passed away this weekend. ST offering empathy and condolences.     Plan:  [x] Continue ST services    [] Discharge from ST:      Discharge recommendations:  [x] Further therapy recommended at discharge.   [] No therapy recommended at discharge.       Treatment completed by: Deonna Capps M.S. CF-SLP    
Speech Language Pathology  [facility] PROGRESSIVE [facility]ARE    Speech Language Treatment Note    Date: 11/19/2024  Patient’s Name: Nitesh Ren  MRN: [id number]  Diagnosis:   Patient Active Problem List   Diagnosis Code    Abdominal pain R10.9    Colon polyp K63.5    Neurofibromatosis (HCC) Q85.00    Thyroid nodule E04.1    Pulmonary nodules R91.8    Lymphangitis I89.1    Cellulitis of left lower leg L03.116    Ex-cigarette smoker Z87.891    Polycythemia D75.1    Pneumococcal vaccination declined Z28.21    Colonoscopy refused Z53.20    Polycythemia vera (HCC) D45    Left-sided weakness R53.1    Cerebrovascular accident (CVA) ([facility]) I63.9    Right-sided lacunar stroke ([facility]) I63.81       Pain Rating: Does not report    Speech and Language Treatment  Treatment time: 5793-9137    Subjective: [x] Alert [x] Cooperative     [] Confused     [] Agitated    [] Lethargic      Objective/Assessment:    Speech: Pt. Completed oral motor exercises targeting labial strength/ROM x5 sets x10 reps each with min verbal/visual cues to reduce rate. Exercise program remains at bedside. Pt. States he has been practicing exercises independently, ST reinforced.     Other: Pt. C visitors present x1.    Frequent redirection back to task as pt. And his visitor were very tangential.   Call light left within reach.     Plan:  [x] Continue ST services    [] Discharge from ST:      Discharge recommendations:  [x] Further therapy recommended at discharge.   [] No therapy recommended at discharge.       Treatment completed by: Jumana Calderon M.A.CCC/SLP      
Spiritual Health History and Assessment/Progress Note  [facility]    (P) Crisis, (P) Code Stroke,  ,      Name: Nitesh Ren MRN: [id number]    Age: 69 y.o.     Sex: male   Language: English   Shinto: Gnosticist   Left-sided weakness     Date: 11/7/2024            Total Time Calculated: (P) 10 min              Spiritual Assessment began in [facility] PRO[facility]ESS[facility]        Referral/Consult From: (P) Multi-disciplinary team   Encounter Overview/Reason: (P) Crisis  Service Provided For: (P) Family    Writer responded to perfect serve. PT was taken for CT and his sister-in-law, Jacqueline, retired nurse from [employer] was in room. She was calm and welcomed prayer.     Fabi, Belief, Meaning:   Patient unable to assess at this time  Family/Friends identify as spiritual      Importance and Influence:  Patient unable to assess at this time  Family/Friends have spiritual/personal beliefs that influence decisions regarding the patient's health    Community:  Patient Other: NA  Family/Friends feel well-supported. Support system includes: Spouse/Partner and Extended family    Assessment and Plan of Care:     Patient Interventions include: Other: NA  Family/Friends Interventions include: Facilitated expression of thoughts and feelings, Explored spiritual coping/struggle/distress, and Affirmed coping skills/support systems    Patient Plan of Care: Other: NA  Family/Friends Plan of Care: Spiritual Care available upon further referral    Electronically signed by Chaplain Phuong on 11/7/2024 at 5:13 PM    
Spiritual Health History and Assessment/Progress Note  [facility]    (P) Grief, Loss, and Adjustments, Code Stroke, (P) Adjustment to illness,      Name: Nitesh Ren MRN: [id number]    Age: 69 y.o.     Sex: male   Language: English   Sikh: Yarsani   Left-sided weakness     Date: 11/12/2024            Total Time Calculated: (P) 4 min        Patient discussed his medical circumstances and his discharge to rehab; patient shared life review and explained he is a \"non-practicing Uatsdin\"; listening presence and support provided; declined prayer asking writer to pray \"silently\" for patient;          Spiritual Assessment continued in [facility] PROGRESS[facility]        Referral/Consult From: (P) Rounding   Encounter Overview/Reason: (P) Grief, Loss, and Adjustments  Service Provided For: (P) Patient    Fabi, Belief, Meaning:   Patient has beliefs or practices that help with coping during difficult times  Family/Friends No family/friends present      Importance and Influence:  Patient has no beliefs influential to healthcare decision-making identified during this visit  Family/Friends No family/friends present    Community:  Patient feels well-supported. Support system includes: Extended family  Family/Friends No family/friends present    Assessment and Plan of Care:     Patient Interventions include: Facilitated expression of thoughts and feelings and Affirmed coping skills/support systems  Family/Friends Interventions include: No family/friends present    Patient Plan of Care: Spiritual Care available upon further referral  Family/Friends Plan of Care: Spiritual Care available upon further referral    Electronically signed by Chaplain Ulysses on 11/12/2024 at 6:25 PM   
Telemetry reordered per Dr. Miller.   
(12/12/11); and Colonoscopy (10/23/07).    Restrictions  Restrictions/Precautions  Restrictions/Precautions: Up as Tolerated, General Precautions  Required Braces or Orthoses?: No  Implants present? :  (pt denies)  Position Activity Restriction  Other position/activity restrictions: MRI 11/7: 2 small areas of restricted diffusion in the right basal ganglia consistent  with acute areas of infarct.  No other areas restricted diffusion are  identified. ; CT head 11/11: 19 mm acute infarct in the right frontal corona radiata is  redemonstrated involving a larger area than previously.       Subjective  Subjective  Subjective: Pt lying in bed upon arrival, agreeable to therapy   General Comment  Comments: LAUREN Riley approved therapy- co-treat with SARAH Sellers     Subjective  Pain: patient denies pain       Objective  Orientation  Overall Orientation Status: Within Functional Limits          Bed Mobility  Bed mobility  Supine to Sit: Minimal assistance  Sit to Supine: Unable to assess  Scooting: Contact guard assistance     Transfers  Transfers  Sit to Stand: Minimal Assistance, 2 Person Assistance, Maximum Assistance (Ni A x2 with SS; Max A x2 with hemiwalker)  Stand to Sit: Moderate Assistance, 2 Person Assistance     Exercises completed:    Exercise 1: bed mobility x1  Exercise 2: STS x1 from bed with SS and Min A x2 and x1 from toilet   Exercise 3: toilet transfer x1 with S- pt dependent for pericare and donning/doffing brief   Exercise 4: STS x2 from recliner with Max A x2 and hemiwalker- pt required assist to perform TKE on L knee and assist for trunk control. Pt has severe L lateral lean.   Exercise 5: standing tolerance ~30 seconds and ~45 seconds       Assessment  Assessment  Treatment Diagnosis: Impaired function  Discharge Recommendations: Patient able to tolerate 3 hours of therapy per day, Patient would benefit from continued therapy after discharge, Therapy recommended at discharge  Activity 
82   Resp: 18 18 18 18   Temp: 98.2 °F (36.8 °C) 98 °F (36.7 °C) 97.5 °F (36.4 °C) 97.9 °F (36.6 °C)   TempSrc: Oral Oral Oral Oral   SpO2: 94% 94% 94% 95%   Weight:       Height:           No results for input(s): \"POCGLU\" in the last 72 hours.    Intake/Output Summary (Last 24 hours) at 11/16/2024 1144  Last data filed at 11/16/2024 1048  Gross per 24 hour   Intake 120 ml   Output 850 ml   Net -730 ml       General Appearance: alert, well appearing, and in no acute distress  Mental status:   Head:  normocephalic, atraumatic.  Neck: supple, no carotid bruits, thyroid not palpable  Lungs: Bilateral equal air entry, clear to ausculation, no wheezing, rales or rhonchi, normal effort  Cardiovascular: normal rate, regular rhythm, no murmur, gallop, rub.  Abdomen: Soft, nontender, nondistended, normal bowel sounds, no hepatomegaly or splenomegaly  Neurologic: , Left upper extremity 0/5. Left lower extremity 2/5.   Skin:Neurofibromatosis lesions.   Extremities:  peripheral pulses palpable, no pedal edema or calf pain with palpation  Psych: normal effect       Data:     PLabs:    BMP:   Recent Labs     11/14/24  0432      K 4.0   CO2 20   BUN 29*   CREATININE 1.1   LABGLOM 73   GLUCOSE 97               Medications:     Allergies:  No Known Allergies    Current Meds:   Scheduled Meds:    amLODIPine  10 mg Oral Daily    lisinopril  2.5 mg Oral Daily    erythromycin   Right Eye 4 times per day    carboxymethylcellulose  1 drop Both Eyes TID    enoxaparin  40 mg SubCUTAneous Daily    sodium chloride flush  5-40 mL IntraVENous 2 times per day    clopidogrel  75 mg Oral Daily    rosuvastatin  40 mg Oral Nightly    aspirin  81 mg Oral Daily     Continuous Infusions:    sodium chloride       PRN Meds: acetaminophen, melatonin, sodium chloride flush, sodium chloride flush, sodium chloride, ondansetron **OR** ondansetron, polyethylene glycol, labetalol          Assessment:        Primary Problem  Left-sided 
Dependent/Total  Putting On/Taking Off Footwear Skilled Clinical Factors: TA for donning socks.  Toileting: Dependent/Total  Toileting Skilled Clinical Factors: TA for all aspects to utilize SS for toileting.  Additional Comments: Pt currently limited by decreased balance, safety awareness, coordination and L side weaness impacting safty and indpendence with self care tasks.    Balance  Balance  Sitting Balance: Contact guard assistance  Standing Balance: Maximum assistance  Standing Balance  Time: ~30 sec, ~45 sec  Activity: static stand on SS and at maynor walker  Comment: Blocking of L knee with L side lean noted.    Transfers/Mobility  Bed mobility  Supine to Sit: Moderate assistance  Sit to Supine: Unable to assess (pt left up inchair at end of session)  Scooting: Contact guard assistance  Transfers  Sit to stand: 2 Person assistance;Maximum assistance (Max A X2)  Stand to sit: 2 Person assistance;Maximum assistance (Max X2)  Transfer Comments: Min X2 from SS paddles, Max X2 from chair with maynor walker. VC for eccentric control, blocking of L knee throughout. Sling donned on LUE throughout transfers.  Toilet Transfers  Toilet - Technique:  (use of SS)  Equipment Used: Standard toilet (bilat toilet rails)  Toilet Transfer: 2 Person assistance  Toilet Transfers Comments: Min A X2 for use of SS.      Patient Education  Patient Education  Education Given To: Patient  Education Provided: Role of Therapy, Plan of Care, ADL Adaptive Strategies, Transfer Training, Energy Conservation, Fall Prevention Strategies, Precautions  Education Provided Comments: EDU provided on LUE positioning and self ROM for optimal joint integrity and rehab. Pt verbalizes understanding with further education warranted.  Education Method: Demonstration, Verbal  Barriers to Learning: Cognition  Education Outcome: Verbalized understanding, Demonstrated understanding, Continued education needed    Goals  Short Term Goals  Time Frame for Short Term 
Minimal assistance  Bed Mobility Comments: HOB elevated and edu on sequencing and cues for LUE awareness throughout  Transfers  Sit to stand: 2 Person assistance;Contact guard assistance  Stand to sit: 2 Person assistance;Contact guard assistance  Transfer Comments: vc and A to place LUE onto RW in prep for t/f, cues for hand placement    Functional Mobility  Functional - Mobility Device: Rolling Walker  Activity: Other (in room and short distance down hallway)  Assist Level: Dependent/Total (Ax2)  Functional Mobility Comments: Throghout writer providing A to maintain grasp on RW, pt demo's unsteadiness, narrow JORDAN, decreased step with LLE req A for stride length/height with P retrun req MOD/MIN  A x2      OT Exercises  PROM Exercises: Writer facilitated pt engagement in PROM in all tolerated planes for 10 reps to support ROM and reduce chance of contractures. Pt tolerated well with no c/o pain    Patient Education  Patient Education  Education Given To: Patient  Education Provided: Role of Therapy, Plan of Care, Home Exercise Program, ADL Adaptive Strategies, Transfer Training, Mobility Training  Education Provided Comments: Edu on Self PROM, LUE awarness, safe LUE positoning  Education Method: Demonstration, Verbal  Barriers to Learning: Cognition  Education Outcome: Verbalized understanding, Demonstrated understanding, Continued education needed    Goals  Short Term Goals  Time Frame for Short Term Goals: By discharge, pt will  Short Term Goal 1: demo ability to perform UB ADLs with MOD I with implementation of compensatory techniques in order to return to PLOF.  Short Term Goal 2: demo ability to perform self-feeding/hygiene&grooming tasks with MOD I with use of compensatory techniques/AE in order to increase IND.  Short Term Goal 3: demo ability to perform LB ADLs/toileting tasks with SBA with use of AE/compensatory techniques in order to increase ADL IND.  Short Term Goal 4: demo functional 
experienced a stroke. Patient called EMS and was transported to [facility], and was evaluated by tele-video at bedside.       Patient had GCS 15 with intact speech and retained ability to follow commands. He had mild dysmetria with left finger-to-nose test, left lower facial droop near-complete paralysis, left arm drift, and left hemibody numbness. No visual symptoms noted, and he was able to cross midline with gaze.       Pain:  Pain Assessment  Pain Assessment: 0-10  Pain Level: 0    Reason for Referral  Nitesh Ren was referred for a bedside swallow evaluation to assess the efficiency of his swallow function, identify signs and symptoms of aspiration and make recommendations regarding safe dietary consistencies, effective compensatory strategies, and safe eating environment.    Impression  Dysphagia Diagnosis: Swallow function appears WF  Dysphagia Outcome Severity Scale: Level 7: Normal in all situations     Patient presents with probable safe swallow for Regular diet with thin liquids as evidenced by no overt s/s of aspiration noted with consistencies tested. Pt does present with facial/labial weakness as indicated by L facial droop. Per pt reports intermittent times of anterior spillage from L side of oral cavity, however this was not observed during this evaluation.  Recommend small sips and bites, only feed when alert and awake and upright at 90 degrees for all PO intake.  Recommend close monitoring for overt/clinical s/s of aspiration and D/C PO intake and complete Modified Barium Swallow Study should they occur.  Results and recommendations reported to RN.      Treatment Plan  Requires SLP Intervention: Yes  2-3x/week  D/C Recommendations: Ongoing speech therapy is recommended during this hospitalization       Recommended Diet and Intervention  Diet Solids Recommendation: Regular  Liquid Consistency Recommendation: Thin  Recommended Form of Meds: Whole with water  Therapeutic Interventions: Diet 
    Primary Problem  Left-sided weakness    Principal Problem:    Left-sided weakness  Active Problems:    Cerebrovascular accident (CVA) (HCC)    Right-sided lacunar stroke (HCC)  Resolved Problems:    * No resolved hospital problems. *       Plan:        Left extremity weakness secondary to right basal ganglia infarct, corona radiator infarct.  Neurofibromatosis  -Enlargement of the right CT head with residual left hemiparesis  -Continue the patient on aspirin Plavix  -Stable for discharge from neurology standpoint, recommend outpatient follow-up in 6 weeks  -PT OT as needed  -Pending acute rehab placement, pending authorization.    Hypertension:  Blood pressure better controlled than yesterday 139/94  Continue the patient on Norvasc 10 daily, lisinopril 2.5 daily  Labetalol 10 mg as needed for SBP more than 220  Keep SBP less than 140  Continue to monitor    Polycythemia [place] reactive:  -Hematology on board   -Labs essentially unchanged with hemoglobin 18.1, platelets 169  -Has had extensive workup by [facility] hematology with JAK2, NexGen sequencing negative.  Bone marrow was negative  -In for outpatient phlebotomy      Thanks for consulting us.  Will monitor vitals and clinical course, and optimize therapy as needed .           Isabella Montano MD  11/15/2024   Attending Physician Statement  I have discussed the care of Nitesh Ren and I have examined the patient myself and taken ROS and HPI, including pertinent history and exam findings, with the resident. I have reviewed the key elements of all parts of the encounter with the resident.  I agree with the assessment, plan and orders as documented by the resident.  Discharge pending placement, vitals have been stable, seen by oncology, recommended outpatient evaluation      Electronically signed by Anusha Miller MD    
  CO2 24   BUN 24*   CREATININE 0.9   LABGLOM >90   GLUCOSE 88                 Medications:     Allergies:  No Known Allergies    Current Meds:   Scheduled Meds:    amLODIPine  10 mg Oral Daily    lisinopril  2.5 mg Oral Daily    erythromycin   Right Eye 4 times per day    carboxymethylcellulose  1 drop Both Eyes TID    enoxaparin  40 mg SubCUTAneous Daily    sodium chloride flush  5-40 mL IntraVENous 2 times per day    clopidogrel  75 mg Oral Daily    rosuvastatin  40 mg Oral Nightly    aspirin  81 mg Oral Daily     Continuous Infusions:    sodium chloride       PRN Meds: baclofen, acetaminophen, melatonin, sodium chloride flush, sodium chloride flush, sodium chloride, ondansetron **OR** ondansetron, polyethylene glycol, labetalol          Assessment:        Primary Problem  Left-sided weakness    Principal Problem:    Left-sided weakness  Active Problems:    Cerebrovascular accident (CVA) (HCC)    Right-sided lacunar stroke (HCC)  Resolved Problems:    * No resolved hospital problems. *       Plan:        Left extremity weakness secondary to right basal ganglia infarct, corona radiator infarct.  Neurofibromatosis  -Enlargement of the right CT head with residual left hemiparesis  -Continue the patient on aspirin Plavix  -Stable for discharge from neurology standpoint, recommend outpatient follow-up in 6 weeks  -PT OT as needed  -Pending acute rehab placement, pending authorization.    Hypertension:  Blood pressure better controlled than yesterday 139/94  Continue the patient on Norvasc 10 daily, lisinopril 2.5 daily  Labetalol 10 mg as needed for SBP more than 220  Keep SBP less than 140  Continue to monitor    Polycythemia [place] reactive:  -Hematology on board   -Labs essentially unchanged with hemoglobin 18.1, platelets 169  -Has had extensive workup by [facility] hematology with JAK2, NexGen sequencing negative.  Bone marrow was negative  -In for outpatient phlebotomy    11/17  Patient seen and 
3: Transfers consistantly at min A  Short Term Goal 4: Gait with RW distance of 50 ft x 2, min Ax2 , with ability to clear floor during Left swing phase > 50% of the time  Short Term Goal 5: Pt to tolerate standing with UE support for 2 to 4 minutes to owrk on standing balance  Patient Goals   Patient Goals : Get left side stronger, be independent    Education  Patient Education  Education Given To: Patient  Education Provided: Precautions;Transfer Training  Education Method: Demonstration;Verbal  Education Outcome: Continued education needed;Verbalized understanding;Demonstrated understanding    AM-PAC - Mobility  AM-PAC Mobility without Stair Climbing Inpatient   How much difficulty turning over in bed?: A Little  How much difficulty sitting down on / standing up from a chair with arms?: A Little  How much difficulty moving from lying on back to sitting on side of bed?: A Little  How much help from another person moving to and from a bed to a chair?: A Little  How much help from another person needed to walk in hospital room?: A Little  AM-PAC Inpatient Mobility without Stair Climbing Raw Score : 15  AM-PAC Inpatient without Stair Climbing T-Scale Score : 43.03  Mobility Inpatient CMS 0-100% Score: 47.43  Mobility Inpatient without Stair CMS G-Code Modifier : CK    Therapy Time   Individual Concurrent Group Co-treatment   Time In 1500         Time Out 1530         Minutes 30           Cain Yost, PT DPT          
Assistance: Independent  Transfer Assistance: Independent  Active : Yes  Mode of Transportation: Car  Occupation: Retired  Type of Occupation: [employer]aging [employer]  Additional Comments: pt reports brother, sister in law, and sister can assist - live locally and are all retired; pt sleeps in flat waterbed at home- able to sleep in recliner on main floor if needed  Vision/Hearing  Vision  Vision: Impaired  Vision Exceptions: Wears glasses at all times (reports no visual changes since CVA- states \"he has always had a floater in the L eye\")  Hearing  Hearing: Exceptions to WFL  Hearing Exceptions: Hard of hearing/hearing concerns    Cognition   Orientation  Overall Orientation Status: Within Functional Limits  Cognition  Overall Cognitive Status: WFL  Arousal/Alertness: Appears intact  Following Commands: Follows multistep commands with increased time;Follows multistep commands with repitition  Attention Span: Appears intact  Memory: Appears intact  Safety Judgement: Decreased awareness of need for assistance;Decreased awareness of need for safety  Problem Solving: Assistance required to implement solutions;Assistance required to correct errors made;Assistance required to identify errors made  Insights: Decreased awareness of deficits  Initiation: Requires cues for some  Sequencing: Requires cues for some          Bed mobility  Supine to Sit: Moderate assistance (Assist left LE and trunk)  Sit to Supine: Moderate assistance (left LE and UE)  Scooting: Minimal assistance (left hip to EOB)  Bed Mobility Comments: bed flat , no hand rail , exit right  Transfers  Sit to Stand: Moderate Assistance (left UE dependant , VCs right hand placement)  Stand to Sit: Moderate Assistance (dependant left UE , VCs right hand placement)  Stand Pivot Transfers: Moderate Assistance (RW, dependant left hand )  Ambulation  Surface: Level tile  Device: Rolling Walker  Assistance: Dependent/Total;2 Person assistance;Moderate 
Patterns: sequencing for transfers  Response to Techniques: Pt verbalizing sequencing poorly only focusing on \"March, March, march\"  Other: Slightly impulsive and fast paced, fair response to cueing with poor carryover.  PT Exercises  Exercise Treatment: L LE HS and Ankle DF stretch 30\" x3 each  A/AROM Exercises: L LE LAQ with tapping x20  Circulation/Endurance Exercises: ANkle PF/DF with tapping x20  Functional Mobility Circuit Training: STS x3  Dynamic Sitting Balance Exercises: Sitting EOB ~20 mins focusing on abdominal stability with ex's and transfer training  Static Standing Balance Exercises: x3 ~5-10 sec- L Lateral/posterior lean  Motor Control/Coordination: Pivot R 180 degrees to BSC, Picot L 90 degrees to recliner            Goals  Short Term Goals  Time Frame for Short Term Goals: 6 to 7 visits  Short Term Goal 1: Supine<>sit CGA  Short Term Goal 2: Pt to dangle at EOB SBA, pt pt being aware of his posture, and able to maintain midline with minimal cues  Short Term Goal 3: Transfers consistantly at min ax 2  Short Term Goal 4: Gait with hemiwalker, distance of 10 ft x 2, mod A x 2, with ability to lifr L LE to clear floor during Left swing phase > 50% of the time  Short Term Goal 5: Pt to tolerate standing with UE support for 2 to 4 minutes to owrk on standing balance  Patient Goals   Patient Goals : Get left side stronger, be independent    Education  Patient Education  Education Given To: Patient  Education Provided: Precautions;Transfer Training  Education Provided Comments: Education on importance of slow paced to improve safety and quality of movements, performing ex's outside of tx to improve contraction response, fall risk prevention.  Education Method: Demonstration  Barriers to Learning: Cognition  Education Outcome: Continued education needed;Verbalized understanding;Demonstrated understanding    AM-PAC - Mobility    AM-PAC Basic Mobility - Inpatient   How much help is needed turning from your 
Sitting Balance Exercises: Participation in sitting unsupported at EOB and completing self-ROM of LUE and AROM of LLE for improved balance and activity tolerance as well as improved overall functional use of LUE/LLE. Pt tolerated fairly well, complete 1 set x10-15 reps each in all available planes. Cues to maintain upright sitting balance as pt displays posterior lean with movement while unsupported. Tolerated ~15 mins at EOB.    Patient Education  Patient Education  Education Given To: Patient  Education Provided: Role of Therapy, Plan of Care, Transfer Training, Home Exercise Program  Education Provided Comments: .  Education Method: Demonstration, Verbal  Barriers to Learning: Cognition  Education Outcome: Verbalized understanding, Demonstrated understanding, Continued education needed    Goals  Short Term Goals  Time Frame for Short Term Goals: By discharge, pt will  Short Term Goal 1: demo ability to perform UB ADLs with SUP with implementation of compensatory techniques in order to return to PLOF.  Short Term Goal 2: demo ability to perform self-feeding/hygiene&grooming tasks with MOD I with use of compensatory techniques/AE in order to increase IND.  Short Term Goal 3: demo ability to perform LB ADLs/toileting tasks with CGA with use of AE/compensatory techniques in order to increase ADL IND.  Short Term Goal 4: demo functional transfers/functional mobility with CGA with min vc's or less for safety utilizing LRAD in order to increase participation and IND with ADLs.  Short Term Goal 5: participate in 15+ minutes of therapeutic exercises/therapeutic activites in order to increase activity tolerance during ADLs.  Additional Goals?: Yes  Short Term Goal 6: tolerate 10+ minutes sitting EOB during static/dynamic tasks with SUP in order to increase safety/IND.  Short Term Goal 7: demo 5+ minutes of static/dynamic standing during engagement in ADLs with CGA in order to increase ADL/IADL IND.  Short Term Goal 8: 
extrapolated by contextual derivation.    
  Cognition   Orientation  Overall Orientation Status: Within Functional Limits  Orientation Level: Oriented X4;Oriented to place;Oriented to time;Oriented to situation;Oriented to person  Cognition  Overall Cognitive Status: Exceptions  Arousal/Alertness: Appears intact  Following Commands: Follows multistep commands with increased time;Follows multistep commands with repitition  Attention Span: Appears intact  Memory: Appears intact  Safety Judgement: Decreased awareness of need for assistance;Decreased awareness of need for safety  Problem Solving: Assistance required to implement solutions;Assistance required to correct errors made;Assistance required to identify errors made  Insights: Decreased awareness of deficits  Initiation: Requires cues for some  Sequencing: Requires cues for some  Cognition Comment: slightly impulsive    Objective  Temp: 97.2 °F (36.2 °C)  Pulse: 90  Heart Rate Source: Monitor  Respirations: 18  SpO2: 94 %  O2 Device: None (Room air)  BP: (!) 146/92  MAP (Calculated): 110  BP Location: Right upper arm  BP Method: Automatic  Patient Position: Semi fowlers             AROM RLE (degrees)  RLE AROM: WFL  AROM LLE (degrees)  LLE General AROM: AAROM WFL  PROM LUE (degrees)  LUE General PROM: Flaccid L UE  Strength RLE  Comment: 4+/5  Strength LLE  Comment: Hip flexion 2-/5, Knee flexion/extension 1+ to 2-/5, DF 1+/5           Bed mobility  Supine to Sit: Moderate assistance  Sit to Supine: Unable to assess (left in recliner at end of session)  Scooting: Contact guard assistance  Bed Mobility Comments: bed slightly elevated, use of L bed rail  Transfers  Sit to Stand: Moderate Assistance;2 Person Assistance (left UE dependant (therapist assisting with handling/psotioning during mobility , VCs right hand placement)  Stand to Sit: Moderate Assistance;2 Person Assistance (dependant left UE(therapist assisting managing/supporting L UE) , VCs right hand placement)  Bed to Chair: Moderate 
53.2 (H) 41 - 53 %    MCV 80.4 80 - 100 fL    MCH 27.4 26 - 34 pg    MCHC 34.1 31 - 37 g/dL    RDW 15.9 (H) 11.5 - 14.9 %    Platelets 169 150 - 450 k/uL    MPV 8.3 6.0 - 12.0 fL    Neutrophils % 65 36 - 66 %    Lymphocytes % 18 (L) 24 - 44 %    Monocytes % 10 (H) 1 - 7 %    Eosinophils % 6 (H) 0 - 4 %    Basophils % 1 0 - 2 %    Neutrophils Absolute 6.00 1.3 - 9.1 k/uL    Lymphocytes Absolute 1.60 1.0 - 4.8 k/uL    Monocytes Absolute 0.90 0.1 - 1.3 k/uL    Eosinophils Absolute 0.50 (H) 0.0 - 0.4 k/uL    Basophils Absolute 0.10 0.0 - 0.2 k/uL   Basic Metabolic Panel    Collection Time: 11/14/24  4:32 AM   Result Value Ref Range    Sodium 137 136 - 145 mmol/L    Potassium 4.0 3.7 - 5.3 mmol/L    Chloride 106 98 - 107 mmol/L    CO2 20 20 - 31 mmol/L    Anion Gap 11 9 - 16 mmol/L    Glucose 97 74 - 99 mg/dL    BUN 29 (H) 8 - 23 mg/dL    Creatinine 1.1 0.7 - 1.2 mg/dL    Est, Glom Filt Rate 73 >60 mL/min/1.73m2    Calcium 8.8 8.6 - 10.4 mg/dL       Imaging/Diagnostics:  CT HEAD WO CONTRAST    Result Date: 11/11/2024  Slight enlargement of acute infarct in the right frontal corona radiata.     CT CERVICAL SPINE WO CONTRAST    Result Date: 11/8/2024  Multilevel degenerative changes of the cervical spine as detailed above.     MRI BRAIN WO CONTRAST    Result Date: 11/7/2024  2 small areas of restricted diffusion in the right basal ganglia consistent with acute areas of infarct.  No other areas restricted diffusion are identified. Cerebral atrophy.  Mild chronic small vessel ischemic disease. Multiple subcutaneous nodules in the soft tissues of the head and upper neck.     CTA HEAD NECK W CONTRAST    Result Date: 11/7/2024  Mild atherosclerosis, otherwise unremarkable CT angiogram of the head and neck.     CT HEAD WO CONTRAST    Result Date: 11/7/2024  No acute intracranial process identified. The findings were sent to the Radiology Results Communication [facility] at 12:18 pm on 11/7/2024 to be communicated to a licensed 
facial droop since CVA.    Pt. Completed oral motor exercises c min cues, x5, 10 reps each.   Good B ROM noted.      Problem Solving/Reasoning/organization:   N/a     Other: Pt. Sister and brother in law present.    Objective:  BP (!) 162/92   Pulse 85   Temp 98 °F (36.7 °C) (Oral)   Resp 16   Ht 1.778 m (5' 10\")   Wt 74.4 kg (164 lb)   SpO2 94%   BMI 23.53 kg/m²       GEN: well developed, well nourished, in NAD  HEENT: NCAT, PERRL, EOMI, mucous membranes pink and moist  CV: RRR, no murmurs, rubs or gallops  PULM: CTAB, no rales or rhonchi. Respirations WNL and unlabored  ABD: soft, NT, ND, BS+ and equal  NEURO: A&O x3. Sensation intact to light touch.Mildly impaired R CN VII  MSK: Functional ROM impaired LUE and LLE .  Strength 5/5 key muscles RUE and RLE. L shoulder flexion 3/5, L elbow flexion 2/5, L elbow extension 2/5, L wrist and  0/5. L hip flexion 4-/5 key muscles LLE.  EXTREMITIES: No calf tenderness to palpation bilaterally. No edema BLEs  SKIN: warm dry and intact with good turgor with diffusely scattered neurofibromatosis lesions  PSYCH: appropriately interactive. Affect WNL.     Current Medications:   Current Facility-Administered Medications: carboxymethylcellulose (REFRESH PLUS) 0.5 % ophthalmic solution 1 drop, 1 drop, Both Eyes, TID  enoxaparin (LOVENOX) injection 40 mg, 40 mg, SubCUTAneous, Daily  melatonin tablet 3 mg, 3 mg, Oral, Nightly PRN  sodium chloride flush 0.9 % injection 10 mL, 10 mL, IntraVENous, PRN  sodium chloride flush 0.9 % injection 5-40 mL, 5-40 mL, IntraVENous, 2 times per day  sodium chloride flush 0.9 % injection 5-40 mL, 5-40 mL, IntraVENous, PRN  0.9 % sodium chloride infusion, , IntraVENous, PRN  ondansetron (ZOFRAN-ODT) disintegrating tablet 4 mg, 4 mg, Oral, Q8H PRN **OR** ondansetron (ZOFRAN) injection 4 mg, 4 mg, IntraVENous, Q6H PRN  polyethylene glycol (GLYCOLAX) packet 17 g, 17 g, Oral, Daily PRN  labetalol (NORMODYNE;TRANDATE) injection 10 mg, 10 mg, 
participate in 15+ minutes of therapeutic exercises/therapeutic activites in order to increase activity tolerance during ADLs.  Additional Goals?: Yes  Short Term Goal 6: tolerate 10+ minutes sitting EOB during static/dynamic tasks with SUP in order to increase safety/IND.  Short Term Goal 7: demo 5+ minutes of static/dynamic standing during engagement in ADLs with CGA in order to increase ADL/IADL IND.  Short Term Goal 8: engage in L UE neuro re-ed activities including weightbearing, PROM, self-PROM with good tolerance in order to promote increased functional use of dominant L UE.  Occupational Therapy Plan  Times Per Week: 5-7x  Current Treatment Recommendations: Strengthening, ROM, Balance training, Functional mobility training, Endurance training, Neuromuscular re-education, Safety education & training, Patient/Caregiver education & training, Equipment evaluation, education, & procurement, Self-Care / ADL, Home management training, Coordination training, Co-Treatment    Assessment  Activity Tolerance  Activity Tolerance: Patient Tolerated treatment well  Assessment  Performance deficits / Impairments: Decreased functional mobility , Decreased ADL status, Decreased strength, Decreased safe awareness, Decreased endurance, Decreased sensation, Decreased balance, Decreased high-level IADLs, Decreased coordination, Decreased ROM, Decreased fine motor control, Decreased posture  Assessment: -  Treatment Diagnosis: Impaired self-care status  Prognosis: Good  Decision Making: Medium Complexity  Discharge Recommendations: Patient would benefit from continued therapy after discharge  OT Equipment Recommendations  Other: TBD  Safety Devices  Type of Devices: All fall risk precautions in place, Call light within reach, Gait belt, Patient at risk for falls, Left in chair, Chair alarm in place, Nurse notified (LUE propped for support and wedges donned on L side to offload weight for skin integrity)    AM-PAC Daily Activities 
9 9 - 16 mmol/L    Glucose 118 (H) 74 - 99 mg/dL    BUN 26 (H) 8 - 23 mg/dL    Creatinine 1.1 0.7 - 1.2 mg/dL    Est, Glom Filt Rate 73 >60 mL/min/1.73m2    Calcium 8.9 8.6 - 10.4 mg/dL       Imaging/Diagnostics:  CT HEAD WO CONTRAST    Result Date: 11/11/2024  Slight enlargement of acute infarct in the right frontal corona radiata.     CT CERVICAL SPINE WO CONTRAST    Result Date: 11/8/2024  Multilevel degenerative changes of the cervical spine as detailed above.     MRI BRAIN WO CONTRAST    Result Date: 11/7/2024  2 small areas of restricted diffusion in the right basal ganglia consistent with acute areas of infarct.  No other areas restricted diffusion are identified. Cerebral atrophy.  Mild chronic small vessel ischemic disease. Multiple subcutaneous nodules in the soft tissues of the head and upper neck.     CTA HEAD NECK W CONTRAST    Result Date: 11/7/2024  Mild atherosclerosis, otherwise unremarkable CT angiogram of the head and neck.     CT HEAD WO CONTRAST    Result Date: 11/7/2024  No acute intracranial process identified. The findings were sent to the Radiology Results Communication Center at 12:18 pm on 11/7/2024 to be communicated to a licensed caregiver.  Negative results were subsequently relayed to Dr. Lane by the CORE team at 12:23 p.m. on 11/07/2024.       Assessment :      Hospital Problems             Last Modified POA    * (Principal) Left-sided weakness 11/7/2024 Yes    Cerebrovascular accident (CVA) (HCC) 11/12/2024 Yes    Right-sided lacunar stroke (HCC) 11/9/2024 Yes       Plan:     Patient status inpatient in the Progressive Unit/Step down    69-year-old old male with no significant past medical history presented with left extremity weakness admitted for stroke.  Right basal ganglia infarct, corona radiator infarct.  Slight enlargement on repeat CT head.  With residual left hemiparesis.  Continue aspirin, Plavix.  Appreciate neuroinput.  Hypertension.  Blood pressure slightly on the 
disintegrating tablet 4 mg, 4 mg, Oral, Q8H PRN **OR** ondansetron (ZOFRAN) injection 4 mg, 4 mg, IntraVENous, Q6H PRN  polyethylene glycol (GLYCOLAX) packet 17 g, 17 g, Oral, Daily PRN  labetalol (NORMODYNE;TRANDATE) injection 10 mg, 10 mg, IntraVENous, Q10 Min PRN  clopidogrel (PLAVIX) tablet 75 mg, 75 mg, Oral, Daily  rosuvastatin (CRESTOR) tablet 40 mg, 40 mg, Oral, Nightly  aspirin EC tablet 81 mg, 81 mg, Oral, Daily      Diagnostics:     CBC:   Recent Labs     11/18/24  0612   WBC 9.2   RBC 6.72*   HGB 18.7*   HCT 55.2*   MCV 82.2   RDW 15.9*        BMP:    Recent Labs     11/18/24  0612   GLUCOSE 88   BUN 24*   CREATININE 0.9   CALCIUM 9.1      K 4.2      CO2 24   ANIONGAP 9   LABGLOM >90     HbA1c:   Lab Results   Component Value Date    LABA1C 5.2 11/08/2024     BNP: No results for input(s): \"BNP\" in the last 72 hours.  PT/INR: No results for input(s): \"PROTIME\", \"INR\" in the last 72 hours.  APTT: No results for input(s): \"APTT\" in the last 72 hours.  CARDIAC ENZYMES: No results for input(s): \"CKMB\", \"CKMBINDEX\", \"TROPONINT\", \"TROPHS\", \"TROPII\" in the last 72 hours.    Invalid input(s): \"CKTOTAL;3\"   FASTING LIPID PANEL:  Lab Results   Component Value Date    CHOL 134 11/08/2024    HDL 52 11/08/2024    TRIG 89 11/08/2024     LIVER PROFILE: No results for input(s): \"AST\", \"ALT\", \"BILIDIR\", \"BILITOT\", \"ALKPHOS\" in the last 72 hours.    Invalid input(s): \"ALB\"      Latest Reference Range & Units 11/17/24 08:46   Uric Acid 3.4 - 7.0 mg/dL 7.0       Radiology:    CT HEAD 11/11/24  FINDINGS:  BRAIN/VENTRICLES: 19 mm acute infarct in the right frontal corona radiata is  redemonstrated involving a larger area than previously.  It is located  immediately superior to the genu of the internal capsule.  There is no acute  hemorrhage, herniation, or hydrocephalus.     ORBITS: The visualized portion of the orbits demonstrate no acute abnormality.     SINUSES: The visualized paranasal sinuses and 
is unremarkable. SINUSES: The paranasal sinuses and mastoid air cells are clear. SOFT TISSUES/SKULL:  No lytic or blastic osseous lesions are identified.     No acute intracranial process identified. The findings were sent to the Radiology Results Communication Center at 12:18 pm on 11/7/2024 to be communicated to a licensed caregiver.  Negative results were subsequently relayed to Dr. Lane by the CORE team at 12:23 p.m. on 11/07/2024.       Assessment :   Ac cva/on dual therapy  neurofibromyoma     Plan:   1.  Continue therapy  2.  See order    Patient Active Problem List:     Abdominal pain     Colon polyp     Neurofibromatosis (HCC)     Thyroid nodule     Pulmonary nodules     Lymphangitis     Cellulitis of left lower leg     Ex-cigarette smoker     Polycythemia     Pneumococcal vaccination declined     Colonoscopy refused     Polycythemia vera (HCC)     Left-sided weakness     Cerebrovascular accident (CVA) (HCC)     Right-sided lacunar stroke (HCC)      Anticipated Disposition upon discharge: [] Home                                                                         [] Home with Home Health                                                                         [] Skilled Nursing Facility                                                                         [] [facility]      Patient is admitted as inpatient status because of co-morbidities listed above, severity of signs and symptoms as outlined, requirement for current medical therapies and most importantly because of direct risk to patient if care not provided in a hospital setting.          David Khan MD, MD  Rounding Hospitalist    
MD Janet Jacques,MD [facility]                            This note is created with the assistance of a speech recognition program.  While intending to generate a document that actually reflects the content of the visit, the document can still have some errors including those of syntax and sound a like substitutions which may escape proof reading.  It such instances, actual meaning can be extrapolated by contextual diversion.      
hematology.  He had JAK2 gene mutation which was negative.  Bone marrow test was negative.  NexGen ration sequencing test was negative.  Bone marrow was reviewed and [facility] pathology.  Although myeloproliferative disorder is not completely ruled out but there is no solid evidence to make the diagnosis.  So reactive polycythemia is still a consideration.  The patient will need to have therapeutic phlebotomy as outpatient.  Likely to keep his hemoglobin/hematocrit under control.  Unfortunately phlebotomy cannot be done during hospitalization and will be performed as outpatient.  In the meantime continue antiplatelets and continue monitoring.  Patient's questions were answered to the best of his satisfaction and he verbalized full understanding and agreement.    Enrique Peñaloza MD, MD [facility]                            This note is created with the assistance of a speech recognition program.  While intending to generate a document that actually reflects the content of the visit, the document can still have some errors including those of syntax and sound a like substitutions which may escape proof reading.  It such instances, actual meaning can be extrapolated by contextual diversion.

## 2024-11-19 NOTE — CARE COORDINATION
ACUTE INPATIENT REHABILITATION  [facility]  PRE-ADMISSION ASSESSMENT    Patient Name: Nitesh Ren        MRN: [id number]    : 1955  (69 y.o.)  Gender: male   Ethnicity: Not , /a or Swedish Origin  Race: White    Admitted from:  [facility]     Type of Admission:  New Admission     Date of Onset / Admission to the Acute Hospital:  2024    Inpatient Rehabilitation Admitting Diagnosis:  L dominant hemiparesis secondary to ischemic CVA:     Did patient have surgery/procedures?  No     Physicians:   David Khan MD  Physician  Family Medicine    Anusha Miller MD  Physician  Internal Medicine    Max Thompson DO  Physician  Neurology    Enrique Peñaloza MD  Physician  Oncology        Risk for Clinical Complications:  Low     Co-morbidities:    L dominant hemiparesis secondary to ischemic CVA: 21 days DAPT followed by ASA monotherapy. Outpatient neurology follow up 4-6 weeks   Spasticity: new onset. Recommend initiating baclofen and titrating up to effective dose.   Hypertension  Hyperlipidemia  Polycythemia: hematology following. For outpatient phlebotomy with his hematologist Dr. Stinson  Tobacco use  Neurofibromatosis     Financial Information  Primary insurance: Medicare HMO: [employer]      Secondary Insurance: None     Precautions:   []Cardiac Precautions: No Cardiac Precautions  []Total hip precautions: No Total Hip Precautions  []Weight Bearing status: No Weight Bearing Restrictions  [x]Safety Precautions/Concerns:  Fall Risk, General Precautions  [x]Visually impaired: (some decreased awareness of L side noted this date)  Vision Exceptions: Wears glasses at all times (reports he has always had a \"floater in L eye\" prior to CVA - reports no visual changes)  Tracking:  (tracking WFL; minimal difficulty converging at initial evaluation)   [x]Hard of Hearing: Exceptions to WFL  Hearing Exceptions: Hard of hearing/hearing concerns

## 2024-11-19 NOTE — CARE COORDINATION
ONGOING DISCHARGE PLAN:    Patient is alert and oriented x4.    Spoke with patient regarding discharge plan and patient confirms that plan is still to discharge to [facility]    Patient is approved to discharge to [facility]    Will continue to follow for additional discharge needs.    If patient is discharged prior to next notation, then this note serves as note for discharge by case management.    Electronically signed by Diane Rosenthal RN on 11/19/2024 at 12:10 PM

## 2024-11-19 NOTE — PRE-CERTIFICATION NOTE
Received call from Dr. Melendez, peer to peer completed, denial overturned. Patient approved for inpatient rehab admission.     Will need confirmation from [facility] [employer] on number of authorized dates. Will call to follow up and request documentation or obtain verbal authorization information.    Spoke with NAIF Contreras to update on status of authorization. Coordinating discharge/readmit with charge nurse and ensuring anticoagulation is continued.     Preliminarily requested 4:00 pm for transportation.    1:20 PM: Follow up call placed to [employer], spoke with Thi DURAN, Call Ref#[id number]. Authorization #[id number] status remains pending.     2:50 PM: Follow up call placed to [facility] [employer], spoke with Beau EMERSON, Call Ref#[id number], Approved for 5 days.

## 2024-11-19 NOTE — PLAN OF CARE
Problem: Discharge Planning  Goal: Discharge to home or other facility with appropriate resources  11/10/2024 0006 by Tevin Guzman RN  Outcome: Progressing  11/9/2024 1227 by Josee Pires RN  Outcome: Progressing  Flowsheets (Taken 11/9/2024 0829)  Discharge to home or other facility with appropriate resources:   Identify barriers to discharge with patient and caregiver   Arrange for needed discharge resources and transportation as appropriate   Identify discharge learning needs (meds, wound care, etc)   Arrange for interpreters to assist at discharge as needed   Refer to discharge planning if patient needs post-hospital services based on physician order or complex needs related to functional status, cognitive ability or social support system     Problem: Pain  Goal: Verbalizes/displays adequate comfort level or baseline comfort level  11/10/2024 0006 by Tevin Guzman RN  Outcome: Progressing  11/9/2024 1227 by Josee Pires RN  Outcome: Progressing     Problem: Safety - Adult  Goal: Free from fall injury  11/10/2024 0006 by Tevin Guzman RN  Outcome: Progressing  11/9/2024 1227 by Josee Pires RN  Outcome: Progressing  Flowsheets (Taken 11/9/2024 0829)  Free From Fall Injury:   Instruct family/caregiver on patient safety   Based on caregiver fall risk screen, instruct family/caregiver to ask for assistance with transferring infant if caregiver noted to have fall risk factors     
  Problem: Discharge Planning  Goal: Discharge to home or other facility with appropriate resources  11/10/2024 2240 by Eliz Harvey RN  Outcome: Progressing  Flowsheets (Taken 11/10/2024 2120)  Discharge to home or other facility with appropriate resources:   Identify barriers to discharge with patient and caregiver   Arrange for needed discharge resources and transportation as appropriate   Identify discharge learning needs (meds, wound care, etc)   Arrange for interpreters to assist at discharge as needed   Refer to discharge planning if patient needs post-hospital services based on physician order or complex needs related to functional status, cognitive ability or social support system  11/10/2024 1742 by Leonila Mendenhall RN  Outcome: Progressing     Problem: Pain  Goal: Verbalizes/displays adequate comfort level or baseline comfort level  11/10/2024 2240 by Eliz Harvey RN  Outcome: Progressing  11/10/2024 1742 by Leonila Mendenhall RN  Outcome: Progressing     Problem: Safety - Adult  Goal: Free from fall injury  11/10/2024 2240 by Eliz Harvey RN  Outcome: Progressing  11/10/2024 1742 by Leonila Mendenhall RN  Outcome: Progressing     Problem: Chronic Conditions and Co-morbidities  Goal: Patient's chronic conditions and co-morbidity symptoms are monitored and maintained or improved  11/10/2024 2240 by Eliz Harvey RN  Outcome: Progressing  Flowsheets (Taken 11/10/2024 2120)  Care Plan - Patient's Chronic Conditions and Co-Morbidity Symptoms are Monitored and Maintained or Improved:   Monitor and assess patient's chronic conditions and comorbid symptoms for stability, deterioration, or improvement   Collaborate with multidisciplinary team to address chronic and comorbid conditions and prevent exacerbation or deterioration   Update acute care plan with appropriate goals if chronic or comorbid symptoms are exacerbated and prevent overall improvement and discharge  11/10/2024 1742 by Leonila Mendenhall 
  Problem: Discharge Planning  Goal: Discharge to home or other facility with appropriate resources  11/12/2024 0046 by Bhavana Driver RN  Outcome: Progressing   DC barrier: ARU placement     Problem: Pain  Goal: Verbalizes/displays adequate comfort level or baseline comfort level  Outcome: Progressing   Assessed all pain characteristics including level, type, location, frequency, and onset.  Non-pharmacologic interventions offered to pt as well.  Pt states pain is tolerable at this time.     Problem: Safety - Adult  Goal: Free from fall injury  11/12/2024 0046 by Bhavana Driver RN  Outcome: Progressing   Pt assessed as a fall risk this shift. Remains free from falls and accidental injury at this time. Fall precautions in place, including falling star sign and fall risk band on pt. Floor free from obstacles, and bed is locked and in lowest position. Adequate lighting provided.  Pt encouraged to call before getting OOB for any need.  Bed alarm activated.   
  Problem: Discharge Planning  Goal: Discharge to home or other facility with appropriate resources  11/14/2024 1624 by Bhupendra Dutta RN  Outcome: Progressing     Problem: Pain  Goal: Verbalizes/displays adequate comfort level or baseline comfort level  11/14/2024 1624 by Bhupendra Dutta RN  Outcome: Progressing     Problem: Safety - Adult  Goal: Free from fall injury  11/14/2024 1624 by Bhupendra Dutta RN  Outcome: Progressing     Problem: Chronic Conditions and Co-morbidities  Goal: Patient's chronic conditions and co-morbidity symptoms are monitored and maintained or improved  11/14/2024 1624 by Bhupendra Dutta RN  Outcome: Progressing     Problem: ABCDS Injury Assessment  Goal: Absence of physical injury  11/14/2024 1624 by Bhupendra Dutta RN  Outcome: Progressing     Problem: Neurosensory - Adult  Goal: Achieves maximal functionality and self care  11/14/2024 1624 by Bhupendra Dutta RN  Outcome: Progressing     Problem: Skin/Tissue Integrity - Adult  Goal: Skin integrity remains intact  11/14/2024 1624 by Bhupendra Dutta RN  Outcome: Progressing     Problem: Musculoskeletal - Adult  Goal: Return mobility to safest level of function  11/14/2024 1624 by Bhupendra Dutta RN  Outcome: Progressing     Problem: Skin/Tissue Integrity  Goal: Absence of new skin breakdown  Description: 1.  Monitor for areas of redness and/or skin breakdown  2.  Assess vascular access sites hourly  3.  Every 4-6 hours minimum:  Change oxygen saturation probe site  4.  Every 4-6 hours:  If on nasal continuous positive airway pressure, respiratory therapy assess nares and determine need for appliance change or resting period.  Outcome: Progressing     Problem: Nutrition Deficit:  Goal: Optimize nutritional status  Outcome: Progressing     
  Problem: Discharge Planning  Goal: Discharge to home or other facility with appropriate resources  11/15/2024 0250 by Suki Roman RN  Outcome: Progressing  Flowsheets (Taken 11/15/2024 0250)  Discharge to home or other facility with appropriate resources:   Identify barriers to discharge with patient and caregiver   Arrange for needed discharge resources and transportation as appropriate   Identify discharge learning needs (meds, wound care, etc)   Refer to discharge planning if patient needs post-hospital services based on physician order or complex needs related to functional status, cognitive ability or social support system  Note: Patient is planning to go to ARU upon discharge, waiting for insurance approval.      Problem: Pain  Goal: Verbalizes/displays adequate comfort level or baseline comfort level  11/15/2024 0250 by Suki Roman, RN  Outcome: Progressing  Flowsheets (Taken 11/15/2024 0250)  Verbalizes/displays adequate comfort level or baseline comfort level:   Encourage patient to monitor pain and request assistance   Assess pain using appropriate pain scale   Administer analgesics based on type and severity of pain and evaluate response   Implement non-pharmacological measures as appropriate and evaluate response   Consider cultural and social influences on pain and pain management  Note: Patient received tylenol for neck pain and utilized rest and repositioning for pain relief. Patient was satisfied with pain relief.      Problem: Safety - Adult  Goal: Free from fall injury  11/15/2024 0250 by Suki Roman, RN  Outcome: Progressing  Flowsheets (Taken 11/15/2024 0250)  Free From Fall Injury: Instruct family/caregiver on patient safety  Note: Patient is a high PA fall risk and utilized fall precautions. Patient calls out appropriately for needs      Problem: Chronic Conditions and Co-morbidities  Goal: Patient's chronic conditions and co-morbidity symptoms are monitored and 
  Problem: Discharge Planning  Goal: Discharge to home or other facility with appropriate resources  11/16/2024 0309 by Svetlana Jaffe RN  Outcome: Progressing  Flowsheets (Taken 11/15/2024 0250 by Suki Roman, RN)  Discharge to home or other facility with appropriate resources:   Identify barriers to discharge with patient and caregiver   Arrange for needed discharge resources and transportation as appropriate   Identify discharge learning needs (meds, wound care, etc)   Refer to discharge planning if patient needs post-hospital services based on physician order or complex needs related to functional status, cognitive ability or social support system  11/15/2024 1710 by Bhupendra Dutta RN  Outcome: Progressing     Problem: Pain  Goal: Verbalizes/displays adequate comfort level or baseline comfort level  11/16/2024 0309 by Svetlana Jaffe RN  Outcome: Progressing  Flowsheets (Taken 11/15/2024 0250 by Suki Roman RN)  Verbalizes/displays adequate comfort level or baseline comfort level:   Encourage patient to monitor pain and request assistance   Assess pain using appropriate pain scale   Administer analgesics based on type and severity of pain and evaluate response   Implement non-pharmacological measures as appropriate and evaluate response   Consider cultural and social influences on pain and pain management  11/15/2024 1710 by Bhupendra Dutta RN  Outcome: Progressing     Problem: Safety - Adult  Goal: Free from fall injury  11/16/2024 0309 by Svetlana Jaffe RN  Outcome: Progressing  Flowsheets (Taken 11/15/2024 0250 by Suki Roman, RN)  Free From Fall Injury: Instruct family/caregiver on patient safety  11/15/2024 1710 by Bhupendra Dutta RN  Outcome: Progressing     Problem: Chronic Conditions and Co-morbidities  Goal: Patient's chronic conditions and co-morbidity symptoms are monitored and maintained or improved  11/16/2024 0309 by Svetlana Jaffe RN  Outcome: 
  Problem: Discharge Planning  Goal: Discharge to home or other facility with appropriate resources  11/16/2024 1548 by Randi Haines RN  Outcome: Progressing     Problem: Pain  Goal: Verbalizes/displays adequate comfort level or baseline comfort level  11/16/2024 1548 by Randi Haines RN  Outcome: Progressing     Problem: Safety - Adult  Goal: Free from fall injury  11/16/2024 1548 by aRndi Haines RN  Outcome: Progressing     Problem: Chronic Conditions and Co-morbidities  Goal: Patient's chronic conditions and co-morbidity symptoms are monitored and maintained or improved  11/16/2024 1548 by Randi Haines RN  Outcome: Progressing     Problem: Neurosensory - Adult  Goal: Achieves maximal functionality and self care  11/16/2024 1548 by Randi Haines RN  Outcome: Progressing     Problem: Skin/Tissue Integrity - Adult  Goal: Skin integrity remains intact  11/16/2024 1548 by Randi Haines RN  Outcome: Progressing     Problem: Musculoskeletal - Adult  Goal: Return mobility to safest level of function  11/16/2024 1548 by Randi Haines RN  Outcome: Progressing     
  Problem: Discharge Planning  Goal: Discharge to home or other facility with appropriate resources  11/17/2024 0356 by Sarah Raines RN  Outcome: Progressing  Flowsheets (Taken 11/17/2024 0356)  Discharge to home or other facility with appropriate resources:   Identify barriers to discharge with patient and caregiver   Arrange for needed discharge resources and transportation as appropriate   Identify discharge learning needs (meds, wound care, etc)  11/16/2024 1548 by Randi Haines, RN  Outcome: Progressing     Problem: Pain  Goal: Verbalizes/displays adequate comfort level or baseline comfort level  11/17/2024 0356 by Sarah Raines RN  Outcome: Progressing  Flowsheets (Taken 11/17/2024 0356)  Verbalizes/displays adequate comfort level or baseline comfort level:   Encourage patient to monitor pain and request assistance   Assess pain using appropriate pain scale   Administer analgesics based on type and severity of pain and evaluate response  11/16/2024 1548 by Randi Haines, RN  Outcome: Progressing     Problem: Safety - Adult  Goal: Free from fall injury  11/17/2024 0356 by Sarah Raines RN  Outcome: Progressing  Flowsheets (Taken 11/17/2024 0356)  Free From Fall Injury: Instruct family/caregiver on patient safety  11/16/2024 1548 by Randi Haines RN  Outcome: Progressing     
  Problem: Discharge Planning  Goal: Discharge to home or other facility with appropriate resources  11/17/2024 1514 by Randi Haines, RN  Outcome: Progressing     Problem: Pain  Goal: Verbalizes/displays adequate comfort level or baseline comfort level  11/17/2024 1514 by Randi Haines, RN  Outcome: Progressing     Problem: Safety - Adult  Goal: Free from fall injury  11/17/2024 1514 by Randi Haines RN  Outcome: Progressing     Problem: Chronic Conditions and Co-morbidities  Goal: Patient's chronic conditions and co-morbidity symptoms are monitored and maintained or improved  Outcome: Progressing     Problem: ABCDS Injury Assessment  Goal: Absence of physical injury  Outcome: Progressing     Problem: Neurosensory - Adult  Goal: Achieves maximal functionality and self care  Outcome: Progressing     
  Problem: Discharge Planning  Goal: Discharge to home or other facility with appropriate resources  11/18/2024 0451 by Minnie Valladares RN  Outcome: Progressing  Flowsheets (Taken 11/17/2024 2000)  Discharge to home or other facility with appropriate resources:   Identify barriers to discharge with patient and caregiver   Arrange for needed discharge resources and transportation as appropriate   Identify discharge learning needs (meds, wound care, etc)  11/17/2024 1514 by Randi Haines RN  Outcome: Progressing     Problem: Pain  Goal: Verbalizes/displays adequate comfort level or baseline comfort level  11/18/2024 0451 by Minnie Valladares RN  Outcome: Progressing  Flowsheets (Taken 11/17/2024 2000)  Verbalizes/displays adequate comfort level or baseline comfort level:   Encourage patient to monitor pain and request assistance   Assess pain using appropriate pain scale   Administer analgesics based on type and severity of pain and evaluate response   Implement non-pharmacological measures as appropriate and evaluate response  11/17/2024 1514 by Randi Haines RN  Outcome: Progressing     Problem: Safety - Adult  Goal: Free from fall injury  11/18/2024 0451 by Minnie Valladares RN  Outcome: Progressing  Flowsheets (Taken 11/18/2024 0451)  Free From Fall Injury: Instruct family/caregiver on patient safety  11/17/2024 1514 by Randi Haines RN  Outcome: Progressing     Problem: Chronic Conditions and Co-morbidities  Goal: Patient's chronic conditions and co-morbidity symptoms are monitored and maintained or improved  11/18/2024 0451 by Minnie Valladares RN  Outcome: Progressing  Flowsheets (Taken 11/17/2024 2000)  Care Plan - Patient's Chronic Conditions and Co-Morbidity Symptoms are Monitored and Maintained or Improved:   Monitor and assess patient's chronic conditions and comorbid symptoms for stability, deterioration, or improvement   Collaborate with multidisciplinary team to address chronic and comorbid 
  Problem: Discharge Planning  Goal: Discharge to home or other facility with appropriate resources  11/19/2024 0459 by Bhavana Driver RN  Outcome: Progressing   DC barrier: ARU rejected by insurance, possible overturn    Problem: Pain  Goal: Verbalizes/displays adequate comfort level or baseline comfort level  11/19/2024 0459 by Bhavana Driver RN  Outcome: Progressing   Pt medicated with pain medication prn.  Assessed all pain characteristics including level, type, location, frequency, and onset.  Non-pharmacologic interventions offered to pt as well.  Pt states pain is tolerable at this time.     Problem: Safety - Adult  Goal: Free from fall injury  11/19/2024 0459 by Bhavana Driver RN  Outcome: Progressing   Pt assessed as a fall risk this shift. Remains free from falls and accidental injury at this time. Fall precautions in place, including falling star sign and fall risk band on pt. Floor free from obstacles, and bed is locked and in lowest position. Adequate lighting provided. Bed alarm activated.   
  Problem: Discharge Planning  Goal: Discharge to home or other facility with appropriate resources  11/19/2024 1349 by Jenniffer Marie RN  Outcome: Progressing     Problem: Pain  Goal: Verbalizes/displays adequate comfort level or baseline comfort level  11/19/2024 1349 by Jenniffer Marie RN  Outcome: Progressing     Problem: Safety - Adult  Goal: Free from fall injury  11/19/2024 1349 by Jenniffer Marie RN  Outcome: Progressing     Problem: Chronic Conditions and Co-morbidities  Goal: Patient's chronic conditions and co-morbidity symptoms are monitored and maintained or improved  Outcome: Progressing     Problem: ABCDS Injury Assessment  Goal: Absence of physical injury  Outcome: Progressing     Problem: Neurosensory - Adult  Goal: Achieves maximal functionality and self care  Outcome: Progressing     Problem: Skin/Tissue Integrity - Adult  Goal: Skin integrity remains intact  Outcome: Progressing     Problem: Musculoskeletal - Adult  Goal: Return mobility to safest level of function  Outcome: Progressing     Problem: Skin/Tissue Integrity  Goal: Absence of new skin breakdown  Description: 1.  Monitor for areas of redness and/or skin breakdown  2.  Assess vascular access sites hourly  3.  Every 4-6 hours minimum:  Change oxygen saturation probe site  4.  Every 4-6 hours:  If on nasal continuous positive airway pressure, respiratory therapy assess nares and determine need for appliance change or resting period.  Outcome: Progressing     Problem: Nutrition Deficit:  Goal: Optimize nutritional status  Outcome: Progressing     
  Problem: Discharge Planning  Goal: Discharge to home or other facility with appropriate resources  11/9/2024 0032 by Bhavana Driver RN  Outcome: Progressing   Dc barrier: ARU clearance    Problem: Pain  Goal: Verbalizes/displays adequate comfort level or baseline comfort level  11/9/2024 0032 by Bhavana Driver RN  Outcome: Progressing  Assessed all pain characteristics including level, type, location, frequency, and onset.  Non-pharmacologic interventions offered to pt as well.  Pt states pain is tolerable at this time.     Problem: Safety - Adult  Goal: Free from fall injury  11/9/2024 0032 by Bhavana Driver RN  Outcome: Progressing   Pt assessed as a fall risk this shift. Remains free from falls and accidental injury at this time. Fall precautions in place, including falling star sign and fall risk band on pt. Floor free from obstacles, and bed is locked and in lowest position. Adequate lighting provided.  Pt encouraged to call before getting OOB for any need.  Bed alarm activated.   
  Problem: Discharge Planning  Goal: Discharge to home or other facility with appropriate resources  11/9/2024 1227 by Josee Pires RN  Outcome: Progressing  Flowsheets (Taken 11/9/2024 0829)  Discharge to home or other facility with appropriate resources:   Identify barriers to discharge with patient and caregiver   Arrange for needed discharge resources and transportation as appropriate   Identify discharge learning needs (meds, wound care, etc)   Arrange for interpreters to assist at discharge as needed   Refer to discharge planning if patient needs post-hospital services based on physician order or complex needs related to functional status, cognitive ability or social support system  11/9/2024 0032 by Bhavana Driver RN  Outcome: Progressing     Problem: Pain  Goal: Verbalizes/displays adequate comfort level or baseline comfort level  11/9/2024 1227 by Josee Pires RN  Outcome: Progressing  11/9/2024 0032 by Bhavana Driver RN  Outcome: Progressing     Problem: Safety - Adult  Goal: Free from fall injury  11/9/2024 1227 by Josee Pires RN  Outcome: Progressing  Flowsheets (Taken 11/9/2024 0829)  Free From Fall Injury:   Instruct family/caregiver on patient safety   Based on caregiver fall risk screen, instruct family/caregiver to ask for assistance with transferring infant if caregiver noted to have fall risk factors  11/9/2024 0032 by Bhavana Driver RN  Outcome: Progressing     Problem: Chronic Conditions and Co-morbidities  Goal: Patient's chronic conditions and co-morbidity symptoms are monitored and maintained or improved  Outcome: Progressing  Flowsheets (Taken 11/9/2024 0829)  Care Plan - Patient's Chronic Conditions and Co-Morbidity Symptoms are Monitored and Maintained or Improved:   Monitor and assess patient's chronic conditions and comorbid symptoms for stability, deterioration, or improvement   Collaborate with multidisciplinary team to address chronic and 
  Problem: Discharge Planning  Goal: Discharge to home or other facility with appropriate resources  Outcome: Progressing     Problem: Pain  Goal: Verbalizes/displays adequate comfort level or baseline comfort level  Outcome: Progressing     Problem: Safety - Adult  Goal: Free from fall injury  Outcome: Progressing     
  Problem: Discharge Planning  Goal: Discharge to home or other facility with appropriate resources  Outcome: Progressing     Problem: Pain  Goal: Verbalizes/displays adequate comfort level or baseline comfort level  Outcome: Progressing     Problem: Safety - Adult  Goal: Free from fall injury  Outcome: Progressing     
  Problem: Discharge Planning  Goal: Discharge to home or other facility with appropriate resources  Outcome: Progressing     Problem: Pain  Goal: Verbalizes/displays adequate comfort level or baseline comfort level  Outcome: Progressing     Problem: Safety - Adult  Goal: Free from fall injury  Outcome: Progressing     Problem: Chronic Conditions and Co-morbidities  Goal: Patient's chronic conditions and co-morbidity symptoms are monitored and maintained or improved  Outcome: Progressing     Problem: ABCDS Injury Assessment  Goal: Absence of physical injury  Outcome: Progressing     
  Problem: Discharge Planning  Goal: Discharge to home or other facility with appropriate resources  Outcome: Progressing     Problem: Pain  Goal: Verbalizes/displays adequate comfort level or baseline comfort level  Outcome: Progressing     Problem: Safety - Adult  Goal: Free from fall injury  Outcome: Progressing     Problem: Chronic Conditions and Co-morbidities  Goal: Patient's chronic conditions and co-morbidity symptoms are monitored and maintained or improved  Outcome: Progressing     Problem: ABCDS Injury Assessment  Goal: Absence of physical injury  Outcome: Progressing     Problem: Neurosensory - Adult  Goal: Achieves maximal functionality and self care  Outcome: Progressing     Problem: Skin/Tissue Integrity - Adult  Goal: Skin integrity remains intact  Outcome: Progressing     Problem: Musculoskeletal - Adult  Goal: Return mobility to safest level of function  Outcome: Progressing     
  Problem: Discharge Planning  Goal: Discharge to home or other facility with appropriate resources  Outcome: Progressing     Problem: Pain  Goal: Verbalizes/displays adequate comfort level or baseline comfort level  Outcome: Progressing     Problem: Safety - Adult  Goal: Free from fall injury  Outcome: Progressing     Problem: Chronic Conditions and Co-morbidities  Goal: Patient's chronic conditions and co-morbidity symptoms are monitored and maintained or improved  Outcome: Progressing     Problem: ABCDS Injury Assessment  Goal: Absence of physical injury  Outcome: Progressing     Problem: Neurosensory - Adult  Goal: Achieves maximal functionality and self care  Outcome: Progressing     Problem: Skin/Tissue Integrity - Adult  Goal: Skin integrity remains intact  Outcome: Progressing     Problem: Musculoskeletal - Adult  Goal: Return mobility to safest level of function  Outcome: Progressing     Problem: Skin/Tissue Integrity  Goal: Absence of new skin breakdown  Description: 1.  Monitor for areas of redness and/or skin breakdown  2.  Assess vascular access sites hourly  3.  Every 4-6 hours minimum:  Change oxygen saturation probe site  4.  Every 4-6 hours:  If on nasal continuous positive airway pressure, respiratory therapy assess nares and determine need for appliance change or resting period.  Outcome: Progressing     Problem: Nutrition Deficit:  Goal: Optimize nutritional status  Outcome: Progressing     
  Problem: Discharge Planning  Goal: Discharge to home or other facility with appropriate resources  Outcome: Progressing     Problem: Safety - Adult  Goal: Free from fall injury  Outcome: Progressing  Note: Pt free from falls this shift     
  Problem: Discharge Planning  Goal: Discharge to home or other facility with appropriate resources  Outcome: Progressing   DC Barrier: insurance approval for ARU     Problem: Pain  Goal: Verbalizes/displays adequate comfort level or baseline comfort level  Outcome: Progressing   Pt medicated with pain medication prn.  Assessed all pain characteristics including level, type, location, frequency, and onset.  Non-pharmacologic interventions offered to pt as well.  Pt states pain is tolerable at this time.     Problem: Safety - Adult  Goal: Free from fall injury  Outcome: Progressing   Pt assessed as a fall risk this shift. Remains free from falls and accidental injury at this time. Fall precautions in place, including falling star sign and fall risk band on pt. Floor free from obstacles, and bed is locked and in lowest position. Adequate lighting provided.  Pt encouraged to call before getting OOB for any need.  Bed alarm activated.   
  Problem: Discharge Planning  Goal: Discharge to home or other facility with appropriate resources  Outcome: Progressing  Flowsheets (Taken 11/17/2024 2000 by Minnie Valladares, RN)  Discharge to home or other facility with appropriate resources:   Identify barriers to discharge with patient and caregiver   Arrange for needed discharge resources and transportation as appropriate   Identify discharge learning needs (meds, wound care, etc)     Problem: Pain  Goal: Verbalizes/displays adequate comfort level or baseline comfort level  Outcome: Progressing  Flowsheets (Taken 11/17/2024 2000 by Minnie Valladares, RN)  Verbalizes/displays adequate comfort level or baseline comfort level:   Encourage patient to monitor pain and request assistance   Assess pain using appropriate pain scale   Administer analgesics based on type and severity of pain and evaluate response   Implement non-pharmacological measures as appropriate and evaluate response     Problem: Safety - Adult  Goal: Free from fall injury  Outcome: Progressing  Flowsheets (Taken 11/18/2024 0451 by Minnie Valladares, RN)  Free From Fall Injury: Instruct family/caregiver on patient safety     Problem: Chronic Conditions and Co-morbidities  Goal: Patient's chronic conditions and co-morbidity symptoms are monitored and maintained or improved  Outcome: Progressing  Flowsheets (Taken 11/17/2024 2000 by Minnie Valladares, RN)  Care Plan - Patient's Chronic Conditions and Co-Morbidity Symptoms are Monitored and Maintained or Improved:   Monitor and assess patient's chronic conditions and comorbid symptoms for stability, deterioration, or improvement   Collaborate with multidisciplinary team to address chronic and comorbid conditions and prevent exacerbation or deterioration   Update acute care plan with appropriate goals if chronic or comorbid symptoms are exacerbated and prevent overall improvement and discharge     Problem: ABCDS Injury Assessment  Goal: Absence of physical 
assessment  11/13/2024 1156 by Juani Cunningham RN  Outcome: Progressing     Problem: Neurosensory - Adult  Goal: Achieves maximal functionality and self care  11/13/2024 1156 by Juani Cunningham RN  Outcome: Progressing  Flowsheets (Taken 11/13/2024 0830)  Achieves maximal functionality and self care:   Monitor swallowing and airway patency with patient fatigue and changes in neurological status   Encourage and assist patient to increase activity and self care with guidance from physical therapy/occupational therapy  11/13/2024 1156 by Juani Cunningham RN  Outcome: Progressing  Flowsheets (Taken 11/13/2024 0830)  Achieves maximal functionality and self care:   Monitor swallowing and airway patency with patient fatigue and changes in neurological status   Encourage and assist patient to increase activity and self care with guidance from physical therapy/occupational therapy     Problem: Skin/Tissue Integrity - Adult  Goal: Skin integrity remains intact  11/13/2024 1156 by Juani Cunningham RN  Outcome: Progressing  Flowsheets (Taken 11/13/2024 0830)  Skin Integrity Remains Intact: Monitor for areas of redness and/or skin breakdown  11/13/2024 1156 by Juani Cunningham RN  Outcome: Progressing  Flowsheets (Taken 11/13/2024 0830)  Skin Integrity Remains Intact: Monitor for areas of redness and/or skin breakdown     Problem: Musculoskeletal - Adult  Goal: Return mobility to safest level of function  11/13/2024 1156 by Juani Cunningham RN  Outcome: Progressing  Flowsheets (Taken 11/13/2024 0830)  Return Mobility to Safest Level of Function:   Assess patient stability and activity tolerance for standing, transferring and ambulating with or without assistive devices   Assist with transfers and ambulation using safe patient handling equipment as needed   Ensure adequate protection for wounds/incisions during mobilization   Obtain physical therapy/occupational therapy consults as needed  11/13/2024 1156 by Juani Cunningham

## 2024-11-19 NOTE — CARE COORDINATION
Pre-Admission Assessment completed and co-signed by PM&R physiatrist Dr. Ele Melendez. Preadmission assessment valid for 48 hours after co-signature.    Discharging provider responsible for Discharge/Readmit medication reconciliation action. Status: Completed. Orders verified as signed/held, will be released by [facility] upon admission    DVT Prophylaxis Plan:  Lovenox to be continued for DVT Prophylaxis     Discharging nurse to call nursing report to [facility] nursing team at extension [phone] before patient departure.    Transportation Time: 4:00 pm    Updated Diane CM: Via Telephone [phone] at this time.

## 2024-11-20 LAB
ALBUMIN SERPL-MCNC: 3.7 G/DL (ref 3.5–5.2)
ALP SERPL-CCNC: 96 U/L (ref 40–129)
ALT SERPL-CCNC: 94 U/L (ref 10–50)
ANION GAP SERPL CALCULATED.3IONS-SCNC: 11 MMOL/L (ref 9–16)
AST SERPL-CCNC: 57 U/L (ref 10–50)
BASOPHILS # BLD: 0.1 K/UL (ref 0–0.2)
BASOPHILS NFR BLD: 1 % (ref 0–2)
BILIRUB DIRECT SERPL-MCNC: 0.7 MG/DL (ref 0–0.3)
BILIRUB INDIRECT SERPL-MCNC: 0.7 MG/DL (ref 0–1)
BILIRUB SERPL-MCNC: 1.4 MG/DL (ref 0–1.2)
BUN SERPL-MCNC: 29 MG/DL (ref 8–23)
CALCIUM SERPL-MCNC: 9.4 MG/DL (ref 8.6–10.4)
CHLORIDE SERPL-SCNC: 103 MMOL/L (ref 98–107)
CO2 SERPL-SCNC: 25 MMOL/L (ref 20–31)
CREAT SERPL-MCNC: 1 MG/DL (ref 0.7–1.2)
EOSINOPHIL # BLD: 0.4 K/UL (ref 0–0.4)
EOSINOPHILS RELATIVE PERCENT: 4 % (ref 0–4)
ERYTHROCYTE [DISTWIDTH] IN BLOOD BY AUTOMATED COUNT: 16.4 % (ref 11.5–14.9)
GFR, ESTIMATED: 82 ML/MIN/1.73M2
GLUCOSE SERPL-MCNC: 92 MG/DL (ref 74–99)
HCT VFR BLD AUTO: 57 % (ref 41–53)
HGB BLD-MCNC: 18.7 G/DL (ref 13.5–17.5)
LYMPHOCYTES NFR BLD: 1.5 K/UL (ref 1–4.8)
LYMPHOCYTES RELATIVE PERCENT: 18 % (ref 24–44)
MCH RBC QN AUTO: 27.8 PG (ref 26–34)
MCHC RBC AUTO-ENTMCNC: 32.7 G/DL (ref 31–37)
MCV RBC AUTO: 84.8 FL (ref 80–100)
MONOCYTES NFR BLD: 1 K/UL (ref 0.1–1.3)
MONOCYTES NFR BLD: 11 % (ref 1–7)
NEUTROPHILS NFR BLD: 66 % (ref 36–66)
NEUTS SEG NFR BLD: 5.9 K/UL (ref 1.3–9.1)
PLATELET # BLD AUTO: 191 K/UL (ref 150–450)
PMV BLD AUTO: 8.1 FL (ref 6–12)
POTASSIUM SERPL-SCNC: 4.4 MMOL/L (ref 3.7–5.3)
PROT SERPL-MCNC: 6.6 G/DL (ref 6.6–8.7)
RBC # BLD AUTO: 6.72 M/UL (ref 4.5–5.9)
SODIUM SERPL-SCNC: 139 MMOL/L (ref 136–145)
WBC OTHER # BLD: 8.8 K/UL (ref 3.5–11)

## 2024-11-20 PROCEDURE — 80048 BASIC METABOLIC PNL TOTAL CA: CPT

## 2024-11-20 PROCEDURE — 92523 SPEECH SOUND LANG COMPREHEN: CPT

## 2024-11-20 PROCEDURE — 92610 EVALUATE SWALLOWING FUNCTION: CPT

## 2024-11-20 PROCEDURE — 97530 THERAPEUTIC ACTIVITIES: CPT

## 2024-11-20 PROCEDURE — 97110 THERAPEUTIC EXERCISES: CPT

## 2024-11-20 PROCEDURE — 97162 PT EVAL MOD COMPLEX 30 MIN: CPT

## 2024-11-20 PROCEDURE — 85025 COMPLETE CBC W/AUTO DIFF WBC: CPT

## 2024-11-20 PROCEDURE — 1180000000 HC REHAB R&B

## 2024-11-20 PROCEDURE — 99223 1ST HOSP IP/OBS HIGH 75: CPT | Performed by: PSYCHIATRY & NEUROLOGY

## 2024-11-20 PROCEDURE — 6360000002 HC RX W HCPCS: Performed by: INTERNAL MEDICINE

## 2024-11-20 PROCEDURE — 6370000000 HC RX 637 (ALT 250 FOR IP): Performed by: INTERNAL MEDICINE

## 2024-11-20 PROCEDURE — 6370000000 HC RX 637 (ALT 250 FOR IP): Performed by: PHYSICAL MEDICINE & REHABILITATION

## 2024-11-20 PROCEDURE — 99222 1ST HOSP IP/OBS MODERATE 55: CPT | Performed by: INTERNAL MEDICINE

## 2024-11-20 PROCEDURE — 99222 1ST HOSP IP/OBS MODERATE 55: CPT | Performed by: GENERAL PRACTICE

## 2024-11-20 PROCEDURE — 80076 HEPATIC FUNCTION PANEL: CPT

## 2024-11-20 PROCEDURE — 97535 SELF CARE MNGMENT TRAINING: CPT

## 2024-11-20 PROCEDURE — 97166 OT EVAL MOD COMPLEX 45 MIN: CPT

## 2024-11-20 PROCEDURE — 36415 COLL VENOUS BLD VENIPUNCTURE: CPT

## 2024-11-20 RX ADMIN — ERYTHROMYCIN: 5 OINTMENT OPHTHALMIC at 18:10

## 2024-11-20 RX ADMIN — ERYTHROMYCIN: 5 OINTMENT OPHTHALMIC at 06:46

## 2024-11-20 RX ADMIN — AMLODIPINE BESYLATE 10 MG: 10 TABLET ORAL at 09:56

## 2024-11-20 RX ADMIN — ENOXAPARIN SODIUM 40 MG: 100 INJECTION SUBCUTANEOUS at 09:56

## 2024-11-20 RX ADMIN — CLOPIDOGREL BISULFATE 75 MG: 75 TABLET ORAL at 09:57

## 2024-11-20 RX ADMIN — LISINOPRIL 2.5 MG: 5 TABLET ORAL at 09:56

## 2024-11-20 RX ADMIN — ROSUVASTATIN 40 MG: 40 TABLET, FILM COATED ORAL at 21:05

## 2024-11-20 RX ADMIN — ASPIRIN 81 MG: 81 TABLET, COATED ORAL at 09:56

## 2024-11-20 RX ADMIN — BACLOFEN 5 MG: 10 TABLET ORAL at 04:15

## 2024-11-20 RX ADMIN — POLYETHYLENE GLYCOL 3350 17 G: 17 POWDER, FOR SOLUTION ORAL at 09:56

## 2024-11-20 RX ADMIN — ACETAMINOPHEN 650 MG: 325 TABLET ORAL at 01:54

## 2024-11-20 RX ADMIN — ACETAMINOPHEN 650 MG: 325 TABLET ORAL at 16:21

## 2024-11-20 RX ADMIN — ACETAMINOPHEN 650 MG: 325 TABLET ORAL at 23:54

## 2024-11-20 RX ADMIN — Medication 3 MG: at 21:05

## 2024-11-20 RX ADMIN — CARBOXYMETHYLCELLULOSE SODIUM 1 DROP: 5 SOLUTION/ DROPS OPHTHALMIC at 21:05

## 2024-11-20 RX ADMIN — CARBOXYMETHYLCELLULOSE SODIUM 1 DROP: 5 SOLUTION/ DROPS OPHTHALMIC at 16:09

## 2024-11-20 RX ADMIN — ERYTHROMYCIN: 5 OINTMENT OPHTHALMIC at 16:18

## 2024-11-20 ASSESSMENT — PAIN DESCRIPTION - ORIENTATION
ORIENTATION: LEFT;LOWER;POSTERIOR
ORIENTATION: LEFT
ORIENTATION: RIGHT;LEFT;POSTERIOR
ORIENTATION: LEFT;RIGHT;POSTERIOR

## 2024-11-20 ASSESSMENT — PAIN SCALES - GENERAL
PAINLEVEL_OUTOF10: 4
PAINLEVEL_OUTOF10: 4
PAINLEVEL_OUTOF10: 5
PAINLEVEL_OUTOF10: 4
PAINLEVEL_OUTOF10: 7
PAINLEVEL_OUTOF10: 0
PAINLEVEL_OUTOF10: 0

## 2024-11-20 ASSESSMENT — PAIN DESCRIPTION - DESCRIPTORS
DESCRIPTORS: ACHING

## 2024-11-20 ASSESSMENT — PAIN - FUNCTIONAL ASSESSMENT
PAIN_FUNCTIONAL_ASSESSMENT: ACTIVITIES ARE NOT PREVENTED

## 2024-11-20 ASSESSMENT — PAIN DESCRIPTION - LOCATION
LOCATION: NECK
LOCATION: SHOULDER
LOCATION: SHOULDER
LOCATION: NECK

## 2024-11-20 NOTE — CONSULTS
70 yo male with left side weakness and stroke . He was hospitalized at [facility] on November 7 with abrupt onset of weakness of left arm and leg  strength 4/5 .  Head CT normal . CTA head and neck wit mid atherosclerosis . Blood pressure 150's . NIH 5 loaded with plavix 300 mg and aspirin 324 mg . MRI of Head with acute right basal ganglia corona radiata infarction with mild bilateral chronic periventricular small vessel ischemia . In hospital he did have progression of weakness with left arm becoming weaker 0/5 ,left leg remaining 4/5 .FU Head CT with right corona radiata periventricular infarction. Etiology of infarction was felt to be more be more small vessel from hypertension . He was eventually placed norvasc 10 mg po qd and lisinopril 2.5 mg po qd . Cardac 2 D echo mild VH . Hyperdynamic LVF 60-65 % . Grade 1 diastolic dysfunction . Cholesterol 134 . LDL 64 , TG 89 . Hga1c  5.2  he was placed on plavix 75 mg , aspirin 81 mg po qd and crestor 40 mg po qd . There is polycythemia H/H 18.1 /53.2 seen by hematology felt to need therapeutic phlebotomy not done in hospital .Patient was admitted yesterday to rehabilitation and neurology is asked regarding progression of weakness prior to transfer. Blood pressure 128-131 . Significant medications plavix 75 mg po qd , aspirin 81 mg po qd and crestor 40 mg po qd , norvasc 10 mg po qd,  lisinopril 2.5 mg po qd Testing  Head CT normal . CTA head and neck with mid atherosclerosis.  FU Head CT with right corona radiata periventricular infarction.Cardac 2 D echo mild VH . Hyperdynamic LVF 60-65 % . Grade 1 diastolic dysfunction . Cholesterol 134 . LDL 64 , TG 89 . Hga1c  5.2 MRI of Head with acute right basal ganglia corona radiata infarction with mild bilateral chronic periventricular smal vessel ischemia     Past Medical History:   Diagnosis Date    Abdominal pain     Cerebrovascular accident (CVA) (HCC) 11/8/2024    Colon polyp     Pulmonary nodules 9/17/2013

## 2024-11-20 NOTE — CONSULTS
[facility]   IN-PATIENT SERVICE   [facility]  Consult note            Date:   11/20/2024  Patient name:  Nitesh Ren  Date of admission:  11/19/2024  4:30 PM  MRN:   [id number]  Account:  [id number]  YOB: 1955  PCP:    Bhupendra Hinds MD  Room:   [address] [place] [address]  Code Status:    DNR-CCA    Chief Complaint:     No chief complaint on file.      History Obtained From:     patient    History of Present Illness:     The patient is a 69 y.o.  Non- / non  male who presents with No chief complaint on file.   and he is admitted to the hospital for the management of    Patient is 69-year-old male with multiple comorbidities including history of polycythemia, neurofibromatosis, with multiple neurofibromas, hypertension hyperlipidemia, initially came to hospital on 11/7, with left-sided weakness, found to have acute stroke,  Patient was ordered dual antiplatelet and was seen by neurology, on 11/11, patient had worsening left-sided weakness and facial droop, repeat CT showed enlargement of acute infarct,  Was also seen by hematology oncology for polycythemia recommended outpatient phlebotomy  Patient was complaining of leg spasms, started on baclofen which seemed to help.  Patient currently admitted at [facility] for further care      Past Medical History:     Past Medical History:   Diagnosis Date    Abdominal pain     Cerebrovascular accident (CVA) (HCC) 11/8/2024    Colon polyp     Pulmonary nodules 9/17/2013    Thyroid nodule 9/17/2013        Past Surgical History:     Past Surgical History:   Procedure Laterality Date    COLONOSCOPY  12/12/11    POLYPECTOMY    COLONOSCOPY  10/23/07    POLYPECTOMY    CYST REMOVAL      \"back side\"   and face and neck    POLYPECTOMY  12/12/11        Medications Prior to Admission:     Prior to Admission medications    Medication Sig Start Date End Date Taking? Authorizing Provider   baclofen (LIORESAL) 5 MG tablet Take

## 2024-11-20 NOTE — H&P
Days of Exercise per Week: 0 days     Minutes of Exercise per Session: 0 min   Housing Stability: Low Risk  (11/19/2024)    Housing Stability Vital Sign     Unable to Pay for Housing in the Last Year: No     Number of Times Moved in the Last Year: 0     Homeless in the Last Year: No         Family History:       Problem Relation Age of Onset    Cancer Mother     Cancer Father     Heart Attack Father        Diagnostics:     CBC:   Recent Labs     11/18/24  0612 11/19/24  0451 11/20/24  0745   WBC 9.2 9.6 8.8   RBC 6.72* 6.45* 6.72*   HGB 18.7* 17.7* 18.7*   HCT 55.2* 52.5 57.0*   MCV 82.2 81.3 84.8   RDW 15.9* 15.6* 16.4*    174 191     BMP:    Recent Labs     11/18/24  0612 11/19/24  0451 11/20/24  0745   GLUCOSE 88 93 92   BUN 24* 26* 29*   CREATININE 0.9 1.0 1.0   CALCIUM 9.1 9.2 9.4    135* 139   K 4.2 4.4 4.4    102 103   CO2 24 23 25   ANIONGAP 9 10 11   LABGLOM >90 82 82     HbA1c:   Lab Results   Component Value Date    LABA1C 5.2 11/08/2024     BNP: No results for input(s): \"BNP\" in the last 72 hours.  PT/INR: No results for input(s): \"PROTIME\", \"INR\" in the last 72 hours.  APTT: No results for input(s): \"APTT\" in the last 72 hours.  CARDIAC ENZYMES: No results for input(s): \"CKMB\", \"CKMBINDEX\", \"TROPONINT\", \"TROPHS\", \"TROPII\" in the last 72 hours.    Invalid input(s): \"CKTOTAL;3\"   FASTING LIPID PANEL:  Lab Results   Component Value Date    CHOL 134 11/08/2024    HDL 52 11/08/2024    TRIG 89 11/08/2024     LIVER PROFILE:   Recent Labs     11/20/24  0745   AST 57*   ALT 94*   BILIDIR 0.7*   BILITOT 1.4*   ALKPHOS 96          Radiology:      MRI BRAIN WO CONTRAST    Result Date: 11/12/2024  There are 2 small areas of restricted diffusion in the right basal ganglia consistent with acute areas of infarct.  No other areas restricted diffusion are identified. Cerebral atrophy.  Mild chronic small vessel ischemic disease. Multiple subcutaneous nodules in the soft tissues of the head and

## 2024-11-21 PROCEDURE — 97530 THERAPEUTIC ACTIVITIES: CPT

## 2024-11-21 PROCEDURE — 1180000000 HC REHAB R&B

## 2024-11-21 PROCEDURE — 99231 SBSQ HOSP IP/OBS SF/LOW 25: CPT | Performed by: GENERAL PRACTICE

## 2024-11-21 PROCEDURE — 97116 GAIT TRAINING THERAPY: CPT

## 2024-11-21 PROCEDURE — 97535 SELF CARE MNGMENT TRAINING: CPT

## 2024-11-21 PROCEDURE — 99232 SBSQ HOSP IP/OBS MODERATE 35: CPT | Performed by: PSYCHIATRY & NEUROLOGY

## 2024-11-21 PROCEDURE — 6360000002 HC RX W HCPCS: Performed by: INTERNAL MEDICINE

## 2024-11-21 PROCEDURE — 6370000000 HC RX 637 (ALT 250 FOR IP): Performed by: PHYSICAL MEDICINE & REHABILITATION

## 2024-11-21 PROCEDURE — 99232 SBSQ HOSP IP/OBS MODERATE 35: CPT | Performed by: INTERNAL MEDICINE

## 2024-11-21 PROCEDURE — 97110 THERAPEUTIC EXERCISES: CPT

## 2024-11-21 PROCEDURE — 6370000000 HC RX 637 (ALT 250 FOR IP): Performed by: INTERNAL MEDICINE

## 2024-11-21 RX ADMIN — CARBOXYMETHYLCELLULOSE SODIUM 1 DROP: 5 SOLUTION/ DROPS OPHTHALMIC at 19:56

## 2024-11-21 RX ADMIN — ACETAMINOPHEN 650 MG: 325 TABLET ORAL at 16:52

## 2024-11-21 RX ADMIN — CARBOXYMETHYLCELLULOSE SODIUM 1 DROP: 5 SOLUTION/ DROPS OPHTHALMIC at 07:19

## 2024-11-21 RX ADMIN — POLYETHYLENE GLYCOL 3350 17 G: 17 POWDER, FOR SOLUTION ORAL at 08:25

## 2024-11-21 RX ADMIN — ERYTHROMYCIN: 5 OINTMENT OPHTHALMIC at 11:08

## 2024-11-21 RX ADMIN — ENOXAPARIN SODIUM 40 MG: 100 INJECTION SUBCUTANEOUS at 07:17

## 2024-11-21 RX ADMIN — ROSUVASTATIN 40 MG: 40 TABLET, FILM COATED ORAL at 19:57

## 2024-11-21 RX ADMIN — ERYTHROMYCIN: 5 OINTMENT OPHTHALMIC at 06:39

## 2024-11-21 RX ADMIN — ERYTHROMYCIN: 5 OINTMENT OPHTHALMIC at 19:07

## 2024-11-21 RX ADMIN — CLOPIDOGREL BISULFATE 75 MG: 75 TABLET ORAL at 07:16

## 2024-11-21 RX ADMIN — CARBOXYMETHYLCELLULOSE SODIUM 1 DROP: 5 SOLUTION/ DROPS OPHTHALMIC at 16:23

## 2024-11-21 RX ADMIN — ERYTHROMYCIN: 5 OINTMENT OPHTHALMIC at 23:54

## 2024-11-21 RX ADMIN — ASPIRIN 81 MG: 81 TABLET, COATED ORAL at 07:16

## 2024-11-21 RX ADMIN — AMLODIPINE BESYLATE 10 MG: 10 TABLET ORAL at 07:16

## 2024-11-21 RX ADMIN — Medication 3 MG: at 22:17

## 2024-11-21 ASSESSMENT — PAIN DESCRIPTION - DESCRIPTORS: DESCRIPTORS: ACHING

## 2024-11-21 ASSESSMENT — PAIN SCALES - GENERAL
PAINLEVEL_OUTOF10: 0
PAINLEVEL_OUTOF10: 0
PAINLEVEL_OUTOF10: 3
PAINLEVEL_OUTOF10: 0
PAINLEVEL_OUTOF10: 0
PAINLEVEL_OUTOF10: 4

## 2024-11-21 ASSESSMENT — PAIN DESCRIPTION - LOCATION: LOCATION: SHOULDER

## 2024-11-21 ASSESSMENT — PAIN - FUNCTIONAL ASSESSMENT: PAIN_FUNCTIONAL_ASSESSMENT: ACTIVITIES ARE NOT PREVENTED

## 2024-11-21 ASSESSMENT — PAIN DESCRIPTION - ORIENTATION: ORIENTATION: LEFT

## 2024-11-22 LAB
ALBUMIN SERPL-MCNC: 3.5 G/DL (ref 3.5–5.2)
ALP SERPL-CCNC: 90 U/L (ref 40–129)
ALT SERPL-CCNC: 76 U/L (ref 10–50)
ANION GAP SERPL CALCULATED.3IONS-SCNC: 8 MMOL/L (ref 9–16)
AST SERPL-CCNC: 42 U/L (ref 10–50)
BILIRUB SERPL-MCNC: 1.1 MG/DL (ref 0–1.2)
BUN SERPL-MCNC: 25 MG/DL (ref 8–23)
CALCIUM SERPL-MCNC: 9.6 MG/DL (ref 8.6–10.4)
CHLORIDE SERPL-SCNC: 102 MMOL/L (ref 98–107)
CO2 SERPL-SCNC: 25 MMOL/L (ref 20–31)
CREAT SERPL-MCNC: 1 MG/DL (ref 0.7–1.2)
GFR, ESTIMATED: 82 ML/MIN/1.73M2
GLUCOSE SERPL-MCNC: 85 MG/DL (ref 74–99)
POTASSIUM SERPL-SCNC: 5 MMOL/L (ref 3.7–5.3)
PROT SERPL-MCNC: 6.3 G/DL (ref 6.6–8.7)
SODIUM SERPL-SCNC: 135 MMOL/L (ref 136–145)

## 2024-11-22 PROCEDURE — 97116 GAIT TRAINING THERAPY: CPT

## 2024-11-22 PROCEDURE — 6360000002 HC RX W HCPCS: Performed by: INTERNAL MEDICINE

## 2024-11-22 PROCEDURE — 99232 SBSQ HOSP IP/OBS MODERATE 35: CPT | Performed by: INTERNAL MEDICINE

## 2024-11-22 PROCEDURE — 36415 COLL VENOUS BLD VENIPUNCTURE: CPT

## 2024-11-22 PROCEDURE — 1180000000 HC REHAB R&B

## 2024-11-22 PROCEDURE — 97542 WHEELCHAIR MNGMENT TRAINING: CPT

## 2024-11-22 PROCEDURE — 80053 COMPREHEN METABOLIC PANEL: CPT

## 2024-11-22 PROCEDURE — 97530 THERAPEUTIC ACTIVITIES: CPT

## 2024-11-22 PROCEDURE — 97110 THERAPEUTIC EXERCISES: CPT

## 2024-11-22 PROCEDURE — 6370000000 HC RX 637 (ALT 250 FOR IP): Performed by: INTERNAL MEDICINE

## 2024-11-22 PROCEDURE — 6370000000 HC RX 637 (ALT 250 FOR IP): Performed by: PHYSICAL MEDICINE & REHABILITATION

## 2024-11-22 PROCEDURE — 99231 SBSQ HOSP IP/OBS SF/LOW 25: CPT | Performed by: GENERAL PRACTICE

## 2024-11-22 PROCEDURE — 97535 SELF CARE MNGMENT TRAINING: CPT

## 2024-11-22 PROCEDURE — 97112 NEUROMUSCULAR REEDUCATION: CPT

## 2024-11-22 RX ORDER — MENTHOL 1.4 %
ADHESIVE PATCH, MEDICATED TOPICAL 3 TIMES DAILY PRN
Status: DISCONTINUED | OUTPATIENT
Start: 2024-11-22 | End: 2024-12-12 | Stop reason: HOSPADM

## 2024-11-22 RX ADMIN — ERYTHROMYCIN: 5 OINTMENT OPHTHALMIC at 11:30

## 2024-11-22 RX ADMIN — CLOPIDOGREL BISULFATE 75 MG: 75 TABLET ORAL at 07:37

## 2024-11-22 RX ADMIN — ASPIRIN 81 MG: 81 TABLET, COATED ORAL at 07:37

## 2024-11-22 RX ADMIN — ENOXAPARIN SODIUM 40 MG: 100 INJECTION SUBCUTANEOUS at 07:36

## 2024-11-22 RX ADMIN — ERYTHROMYCIN: 5 OINTMENT OPHTHALMIC at 05:27

## 2024-11-22 RX ADMIN — CARBOXYMETHYLCELLULOSE SODIUM 1 DROP: 5 SOLUTION/ DROPS OPHTHALMIC at 12:38

## 2024-11-22 RX ADMIN — ROSUVASTATIN 40 MG: 40 TABLET, FILM COATED ORAL at 20:58

## 2024-11-22 RX ADMIN — ERYTHROMYCIN: 5 OINTMENT OPHTHALMIC at 18:24

## 2024-11-22 RX ADMIN — POLYETHYLENE GLYCOL 3350 17 G: 17 POWDER, FOR SOLUTION ORAL at 07:37

## 2024-11-22 RX ADMIN — AMLODIPINE BESYLATE 10 MG: 10 TABLET ORAL at 07:37

## 2024-11-22 RX ADMIN — CARBOXYMETHYLCELLULOSE SODIUM 1 DROP: 5 SOLUTION/ DROPS OPHTHALMIC at 20:58

## 2024-11-22 RX ADMIN — MENTHOL, UNSPECIFIED FORM AND METHYL SALICYLATE: 10; 150 CREAM TOPICAL at 20:58

## 2024-11-22 RX ADMIN — CARBOXYMETHYLCELLULOSE SODIUM 1 DROP: 5 SOLUTION/ DROPS OPHTHALMIC at 07:39

## 2024-11-22 ASSESSMENT — PAIN - FUNCTIONAL ASSESSMENT: PAIN_FUNCTIONAL_ASSESSMENT: ACTIVITIES ARE NOT PREVENTED

## 2024-11-22 ASSESSMENT — PAIN DESCRIPTION - ORIENTATION: ORIENTATION: LEFT;POSTERIOR

## 2024-11-22 ASSESSMENT — PAIN SCALES - GENERAL
PAINLEVEL_OUTOF10: 3
PAINLEVEL_OUTOF10: 0
PAINLEVEL_OUTOF10: 0

## 2024-11-22 ASSESSMENT — PAIN DESCRIPTION - DESCRIPTORS: DESCRIPTORS: ACHING

## 2024-11-22 ASSESSMENT — PAIN DESCRIPTION - LOCATION: LOCATION: SHOULDER

## 2024-11-23 PROCEDURE — 97530 THERAPEUTIC ACTIVITIES: CPT

## 2024-11-23 PROCEDURE — 99231 SBSQ HOSP IP/OBS SF/LOW 25: CPT | Performed by: INTERNAL MEDICINE

## 2024-11-23 PROCEDURE — 97110 THERAPEUTIC EXERCISES: CPT

## 2024-11-23 PROCEDURE — 6370000000 HC RX 637 (ALT 250 FOR IP): Performed by: INTERNAL MEDICINE

## 2024-11-23 PROCEDURE — 6360000002 HC RX W HCPCS: Performed by: INTERNAL MEDICINE

## 2024-11-23 PROCEDURE — 1180000000 HC REHAB R&B

## 2024-11-23 PROCEDURE — 6370000000 HC RX 637 (ALT 250 FOR IP): Performed by: PHYSICAL MEDICINE & REHABILITATION

## 2024-11-23 PROCEDURE — 97535 SELF CARE MNGMENT TRAINING: CPT

## 2024-11-23 PROCEDURE — 97116 GAIT TRAINING THERAPY: CPT

## 2024-11-23 PROCEDURE — 99232 SBSQ HOSP IP/OBS MODERATE 35: CPT | Performed by: PHYSICAL MEDICINE & REHABILITATION

## 2024-11-23 PROCEDURE — 97112 NEUROMUSCULAR REEDUCATION: CPT

## 2024-11-23 RX ORDER — BACLOFEN 10 MG/1
5 TABLET ORAL 3 TIMES DAILY
Status: DISCONTINUED | OUTPATIENT
Start: 2024-11-23 | End: 2024-11-25

## 2024-11-23 RX ADMIN — ERYTHROMYCIN: 5 OINTMENT OPHTHALMIC at 14:40

## 2024-11-23 RX ADMIN — CARBOXYMETHYLCELLULOSE SODIUM 1 DROP: 5 SOLUTION/ DROPS OPHTHALMIC at 07:37

## 2024-11-23 RX ADMIN — BACLOFEN 5 MG: 10 TABLET ORAL at 14:39

## 2024-11-23 RX ADMIN — Medication 3 MG: at 20:12

## 2024-11-23 RX ADMIN — BACLOFEN 5 MG: 10 TABLET ORAL at 20:13

## 2024-11-23 RX ADMIN — ASPIRIN 81 MG: 81 TABLET, COATED ORAL at 07:36

## 2024-11-23 RX ADMIN — ERYTHROMYCIN: 5 OINTMENT OPHTHALMIC at 18:51

## 2024-11-23 RX ADMIN — AMLODIPINE BESYLATE 10 MG: 10 TABLET ORAL at 07:35

## 2024-11-23 RX ADMIN — ERYTHROMYCIN: 5 OINTMENT OPHTHALMIC at 05:45

## 2024-11-23 RX ADMIN — CLOPIDOGREL BISULFATE 75 MG: 75 TABLET ORAL at 07:36

## 2024-11-23 RX ADMIN — CARBOXYMETHYLCELLULOSE SODIUM 1 DROP: 5 SOLUTION/ DROPS OPHTHALMIC at 20:13

## 2024-11-23 RX ADMIN — ROSUVASTATIN 40 MG: 40 TABLET, FILM COATED ORAL at 20:12

## 2024-11-23 RX ADMIN — ENOXAPARIN SODIUM 40 MG: 100 INJECTION SUBCUTANEOUS at 07:36

## 2024-11-23 RX ADMIN — ERYTHROMYCIN: 5 OINTMENT OPHTHALMIC at 22:48

## 2024-11-23 RX ADMIN — CARBOXYMETHYLCELLULOSE SODIUM 1 DROP: 5 SOLUTION/ DROPS OPHTHALMIC at 14:40

## 2024-11-23 ASSESSMENT — PAIN SCALES - GENERAL: PAINLEVEL_OUTOF10: 0

## 2024-11-24 PROCEDURE — 97530 THERAPEUTIC ACTIVITIES: CPT

## 2024-11-24 PROCEDURE — 6370000000 HC RX 637 (ALT 250 FOR IP): Performed by: INTERNAL MEDICINE

## 2024-11-24 PROCEDURE — 97535 SELF CARE MNGMENT TRAINING: CPT

## 2024-11-24 PROCEDURE — 97110 THERAPEUTIC EXERCISES: CPT

## 2024-11-24 PROCEDURE — 97116 GAIT TRAINING THERAPY: CPT

## 2024-11-24 PROCEDURE — 6370000000 HC RX 637 (ALT 250 FOR IP): Performed by: PHYSICAL MEDICINE & REHABILITATION

## 2024-11-24 PROCEDURE — 97112 NEUROMUSCULAR REEDUCATION: CPT

## 2024-11-24 PROCEDURE — 6360000002 HC RX W HCPCS: Performed by: INTERNAL MEDICINE

## 2024-11-24 PROCEDURE — 99232 SBSQ HOSP IP/OBS MODERATE 35: CPT | Performed by: PHYSICAL MEDICINE & REHABILITATION

## 2024-11-24 PROCEDURE — 99232 SBSQ HOSP IP/OBS MODERATE 35: CPT | Performed by: INTERNAL MEDICINE

## 2024-11-24 PROCEDURE — 1180000000 HC REHAB R&B

## 2024-11-24 RX ORDER — LOSARTAN POTASSIUM 25 MG/1
25 TABLET ORAL DAILY
Status: DISCONTINUED | OUTPATIENT
Start: 2024-11-24 | End: 2024-12-12 | Stop reason: HOSPADM

## 2024-11-24 RX ADMIN — ERYTHROMYCIN: 5 OINTMENT OPHTHALMIC at 22:46

## 2024-11-24 RX ADMIN — CARBOXYMETHYLCELLULOSE SODIUM 1 DROP: 5 SOLUTION/ DROPS OPHTHALMIC at 14:05

## 2024-11-24 RX ADMIN — ASPIRIN 81 MG: 81 TABLET, COATED ORAL at 07:45

## 2024-11-24 RX ADMIN — ENOXAPARIN SODIUM 40 MG: 100 INJECTION SUBCUTANEOUS at 07:43

## 2024-11-24 RX ADMIN — BACLOFEN 5 MG: 10 TABLET ORAL at 19:45

## 2024-11-24 RX ADMIN — ERYTHROMYCIN: 5 OINTMENT OPHTHALMIC at 14:04

## 2024-11-24 RX ADMIN — ERYTHROMYCIN: 5 OINTMENT OPHTHALMIC at 17:57

## 2024-11-24 RX ADMIN — ERYTHROMYCIN: 5 OINTMENT OPHTHALMIC at 05:20

## 2024-11-24 RX ADMIN — LOSARTAN POTASSIUM 25 MG: 25 TABLET, FILM COATED ORAL at 17:56

## 2024-11-24 RX ADMIN — ROSUVASTATIN 40 MG: 40 TABLET, FILM COATED ORAL at 19:45

## 2024-11-24 RX ADMIN — BACLOFEN 5 MG: 10 TABLET ORAL at 14:04

## 2024-11-24 RX ADMIN — CARBOXYMETHYLCELLULOSE SODIUM 1 DROP: 5 SOLUTION/ DROPS OPHTHALMIC at 07:48

## 2024-11-24 RX ADMIN — BACLOFEN 5 MG: 10 TABLET ORAL at 07:46

## 2024-11-24 RX ADMIN — CARBOXYMETHYLCELLULOSE SODIUM 1 DROP: 5 SOLUTION/ DROPS OPHTHALMIC at 19:46

## 2024-11-24 RX ADMIN — ACETAMINOPHEN 650 MG: 325 TABLET ORAL at 03:10

## 2024-11-24 RX ADMIN — CLOPIDOGREL BISULFATE 75 MG: 75 TABLET ORAL at 07:46

## 2024-11-24 RX ADMIN — AMLODIPINE BESYLATE 10 MG: 10 TABLET ORAL at 07:45

## 2024-11-24 ASSESSMENT — PAIN SCALES - GENERAL
PAINLEVEL_OUTOF10: 2
PAINLEVEL_OUTOF10: 0
PAINLEVEL_OUTOF10: 3

## 2024-11-24 ASSESSMENT — PAIN DESCRIPTION - FREQUENCY: FREQUENCY: INTERMITTENT

## 2024-11-24 ASSESSMENT — PAIN DESCRIPTION - ONSET: ONSET: AWAKENED FROM SLEEP

## 2024-11-24 ASSESSMENT — PAIN DESCRIPTION - LOCATION
LOCATION: LEG;NECK
LOCATION: NECK

## 2024-11-24 ASSESSMENT — PAIN DESCRIPTION - ORIENTATION
ORIENTATION: LEFT;POSTERIOR
ORIENTATION: POSTERIOR

## 2024-11-24 ASSESSMENT — PAIN DESCRIPTION - DESCRIPTORS
DESCRIPTORS: ACHING
DESCRIPTORS: ACHING;DISCOMFORT

## 2024-11-24 ASSESSMENT — PAIN DESCRIPTION - PAIN TYPE: TYPE: ACUTE PAIN

## 2024-11-25 PROCEDURE — 97116 GAIT TRAINING THERAPY: CPT

## 2024-11-25 PROCEDURE — 97110 THERAPEUTIC EXERCISES: CPT

## 2024-11-25 PROCEDURE — 99231 SBSQ HOSP IP/OBS SF/LOW 25: CPT | Performed by: INTERNAL MEDICINE

## 2024-11-25 PROCEDURE — 51798 US URINE CAPACITY MEASURE: CPT

## 2024-11-25 PROCEDURE — 6370000000 HC RX 637 (ALT 250 FOR IP): Performed by: PHYSICAL MEDICINE & REHABILITATION

## 2024-11-25 PROCEDURE — 6370000000 HC RX 637 (ALT 250 FOR IP): Performed by: STUDENT IN AN ORGANIZED HEALTH CARE EDUCATION/TRAINING PROGRAM

## 2024-11-25 PROCEDURE — 99232 SBSQ HOSP IP/OBS MODERATE 35: CPT | Performed by: STUDENT IN AN ORGANIZED HEALTH CARE EDUCATION/TRAINING PROGRAM

## 2024-11-25 PROCEDURE — 1180000000 HC REHAB R&B

## 2024-11-25 PROCEDURE — 6370000000 HC RX 637 (ALT 250 FOR IP): Performed by: INTERNAL MEDICINE

## 2024-11-25 PROCEDURE — 97530 THERAPEUTIC ACTIVITIES: CPT

## 2024-11-25 PROCEDURE — 97112 NEUROMUSCULAR REEDUCATION: CPT

## 2024-11-25 PROCEDURE — 6360000002 HC RX W HCPCS: Performed by: INTERNAL MEDICINE

## 2024-11-25 PROCEDURE — 97535 SELF CARE MNGMENT TRAINING: CPT

## 2024-11-25 RX ORDER — BACLOFEN 10 MG/1
10 TABLET ORAL NIGHTLY
Status: DISCONTINUED | OUTPATIENT
Start: 2024-11-25 | End: 2024-12-04

## 2024-11-25 RX ORDER — BACLOFEN 10 MG/1
5 TABLET ORAL
Status: DISCONTINUED | OUTPATIENT
Start: 2024-11-26 | End: 2024-12-04

## 2024-11-25 RX ADMIN — BACLOFEN 5 MG: 10 TABLET ORAL at 07:49

## 2024-11-25 RX ADMIN — ERYTHROMYCIN: 5 OINTMENT OPHTHALMIC at 18:27

## 2024-11-25 RX ADMIN — Medication 6 MG: at 20:09

## 2024-11-25 RX ADMIN — CARBOXYMETHYLCELLULOSE SODIUM 1 DROP: 5 SOLUTION/ DROPS OPHTHALMIC at 07:53

## 2024-11-25 RX ADMIN — BACLOFEN 5 MG: 10 TABLET ORAL at 13:42

## 2024-11-25 RX ADMIN — LOSARTAN POTASSIUM 25 MG: 25 TABLET, FILM COATED ORAL at 07:49

## 2024-11-25 RX ADMIN — BACLOFEN 10 MG: 10 TABLET ORAL at 20:09

## 2024-11-25 RX ADMIN — ENOXAPARIN SODIUM 40 MG: 100 INJECTION SUBCUTANEOUS at 07:50

## 2024-11-25 RX ADMIN — ACETAMINOPHEN 650 MG: 325 TABLET ORAL at 02:33

## 2024-11-25 RX ADMIN — ROSUVASTATIN 40 MG: 40 TABLET, FILM COATED ORAL at 20:09

## 2024-11-25 RX ADMIN — CARBOXYMETHYLCELLULOSE SODIUM 1 DROP: 5 SOLUTION/ DROPS OPHTHALMIC at 13:42

## 2024-11-25 RX ADMIN — ERYTHROMYCIN: 5 OINTMENT OPHTHALMIC at 05:56

## 2024-11-25 RX ADMIN — AMLODIPINE BESYLATE 10 MG: 10 TABLET ORAL at 07:49

## 2024-11-25 RX ADMIN — CARBOXYMETHYLCELLULOSE SODIUM 1 DROP: 5 SOLUTION/ DROPS OPHTHALMIC at 22:19

## 2024-11-25 RX ADMIN — ASPIRIN 81 MG: 81 TABLET, COATED ORAL at 07:49

## 2024-11-25 RX ADMIN — CLOPIDOGREL BISULFATE 75 MG: 75 TABLET ORAL at 07:49

## 2024-11-25 RX ADMIN — ACETAMINOPHEN 650 MG: 325 TABLET ORAL at 22:21

## 2024-11-25 RX ADMIN — POLYETHYLENE GLYCOL 3350 17 G: 17 POWDER, FOR SOLUTION ORAL at 07:50

## 2024-11-25 RX ADMIN — SENNOSIDES 17.2 MG: 8.6 TABLET, FILM COATED ORAL at 09:44

## 2024-11-25 RX ADMIN — ERYTHROMYCIN: 5 OINTMENT OPHTHALMIC at 12:01

## 2024-11-25 ASSESSMENT — PAIN SCALES - GENERAL
PAINLEVEL_OUTOF10: 4
PAINLEVEL_OUTOF10: 6
PAINLEVEL_OUTOF10: 0
PAINLEVEL_OUTOF10: 7

## 2024-11-25 ASSESSMENT — PAIN DESCRIPTION - DESCRIPTORS
DESCRIPTORS: ACHING
DESCRIPTORS: ACHING;DISCOMFORT

## 2024-11-25 ASSESSMENT — PAIN DESCRIPTION - LOCATION
LOCATION: LEG
LOCATION: SHOULDER

## 2024-11-25 ASSESSMENT — PAIN - FUNCTIONAL ASSESSMENT
PAIN_FUNCTIONAL_ASSESSMENT: PREVENTS OR INTERFERES SOME ACTIVE ACTIVITIES AND ADLS
PAIN_FUNCTIONAL_ASSESSMENT: ACTIVITIES ARE NOT PREVENTED

## 2024-11-25 ASSESSMENT — PAIN DESCRIPTION - ORIENTATION
ORIENTATION: LEFT;POSTERIOR
ORIENTATION: LEFT

## 2024-11-26 PROCEDURE — 97112 NEUROMUSCULAR REEDUCATION: CPT

## 2024-11-26 PROCEDURE — 99232 SBSQ HOSP IP/OBS MODERATE 35: CPT | Performed by: STUDENT IN AN ORGANIZED HEALTH CARE EDUCATION/TRAINING PROGRAM

## 2024-11-26 PROCEDURE — 1180000000 HC REHAB R&B

## 2024-11-26 PROCEDURE — 97530 THERAPEUTIC ACTIVITIES: CPT

## 2024-11-26 PROCEDURE — 99231 SBSQ HOSP IP/OBS SF/LOW 25: CPT | Performed by: INTERNAL MEDICINE

## 2024-11-26 PROCEDURE — 6370000000 HC RX 637 (ALT 250 FOR IP): Performed by: INTERNAL MEDICINE

## 2024-11-26 PROCEDURE — 97110 THERAPEUTIC EXERCISES: CPT

## 2024-11-26 PROCEDURE — 6360000002 HC RX W HCPCS: Performed by: INTERNAL MEDICINE

## 2024-11-26 PROCEDURE — 6370000000 HC RX 637 (ALT 250 FOR IP): Performed by: PHYSICAL MEDICINE & REHABILITATION

## 2024-11-26 PROCEDURE — 97535 SELF CARE MNGMENT TRAINING: CPT

## 2024-11-26 PROCEDURE — 6370000000 HC RX 637 (ALT 250 FOR IP): Performed by: STUDENT IN AN ORGANIZED HEALTH CARE EDUCATION/TRAINING PROGRAM

## 2024-11-26 PROCEDURE — 97116 GAIT TRAINING THERAPY: CPT

## 2024-11-26 RX ORDER — BACLOFEN 10 MG/1
5 TABLET ORAL ONCE
Status: COMPLETED | OUTPATIENT
Start: 2024-11-26 | End: 2024-11-26

## 2024-11-26 RX ADMIN — BACLOFEN 5 MG: 10 TABLET ORAL at 22:35

## 2024-11-26 RX ADMIN — BACLOFEN 10 MG: 10 TABLET ORAL at 20:19

## 2024-11-26 RX ADMIN — ERYTHROMYCIN: 5 OINTMENT OPHTHALMIC at 18:00

## 2024-11-26 RX ADMIN — ERYTHROMYCIN: 5 OINTMENT OPHTHALMIC at 06:22

## 2024-11-26 RX ADMIN — ASPIRIN 81 MG: 81 TABLET, COATED ORAL at 07:07

## 2024-11-26 RX ADMIN — ERYTHROMYCIN: 5 OINTMENT OPHTHALMIC at 12:45

## 2024-11-26 RX ADMIN — CARBOXYMETHYLCELLULOSE SODIUM 1 DROP: 5 SOLUTION/ DROPS OPHTHALMIC at 12:45

## 2024-11-26 RX ADMIN — CARBOXYMETHYLCELLULOSE SODIUM 1 DROP: 5 SOLUTION/ DROPS OPHTHALMIC at 20:19

## 2024-11-26 RX ADMIN — CLOPIDOGREL BISULFATE 75 MG: 75 TABLET ORAL at 07:08

## 2024-11-26 RX ADMIN — SENNOSIDES 17.2 MG: 8.6 TABLET, FILM COATED ORAL at 06:36

## 2024-11-26 RX ADMIN — ACETAMINOPHEN 650 MG: 325 TABLET ORAL at 22:35

## 2024-11-26 RX ADMIN — POLYETHYLENE GLYCOL 3350 17 G: 17 POWDER, FOR SOLUTION ORAL at 07:08

## 2024-11-26 RX ADMIN — BACLOFEN 5 MG: 10 TABLET ORAL at 07:07

## 2024-11-26 RX ADMIN — ROSUVASTATIN 40 MG: 40 TABLET, FILM COATED ORAL at 20:19

## 2024-11-26 RX ADMIN — BACLOFEN 5 MG: 10 TABLET ORAL at 12:43

## 2024-11-26 RX ADMIN — LOSARTAN POTASSIUM 25 MG: 25 TABLET, FILM COATED ORAL at 07:07

## 2024-11-26 RX ADMIN — ENOXAPARIN SODIUM 40 MG: 100 INJECTION SUBCUTANEOUS at 07:07

## 2024-11-26 RX ADMIN — CARBOXYMETHYLCELLULOSE SODIUM 1 DROP: 5 SOLUTION/ DROPS OPHTHALMIC at 07:09

## 2024-11-26 RX ADMIN — Medication 6 MG: at 22:00

## 2024-11-26 RX ADMIN — AMLODIPINE BESYLATE 10 MG: 10 TABLET ORAL at 07:07

## 2024-11-26 ASSESSMENT — PAIN - FUNCTIONAL ASSESSMENT: PAIN_FUNCTIONAL_ASSESSMENT: ACTIVITIES ARE NOT PREVENTED

## 2024-11-26 ASSESSMENT — PAIN DESCRIPTION - DESCRIPTORS: DESCRIPTORS: ACHING

## 2024-11-26 ASSESSMENT — PAIN DESCRIPTION - ORIENTATION: ORIENTATION: POSTERIOR

## 2024-11-26 ASSESSMENT — PAIN SCALES - GENERAL
PAINLEVEL_OUTOF10: 3
PAINLEVEL_OUTOF10: 0

## 2024-11-26 ASSESSMENT — PAIN DESCRIPTION - LOCATION: LOCATION: NECK

## 2024-11-27 LAB
ANION GAP SERPL CALCULATED.3IONS-SCNC: 8 MMOL/L (ref 9–16)
BASOPHILS # BLD: 0.1 K/UL (ref 0–0.2)
BASOPHILS NFR BLD: 1 % (ref 0–2)
BUN SERPL-MCNC: 22 MG/DL (ref 8–23)
CALCIUM SERPL-MCNC: 9.6 MG/DL (ref 8.6–10.4)
CHLORIDE SERPL-SCNC: 101 MMOL/L (ref 98–107)
CO2 SERPL-SCNC: 28 MMOL/L (ref 20–31)
CREAT SERPL-MCNC: 1 MG/DL (ref 0.7–1.2)
EOSINOPHIL # BLD: 0.5 K/UL (ref 0–0.4)
EOSINOPHILS RELATIVE PERCENT: 5 % (ref 0–4)
ERYTHROCYTE [DISTWIDTH] IN BLOOD BY AUTOMATED COUNT: 15.4 % (ref 11.5–14.9)
GFR, ESTIMATED: 82 ML/MIN/1.73M2
GLUCOSE SERPL-MCNC: 98 MG/DL (ref 74–99)
HCT VFR BLD AUTO: 54.3 % (ref 41–53)
HGB BLD-MCNC: 18.2 G/DL (ref 13.5–17.5)
LYMPHOCYTES NFR BLD: 1.6 K/UL (ref 1–4.8)
LYMPHOCYTES RELATIVE PERCENT: 19 % (ref 24–44)
MCH RBC QN AUTO: 27.6 PG (ref 26–34)
MCHC RBC AUTO-ENTMCNC: 33.5 G/DL (ref 31–37)
MCV RBC AUTO: 82.5 FL (ref 80–100)
MONOCYTES NFR BLD: 0.8 K/UL (ref 0.1–1.3)
MONOCYTES NFR BLD: 9 % (ref 1–7)
NEUTROPHILS NFR BLD: 66 % (ref 36–66)
NEUTS SEG NFR BLD: 5.9 K/UL (ref 1.3–9.1)
PLATELET # BLD AUTO: 242 K/UL (ref 150–450)
PMV BLD AUTO: 7.9 FL (ref 6–12)
POTASSIUM SERPL-SCNC: 4.5 MMOL/L (ref 3.7–5.3)
RBC # BLD AUTO: 6.58 M/UL (ref 4.5–5.9)
SODIUM SERPL-SCNC: 137 MMOL/L (ref 136–145)
WBC OTHER # BLD: 8.8 K/UL (ref 3.5–11)

## 2024-11-27 PROCEDURE — 97110 THERAPEUTIC EXERCISES: CPT

## 2024-11-27 PROCEDURE — 85025 COMPLETE CBC W/AUTO DIFF WBC: CPT

## 2024-11-27 PROCEDURE — 1180000000 HC REHAB R&B

## 2024-11-27 PROCEDURE — 97535 SELF CARE MNGMENT TRAINING: CPT

## 2024-11-27 PROCEDURE — 6370000000 HC RX 637 (ALT 250 FOR IP): Performed by: INTERNAL MEDICINE

## 2024-11-27 PROCEDURE — 99231 SBSQ HOSP IP/OBS SF/LOW 25: CPT | Performed by: INTERNAL MEDICINE

## 2024-11-27 PROCEDURE — 6370000000 HC RX 637 (ALT 250 FOR IP): Performed by: PHYSICAL MEDICINE & REHABILITATION

## 2024-11-27 PROCEDURE — 6370000000 HC RX 637 (ALT 250 FOR IP): Performed by: STUDENT IN AN ORGANIZED HEALTH CARE EDUCATION/TRAINING PROGRAM

## 2024-11-27 PROCEDURE — 97112 NEUROMUSCULAR REEDUCATION: CPT

## 2024-11-27 PROCEDURE — 97530 THERAPEUTIC ACTIVITIES: CPT

## 2024-11-27 PROCEDURE — 6360000002 HC RX W HCPCS: Performed by: INTERNAL MEDICINE

## 2024-11-27 PROCEDURE — 99232 SBSQ HOSP IP/OBS MODERATE 35: CPT | Performed by: STUDENT IN AN ORGANIZED HEALTH CARE EDUCATION/TRAINING PROGRAM

## 2024-11-27 PROCEDURE — 97542 WHEELCHAIR MNGMENT TRAINING: CPT

## 2024-11-27 PROCEDURE — 36415 COLL VENOUS BLD VENIPUNCTURE: CPT

## 2024-11-27 PROCEDURE — 97116 GAIT TRAINING THERAPY: CPT

## 2024-11-27 PROCEDURE — 80048 BASIC METABOLIC PNL TOTAL CA: CPT

## 2024-11-27 RX ADMIN — ERYTHROMYCIN: 5 OINTMENT OPHTHALMIC at 23:39

## 2024-11-27 RX ADMIN — BACLOFEN 10 MG: 10 TABLET ORAL at 20:29

## 2024-11-27 RX ADMIN — ERYTHROMYCIN: 5 OINTMENT OPHTHALMIC at 00:00

## 2024-11-27 RX ADMIN — Medication 6 MG: at 22:07

## 2024-11-27 RX ADMIN — CARBOXYMETHYLCELLULOSE SODIUM 1 DROP: 5 SOLUTION/ DROPS OPHTHALMIC at 20:28

## 2024-11-27 RX ADMIN — ENOXAPARIN SODIUM 40 MG: 100 INJECTION SUBCUTANEOUS at 07:47

## 2024-11-27 RX ADMIN — ERYTHROMYCIN: 5 OINTMENT OPHTHALMIC at 05:25

## 2024-11-27 RX ADMIN — ERYTHROMYCIN: 5 OINTMENT OPHTHALMIC at 12:07

## 2024-11-27 RX ADMIN — ERYTHROMYCIN: 5 OINTMENT OPHTHALMIC at 17:41

## 2024-11-27 RX ADMIN — ACETAMINOPHEN 650 MG: 325 TABLET ORAL at 20:29

## 2024-11-27 RX ADMIN — ACETAMINOPHEN 650 MG: 325 TABLET ORAL at 07:48

## 2024-11-27 RX ADMIN — ACETAMINOPHEN 650 MG: 325 TABLET ORAL at 12:07

## 2024-11-27 RX ADMIN — AMLODIPINE BESYLATE 10 MG: 10 TABLET ORAL at 07:48

## 2024-11-27 RX ADMIN — ROSUVASTATIN 40 MG: 40 TABLET, FILM COATED ORAL at 20:29

## 2024-11-27 RX ADMIN — LOSARTAN POTASSIUM 25 MG: 25 TABLET, FILM COATED ORAL at 07:47

## 2024-11-27 RX ADMIN — CARBOXYMETHYLCELLULOSE SODIUM 1 DROP: 5 SOLUTION/ DROPS OPHTHALMIC at 14:13

## 2024-11-27 RX ADMIN — CARBOXYMETHYLCELLULOSE SODIUM 1 DROP: 5 SOLUTION/ DROPS OPHTHALMIC at 07:49

## 2024-11-27 RX ADMIN — ASPIRIN 81 MG: 81 TABLET, COATED ORAL at 07:48

## 2024-11-27 RX ADMIN — BACLOFEN 5 MG: 10 TABLET ORAL at 14:12

## 2024-11-27 RX ADMIN — BACLOFEN 5 MG: 10 TABLET ORAL at 07:47

## 2024-11-27 RX ADMIN — CLOPIDOGREL BISULFATE 75 MG: 75 TABLET ORAL at 07:48

## 2024-11-27 ASSESSMENT — PAIN DESCRIPTION - ORIENTATION
ORIENTATION: POSTERIOR
ORIENTATION: MID
ORIENTATION: POSTERIOR
ORIENTATION: POSTERIOR

## 2024-11-27 ASSESSMENT — PAIN SCALES - GENERAL
PAINLEVEL_OUTOF10: 3
PAINLEVEL_OUTOF10: 5
PAINLEVEL_OUTOF10: 0
PAINLEVEL_OUTOF10: 2
PAINLEVEL_OUTOF10: 3
PAINLEVEL_OUTOF10: 1
PAINLEVEL_OUTOF10: 5
PAINLEVEL_OUTOF10: 3
PAINLEVEL_OUTOF10: 0

## 2024-11-27 ASSESSMENT — PAIN - FUNCTIONAL ASSESSMENT
PAIN_FUNCTIONAL_ASSESSMENT: ACTIVITIES ARE NOT PREVENTED

## 2024-11-27 ASSESSMENT — PAIN DESCRIPTION - DESCRIPTORS
DESCRIPTORS: ACHING

## 2024-11-27 ASSESSMENT — PAIN DESCRIPTION - LOCATION
LOCATION: OTHER (COMMENT);BACK
LOCATION: NECK

## 2024-11-28 PROCEDURE — 97110 THERAPEUTIC EXERCISES: CPT

## 2024-11-28 PROCEDURE — 97535 SELF CARE MNGMENT TRAINING: CPT

## 2024-11-28 PROCEDURE — 6370000000 HC RX 637 (ALT 250 FOR IP): Performed by: PHYSICAL MEDICINE & REHABILITATION

## 2024-11-28 PROCEDURE — 6370000000 HC RX 637 (ALT 250 FOR IP): Performed by: STUDENT IN AN ORGANIZED HEALTH CARE EDUCATION/TRAINING PROGRAM

## 2024-11-28 PROCEDURE — 99231 SBSQ HOSP IP/OBS SF/LOW 25: CPT | Performed by: INTERNAL MEDICINE

## 2024-11-28 PROCEDURE — 6360000002 HC RX W HCPCS: Performed by: INTERNAL MEDICINE

## 2024-11-28 PROCEDURE — 97116 GAIT TRAINING THERAPY: CPT

## 2024-11-28 PROCEDURE — 99232 SBSQ HOSP IP/OBS MODERATE 35: CPT | Performed by: STUDENT IN AN ORGANIZED HEALTH CARE EDUCATION/TRAINING PROGRAM

## 2024-11-28 PROCEDURE — 97542 WHEELCHAIR MNGMENT TRAINING: CPT

## 2024-11-28 PROCEDURE — 6370000000 HC RX 637 (ALT 250 FOR IP): Performed by: INTERNAL MEDICINE

## 2024-11-28 PROCEDURE — 97112 NEUROMUSCULAR REEDUCATION: CPT

## 2024-11-28 PROCEDURE — 97530 THERAPEUTIC ACTIVITIES: CPT

## 2024-11-28 PROCEDURE — 1180000000 HC REHAB R&B

## 2024-11-28 RX ADMIN — CARBOXYMETHYLCELLULOSE SODIUM 1 DROP: 5 SOLUTION/ DROPS OPHTHALMIC at 14:35

## 2024-11-28 RX ADMIN — ACETAMINOPHEN 650 MG: 325 TABLET ORAL at 10:55

## 2024-11-28 RX ADMIN — CLOPIDOGREL BISULFATE 75 MG: 75 TABLET ORAL at 07:27

## 2024-11-28 RX ADMIN — LOSARTAN POTASSIUM 25 MG: 25 TABLET, FILM COATED ORAL at 07:27

## 2024-11-28 RX ADMIN — POLYETHYLENE GLYCOL 3350 17 G: 17 POWDER, FOR SOLUTION ORAL at 07:27

## 2024-11-28 RX ADMIN — Medication 6 MG: at 21:59

## 2024-11-28 RX ADMIN — ERYTHROMYCIN: 5 OINTMENT OPHTHALMIC at 10:58

## 2024-11-28 RX ADMIN — BACLOFEN 10 MG: 10 TABLET ORAL at 20:35

## 2024-11-28 RX ADMIN — ERYTHROMYCIN: 5 OINTMENT OPHTHALMIC at 18:58

## 2024-11-28 RX ADMIN — BACLOFEN 5 MG: 10 TABLET ORAL at 14:34

## 2024-11-28 RX ADMIN — ASPIRIN 81 MG: 81 TABLET, COATED ORAL at 07:27

## 2024-11-28 RX ADMIN — ACETAMINOPHEN 650 MG: 325 TABLET ORAL at 01:46

## 2024-11-28 RX ADMIN — BACLOFEN 5 MG: 10 TABLET ORAL at 07:27

## 2024-11-28 RX ADMIN — AMLODIPINE BESYLATE 10 MG: 10 TABLET ORAL at 07:27

## 2024-11-28 RX ADMIN — ROSUVASTATIN 40 MG: 40 TABLET, FILM COATED ORAL at 20:35

## 2024-11-28 RX ADMIN — ERYTHROMYCIN: 5 OINTMENT OPHTHALMIC at 05:46

## 2024-11-28 RX ADMIN — ENOXAPARIN SODIUM 40 MG: 100 INJECTION SUBCUTANEOUS at 07:26

## 2024-11-28 RX ADMIN — ACETAMINOPHEN 650 MG: 325 TABLET ORAL at 23:45

## 2024-11-28 RX ADMIN — CARBOXYMETHYLCELLULOSE SODIUM 1 DROP: 5 SOLUTION/ DROPS OPHTHALMIC at 20:35

## 2024-11-28 ASSESSMENT — PAIN DESCRIPTION - DESCRIPTORS
DESCRIPTORS: ACHING

## 2024-11-28 ASSESSMENT — PAIN SCALES - GENERAL
PAINLEVEL_OUTOF10: 1
PAINLEVEL_OUTOF10: 3
PAINLEVEL_OUTOF10: 0
PAINLEVEL_OUTOF10: 4
PAINLEVEL_OUTOF10: 1
PAINLEVEL_OUTOF10: 3

## 2024-11-28 ASSESSMENT — PAIN - FUNCTIONAL ASSESSMENT
PAIN_FUNCTIONAL_ASSESSMENT: ACTIVITIES ARE NOT PREVENTED

## 2024-11-28 ASSESSMENT — PAIN DESCRIPTION - LOCATION
LOCATION: NECK

## 2024-11-28 ASSESSMENT — PAIN DESCRIPTION - ORIENTATION
ORIENTATION: MID
ORIENTATION: POSTERIOR
ORIENTATION: MID

## 2024-11-29 PROCEDURE — 6370000000 HC RX 637 (ALT 250 FOR IP): Performed by: INTERNAL MEDICINE

## 2024-11-29 PROCEDURE — 99232 SBSQ HOSP IP/OBS MODERATE 35: CPT | Performed by: STUDENT IN AN ORGANIZED HEALTH CARE EDUCATION/TRAINING PROGRAM

## 2024-11-29 PROCEDURE — 97530 THERAPEUTIC ACTIVITIES: CPT

## 2024-11-29 PROCEDURE — 97110 THERAPEUTIC EXERCISES: CPT

## 2024-11-29 PROCEDURE — 99232 SBSQ HOSP IP/OBS MODERATE 35: CPT | Performed by: INTERNAL MEDICINE

## 2024-11-29 PROCEDURE — 1180000000 HC REHAB R&B

## 2024-11-29 PROCEDURE — 97116 GAIT TRAINING THERAPY: CPT

## 2024-11-29 PROCEDURE — 6370000000 HC RX 637 (ALT 250 FOR IP): Performed by: PHYSICAL MEDICINE & REHABILITATION

## 2024-11-29 PROCEDURE — 6370000000 HC RX 637 (ALT 250 FOR IP): Performed by: STUDENT IN AN ORGANIZED HEALTH CARE EDUCATION/TRAINING PROGRAM

## 2024-11-29 PROCEDURE — 6360000002 HC RX W HCPCS: Performed by: INTERNAL MEDICINE

## 2024-11-29 PROCEDURE — 97535 SELF CARE MNGMENT TRAINING: CPT

## 2024-11-29 PROCEDURE — 97112 NEUROMUSCULAR REEDUCATION: CPT

## 2024-11-29 RX ADMIN — ACETAMINOPHEN 650 MG: 325 TABLET ORAL at 05:35

## 2024-11-29 RX ADMIN — ERYTHROMYCIN: 5 OINTMENT OPHTHALMIC at 23:41

## 2024-11-29 RX ADMIN — ACETAMINOPHEN 650 MG: 325 TABLET ORAL at 14:53

## 2024-11-29 RX ADMIN — CARBOXYMETHYLCELLULOSE SODIUM 1 DROP: 5 SOLUTION/ DROPS OPHTHALMIC at 08:13

## 2024-11-29 RX ADMIN — ACETAMINOPHEN 650 MG: 325 TABLET ORAL at 09:31

## 2024-11-29 RX ADMIN — AMLODIPINE BESYLATE 10 MG: 10 TABLET ORAL at 08:08

## 2024-11-29 RX ADMIN — ERYTHROMYCIN: 5 OINTMENT OPHTHALMIC at 17:55

## 2024-11-29 RX ADMIN — Medication 6 MG: at 20:27

## 2024-11-29 RX ADMIN — BACLOFEN 10 MG: 10 TABLET ORAL at 19:45

## 2024-11-29 RX ADMIN — CARBOXYMETHYLCELLULOSE SODIUM 1 DROP: 5 SOLUTION/ DROPS OPHTHALMIC at 19:45

## 2024-11-29 RX ADMIN — SENNOSIDES 17.2 MG: 8.6 TABLET, FILM COATED ORAL at 23:41

## 2024-11-29 RX ADMIN — ENOXAPARIN SODIUM 40 MG: 100 INJECTION SUBCUTANEOUS at 08:07

## 2024-11-29 RX ADMIN — LOSARTAN POTASSIUM 25 MG: 25 TABLET, FILM COATED ORAL at 08:07

## 2024-11-29 RX ADMIN — BACLOFEN 5 MG: 10 TABLET ORAL at 14:53

## 2024-11-29 RX ADMIN — BACLOFEN 5 MG: 10 TABLET ORAL at 08:08

## 2024-11-29 RX ADMIN — ERYTHROMYCIN: 5 OINTMENT OPHTHALMIC at 05:36

## 2024-11-29 RX ADMIN — ERYTHROMYCIN: 5 OINTMENT OPHTHALMIC at 12:50

## 2024-11-29 RX ADMIN — CARBOXYMETHYLCELLULOSE SODIUM 1 DROP: 5 SOLUTION/ DROPS OPHTHALMIC at 14:54

## 2024-11-29 RX ADMIN — POLYETHYLENE GLYCOL 3350 17 G: 17 POWDER, FOR SOLUTION ORAL at 08:10

## 2024-11-29 RX ADMIN — ASPIRIN 81 MG: 81 TABLET, COATED ORAL at 08:07

## 2024-11-29 RX ADMIN — ROSUVASTATIN 40 MG: 40 TABLET, FILM COATED ORAL at 19:45

## 2024-11-29 RX ADMIN — ERYTHROMYCIN: 5 OINTMENT OPHTHALMIC at 00:28

## 2024-11-29 ASSESSMENT — PAIN - FUNCTIONAL ASSESSMENT
PAIN_FUNCTIONAL_ASSESSMENT: ACTIVITIES ARE NOT PREVENTED

## 2024-11-29 ASSESSMENT — PAIN SCALES - GENERAL
PAINLEVEL_OUTOF10: 3
PAINLEVEL_OUTOF10: 3
PAINLEVEL_OUTOF10: 1
PAINLEVEL_OUTOF10: 1
PAINLEVEL_OUTOF10: 0
PAINLEVEL_OUTOF10: 0
PAINLEVEL_OUTOF10: 2
PAINLEVEL_OUTOF10: 4
PAINLEVEL_OUTOF10: 1

## 2024-11-29 ASSESSMENT — PAIN DESCRIPTION - LOCATION
LOCATION: SHOULDER
LOCATION: NECK;ARM
LOCATION: SHOULDER

## 2024-11-29 ASSESSMENT — PAIN DESCRIPTION - DESCRIPTORS
DESCRIPTORS: TIGHTNESS
DESCRIPTORS: ACHING
DESCRIPTORS: TIGHTNESS

## 2024-11-29 ASSESSMENT — PAIN DESCRIPTION - ORIENTATION
ORIENTATION: RIGHT;MID
ORIENTATION: LEFT
ORIENTATION: LEFT

## 2024-11-30 PROCEDURE — 1180000000 HC REHAB R&B

## 2024-11-30 PROCEDURE — 97110 THERAPEUTIC EXERCISES: CPT

## 2024-11-30 PROCEDURE — 6370000000 HC RX 637 (ALT 250 FOR IP): Performed by: INTERNAL MEDICINE

## 2024-11-30 PROCEDURE — 97530 THERAPEUTIC ACTIVITIES: CPT

## 2024-11-30 PROCEDURE — 6370000000 HC RX 637 (ALT 250 FOR IP): Performed by: STUDENT IN AN ORGANIZED HEALTH CARE EDUCATION/TRAINING PROGRAM

## 2024-11-30 PROCEDURE — 6360000002 HC RX W HCPCS: Performed by: INTERNAL MEDICINE

## 2024-11-30 PROCEDURE — 97112 NEUROMUSCULAR REEDUCATION: CPT

## 2024-11-30 PROCEDURE — 6370000000 HC RX 637 (ALT 250 FOR IP): Performed by: PHYSICAL MEDICINE & REHABILITATION

## 2024-11-30 PROCEDURE — 97542 WHEELCHAIR MNGMENT TRAINING: CPT

## 2024-11-30 PROCEDURE — 99232 SBSQ HOSP IP/OBS MODERATE 35: CPT | Performed by: STUDENT IN AN ORGANIZED HEALTH CARE EDUCATION/TRAINING PROGRAM

## 2024-11-30 PROCEDURE — 99232 SBSQ HOSP IP/OBS MODERATE 35: CPT | Performed by: INTERNAL MEDICINE

## 2024-11-30 PROCEDURE — 97116 GAIT TRAINING THERAPY: CPT

## 2024-11-30 RX ORDER — LIDOCAINE 4 G/G
1 PATCH TOPICAL DAILY
Status: DISCONTINUED | OUTPATIENT
Start: 2024-11-30 | End: 2024-12-12 | Stop reason: HOSPADM

## 2024-11-30 RX ADMIN — ENOXAPARIN SODIUM 40 MG: 100 INJECTION SUBCUTANEOUS at 07:31

## 2024-11-30 RX ADMIN — POLYETHYLENE GLYCOL 3350 17 G: 17 POWDER, FOR SOLUTION ORAL at 07:32

## 2024-11-30 RX ADMIN — ERYTHROMYCIN: 5 OINTMENT OPHTHALMIC at 04:50

## 2024-11-30 RX ADMIN — BACLOFEN 10 MG: 10 TABLET ORAL at 20:44

## 2024-11-30 RX ADMIN — BACLOFEN 5 MG: 10 TABLET ORAL at 13:59

## 2024-11-30 RX ADMIN — CARBOXYMETHYLCELLULOSE SODIUM 1 DROP: 5 SOLUTION/ DROPS OPHTHALMIC at 20:44

## 2024-11-30 RX ADMIN — ERYTHROMYCIN: 5 OINTMENT OPHTHALMIC at 12:06

## 2024-11-30 RX ADMIN — ROSUVASTATIN 40 MG: 40 TABLET, FILM COATED ORAL at 20:44

## 2024-11-30 RX ADMIN — BACLOFEN 5 MG: 10 TABLET ORAL at 07:32

## 2024-11-30 RX ADMIN — ASPIRIN 81 MG: 81 TABLET, COATED ORAL at 07:32

## 2024-11-30 RX ADMIN — CARBOXYMETHYLCELLULOSE SODIUM 1 DROP: 5 SOLUTION/ DROPS OPHTHALMIC at 07:33

## 2024-11-30 RX ADMIN — ERYTHROMYCIN: 5 OINTMENT OPHTHALMIC at 23:31

## 2024-11-30 RX ADMIN — ACETAMINOPHEN 650 MG: 325 TABLET ORAL at 04:50

## 2024-11-30 RX ADMIN — CARBOXYMETHYLCELLULOSE SODIUM 1 DROP: 5 SOLUTION/ DROPS OPHTHALMIC at 13:59

## 2024-11-30 RX ADMIN — ACETAMINOPHEN 650 MG: 325 TABLET ORAL at 15:25

## 2024-11-30 RX ADMIN — LOSARTAN POTASSIUM 25 MG: 25 TABLET, FILM COATED ORAL at 07:32

## 2024-11-30 RX ADMIN — AMLODIPINE BESYLATE 10 MG: 10 TABLET ORAL at 07:32

## 2024-11-30 RX ADMIN — POLYETHYLENE GLYCOL 3350 17 G: 17 POWDER, FOR SOLUTION ORAL at 17:17

## 2024-11-30 RX ADMIN — ERYTHROMYCIN: 5 OINTMENT OPHTHALMIC at 17:17

## 2024-11-30 RX ADMIN — MAGNESIUM HYDROXIDE 30 ML: 400 SUSPENSION ORAL at 15:34

## 2024-11-30 ASSESSMENT — PAIN DESCRIPTION - DESCRIPTORS
DESCRIPTORS: NAGGING
DESCRIPTORS: ACHING

## 2024-11-30 ASSESSMENT — PAIN DESCRIPTION - ORIENTATION
ORIENTATION: LEFT
ORIENTATION: LEFT

## 2024-11-30 ASSESSMENT — PAIN - FUNCTIONAL ASSESSMENT
PAIN_FUNCTIONAL_ASSESSMENT: ACTIVITIES ARE NOT PREVENTED
PAIN_FUNCTIONAL_ASSESSMENT: ACTIVITIES ARE NOT PREVENTED

## 2024-11-30 ASSESSMENT — PAIN SCALES - GENERAL
PAINLEVEL_OUTOF10: 1
PAINLEVEL_OUTOF10: 3
PAINLEVEL_OUTOF10: 4

## 2024-11-30 ASSESSMENT — PAIN DESCRIPTION - LOCATION
LOCATION: SHOULDER
LOCATION: LEG

## 2024-12-01 PROCEDURE — 97112 NEUROMUSCULAR REEDUCATION: CPT

## 2024-12-01 PROCEDURE — 97535 SELF CARE MNGMENT TRAINING: CPT

## 2024-12-01 PROCEDURE — 1180000000 HC REHAB R&B

## 2024-12-01 PROCEDURE — 6370000000 HC RX 637 (ALT 250 FOR IP): Performed by: INTERNAL MEDICINE

## 2024-12-01 PROCEDURE — 97110 THERAPEUTIC EXERCISES: CPT

## 2024-12-01 PROCEDURE — 6360000002 HC RX W HCPCS: Performed by: INTERNAL MEDICINE

## 2024-12-01 PROCEDURE — 6370000000 HC RX 637 (ALT 250 FOR IP): Performed by: STUDENT IN AN ORGANIZED HEALTH CARE EDUCATION/TRAINING PROGRAM

## 2024-12-01 PROCEDURE — 97530 THERAPEUTIC ACTIVITIES: CPT

## 2024-12-01 PROCEDURE — 97116 GAIT TRAINING THERAPY: CPT

## 2024-12-01 PROCEDURE — 99231 SBSQ HOSP IP/OBS SF/LOW 25: CPT | Performed by: INTERNAL MEDICINE

## 2024-12-01 RX ADMIN — ERYTHROMYCIN: 5 OINTMENT OPHTHALMIC at 23:26

## 2024-12-01 RX ADMIN — ACETAMINOPHEN 650 MG: 325 TABLET ORAL at 00:40

## 2024-12-01 RX ADMIN — BACLOFEN 5 MG: 10 TABLET ORAL at 14:05

## 2024-12-01 RX ADMIN — CARBOXYMETHYLCELLULOSE SODIUM 1 DROP: 5 SOLUTION/ DROPS OPHTHALMIC at 14:05

## 2024-12-01 RX ADMIN — ERYTHROMYCIN: 5 OINTMENT OPHTHALMIC at 13:30

## 2024-12-01 RX ADMIN — LOSARTAN POTASSIUM 25 MG: 25 TABLET, FILM COATED ORAL at 08:01

## 2024-12-01 RX ADMIN — ACETAMINOPHEN 650 MG: 325 TABLET ORAL at 20:22

## 2024-12-01 RX ADMIN — ERYTHROMYCIN: 5 OINTMENT OPHTHALMIC at 17:30

## 2024-12-01 RX ADMIN — ERYTHROMYCIN: 5 OINTMENT OPHTHALMIC at 06:06

## 2024-12-01 RX ADMIN — ENOXAPARIN SODIUM 40 MG: 100 INJECTION SUBCUTANEOUS at 08:00

## 2024-12-01 RX ADMIN — AMLODIPINE BESYLATE 10 MG: 10 TABLET ORAL at 08:01

## 2024-12-01 RX ADMIN — CARBOXYMETHYLCELLULOSE SODIUM 1 DROP: 5 SOLUTION/ DROPS OPHTHALMIC at 08:01

## 2024-12-01 RX ADMIN — BACLOFEN 5 MG: 10 TABLET ORAL at 08:00

## 2024-12-01 RX ADMIN — ACETAMINOPHEN 650 MG: 325 TABLET ORAL at 14:04

## 2024-12-01 RX ADMIN — ROSUVASTATIN 40 MG: 40 TABLET, FILM COATED ORAL at 20:22

## 2024-12-01 RX ADMIN — ASPIRIN 81 MG: 81 TABLET, COATED ORAL at 08:00

## 2024-12-01 RX ADMIN — CARBOXYMETHYLCELLULOSE SODIUM 1 DROP: 5 SOLUTION/ DROPS OPHTHALMIC at 20:22

## 2024-12-01 RX ADMIN — BACLOFEN 10 MG: 10 TABLET ORAL at 20:22

## 2024-12-01 ASSESSMENT — PAIN DESCRIPTION - DESCRIPTORS
DESCRIPTORS: TIGHTNESS
DESCRIPTORS: ACHING
DESCRIPTORS: ACHING
DESCRIPTORS: TIGHTNESS

## 2024-12-01 ASSESSMENT — PAIN - FUNCTIONAL ASSESSMENT
PAIN_FUNCTIONAL_ASSESSMENT: ACTIVITIES ARE NOT PREVENTED

## 2024-12-01 ASSESSMENT — PAIN DESCRIPTION - LOCATION
LOCATION: FOOT
LOCATION: NECK;SHOULDER
LOCATION: FOOT
LOCATION: SHOULDER;NECK

## 2024-12-01 ASSESSMENT — PAIN SCALES - GENERAL
PAINLEVEL_OUTOF10: 3
PAINLEVEL_OUTOF10: 3
PAINLEVEL_OUTOF10: 1
PAINLEVEL_OUTOF10: 1
PAINLEVEL_OUTOF10: 2
PAINLEVEL_OUTOF10: 1
PAINLEVEL_OUTOF10: 3

## 2024-12-01 ASSESSMENT — PAIN DESCRIPTION - ORIENTATION
ORIENTATION: LEFT
ORIENTATION: RIGHT

## 2024-12-02 PROCEDURE — 97112 NEUROMUSCULAR REEDUCATION: CPT

## 2024-12-02 PROCEDURE — 6360000002 HC RX W HCPCS: Performed by: INTERNAL MEDICINE

## 2024-12-02 PROCEDURE — 97530 THERAPEUTIC ACTIVITIES: CPT

## 2024-12-02 PROCEDURE — 97150 GROUP THERAPEUTIC PROCEDURES: CPT

## 2024-12-02 PROCEDURE — 6370000000 HC RX 637 (ALT 250 FOR IP): Performed by: PHYSICAL MEDICINE & REHABILITATION

## 2024-12-02 PROCEDURE — 97110 THERAPEUTIC EXERCISES: CPT

## 2024-12-02 PROCEDURE — 6370000000 HC RX 637 (ALT 250 FOR IP): Performed by: INTERNAL MEDICINE

## 2024-12-02 PROCEDURE — 1180000000 HC REHAB R&B

## 2024-12-02 PROCEDURE — 99232 SBSQ HOSP IP/OBS MODERATE 35: CPT | Performed by: PHYSICAL MEDICINE & REHABILITATION

## 2024-12-02 PROCEDURE — 6370000000 HC RX 637 (ALT 250 FOR IP): Performed by: STUDENT IN AN ORGANIZED HEALTH CARE EDUCATION/TRAINING PROGRAM

## 2024-12-02 RX ADMIN — ACETAMINOPHEN 650 MG: 325 TABLET ORAL at 20:44

## 2024-12-02 RX ADMIN — CARBOXYMETHYLCELLULOSE SODIUM 1 DROP: 5 SOLUTION/ DROPS OPHTHALMIC at 19:40

## 2024-12-02 RX ADMIN — BACLOFEN 10 MG: 10 TABLET ORAL at 19:36

## 2024-12-02 RX ADMIN — LOSARTAN POTASSIUM 25 MG: 25 TABLET, FILM COATED ORAL at 07:13

## 2024-12-02 RX ADMIN — CARBOXYMETHYLCELLULOSE SODIUM 1 DROP: 5 SOLUTION/ DROPS OPHTHALMIC at 07:21

## 2024-12-02 RX ADMIN — ENOXAPARIN SODIUM 40 MG: 100 INJECTION SUBCUTANEOUS at 07:13

## 2024-12-02 RX ADMIN — ACETAMINOPHEN 650 MG: 325 TABLET ORAL at 02:23

## 2024-12-02 RX ADMIN — ERYTHROMYCIN: 5 OINTMENT OPHTHALMIC at 06:03

## 2024-12-02 RX ADMIN — ERYTHROMYCIN: 5 OINTMENT OPHTHALMIC at 17:08

## 2024-12-02 RX ADMIN — ACETAMINOPHEN 650 MG: 325 TABLET ORAL at 07:14

## 2024-12-02 RX ADMIN — ERYTHROMYCIN: 5 OINTMENT OPHTHALMIC at 23:44

## 2024-12-02 RX ADMIN — SENNOSIDES 17.2 MG: 8.6 TABLET, FILM COATED ORAL at 09:44

## 2024-12-02 RX ADMIN — ACETAMINOPHEN 650 MG: 325 TABLET ORAL at 13:08

## 2024-12-02 RX ADMIN — BACLOFEN 5 MG: 10 TABLET ORAL at 07:14

## 2024-12-02 RX ADMIN — BACLOFEN 5 MG: 10 TABLET ORAL at 13:08

## 2024-12-02 RX ADMIN — ASPIRIN 81 MG: 81 TABLET, COATED ORAL at 07:14

## 2024-12-02 RX ADMIN — ROSUVASTATIN 40 MG: 40 TABLET, FILM COATED ORAL at 19:36

## 2024-12-02 RX ADMIN — ERYTHROMYCIN: 5 OINTMENT OPHTHALMIC at 11:53

## 2024-12-02 RX ADMIN — AMLODIPINE BESYLATE 10 MG: 10 TABLET ORAL at 07:14

## 2024-12-02 RX ADMIN — CARBOXYMETHYLCELLULOSE SODIUM 1 DROP: 5 SOLUTION/ DROPS OPHTHALMIC at 13:08

## 2024-12-02 RX ADMIN — Medication 6 MG: at 20:44

## 2024-12-02 RX ADMIN — POLYETHYLENE GLYCOL 3350 17 G: 17 POWDER, FOR SOLUTION ORAL at 07:22

## 2024-12-02 ASSESSMENT — PAIN SCALES - GENERAL
PAINLEVEL_OUTOF10: 3
PAINLEVEL_OUTOF10: 3
PAINLEVEL_OUTOF10: 2
PAINLEVEL_OUTOF10: 0
PAINLEVEL_OUTOF10: 4
PAINLEVEL_OUTOF10: 6
PAINLEVEL_OUTOF10: 1
PAINLEVEL_OUTOF10: 0

## 2024-12-02 ASSESSMENT — PAIN DESCRIPTION - ORIENTATION
ORIENTATION: RIGHT
ORIENTATION: LEFT
ORIENTATION: LEFT

## 2024-12-02 ASSESSMENT — PAIN - FUNCTIONAL ASSESSMENT
PAIN_FUNCTIONAL_ASSESSMENT: ACTIVITIES ARE NOT PREVENTED

## 2024-12-02 ASSESSMENT — PAIN DESCRIPTION - LOCATION
LOCATION: FOOT
LOCATION: SHOULDER;NECK
LOCATION: KNEE;SHOULDER

## 2024-12-02 ASSESSMENT — PAIN DESCRIPTION - DESCRIPTORS
DESCRIPTORS: ACHING
DESCRIPTORS: SHARP
DESCRIPTORS: SHARP

## 2024-12-02 NOTE — DISCHARGE INSTR - COC
Continuity of Care Form    Patient Name: Nitesh Ren   :  1955  MRN:  [id number]    Admit date:  2024  Discharge date:  24    Code Status Order: DNR-CCA   Advance Directives:   Advance Care Flowsheet Documentation             Admitting Physician:  Nitesh Ash MD  PCP: Bhupendra Hinds MD    Discharging Nurse: Dyan Cooper  Discharging Hospital Unit/Room#: 2638/2638-01  Discharging Unit Phone Number: [phone]    Emergency Contact:   Extended Emergency Contact Information  Primary Emergency Contact: Greer Schaeffer  Home Phone: [phone]  Relation: Brother/Sister  Secondary Emergency Contact: Everardo Montes  Home Phone: [phone]  Relation: Brother/Sister    Past Surgical History:  Past Surgical History:   Procedure Laterality Date    COLONOSCOPY  11    POLYPECTOMY    COLONOSCOPY  10/23/07    POLYPECTOMY    CYST REMOVAL      \"back side\"   and face and neck    POLYPECTOMY  11       Immunization History:   Immunization History   Administered Date(s) Administered    COVID-19, PFIZER Bivalent, DO NOT Dilute, (age 12y+), IM, 30 mcg/0.3 mL 01/10/2023    COVID-19, PFIZER PURPLE top, DILUTE for use, (age 12 y+), 30mcg/0.3mL 2021, 2021, 2021    COVID-19, PFIZER, (age 12y+), IM, 30mcg/0.3mL 10/12/2023    Influenza, FLUAD, (age 65 y+), IM, Quadv, 0.5mL 2022    TDaP, ADACEL (age 10y-64y), BOOSTRIX (age 10y+), IM, 0.5mL 2020    Zoster Recombinant (Shingrix) 2022, 2022       Active Problems:  Patient Active Problem List   Diagnosis Code    Abdominal pain R10.9    Colon polyp K63.5    Neurofibromatosis (HCC) Q85.00    Thyroid nodule E04.1    Pulmonary nodules R91.8    Lymphangitis I89.1    Cellulitis of left lower leg L03.116    Ex-cigarette smoker Z87.891    Polycythemia D75.1    Pneumococcal vaccination declined Z28.21    Colonoscopy refused Z53.20    Polycythemia vera (HCC) D45    Left-sided weakness R53.1    Cerebrovascular accident

## 2024-12-03 PROCEDURE — 97110 THERAPEUTIC EXERCISES: CPT

## 2024-12-03 PROCEDURE — 97116 GAIT TRAINING THERAPY: CPT

## 2024-12-03 PROCEDURE — 6370000000 HC RX 637 (ALT 250 FOR IP): Performed by: PHYSICAL MEDICINE & REHABILITATION

## 2024-12-03 PROCEDURE — 1180000000 HC REHAB R&B

## 2024-12-03 PROCEDURE — 6370000000 HC RX 637 (ALT 250 FOR IP): Performed by: STUDENT IN AN ORGANIZED HEALTH CARE EDUCATION/TRAINING PROGRAM

## 2024-12-03 PROCEDURE — 6370000000 HC RX 637 (ALT 250 FOR IP): Performed by: INTERNAL MEDICINE

## 2024-12-03 PROCEDURE — 97112 NEUROMUSCULAR REEDUCATION: CPT

## 2024-12-03 PROCEDURE — 99232 SBSQ HOSP IP/OBS MODERATE 35: CPT | Performed by: PHYSICAL MEDICINE & REHABILITATION

## 2024-12-03 PROCEDURE — 6360000002 HC RX W HCPCS: Performed by: INTERNAL MEDICINE

## 2024-12-03 PROCEDURE — 97530 THERAPEUTIC ACTIVITIES: CPT

## 2024-12-03 PROCEDURE — 97535 SELF CARE MNGMENT TRAINING: CPT

## 2024-12-03 RX ADMIN — ERYTHROMYCIN: 5 OINTMENT OPHTHALMIC at 05:18

## 2024-12-03 RX ADMIN — ACETAMINOPHEN 650 MG: 325 TABLET ORAL at 12:25

## 2024-12-03 RX ADMIN — CARBOXYMETHYLCELLULOSE SODIUM 1 DROP: 5 SOLUTION/ DROPS OPHTHALMIC at 14:04

## 2024-12-03 RX ADMIN — LOSARTAN POTASSIUM 25 MG: 25 TABLET, FILM COATED ORAL at 07:20

## 2024-12-03 RX ADMIN — ACETAMINOPHEN 650 MG: 325 TABLET ORAL at 19:22

## 2024-12-03 RX ADMIN — BACLOFEN 5 MG: 10 TABLET ORAL at 07:20

## 2024-12-03 RX ADMIN — ROSUVASTATIN 40 MG: 40 TABLET, FILM COATED ORAL at 19:23

## 2024-12-03 RX ADMIN — ERYTHROMYCIN: 5 OINTMENT OPHTHALMIC at 12:26

## 2024-12-03 RX ADMIN — ASPIRIN 81 MG: 81 TABLET, COATED ORAL at 07:20

## 2024-12-03 RX ADMIN — BACLOFEN 10 MG: 10 TABLET ORAL at 19:23

## 2024-12-03 RX ADMIN — ACETAMINOPHEN 650 MG: 325 TABLET ORAL at 05:57

## 2024-12-03 RX ADMIN — POLYETHYLENE GLYCOL 3350 17 G: 17 POWDER, FOR SOLUTION ORAL at 07:20

## 2024-12-03 RX ADMIN — AMLODIPINE BESYLATE 10 MG: 10 TABLET ORAL at 07:20

## 2024-12-03 RX ADMIN — CARBOXYMETHYLCELLULOSE SODIUM 1 DROP: 5 SOLUTION/ DROPS OPHTHALMIC at 19:22

## 2024-12-03 RX ADMIN — BACLOFEN 5 MG: 10 TABLET ORAL at 14:03

## 2024-12-03 RX ADMIN — Medication 6 MG: at 19:23

## 2024-12-03 RX ADMIN — SENNOSIDES 17.2 MG: 8.6 TABLET, FILM COATED ORAL at 07:20

## 2024-12-03 RX ADMIN — ENOXAPARIN SODIUM 40 MG: 100 INJECTION SUBCUTANEOUS at 07:20

## 2024-12-03 RX ADMIN — ERYTHROMYCIN: 5 OINTMENT OPHTHALMIC at 16:02

## 2024-12-03 ASSESSMENT — PAIN - FUNCTIONAL ASSESSMENT
PAIN_FUNCTIONAL_ASSESSMENT: ACTIVITIES ARE NOT PREVENTED

## 2024-12-03 ASSESSMENT — PAIN DESCRIPTION - DESCRIPTORS
DESCRIPTORS: ACHING
DESCRIPTORS: DULL
DESCRIPTORS: DULL

## 2024-12-03 ASSESSMENT — PAIN DESCRIPTION - LOCATION
LOCATION: ELBOW;SHOULDER
LOCATION: FOOT
LOCATION: NECK
LOCATION: SHOULDER

## 2024-12-03 ASSESSMENT — PAIN SCALES - GENERAL
PAINLEVEL_OUTOF10: 0
PAINLEVEL_OUTOF10: 1
PAINLEVEL_OUTOF10: 0
PAINLEVEL_OUTOF10: 2

## 2024-12-03 ASSESSMENT — PAIN DESCRIPTION - ORIENTATION
ORIENTATION: MID;POSTERIOR
ORIENTATION: LEFT

## 2024-12-04 LAB
ANION GAP SERPL CALCULATED.3IONS-SCNC: 8 MMOL/L (ref 9–16)
BASOPHILS # BLD: 0 K/UL (ref 0–0.2)
BASOPHILS NFR BLD: 0 % (ref 0–2)
BUN SERPL-MCNC: 26 MG/DL (ref 8–23)
CALCIUM SERPL-MCNC: 9.2 MG/DL (ref 8.6–10.4)
CHLORIDE SERPL-SCNC: 105 MMOL/L (ref 98–107)
CO2 SERPL-SCNC: 27 MMOL/L (ref 20–31)
CREAT SERPL-MCNC: 1 MG/DL (ref 0.7–1.2)
EOSINOPHIL # BLD: 0.4 K/UL (ref 0–0.4)
EOSINOPHILS RELATIVE PERCENT: 5 % (ref 0–4)
ERYTHROCYTE [DISTWIDTH] IN BLOOD BY AUTOMATED COUNT: 15.9 % (ref 11.5–14.9)
GFR, ESTIMATED: 82 ML/MIN/1.73M2
GLUCOSE SERPL-MCNC: 86 MG/DL (ref 74–99)
HCT VFR BLD AUTO: 46.8 % (ref 41–53)
HGB BLD-MCNC: 15.9 G/DL (ref 13.5–17.5)
LYMPHOCYTES NFR BLD: 1.4 K/UL (ref 1–4.8)
LYMPHOCYTES RELATIVE PERCENT: 20 % (ref 24–44)
MCH RBC QN AUTO: 28.1 PG (ref 26–34)
MCHC RBC AUTO-ENTMCNC: 34 G/DL (ref 31–37)
MCV RBC AUTO: 82.7 FL (ref 80–100)
MONOCYTES NFR BLD: 0.7 K/UL (ref 0.1–1.3)
MONOCYTES NFR BLD: 10 % (ref 1–7)
NEUTROPHILS NFR BLD: 65 % (ref 36–66)
NEUTS SEG NFR BLD: 4.6 K/UL (ref 1.3–9.1)
PLATELET # BLD AUTO: 177 K/UL (ref 150–450)
PMV BLD AUTO: 8.2 FL (ref 6–12)
POTASSIUM SERPL-SCNC: 4.2 MMOL/L (ref 3.7–5.3)
RBC # BLD AUTO: 5.66 M/UL (ref 4.5–5.9)
SODIUM SERPL-SCNC: 140 MMOL/L (ref 136–145)
WBC OTHER # BLD: 7.2 K/UL (ref 3.5–11)

## 2024-12-04 PROCEDURE — 6360000002 HC RX W HCPCS: Performed by: INTERNAL MEDICINE

## 2024-12-04 PROCEDURE — 97530 THERAPEUTIC ACTIVITIES: CPT

## 2024-12-04 PROCEDURE — 97110 THERAPEUTIC EXERCISES: CPT

## 2024-12-04 PROCEDURE — 1180000000 HC REHAB R&B

## 2024-12-04 PROCEDURE — 6370000000 HC RX 637 (ALT 250 FOR IP): Performed by: PHYSICAL MEDICINE & REHABILITATION

## 2024-12-04 PROCEDURE — 97535 SELF CARE MNGMENT TRAINING: CPT

## 2024-12-04 PROCEDURE — 80048 BASIC METABOLIC PNL TOTAL CA: CPT

## 2024-12-04 PROCEDURE — 85025 COMPLETE CBC W/AUTO DIFF WBC: CPT

## 2024-12-04 PROCEDURE — 6370000000 HC RX 637 (ALT 250 FOR IP): Performed by: STUDENT IN AN ORGANIZED HEALTH CARE EDUCATION/TRAINING PROGRAM

## 2024-12-04 PROCEDURE — 99232 SBSQ HOSP IP/OBS MODERATE 35: CPT | Performed by: PHYSICAL MEDICINE & REHABILITATION

## 2024-12-04 PROCEDURE — 36415 COLL VENOUS BLD VENIPUNCTURE: CPT

## 2024-12-04 PROCEDURE — 97112 NEUROMUSCULAR REEDUCATION: CPT

## 2024-12-04 PROCEDURE — 97116 GAIT TRAINING THERAPY: CPT

## 2024-12-04 PROCEDURE — 6370000000 HC RX 637 (ALT 250 FOR IP): Performed by: INTERNAL MEDICINE

## 2024-12-04 RX ORDER — BACLOFEN 10 MG/1
5 TABLET ORAL ONCE
Status: COMPLETED | OUTPATIENT
Start: 2024-12-04 | End: 2024-12-04

## 2024-12-04 RX ORDER — BACLOFEN 10 MG/1
10 TABLET ORAL 3 TIMES DAILY
Status: DISCONTINUED | OUTPATIENT
Start: 2024-12-05 | End: 2024-12-12 | Stop reason: HOSPADM

## 2024-12-04 RX ADMIN — ROSUVASTATIN 40 MG: 40 TABLET, FILM COATED ORAL at 20:27

## 2024-12-04 RX ADMIN — AMLODIPINE BESYLATE 10 MG: 10 TABLET ORAL at 07:32

## 2024-12-04 RX ADMIN — CARBOXYMETHYLCELLULOSE SODIUM 1 DROP: 5 SOLUTION/ DROPS OPHTHALMIC at 11:19

## 2024-12-04 RX ADMIN — CARBOXYMETHYLCELLULOSE SODIUM 1 DROP: 5 SOLUTION/ DROPS OPHTHALMIC at 15:49

## 2024-12-04 RX ADMIN — ACETAMINOPHEN 650 MG: 325 TABLET ORAL at 23:39

## 2024-12-04 RX ADMIN — ERYTHROMYCIN: 5 OINTMENT OPHTHALMIC at 04:57

## 2024-12-04 RX ADMIN — BACLOFEN 5 MG: 10 TABLET ORAL at 07:32

## 2024-12-04 RX ADMIN — ERYTHROMYCIN: 5 OINTMENT OPHTHALMIC at 11:19

## 2024-12-04 RX ADMIN — BACLOFEN 10 MG: 10 TABLET ORAL at 20:27

## 2024-12-04 RX ADMIN — ERYTHROMYCIN: 5 OINTMENT OPHTHALMIC at 00:54

## 2024-12-04 RX ADMIN — ACETAMINOPHEN 650 MG: 325 TABLET ORAL at 11:22

## 2024-12-04 RX ADMIN — ACETAMINOPHEN 650 MG: 325 TABLET ORAL at 17:54

## 2024-12-04 RX ADMIN — LOSARTAN POTASSIUM 25 MG: 25 TABLET, FILM COATED ORAL at 07:32

## 2024-12-04 RX ADMIN — ASPIRIN 81 MG: 81 TABLET, COATED ORAL at 07:32

## 2024-12-04 RX ADMIN — ENOXAPARIN SODIUM 40 MG: 100 INJECTION SUBCUTANEOUS at 07:32

## 2024-12-04 RX ADMIN — BACLOFEN 5 MG: 10 TABLET ORAL at 12:41

## 2024-12-04 RX ADMIN — CARBOXYMETHYLCELLULOSE SODIUM 1 DROP: 5 SOLUTION/ DROPS OPHTHALMIC at 20:29

## 2024-12-04 RX ADMIN — POLYETHYLENE GLYCOL 3350 17 G: 17 POWDER, FOR SOLUTION ORAL at 07:32

## 2024-12-04 RX ADMIN — ACETAMINOPHEN 650 MG: 325 TABLET ORAL at 00:57

## 2024-12-04 RX ADMIN — Medication 6 MG: at 20:27

## 2024-12-04 RX ADMIN — ERYTHROMYCIN: 5 OINTMENT OPHTHALMIC at 17:54

## 2024-12-04 RX ADMIN — BACLOFEN 5 MG: 10 TABLET ORAL at 18:21

## 2024-12-04 RX ADMIN — ERYTHROMYCIN: 5 OINTMENT OPHTHALMIC at 23:39

## 2024-12-04 ASSESSMENT — PAIN SCALES - GENERAL
PAINLEVEL_OUTOF10: 3
PAINLEVEL_OUTOF10: 6
PAINLEVEL_OUTOF10: 1
PAINLEVEL_OUTOF10: 2
PAINLEVEL_OUTOF10: 3
PAINLEVEL_OUTOF10: 2
PAINLEVEL_OUTOF10: 0
PAINLEVEL_OUTOF10: 0

## 2024-12-04 ASSESSMENT — PAIN - FUNCTIONAL ASSESSMENT
PAIN_FUNCTIONAL_ASSESSMENT: ACTIVITIES ARE NOT PREVENTED

## 2024-12-04 ASSESSMENT — PAIN DESCRIPTION - DESCRIPTORS
DESCRIPTORS: ACHING
DESCRIPTORS: THROBBING
DESCRIPTORS: ACHING
DESCRIPTORS: ACHING;DULL

## 2024-12-04 ASSESSMENT — PAIN DESCRIPTION - LOCATION
LOCATION: HEAD
LOCATION: SHOULDER
LOCATION: SHOULDER
LOCATION: NECK

## 2024-12-04 ASSESSMENT — PAIN DESCRIPTION - ORIENTATION
ORIENTATION: LEFT
ORIENTATION: MID;POSTERIOR
ORIENTATION: LEFT

## 2024-12-05 LAB — GLUCOSE BLD-MCNC: 92 MG/DL (ref 75–110)

## 2024-12-05 PROCEDURE — 97110 THERAPEUTIC EXERCISES: CPT

## 2024-12-05 PROCEDURE — 99232 SBSQ HOSP IP/OBS MODERATE 35: CPT | Performed by: PHYSICAL MEDICINE & REHABILITATION

## 2024-12-05 PROCEDURE — 97116 GAIT TRAINING THERAPY: CPT

## 2024-12-05 PROCEDURE — 99231 SBSQ HOSP IP/OBS SF/LOW 25: CPT | Performed by: INTERNAL MEDICINE

## 2024-12-05 PROCEDURE — 97535 SELF CARE MNGMENT TRAINING: CPT

## 2024-12-05 PROCEDURE — 82947 ASSAY GLUCOSE BLOOD QUANT: CPT

## 2024-12-05 PROCEDURE — 6360000002 HC RX W HCPCS: Performed by: INTERNAL MEDICINE

## 2024-12-05 PROCEDURE — 6370000000 HC RX 637 (ALT 250 FOR IP): Performed by: INTERNAL MEDICINE

## 2024-12-05 PROCEDURE — 6370000000 HC RX 637 (ALT 250 FOR IP): Performed by: STUDENT IN AN ORGANIZED HEALTH CARE EDUCATION/TRAINING PROGRAM

## 2024-12-05 PROCEDURE — 1180000000 HC REHAB R&B

## 2024-12-05 PROCEDURE — 6370000000 HC RX 637 (ALT 250 FOR IP): Performed by: PHYSICAL MEDICINE & REHABILITATION

## 2024-12-05 PROCEDURE — 97112 NEUROMUSCULAR REEDUCATION: CPT

## 2024-12-05 PROCEDURE — 97530 THERAPEUTIC ACTIVITIES: CPT

## 2024-12-05 RX ADMIN — LOSARTAN POTASSIUM 25 MG: 25 TABLET, FILM COATED ORAL at 07:25

## 2024-12-05 RX ADMIN — POLYETHYLENE GLYCOL 3350 17 G: 17 POWDER, FOR SOLUTION ORAL at 07:26

## 2024-12-05 RX ADMIN — BACLOFEN 10 MG: 10 TABLET ORAL at 07:25

## 2024-12-05 RX ADMIN — ENOXAPARIN SODIUM 40 MG: 100 INJECTION SUBCUTANEOUS at 07:26

## 2024-12-05 RX ADMIN — BACLOFEN 10 MG: 10 TABLET ORAL at 21:24

## 2024-12-05 RX ADMIN — CARBOXYMETHYLCELLULOSE SODIUM 1 DROP: 5 SOLUTION/ DROPS OPHTHALMIC at 08:00

## 2024-12-05 RX ADMIN — ERYTHROMYCIN: 5 OINTMENT OPHTHALMIC at 11:40

## 2024-12-05 RX ADMIN — CARBOXYMETHYLCELLULOSE SODIUM 1 DROP: 5 SOLUTION/ DROPS OPHTHALMIC at 14:27

## 2024-12-05 RX ADMIN — ERYTHROMYCIN: 5 OINTMENT OPHTHALMIC at 18:24

## 2024-12-05 RX ADMIN — ASPIRIN 81 MG: 81 TABLET, COATED ORAL at 07:25

## 2024-12-05 RX ADMIN — ACETAMINOPHEN 650 MG: 325 TABLET ORAL at 15:41

## 2024-12-05 RX ADMIN — CARBOXYMETHYLCELLULOSE SODIUM 1 DROP: 5 SOLUTION/ DROPS OPHTHALMIC at 21:26

## 2024-12-05 RX ADMIN — ERYTHROMYCIN: 5 OINTMENT OPHTHALMIC at 05:34

## 2024-12-05 RX ADMIN — BACLOFEN 10 MG: 10 TABLET ORAL at 14:27

## 2024-12-05 RX ADMIN — ERYTHROMYCIN: 5 OINTMENT OPHTHALMIC at 23:47

## 2024-12-05 RX ADMIN — Medication 6 MG: at 21:24

## 2024-12-05 RX ADMIN — ROSUVASTATIN 40 MG: 40 TABLET, FILM COATED ORAL at 21:24

## 2024-12-05 RX ADMIN — ACETAMINOPHEN 650 MG: 325 TABLET ORAL at 07:25

## 2024-12-05 RX ADMIN — ACETAMINOPHEN 650 MG: 325 TABLET ORAL at 11:35

## 2024-12-05 RX ADMIN — ACETAMINOPHEN 650 MG: 325 TABLET ORAL at 21:25

## 2024-12-05 RX ADMIN — AMLODIPINE BESYLATE 10 MG: 10 TABLET ORAL at 07:25

## 2024-12-05 ASSESSMENT — PAIN - FUNCTIONAL ASSESSMENT
PAIN_FUNCTIONAL_ASSESSMENT: ACTIVITIES ARE NOT PREVENTED

## 2024-12-05 ASSESSMENT — PAIN DESCRIPTION - PAIN TYPE: TYPE: ACUTE PAIN

## 2024-12-05 ASSESSMENT — PAIN SCALES - GENERAL
PAINLEVEL_OUTOF10: 4
PAINLEVEL_OUTOF10: 1
PAINLEVEL_OUTOF10: 0
PAINLEVEL_OUTOF10: 0
PAINLEVEL_OUTOF10: 8
PAINLEVEL_OUTOF10: 4

## 2024-12-05 ASSESSMENT — PAIN DESCRIPTION - ORIENTATION
ORIENTATION: LEFT
ORIENTATION: POSTERIOR;MID
ORIENTATION: LEFT;RIGHT

## 2024-12-05 ASSESSMENT — PAIN DESCRIPTION - FREQUENCY: FREQUENCY: INTERMITTENT

## 2024-12-05 ASSESSMENT — PAIN DESCRIPTION - DESCRIPTORS
DESCRIPTORS: NAGGING
DESCRIPTORS: ACHING
DESCRIPTORS: ACHING
DESCRIPTORS: DULL
DESCRIPTORS: ACHING

## 2024-12-05 ASSESSMENT — PAIN DESCRIPTION - LOCATION
LOCATION: SHOULDER
LOCATION: NECK

## 2024-12-05 ASSESSMENT — PAIN DESCRIPTION - ONSET: ONSET: GRADUAL

## 2024-12-06 PROCEDURE — 97112 NEUROMUSCULAR REEDUCATION: CPT

## 2024-12-06 PROCEDURE — 1180000000 HC REHAB R&B

## 2024-12-06 PROCEDURE — 6370000000 HC RX 637 (ALT 250 FOR IP): Performed by: INTERNAL MEDICINE

## 2024-12-06 PROCEDURE — 6370000000 HC RX 637 (ALT 250 FOR IP): Performed by: PHYSICAL MEDICINE & REHABILITATION

## 2024-12-06 PROCEDURE — 97110 THERAPEUTIC EXERCISES: CPT

## 2024-12-06 PROCEDURE — 97535 SELF CARE MNGMENT TRAINING: CPT

## 2024-12-06 PROCEDURE — 6360000002 HC RX W HCPCS: Performed by: INTERNAL MEDICINE

## 2024-12-06 PROCEDURE — 97116 GAIT TRAINING THERAPY: CPT

## 2024-12-06 PROCEDURE — 6370000000 HC RX 637 (ALT 250 FOR IP): Performed by: STUDENT IN AN ORGANIZED HEALTH CARE EDUCATION/TRAINING PROGRAM

## 2024-12-06 PROCEDURE — 99232 SBSQ HOSP IP/OBS MODERATE 35: CPT | Performed by: PHYSICAL MEDICINE & REHABILITATION

## 2024-12-06 PROCEDURE — 97530 THERAPEUTIC ACTIVITIES: CPT

## 2024-12-06 PROCEDURE — 97542 WHEELCHAIR MNGMENT TRAINING: CPT

## 2024-12-06 PROCEDURE — 99231 SBSQ HOSP IP/OBS SF/LOW 25: CPT | Performed by: INTERNAL MEDICINE

## 2024-12-06 RX ADMIN — AMLODIPINE BESYLATE 10 MG: 10 TABLET ORAL at 07:53

## 2024-12-06 RX ADMIN — CARBOXYMETHYLCELLULOSE SODIUM 1 DROP: 5 SOLUTION/ DROPS OPHTHALMIC at 14:57

## 2024-12-06 RX ADMIN — BACLOFEN 10 MG: 10 TABLET ORAL at 20:25

## 2024-12-06 RX ADMIN — POLYETHYLENE GLYCOL 3350 17 G: 17 POWDER, FOR SOLUTION ORAL at 07:52

## 2024-12-06 RX ADMIN — ERYTHROMYCIN: 5 OINTMENT OPHTHALMIC at 05:11

## 2024-12-06 RX ADMIN — LOSARTAN POTASSIUM 25 MG: 25 TABLET, FILM COATED ORAL at 07:53

## 2024-12-06 RX ADMIN — ERYTHROMYCIN: 5 OINTMENT OPHTHALMIC at 11:04

## 2024-12-06 RX ADMIN — BACLOFEN 10 MG: 10 TABLET ORAL at 12:58

## 2024-12-06 RX ADMIN — CARBOXYMETHYLCELLULOSE SODIUM 1 DROP: 5 SOLUTION/ DROPS OPHTHALMIC at 07:53

## 2024-12-06 RX ADMIN — ENOXAPARIN SODIUM 40 MG: 100 INJECTION SUBCUTANEOUS at 07:52

## 2024-12-06 RX ADMIN — Medication 6 MG: at 22:44

## 2024-12-06 RX ADMIN — CARBOXYMETHYLCELLULOSE SODIUM 1 DROP: 5 SOLUTION/ DROPS OPHTHALMIC at 20:26

## 2024-12-06 RX ADMIN — ACETAMINOPHEN 650 MG: 325 TABLET ORAL at 18:03

## 2024-12-06 RX ADMIN — ASPIRIN 81 MG: 81 TABLET, COATED ORAL at 07:53

## 2024-12-06 RX ADMIN — ERYTHROMYCIN: 5 OINTMENT OPHTHALMIC at 22:44

## 2024-12-06 RX ADMIN — MENTHOL, UNSPECIFIED FORM AND METHYL SALICYLATE: 10; 150 CREAM TOPICAL at 18:47

## 2024-12-06 RX ADMIN — ROSUVASTATIN 40 MG: 40 TABLET, FILM COATED ORAL at 20:26

## 2024-12-06 RX ADMIN — ACETAMINOPHEN 650 MG: 325 TABLET ORAL at 07:53

## 2024-12-06 RX ADMIN — BACLOFEN 10 MG: 10 TABLET ORAL at 07:53

## 2024-12-06 RX ADMIN — ERYTHROMYCIN: 5 OINTMENT OPHTHALMIC at 18:04

## 2024-12-06 ASSESSMENT — PAIN DESCRIPTION - PAIN TYPE: TYPE: ACUTE PAIN

## 2024-12-06 ASSESSMENT — PAIN DESCRIPTION - DESCRIPTORS
DESCRIPTORS: DULL
DESCRIPTORS: ACHING
DESCRIPTORS: ACHING;SHARP;SHOOTING

## 2024-12-06 ASSESSMENT — PAIN DESCRIPTION - ONSET: ONSET: GRADUAL

## 2024-12-06 ASSESSMENT — PAIN DESCRIPTION - LOCATION
LOCATION: NECK

## 2024-12-06 ASSESSMENT — PAIN DESCRIPTION - FREQUENCY: FREQUENCY: INTERMITTENT

## 2024-12-06 ASSESSMENT — PAIN - FUNCTIONAL ASSESSMENT
PAIN_FUNCTIONAL_ASSESSMENT: ACTIVITIES ARE NOT PREVENTED

## 2024-12-06 ASSESSMENT — PAIN DESCRIPTION - ORIENTATION
ORIENTATION: LEFT

## 2024-12-06 ASSESSMENT — PAIN SCALES - GENERAL
PAINLEVEL_OUTOF10: 3
PAINLEVEL_OUTOF10: 4

## 2024-12-07 PROCEDURE — 97530 THERAPEUTIC ACTIVITIES: CPT

## 2024-12-07 PROCEDURE — 6370000000 HC RX 637 (ALT 250 FOR IP): Performed by: INTERNAL MEDICINE

## 2024-12-07 PROCEDURE — 1180000000 HC REHAB R&B

## 2024-12-07 PROCEDURE — 99232 SBSQ HOSP IP/OBS MODERATE 35: CPT | Performed by: PHYSICAL MEDICINE & REHABILITATION

## 2024-12-07 PROCEDURE — 97535 SELF CARE MNGMENT TRAINING: CPT

## 2024-12-07 PROCEDURE — 97110 THERAPEUTIC EXERCISES: CPT

## 2024-12-07 PROCEDURE — 6370000000 HC RX 637 (ALT 250 FOR IP): Performed by: STUDENT IN AN ORGANIZED HEALTH CARE EDUCATION/TRAINING PROGRAM

## 2024-12-07 PROCEDURE — 99231 SBSQ HOSP IP/OBS SF/LOW 25: CPT | Performed by: INTERNAL MEDICINE

## 2024-12-07 PROCEDURE — 97116 GAIT TRAINING THERAPY: CPT

## 2024-12-07 PROCEDURE — 6360000002 HC RX W HCPCS: Performed by: INTERNAL MEDICINE

## 2024-12-07 PROCEDURE — 6370000000 HC RX 637 (ALT 250 FOR IP): Performed by: PHYSICAL MEDICINE & REHABILITATION

## 2024-12-07 RX ORDER — BACLOFEN 10 MG/1
5 TABLET ORAL ONCE
Status: COMPLETED | OUTPATIENT
Start: 2024-12-07 | End: 2024-12-07

## 2024-12-07 RX ADMIN — MENTHOL, UNSPECIFIED FORM AND METHYL SALICYLATE: 10; 150 CREAM TOPICAL at 06:29

## 2024-12-07 RX ADMIN — ACETAMINOPHEN 650 MG: 325 TABLET ORAL at 00:23

## 2024-12-07 RX ADMIN — BACLOFEN 10 MG: 10 TABLET ORAL at 20:44

## 2024-12-07 RX ADMIN — ROSUVASTATIN 40 MG: 40 TABLET, FILM COATED ORAL at 20:44

## 2024-12-07 RX ADMIN — Medication 6 MG: at 20:43

## 2024-12-07 RX ADMIN — ERYTHROMYCIN: 5 OINTMENT OPHTHALMIC at 11:57

## 2024-12-07 RX ADMIN — CARBOXYMETHYLCELLULOSE SODIUM 1 DROP: 5 SOLUTION/ DROPS OPHTHALMIC at 20:52

## 2024-12-07 RX ADMIN — ENOXAPARIN SODIUM 40 MG: 100 INJECTION SUBCUTANEOUS at 09:47

## 2024-12-07 RX ADMIN — POLYETHYLENE GLYCOL 3350 17 G: 17 POWDER, FOR SOLUTION ORAL at 09:47

## 2024-12-07 RX ADMIN — ACETAMINOPHEN 650 MG: 325 TABLET ORAL at 20:43

## 2024-12-07 RX ADMIN — BACLOFEN 10 MG: 10 TABLET ORAL at 13:45

## 2024-12-07 RX ADMIN — LOSARTAN POTASSIUM 25 MG: 25 TABLET, FILM COATED ORAL at 09:49

## 2024-12-07 RX ADMIN — BACLOFEN 5 MG: 10 TABLET ORAL at 15:43

## 2024-12-07 RX ADMIN — ACETAMINOPHEN 650 MG: 325 TABLET ORAL at 09:47

## 2024-12-07 RX ADMIN — ERYTHROMYCIN: 5 OINTMENT OPHTHALMIC at 06:29

## 2024-12-07 RX ADMIN — CARBOXYMETHYLCELLULOSE SODIUM 1 DROP: 5 SOLUTION/ DROPS OPHTHALMIC at 11:57

## 2024-12-07 RX ADMIN — MENTHOL, UNSPECIFIED FORM AND METHYL SALICYLATE: 10; 150 CREAM TOPICAL at 11:58

## 2024-12-07 RX ADMIN — ASPIRIN 81 MG: 81 TABLET, COATED ORAL at 09:49

## 2024-12-07 RX ADMIN — CARBOXYMETHYLCELLULOSE SODIUM 1 DROP: 5 SOLUTION/ DROPS OPHTHALMIC at 13:45

## 2024-12-07 RX ADMIN — AMLODIPINE BESYLATE 10 MG: 10 TABLET ORAL at 09:48

## 2024-12-07 RX ADMIN — BACLOFEN 10 MG: 10 TABLET ORAL at 09:49

## 2024-12-07 ASSESSMENT — PAIN DESCRIPTION - DESCRIPTORS
DESCRIPTORS: ACHING;DULL
DESCRIPTORS: ACHING
DESCRIPTORS: ACHING

## 2024-12-07 ASSESSMENT — PAIN SCALES - GENERAL
PAINLEVEL_OUTOF10: 4
PAINLEVEL_OUTOF10: 2
PAINLEVEL_OUTOF10: 0
PAINLEVEL_OUTOF10: 5
PAINLEVEL_OUTOF10: 2
PAINLEVEL_OUTOF10: 3
PAINLEVEL_OUTOF10: 0
PAINLEVEL_OUTOF10: 4
PAINLEVEL_OUTOF10: 5
PAINLEVEL_OUTOF10: 3

## 2024-12-07 ASSESSMENT — PAIN DESCRIPTION - LOCATION
LOCATION: NECK
LOCATION: LEG
LOCATION: LEG;ARM
LOCATION: BACK
LOCATION: ARM;SHOULDER

## 2024-12-07 ASSESSMENT — PAIN - FUNCTIONAL ASSESSMENT
PAIN_FUNCTIONAL_ASSESSMENT: ACTIVITIES ARE NOT PREVENTED
PAIN_FUNCTIONAL_ASSESSMENT: PREVENTS OR INTERFERES SOME ACTIVE ACTIVITIES AND ADLS
PAIN_FUNCTIONAL_ASSESSMENT: ACTIVITIES ARE NOT PREVENTED
PAIN_FUNCTIONAL_ASSESSMENT: ACTIVITIES ARE NOT PREVENTED

## 2024-12-07 ASSESSMENT — PAIN DESCRIPTION - ORIENTATION
ORIENTATION: LEFT;LOWER;RIGHT;UPPER
ORIENTATION: LEFT
ORIENTATION: LEFT
ORIENTATION: POSTERIOR

## 2024-12-08 PROCEDURE — 6370000000 HC RX 637 (ALT 250 FOR IP): Performed by: STUDENT IN AN ORGANIZED HEALTH CARE EDUCATION/TRAINING PROGRAM

## 2024-12-08 PROCEDURE — 6370000000 HC RX 637 (ALT 250 FOR IP): Performed by: INTERNAL MEDICINE

## 2024-12-08 PROCEDURE — 6360000002 HC RX W HCPCS: Performed by: INTERNAL MEDICINE

## 2024-12-08 PROCEDURE — 97530 THERAPEUTIC ACTIVITIES: CPT

## 2024-12-08 PROCEDURE — 6370000000 HC RX 637 (ALT 250 FOR IP): Performed by: PHYSICAL MEDICINE & REHABILITATION

## 2024-12-08 PROCEDURE — 97535 SELF CARE MNGMENT TRAINING: CPT

## 2024-12-08 PROCEDURE — 1180000000 HC REHAB R&B

## 2024-12-08 PROCEDURE — 97116 GAIT TRAINING THERAPY: CPT

## 2024-12-08 PROCEDURE — 99231 SBSQ HOSP IP/OBS SF/LOW 25: CPT | Performed by: INTERNAL MEDICINE

## 2024-12-08 PROCEDURE — 97110 THERAPEUTIC EXERCISES: CPT

## 2024-12-08 RX ADMIN — AMLODIPINE BESYLATE 10 MG: 10 TABLET ORAL at 10:05

## 2024-12-08 RX ADMIN — POLYETHYLENE GLYCOL 3350 17 G: 17 POWDER, FOR SOLUTION ORAL at 10:05

## 2024-12-08 RX ADMIN — LOSARTAN POTASSIUM 25 MG: 25 TABLET, FILM COATED ORAL at 10:05

## 2024-12-08 RX ADMIN — ACETAMINOPHEN 650 MG: 325 TABLET ORAL at 19:47

## 2024-12-08 RX ADMIN — ASPIRIN 81 MG: 81 TABLET, COATED ORAL at 10:05

## 2024-12-08 RX ADMIN — CARBOXYMETHYLCELLULOSE SODIUM 1 DROP: 5 SOLUTION/ DROPS OPHTHALMIC at 13:52

## 2024-12-08 RX ADMIN — ROSUVASTATIN 40 MG: 40 TABLET, FILM COATED ORAL at 19:47

## 2024-12-08 RX ADMIN — MENTHOL, UNSPECIFIED FORM AND METHYL SALICYLATE: 10; 150 CREAM TOPICAL at 11:56

## 2024-12-08 RX ADMIN — ACETAMINOPHEN 650 MG: 325 TABLET ORAL at 23:47

## 2024-12-08 RX ADMIN — ERYTHROMYCIN: 5 OINTMENT OPHTHALMIC at 00:12

## 2024-12-08 RX ADMIN — ERYTHROMYCIN: 5 OINTMENT OPHTHALMIC at 11:56

## 2024-12-08 RX ADMIN — ACETAMINOPHEN 650 MG: 325 TABLET ORAL at 13:50

## 2024-12-08 RX ADMIN — ERYTHROMYCIN: 5 OINTMENT OPHTHALMIC at 05:23

## 2024-12-08 RX ADMIN — CARBOXYMETHYLCELLULOSE SODIUM 1 DROP: 5 SOLUTION/ DROPS OPHTHALMIC at 10:13

## 2024-12-08 RX ADMIN — CARBOXYMETHYLCELLULOSE SODIUM 1 DROP: 5 SOLUTION/ DROPS OPHTHALMIC at 19:48

## 2024-12-08 RX ADMIN — ENOXAPARIN SODIUM 40 MG: 100 INJECTION SUBCUTANEOUS at 10:04

## 2024-12-08 RX ADMIN — Medication 6 MG: at 22:09

## 2024-12-08 RX ADMIN — ERYTHROMYCIN: 5 OINTMENT OPHTHALMIC at 23:44

## 2024-12-08 RX ADMIN — BACLOFEN 10 MG: 10 TABLET ORAL at 19:47

## 2024-12-08 RX ADMIN — BACLOFEN 10 MG: 10 TABLET ORAL at 13:49

## 2024-12-08 RX ADMIN — BACLOFEN 10 MG: 10 TABLET ORAL at 10:05

## 2024-12-08 ASSESSMENT — PAIN DESCRIPTION - ORIENTATION
ORIENTATION: RIGHT;LEFT
ORIENTATION: POSTERIOR
ORIENTATION: RIGHT;LEFT
ORIENTATION: POSTERIOR

## 2024-12-08 ASSESSMENT — PAIN SCALES - GENERAL
PAINLEVEL_OUTOF10: 4
PAINLEVEL_OUTOF10: 3
PAINLEVEL_OUTOF10: 5
PAINLEVEL_OUTOF10: 4
PAINLEVEL_OUTOF10: 4
PAINLEVEL_OUTOF10: 5

## 2024-12-08 ASSESSMENT — PAIN DESCRIPTION - LOCATION
LOCATION: SHOULDER
LOCATION: NECK
LOCATION: SHOULDER
LOCATION: NECK

## 2024-12-08 ASSESSMENT — PAIN DESCRIPTION - DESCRIPTORS
DESCRIPTORS: ACHING

## 2024-12-08 ASSESSMENT — PAIN - FUNCTIONAL ASSESSMENT
PAIN_FUNCTIONAL_ASSESSMENT: PREVENTS OR INTERFERES SOME ACTIVE ACTIVITIES AND ADLS
PAIN_FUNCTIONAL_ASSESSMENT: PREVENTS OR INTERFERES SOME ACTIVE ACTIVITIES AND ADLS

## 2024-12-09 PROCEDURE — 6370000000 HC RX 637 (ALT 250 FOR IP): Performed by: PHYSICAL MEDICINE & REHABILITATION

## 2024-12-09 PROCEDURE — 97530 THERAPEUTIC ACTIVITIES: CPT

## 2024-12-09 PROCEDURE — 6370000000 HC RX 637 (ALT 250 FOR IP): Performed by: STUDENT IN AN ORGANIZED HEALTH CARE EDUCATION/TRAINING PROGRAM

## 2024-12-09 PROCEDURE — 99232 SBSQ HOSP IP/OBS MODERATE 35: CPT | Performed by: PHYSICAL MEDICINE & REHABILITATION

## 2024-12-09 PROCEDURE — 97535 SELF CARE MNGMENT TRAINING: CPT

## 2024-12-09 PROCEDURE — 99231 SBSQ HOSP IP/OBS SF/LOW 25: CPT | Performed by: INTERNAL MEDICINE

## 2024-12-09 PROCEDURE — 6370000000 HC RX 637 (ALT 250 FOR IP): Performed by: INTERNAL MEDICINE

## 2024-12-09 PROCEDURE — 1180000000 HC REHAB R&B

## 2024-12-09 PROCEDURE — 6360000002 HC RX W HCPCS: Performed by: INTERNAL MEDICINE

## 2024-12-09 PROCEDURE — 97116 GAIT TRAINING THERAPY: CPT

## 2024-12-09 PROCEDURE — 97542 WHEELCHAIR MNGMENT TRAINING: CPT

## 2024-12-09 RX ADMIN — POLYETHYLENE GLYCOL 3350 17 G: 17 POWDER, FOR SOLUTION ORAL at 07:30

## 2024-12-09 RX ADMIN — MENTHOL, UNSPECIFIED FORM AND METHYL SALICYLATE: 10; 150 CREAM TOPICAL at 23:27

## 2024-12-09 RX ADMIN — ERYTHROMYCIN: 5 OINTMENT OPHTHALMIC at 23:27

## 2024-12-09 RX ADMIN — ERYTHROMYCIN: 5 OINTMENT OPHTHALMIC at 17:38

## 2024-12-09 RX ADMIN — ROSUVASTATIN 40 MG: 40 TABLET, FILM COATED ORAL at 20:26

## 2024-12-09 RX ADMIN — LOSARTAN POTASSIUM 25 MG: 25 TABLET, FILM COATED ORAL at 07:30

## 2024-12-09 RX ADMIN — ENOXAPARIN SODIUM 40 MG: 100 INJECTION SUBCUTANEOUS at 07:30

## 2024-12-09 RX ADMIN — CARBOXYMETHYLCELLULOSE SODIUM 1 DROP: 5 SOLUTION/ DROPS OPHTHALMIC at 14:16

## 2024-12-09 RX ADMIN — CARBOXYMETHYLCELLULOSE SODIUM 1 DROP: 5 SOLUTION/ DROPS OPHTHALMIC at 07:33

## 2024-12-09 RX ADMIN — AMLODIPINE BESYLATE 10 MG: 10 TABLET ORAL at 07:30

## 2024-12-09 RX ADMIN — ACETAMINOPHEN 650 MG: 325 TABLET ORAL at 23:26

## 2024-12-09 RX ADMIN — BACLOFEN 10 MG: 10 TABLET ORAL at 07:30

## 2024-12-09 RX ADMIN — ASPIRIN 81 MG: 81 TABLET, COATED ORAL at 07:30

## 2024-12-09 RX ADMIN — ERYTHROMYCIN: 5 OINTMENT OPHTHALMIC at 11:33

## 2024-12-09 RX ADMIN — BACLOFEN 10 MG: 10 TABLET ORAL at 20:26

## 2024-12-09 RX ADMIN — ACETAMINOPHEN 650 MG: 325 TABLET ORAL at 07:30

## 2024-12-09 RX ADMIN — CARBOXYMETHYLCELLULOSE SODIUM 1 DROP: 5 SOLUTION/ DROPS OPHTHALMIC at 20:26

## 2024-12-09 RX ADMIN — Medication 6 MG: at 22:06

## 2024-12-09 RX ADMIN — ERYTHROMYCIN: 5 OINTMENT OPHTHALMIC at 05:50

## 2024-12-09 RX ADMIN — ACETAMINOPHEN 650 MG: 325 TABLET ORAL at 17:35

## 2024-12-09 RX ADMIN — ACETAMINOPHEN 650 MG: 325 TABLET ORAL at 11:32

## 2024-12-09 RX ADMIN — BACLOFEN 10 MG: 10 TABLET ORAL at 14:15

## 2024-12-09 ASSESSMENT — PAIN DESCRIPTION - ONSET
ONSET: ON-GOING
ONSET: GRADUAL
ONSET: GRADUAL

## 2024-12-09 ASSESSMENT — PAIN DESCRIPTION - FREQUENCY
FREQUENCY: INTERMITTENT

## 2024-12-09 ASSESSMENT — PAIN DESCRIPTION - PAIN TYPE
TYPE: ACUTE PAIN

## 2024-12-09 ASSESSMENT — PAIN SCALES - GENERAL
PAINLEVEL_OUTOF10: 4
PAINLEVEL_OUTOF10: 3
PAINLEVEL_OUTOF10: 2
PAINLEVEL_OUTOF10: 4
PAINLEVEL_OUTOF10: 3
PAINLEVEL_OUTOF10: 3
PAINLEVEL_OUTOF10: 2

## 2024-12-09 ASSESSMENT — PAIN DESCRIPTION - DESCRIPTORS
DESCRIPTORS: ACHING

## 2024-12-09 ASSESSMENT — PAIN - FUNCTIONAL ASSESSMENT
PAIN_FUNCTIONAL_ASSESSMENT: ACTIVITIES ARE NOT PREVENTED

## 2024-12-09 ASSESSMENT — PAIN DESCRIPTION - ORIENTATION
ORIENTATION: POSTERIOR
ORIENTATION: LEFT
ORIENTATION: POSTERIOR

## 2024-12-09 ASSESSMENT — PAIN SCALES - WONG BAKER
WONGBAKER_NUMERICALRESPONSE: HURTS A LITTLE BIT
WONGBAKER_NUMERICALRESPONSE: NO HURT

## 2024-12-09 ASSESSMENT — PAIN DESCRIPTION - LOCATION
LOCATION: NECK
LOCATION: LEG

## 2024-12-09 NOTE — PATIENT CARE CONFERENCE
[facility]  TEAM CONFERENCE NOTE  Date: 24  Patient Name: Nitesh Ren       Room: 2638/2638-01  MRN: [id number]       : 1955  (69 y.o.)     Gender: male     Referring Practitioner: Dr Ash     PRINCIPAL DIAGNOSIS: Hemiplegia and hemiparesis following cerebral infarction affecting left dominant side (HCC)    NURSING--------------------------------------------------------------------------------    Bladder Continence  Always Continent  Bowel Continence Always Continent    Date of Last BM: 24    Bladder/Bowel Program Interventions: Both Bowel & Bladder Program In Place     Wounds/Incisions/Ulcers: No skin issues identified    Pain Control: Patient's pain currently controlled and pain regimen effective as ordered    Pain Medication Regimen Usage Pattern: MAR reviewed and pain medications are being used at the following frequency (Specify Medication, # Doses Administered on average per day, identified patterns of use - for example: time of day, prior to activity/therapy)  Tylenol 650 mg Q4 hours as needed. Last given 24 2493    Fall Risk:  Falling star program initiated    Medication Education Program: Patient able to manage medications and being educated by nursing    Discharge Preparation Patient/Responsible Party Education In Progress:   [Complete for All Patients] Rehab Specific Admitting Diagnosis Education:  , Stroke Education, and Fall Prevention    Nursing specific communication for TEAM: No additional information identified requiring communication at this time    PHYSICAL THERAPY------------------------------------------------------------------      Bed Mobility:    Rolling to Left: Minimal assistance  Rolling to Right: Moderate assistance  Supine to Sit: Moderate assistance  Sit to Supine: Maximum assistance  Scooting: Moderate assistance (scooting forward at EOB/EOC.)  Bed Mobility Comments: HOB flat, pt used bed rail on right 
during Left swing phase > 50% of the time  Short Term Goal 5: Pt to tolerate standing with UE support for 2 to 4 minutes to work on standing balance  Short Term Goal 6: Pt able to perform 6\" steps up in // bars with R UE support, mod a x2  Short Term Goal 7: Propel w/c distance of 50 to 80 ft, CGA on level surfaces                Continued Therapy After Discharge: Continue To Assess    DME Recommendations: Continued Assessment Required for Determination    OCCUPATIONAL THERAPY-----------------------------------------------------------    SELF CARE        Feeding  Assistance Level: Set-up  Skilled Clinical Factors: Per pt report. Continued instruction and education regarding maynor techniques and other compensatory strategies for increased indep. G understanding.      Grooming/Oral Hygiene  Assistance Level: Stand by assist  Skilled Clinical Factors: Seated in wheelchair at sink for oral hygiene, face washing. Continued instruction and education regarding maynor techniques and other compensatory strategies for increased indep while completing self care tasks. G understanding.     Upper Extremity Bathing  Assistance Level: Minimal assistance  Skilled Clinical Factors: Assist for washing R arm and axillary. Verbal cuing and demonstration on other maynor techniques/modified techniques for increased indep.     Lower Extremity Bathing  Assistance Level: Minimal assistance  Skilled Clinical Factors: While seated in wheelchair pt was able to complete washing of B LE and kimberly area. While standing at sink pt required assist with washing of buttocks.     Upper Extremity Dressing  Assistance Level: Moderate assistance  Skilled Clinical Factors: Education on use of maynor-dressing technique with visual demo provided, increased time for threading L UE with assistance for threading shirt up to elbow, patient able to thread R UE with increased time to don overhead shirt and assistance for posteriorly managing shirt over back.       Lower 
use of adaptive strategies with good safety  Short Term Goal 3: patient to safely perform functional transfers with mod A with use of least restrictive device during selfcare tasks  Short Term Goal 4: patient to safety perform toileting task with mod A with use of DME as needed with good safety  Short Term Goal 5: patient to participate 30+ min of therapeutic exercise/functional activity to increase independence with selfcare tasks  Additional Goals?: Yes  Short Term Goal 6: patient to tolerate 5+ min with CGA during functional activity of choice to increase independence/safety with selfcare task  Short Term Goal 7: Pt will participate in neuromuscular re-education activities (weight bearing, e-stim, SROM/PROM) as tolerated, to facilitate movement in LUE.  Short Term Goal 8: Pt will demonstrate improved awareness of L UE as demonstrated by ability to maintain safe positioning of L hand and wrist during ADL routine without assistance.     Hand Dominance: Hand Dominance  Hand Dominance: Left      Continued Therapy After Discharge: Continue To Assess    DME Recommendations: Continued Assessment Required for Determination    SPEECH THERAPY------------------------------------------------------------------------        NUTRITION------------------------------------------------------------------------------------  Weight - Scale: 72.1 kg (159 lb) / Body mass index is 22.81 kg/m². Diet Rx: Regular. Ensure Plus High Protein x 1 daily. PO intake appears adequate with % of most meals and supplements consumed. Please see nutrition note for details.    CASE MANAGEMENT ASSESSMENT ------------------------------------------------    Discharge Disposition: home with brother  Family Support: siblings, family members    PCP: Young, Bhupendra Kayden, MD  *If No PCP, Specify Status of Designation:Not Applicable, PCP Verified    Services Being Followed for Discharge Planning: Outpatient Therapy: Location - [facility] 
safely perform functional transfers/mobility with CGA with use of least restrictive device during selfcare task  Long Term Goal 4: patient to perform tub transfer with CGA with use of DME as needed with good safety  Long Term Goal 5: patient to safely perform self feeding/grooming tasks with modified independence with use of adaptive strategies/AE as needed.  Additional Goals?: Yes  Long Term Goal 6: Pt will be educated regarding L UE HEP including self ROM/ LUE shoulder mobility and demonstrate ability to complete with proper body mechanics by discharge.  Long Term Goal 7: patient to verbalize/demonstrate good understanding of AE/adaptive strategies/DME to increase independence with selfcare tasks  Long Term Goal 8: OT to further assess FMC/ strength as able and notify OTR to update goals  ST:     Treating Interdisciplinary Team Professionals/Participating Team Members with current knowledge of Nitesh Ren:  /:  Keysha Palomino RN  Occupational Therapist: Grace Villegas OT   Physical Therapist: Danielle Kovacs PT  Speech Therapist:  N/A  Nurse: Saw Trinidad RN   Dietary/Nutrition: Juliane Gtz RD, LD  Pastoral Care: Galo Roberts - Director of [facility] Chaplain Sun Walker  CMG: Rhonda El RN    I attest to leading the interdisciplinary team meeting, required attendees are present as listed above, I approve the established interdisciplinary plan of care as documented within the medical record of Nitesh Ren. Chronic pain is controlled. Home evaluation completed yesterday. Anticipate discharge home 12/12/24.     Ele Melendez MD

## 2024-12-10 PROCEDURE — 6370000000 HC RX 637 (ALT 250 FOR IP): Performed by: STUDENT IN AN ORGANIZED HEALTH CARE EDUCATION/TRAINING PROGRAM

## 2024-12-10 PROCEDURE — 97110 THERAPEUTIC EXERCISES: CPT

## 2024-12-10 PROCEDURE — 97530 THERAPEUTIC ACTIVITIES: CPT

## 2024-12-10 PROCEDURE — 97542 WHEELCHAIR MNGMENT TRAINING: CPT

## 2024-12-10 PROCEDURE — 6370000000 HC RX 637 (ALT 250 FOR IP): Performed by: PHYSICAL MEDICINE & REHABILITATION

## 2024-12-10 PROCEDURE — 6370000000 HC RX 637 (ALT 250 FOR IP): Performed by: INTERNAL MEDICINE

## 2024-12-10 PROCEDURE — 99232 SBSQ HOSP IP/OBS MODERATE 35: CPT | Performed by: PHYSICAL MEDICINE & REHABILITATION

## 2024-12-10 PROCEDURE — 1180000000 HC REHAB R&B

## 2024-12-10 PROCEDURE — 97112 NEUROMUSCULAR REEDUCATION: CPT

## 2024-12-10 PROCEDURE — 97116 GAIT TRAINING THERAPY: CPT

## 2024-12-10 PROCEDURE — 97535 SELF CARE MNGMENT TRAINING: CPT

## 2024-12-10 PROCEDURE — 99231 SBSQ HOSP IP/OBS SF/LOW 25: CPT | Performed by: INTERNAL MEDICINE

## 2024-12-10 PROCEDURE — 6360000002 HC RX W HCPCS: Performed by: INTERNAL MEDICINE

## 2024-12-10 RX ADMIN — ROSUVASTATIN 40 MG: 40 TABLET, FILM COATED ORAL at 20:34

## 2024-12-10 RX ADMIN — BACLOFEN 10 MG: 10 TABLET ORAL at 13:40

## 2024-12-10 RX ADMIN — BACLOFEN 10 MG: 10 TABLET ORAL at 20:34

## 2024-12-10 RX ADMIN — ACETAMINOPHEN 650 MG: 325 TABLET ORAL at 18:39

## 2024-12-10 RX ADMIN — ERYTHROMYCIN: 5 OINTMENT OPHTHALMIC at 18:39

## 2024-12-10 RX ADMIN — CARBOXYMETHYLCELLULOSE SODIUM 1 DROP: 5 SOLUTION/ DROPS OPHTHALMIC at 13:41

## 2024-12-10 RX ADMIN — ACETAMINOPHEN 650 MG: 325 TABLET ORAL at 04:32

## 2024-12-10 RX ADMIN — BACLOFEN 10 MG: 10 TABLET ORAL at 07:26

## 2024-12-10 RX ADMIN — CARBOXYMETHYLCELLULOSE SODIUM 1 DROP: 5 SOLUTION/ DROPS OPHTHALMIC at 20:34

## 2024-12-10 RX ADMIN — AMLODIPINE BESYLATE 10 MG: 10 TABLET ORAL at 07:26

## 2024-12-10 RX ADMIN — CARBOXYMETHYLCELLULOSE SODIUM 1 DROP: 5 SOLUTION/ DROPS OPHTHALMIC at 07:27

## 2024-12-10 RX ADMIN — ENOXAPARIN SODIUM 40 MG: 100 INJECTION SUBCUTANEOUS at 07:26

## 2024-12-10 RX ADMIN — ASPIRIN 81 MG: 81 TABLET, COATED ORAL at 07:26

## 2024-12-10 RX ADMIN — ERYTHROMYCIN: 5 OINTMENT OPHTHALMIC at 11:05

## 2024-12-10 RX ADMIN — LOSARTAN POTASSIUM 25 MG: 25 TABLET, FILM COATED ORAL at 07:26

## 2024-12-10 RX ADMIN — ERYTHROMYCIN: 5 OINTMENT OPHTHALMIC at 05:45

## 2024-12-10 ASSESSMENT — PAIN - FUNCTIONAL ASSESSMENT
PAIN_FUNCTIONAL_ASSESSMENT: ACTIVITIES ARE NOT PREVENTED

## 2024-12-10 ASSESSMENT — PAIN SCALES - GENERAL
PAINLEVEL_OUTOF10: 0
PAINLEVEL_OUTOF10: 2
PAINLEVEL_OUTOF10: 3
PAINLEVEL_OUTOF10: 1

## 2024-12-10 ASSESSMENT — PAIN DESCRIPTION - ORIENTATION: ORIENTATION: LEFT

## 2024-12-10 ASSESSMENT — PAIN DESCRIPTION - DESCRIPTORS: DESCRIPTORS: ACHING

## 2024-12-10 ASSESSMENT — PAIN DESCRIPTION - LOCATION: LOCATION: LEG

## 2024-12-11 LAB
ANION GAP SERPL CALCULATED.3IONS-SCNC: 9 MMOL/L (ref 9–16)
BASOPHILS # BLD: 0 K/UL (ref 0–0.2)
BASOPHILS NFR BLD: 1 % (ref 0–2)
BUN SERPL-MCNC: 22 MG/DL (ref 8–23)
CALCIUM SERPL-MCNC: 9.2 MG/DL (ref 8.6–10.4)
CHLORIDE SERPL-SCNC: 104 MMOL/L (ref 98–107)
CO2 SERPL-SCNC: 27 MMOL/L (ref 20–31)
CREAT SERPL-MCNC: 0.9 MG/DL (ref 0.7–1.2)
EOSINOPHIL # BLD: 0.4 K/UL (ref 0–0.4)
EOSINOPHILS RELATIVE PERCENT: 5 % (ref 0–4)
ERYTHROCYTE [DISTWIDTH] IN BLOOD BY AUTOMATED COUNT: 16.2 % (ref 11.5–14.9)
GFR, ESTIMATED: >90 ML/MIN/1.73M2
GLUCOSE SERPL-MCNC: 86 MG/DL (ref 74–99)
HCT VFR BLD AUTO: 49.7 % (ref 41–53)
HGB BLD-MCNC: 16.6 G/DL (ref 13.5–17.5)
LYMPHOCYTES NFR BLD: 1.5 K/UL (ref 1–4.8)
LYMPHOCYTES RELATIVE PERCENT: 19 % (ref 24–44)
MCH RBC QN AUTO: 27.8 PG (ref 26–34)
MCHC RBC AUTO-ENTMCNC: 33.3 G/DL (ref 31–37)
MCV RBC AUTO: 83.4 FL (ref 80–100)
MONOCYTES NFR BLD: 0.7 K/UL (ref 0.1–1.3)
MONOCYTES NFR BLD: 9 % (ref 1–7)
NEUTROPHILS NFR BLD: 66 % (ref 36–66)
NEUTS SEG NFR BLD: 5.1 K/UL (ref 1.3–9.1)
PLATELET # BLD AUTO: 183 K/UL (ref 150–450)
PMV BLD AUTO: 8 FL (ref 6–12)
POTASSIUM SERPL-SCNC: 4.2 MMOL/L (ref 3.7–5.3)
RBC # BLD AUTO: 5.96 M/UL (ref 4.5–5.9)
SODIUM SERPL-SCNC: 140 MMOL/L (ref 136–145)
WBC OTHER # BLD: 7.7 K/UL (ref 3.5–11)

## 2024-12-11 PROCEDURE — 6370000000 HC RX 637 (ALT 250 FOR IP): Performed by: PHYSICAL MEDICINE & REHABILITATION

## 2024-12-11 PROCEDURE — 6370000000 HC RX 637 (ALT 250 FOR IP): Performed by: STUDENT IN AN ORGANIZED HEALTH CARE EDUCATION/TRAINING PROGRAM

## 2024-12-11 PROCEDURE — 97530 THERAPEUTIC ACTIVITIES: CPT

## 2024-12-11 PROCEDURE — 80048 BASIC METABOLIC PNL TOTAL CA: CPT

## 2024-12-11 PROCEDURE — 97542 WHEELCHAIR MNGMENT TRAINING: CPT

## 2024-12-11 PROCEDURE — 97110 THERAPEUTIC EXERCISES: CPT

## 2024-12-11 PROCEDURE — 6360000002 HC RX W HCPCS: Performed by: INTERNAL MEDICINE

## 2024-12-11 PROCEDURE — 97535 SELF CARE MNGMENT TRAINING: CPT

## 2024-12-11 PROCEDURE — 1180000000 HC REHAB R&B

## 2024-12-11 PROCEDURE — 99231 SBSQ HOSP IP/OBS SF/LOW 25: CPT | Performed by: INTERNAL MEDICINE

## 2024-12-11 PROCEDURE — 6370000000 HC RX 637 (ALT 250 FOR IP): Performed by: INTERNAL MEDICINE

## 2024-12-11 PROCEDURE — 85025 COMPLETE CBC W/AUTO DIFF WBC: CPT

## 2024-12-11 PROCEDURE — 99232 SBSQ HOSP IP/OBS MODERATE 35: CPT | Performed by: PHYSICAL MEDICINE & REHABILITATION

## 2024-12-11 PROCEDURE — 97116 GAIT TRAINING THERAPY: CPT

## 2024-12-11 PROCEDURE — 36415 COLL VENOUS BLD VENIPUNCTURE: CPT

## 2024-12-11 RX ORDER — ASPIRIN 81 MG/1
81 TABLET ORAL DAILY
Qty: 30 TABLET | Refills: 3 | Status: SHIPPED | OUTPATIENT
Start: 2024-12-12

## 2024-12-11 RX ORDER — BACLOFEN 10 MG/1
10 TABLET ORAL 3 TIMES DAILY
Qty: 90 TABLET | Refills: 2 | Status: SHIPPED | OUTPATIENT
Start: 2024-12-12

## 2024-12-11 RX ORDER — LOSARTAN POTASSIUM 25 MG/1
25 TABLET ORAL DAILY
Qty: 30 TABLET | Refills: 3 | Status: SHIPPED | OUTPATIENT
Start: 2024-12-12

## 2024-12-11 RX ORDER — AMLODIPINE BESYLATE 10 MG/1
10 TABLET ORAL DAILY
Qty: 30 TABLET | Refills: 1 | Status: SHIPPED | OUTPATIENT
Start: 2024-12-11

## 2024-12-11 RX ORDER — MENTHOL 1.4 %
ADHESIVE PATCH, MEDICATED TOPICAL 3 TIMES DAILY PRN
COMMUNITY
Start: 2024-12-11

## 2024-12-11 RX ORDER — ERYTHROMYCIN 5 MG/G
1 OINTMENT OPHTHALMIC EVERY 6 HOURS
Qty: 3.5 G | Refills: 0 | Status: SHIPPED | OUTPATIENT
Start: 2024-12-11

## 2024-12-11 RX ORDER — ROSUVASTATIN CALCIUM 40 MG/1
40 TABLET, COATED ORAL NIGHTLY
Qty: 30 TABLET | Refills: 3 | Status: SHIPPED | OUTPATIENT
Start: 2024-12-11

## 2024-12-11 RX ORDER — LIDOCAINE 4 G/G
1 PATCH TOPICAL DAILY
COMMUNITY
Start: 2024-12-12

## 2024-12-11 RX ORDER — POLYETHYLENE GLYCOL 3350 17 G/17G
17 POWDER, FOR SOLUTION ORAL DAILY PRN
COMMUNITY
Start: 2024-12-11 | End: 2025-01-10

## 2024-12-11 RX ADMIN — ERYTHROMYCIN: 5 OINTMENT OPHTHALMIC at 18:14

## 2024-12-11 RX ADMIN — ENOXAPARIN SODIUM 40 MG: 100 INJECTION SUBCUTANEOUS at 07:35

## 2024-12-11 RX ADMIN — BACLOFEN 10 MG: 10 TABLET ORAL at 07:38

## 2024-12-11 RX ADMIN — AMLODIPINE BESYLATE 10 MG: 10 TABLET ORAL at 07:38

## 2024-12-11 RX ADMIN — ERYTHROMYCIN: 5 OINTMENT OPHTHALMIC at 06:32

## 2024-12-11 RX ADMIN — CARBOXYMETHYLCELLULOSE SODIUM 1 DROP: 5 SOLUTION/ DROPS OPHTHALMIC at 20:10

## 2024-12-11 RX ADMIN — ASPIRIN 81 MG: 81 TABLET, COATED ORAL at 07:39

## 2024-12-11 RX ADMIN — ERYTHROMYCIN: 5 OINTMENT OPHTHALMIC at 00:17

## 2024-12-11 RX ADMIN — ACETAMINOPHEN 650 MG: 325 TABLET ORAL at 12:18

## 2024-12-11 RX ADMIN — BACLOFEN 10 MG: 10 TABLET ORAL at 20:10

## 2024-12-11 RX ADMIN — ACETAMINOPHEN 650 MG: 325 TABLET ORAL at 07:44

## 2024-12-11 RX ADMIN — ACETAMINOPHEN 650 MG: 325 TABLET ORAL at 21:09

## 2024-12-11 RX ADMIN — CARBOXYMETHYLCELLULOSE SODIUM 1 DROP: 5 SOLUTION/ DROPS OPHTHALMIC at 14:43

## 2024-12-11 RX ADMIN — ROSUVASTATIN 40 MG: 40 TABLET, FILM COATED ORAL at 20:10

## 2024-12-11 RX ADMIN — LOSARTAN POTASSIUM 25 MG: 25 TABLET, FILM COATED ORAL at 07:38

## 2024-12-11 RX ADMIN — BACLOFEN 10 MG: 10 TABLET ORAL at 14:43

## 2024-12-11 RX ADMIN — ERYTHROMYCIN: 5 OINTMENT OPHTHALMIC at 23:06

## 2024-12-11 RX ADMIN — ERYTHROMYCIN: 5 OINTMENT OPHTHALMIC at 12:17

## 2024-12-11 RX ADMIN — CARBOXYMETHYLCELLULOSE SODIUM 1 DROP: 5 SOLUTION/ DROPS OPHTHALMIC at 07:41

## 2024-12-11 ASSESSMENT — PAIN DESCRIPTION - DESCRIPTORS
DESCRIPTORS: ACHING

## 2024-12-11 ASSESSMENT — PAIN SCALES - GENERAL
PAINLEVEL_OUTOF10: 0
PAINLEVEL_OUTOF10: 5
PAINLEVEL_OUTOF10: 4
PAINLEVEL_OUTOF10: 5
PAINLEVEL_OUTOF10: 0
PAINLEVEL_OUTOF10: 3
PAINLEVEL_OUTOF10: 1

## 2024-12-11 ASSESSMENT — PAIN - FUNCTIONAL ASSESSMENT
PAIN_FUNCTIONAL_ASSESSMENT: ACTIVITIES ARE NOT PREVENTED

## 2024-12-11 ASSESSMENT — PAIN DESCRIPTION - LOCATION
LOCATION: LEG
LOCATION: SHOULDER;NECK
LOCATION: NECK

## 2024-12-11 ASSESSMENT — PAIN DESCRIPTION - ORIENTATION
ORIENTATION: LEFT
ORIENTATION: LEFT;POSTERIOR
ORIENTATION: LEFT

## 2024-12-12 VITALS
HEART RATE: 91 BPM | TEMPERATURE: 97.6 F | RESPIRATION RATE: 17 BRPM | HEIGHT: 70 IN | OXYGEN SATURATION: 96 % | DIASTOLIC BLOOD PRESSURE: 70 MMHG | BODY MASS INDEX: 23.67 KG/M2 | WEIGHT: 165.34 LBS | SYSTOLIC BLOOD PRESSURE: 118 MMHG

## 2024-12-12 DIAGNOSIS — R19.5 POSITIVE COLORECTAL CANCER SCREENING USING COLOGUARD TEST: Primary | ICD-10-CM

## 2024-12-12 PROCEDURE — 6370000000 HC RX 637 (ALT 250 FOR IP): Performed by: INTERNAL MEDICINE

## 2024-12-12 PROCEDURE — 97530 THERAPEUTIC ACTIVITIES: CPT

## 2024-12-12 PROCEDURE — 97535 SELF CARE MNGMENT TRAINING: CPT

## 2024-12-12 PROCEDURE — 6370000000 HC RX 637 (ALT 250 FOR IP): Performed by: PHYSICAL MEDICINE & REHABILITATION

## 2024-12-12 PROCEDURE — 6360000002 HC RX W HCPCS: Performed by: INTERNAL MEDICINE

## 2024-12-12 PROCEDURE — 97116 GAIT TRAINING THERAPY: CPT

## 2024-12-12 PROCEDURE — 99238 HOSP IP/OBS DSCHRG MGMT 30/<: CPT | Performed by: PHYSICAL MEDICINE & REHABILITATION

## 2024-12-12 PROCEDURE — 6370000000 HC RX 637 (ALT 250 FOR IP): Performed by: STUDENT IN AN ORGANIZED HEALTH CARE EDUCATION/TRAINING PROGRAM

## 2024-12-12 RX ADMIN — ERYTHROMYCIN: 5 OINTMENT OPHTHALMIC at 05:51

## 2024-12-12 RX ADMIN — CARBOXYMETHYLCELLULOSE SODIUM 1 DROP: 5 SOLUTION/ DROPS OPHTHALMIC at 07:56

## 2024-12-12 RX ADMIN — LOSARTAN POTASSIUM 25 MG: 25 TABLET, FILM COATED ORAL at 07:53

## 2024-12-12 RX ADMIN — ASPIRIN 81 MG: 81 TABLET, COATED ORAL at 07:53

## 2024-12-12 RX ADMIN — POLYETHYLENE GLYCOL 3350 17 G: 17 POWDER, FOR SOLUTION ORAL at 07:53

## 2024-12-12 RX ADMIN — AMLODIPINE BESYLATE 10 MG: 10 TABLET ORAL at 07:53

## 2024-12-12 RX ADMIN — ENOXAPARIN SODIUM 40 MG: 100 INJECTION SUBCUTANEOUS at 07:53

## 2024-12-12 RX ADMIN — ACETAMINOPHEN 650 MG: 325 TABLET ORAL at 08:02

## 2024-12-12 RX ADMIN — BACLOFEN 10 MG: 10 TABLET ORAL at 07:53

## 2024-12-12 ASSESSMENT — PAIN SCALES - GENERAL
PAINLEVEL_OUTOF10: 0

## 2024-12-12 ASSESSMENT — PAIN - FUNCTIONAL ASSESSMENT: PAIN_FUNCTIONAL_ASSESSMENT: ACTIVITIES ARE NOT PREVENTED

## 2024-12-12 ASSESSMENT — PAIN DESCRIPTION - LOCATION: LOCATION: OTHER (COMMENT)

## 2024-12-12 NOTE — DISCHARGE SUMMARY
CGA/SBA weight shifting to L side in order to complete washing of buttocks using R UE.  Upper Extremity Dressing  Assistance Level: Moderate assistance  Skilled Clinical Factors: Pt able to doff tshirt with SUP and increased time. Pt requires max increased time for threading of L UE- becomes frustrated and impatient- requiring assist to effectively thread. Pt is able to thread R UE and pull overhead but requires assist for posterior adjustments.  Lower Extremity Dressing  Assistance Level: Minimal assistance  Skilled Clinical Factors: While standing with maynor walker and CGA pt was able to manage clothing down past hips and then returned to seated position. Unthreading of items completed with increased time. Pt requires increased time for threading of L LE- becomes frustrated and impatient- with and without use of AE. Breana provided overall for theading of L LE through clothing, pt is able to thread R LE efficiently and manage clothing up over hips with CGA while standing at maynor walker. Little unsteady but no LOB.  Putting On/Taking Off Footwear  Assistance Level: Dependent  Skilled Clinical Factors: patient already having TEDs and shoewear prior to session with OT providing TA this sate for donning L AFO and shoe with increased time  Toileting  Assistance Level: Dependent  Skilled Clinical Factors: sister in law assisting with toileting this date with patient able to utilizing grab bar to maintain stability/balance with CGA-min A with sister assisting with clothing management with increased time. Cueing during training for safe positioning   Toilet Transfers  Technique: Stand pivot  Equipment: Standard toilet, Grab bars  Additional Factors: Increased time to complete, Verbal cues, Set-up  Assistance Level: Minimal assistance  Skilled Clinical Factors: patient's sister in law assisting with transfer training this date with education provided on w/c positioning, safe use of gait belt and positioning self to assist

## 2024-12-12 NOTE — PLAN OF CARE
Problem: Discharge Planning  Goal: Discharge to home or other facility with appropriate resources  Outcome: Progressing     Problem: Skin/Tissue Integrity  Goal: Absence of new skin breakdown  Description: 1.  Monitor for areas of redness and/or skin breakdown  2.  Assess vascular access sites hourly  3.  Every 4-6 hours minimum:  Change oxygen saturation probe site  4.  Every 4-6 hours:  If on nasal continuous positive airway pressure, respiratory therapy assess nares and determine need for appliance change or resting period.  Outcome: Progressing     Problem: Safety - Adult  Goal: Free from fall injury  Outcome: Progressing     Problem: Pain  Goal: Verbalizes/displays adequate comfort level or baseline comfort level  Outcome: Progressing     Problem: ABCDS Injury Assessment  Goal: Absence of physical injury  Outcome: Progressing     
  Problem: Chronic Conditions and Co-morbidities  Goal: Patient's chronic conditions and co-morbidity symptoms are monitored and maintained or improved  11/20/2024 0431 by Aguilar Hinds RN  Outcome: Progressing     Problem: Discharge Planning  Goal: Discharge to home or other facility with appropriate resources  11/20/2024 0431 by Aguilar Hinds RN  Outcome: Progressing     Problem: Skin/Tissue Integrity  Goal: Absence of new skin breakdown  Description: 1.  Monitor for areas of redness and/or skin breakdown  2.  Assess vascular access sites hourly  3.  Every 4-6 hours minimum:  Change oxygen saturation probe site  4.  Every 4-6 hours:  If on nasal continuous positive airway pressure, respiratory therapy assess nares and determine need for appliance change or resting period.  11/20/2024 0431 by Aguilar Hinds RN  Outcome: Progressing     Problem: Safety - Adult  Goal: Free from fall injury  11/20/2024 0431 by Aguilar Hinds RN  Outcome: Progressing     Problem: Pain  Goal: Verbalizes/displays adequate comfort level or baseline comfort level  11/20/2024 0431 by Aguilar Hinds RN  Outcome: Progressing     Problem: ABCDS Injury Assessment  Goal: Absence of physical injury  Outcome: Progressing     
  Problem: Chronic Conditions and Co-morbidities  Goal: Patient's chronic conditions and co-morbidity symptoms are monitored and maintained or improved  11/21/2024 0145 by Aguilar Hinds RN  Outcome: Progressing     Problem: Discharge Planning  Goal: Discharge to home or other facility with appropriate resources  11/21/2024 0145 by Aguilar Hinds RN  Outcome: Progressing     Problem: Safety - Adult  Goal: Free from fall injury  11/21/2024 0145 by Aguilar Hinds RN  Outcome: Progressing     Problem: Pain  Goal: Verbalizes/displays adequate comfort level or baseline comfort level  11/21/2024 0145 by Aguilar Hinds RN  Outcome: Progressing     Problem: ABCDS Injury Assessment  Goal: Absence of physical injury  11/21/2024 0145 by Aguilar Hinds RN  Outcome: Progressing     
  Problem: Chronic Conditions and Co-morbidities  Goal: Patient's chronic conditions and co-morbidity symptoms are monitored and maintained or improved  11/21/2024 1129 by Nitesh Carney RN  Outcome: Progressing     Problem: Discharge Planning  Goal: Discharge to home or other facility with appropriate resources  11/21/2024 1129 by Nitesh Carney RN  Outcome: Progressing     Problem: Skin/Tissue Integrity  Goal: Absence of new skin breakdown  Description: 1.  Monitor for areas of redness and/or skin breakdown  2.  Assess vascular access sites hourly  3.  Every 4-6 hours minimum:  Change oxygen saturation probe site  4.  Every 4-6 hours:  If on nasal continuous positive airway pressure, respiratory therapy assess nares and determine need for appliance change or resting period.  11/21/2024 1129 by Nitesh Carney RN  Outcome: Progressing     Problem: Safety - Adult  Goal: Free from fall injury  11/21/2024 1129 by Nitesh Carney RN  Outcome: Progressing     Problem: Pain  Goal: Verbalizes/displays adequate comfort level or baseline comfort level  11/21/2024 1129 by Nitesh Carney, RN  Outcome: Progressing     Problem: ABCDS Injury Assessment  Goal: Absence of physical injury  11/21/2024 1129 by Nitesh Carney, RN  Outcome: Progressing     
  Problem: Chronic Conditions and Co-morbidities  Goal: Patient's chronic conditions and co-morbidity symptoms are monitored and maintained or improved  11/22/2024 0907 by Alysia Sanchez RN  Outcome: Progressing     Problem: Discharge Planning  Goal: Discharge to home or other facility with appropriate resources  11/22/2024 0907 by Alysia Sanchez RN  Outcome: Progressing     Problem: Skin/Tissue Integrity  Goal: Absence of new skin breakdown  Description: 1.  Monitor for areas of redness and/or skin breakdown  2.  Assess vascular access sites hourly  3.  Every 4-6 hours minimum:  Change oxygen saturation probe site  4.  Every 4-6 hours:  If on nasal continuous positive airway pressure, respiratory therapy assess nares and determine need for appliance change or resting period.  11/22/2024 0907 by Alysia Sanchez RN  Outcome: Progressing     Problem: Safety - Adult  Goal: Free from fall injury  Outcome: Progressing     Problem: Pain  Goal: Verbalizes/displays adequate comfort level or baseline comfort level  11/22/2024 0907 by Alysia Sanchez, RN  Outcome: Progressing     
  Problem: Chronic Conditions and Co-morbidities  Goal: Patient's chronic conditions and co-morbidity symptoms are monitored and maintained or improved  11/23/2024 1125 by Umu Arreguin RN  Outcome: Progressing  11/23/2024 0332 by Aguilar Hinds RN  Outcome: Progressing     Problem: Discharge Planning  Goal: Discharge to home or other facility with appropriate resources  11/23/2024 1125 by Umu Arreguin RN  Outcome: Progressing  11/23/2024 0332 by Aguilar Hinds RN  Outcome: Progressing     Problem: Skin/Tissue Integrity  Goal: Absence of new skin breakdown  Description: 1.  Monitor for areas of redness and/or skin breakdown  2.  Assess vascular access sites hourly  3.  Every 4-6 hours minimum:  Change oxygen saturation probe site  4.  Every 4-6 hours:  If on nasal continuous positive airway pressure, respiratory therapy assess nares and determine need for appliance change or resting period.  11/23/2024 1125 by Umu Arreguin RN  Outcome: Progressing  11/23/2024 0332 by Aguilar Hinds RN  Outcome: Progressing     Problem: Safety - Adult  Goal: Free from fall injury  11/23/2024 1125 by Umu Arreguin RN  Outcome: Progressing  11/23/2024 0332 by Aguilar Hinds RN  Outcome: Progressing     Problem: Pain  Goal: Verbalizes/displays adequate comfort level or baseline comfort level  11/23/2024 1125 by Umu Arreguin RN  Outcome: Progressing  11/23/2024 0332 by Aguilar Hinds RN  Outcome: Progressing     Problem: ABCDS Injury Assessment  Goal: Absence of physical injury  11/23/2024 1125 by Umu Arreguin RN  Outcome: Progressing  11/23/2024 0332 by Aguilar Hinds RN  Outcome: Progressing     
  Problem: Chronic Conditions and Co-morbidities  Goal: Patient's chronic conditions and co-morbidity symptoms are monitored and maintained or improved  11/24/2024 0113 by Hansel Kaur RN  Outcome: Progressing     Problem: Discharge Planning  Goal: Discharge to home or other facility with appropriate resources  11/24/2024 0113 by Hansel Kaur RN  Outcome: Progressing     Problem: Skin/Tissue Integrity  Goal: Absence of new skin breakdown  Description: 1.  Monitor for areas of redness and/or skin breakdown  2.  Assess vascular access sites hourly  3.  Every 4-6 hours minimum:  Change oxygen saturation probe site  4.  Every 4-6 hours:  If on nasal continuous positive airway pressure, respiratory therapy assess nares and determine need for appliance change or resting period.  11/24/2024 0113 by Hansel Kaur RN  Outcome: Progressing     Problem: Safety - Adult  Goal: Free from fall injury  11/24/2024 0113 by Hansel Kaur RN  Outcome: Progressing     Problem: Pain  Goal: Verbalizes/displays adequate comfort level or baseline comfort level  11/24/2024 0113 by Hansel Kaur RN  Outcome: Progressing  Flowsheets (Taken 11/24/2024 0113)  Verbalizes/displays adequate comfort level or baseline comfort level:   Encourage patient to monitor pain and request assistance   Assess pain using appropriate pain scale     Problem: ABCDS Injury Assessment  Goal: Absence of physical injury  11/24/2024 0113 by Hansel Kaur RN  Outcome: Progressing  Flowsheets (Taken 11/24/2024 0113)  Absence of Physical Injury: Implement safety measures based on patient assessment     
  Problem: Chronic Conditions and Co-morbidities  Goal: Patient's chronic conditions and co-morbidity symptoms are monitored and maintained or improved  11/24/2024 0959 by Umu Arreguin RN  Outcome: Progressing  11/24/2024 0113 by Hansel Kaur RN  Outcome: Progressing     Problem: Discharge Planning  Goal: Discharge to home or other facility with appropriate resources  11/24/2024 0959 by Umu Arreguin RN  Outcome: Progressing  11/24/2024 0113 by Hansel Kaur RN  Outcome: Progressing     Problem: Skin/Tissue Integrity  Goal: Absence of new skin breakdown  Description: 1.  Monitor for areas of redness and/or skin breakdown  2.  Assess vascular access sites hourly  3.  Every 4-6 hours minimum:  Change oxygen saturation probe site  4.  Every 4-6 hours:  If on nasal continuous positive airway pressure, respiratory therapy assess nares and determine need for appliance change or resting period.  11/24/2024 0959 by Umu Arreguin RN  Outcome: Progressing  11/24/2024 0113 by Hansel Kaur RN  Outcome: Progressing     Problem: Safety - Adult  Goal: Free from fall injury  11/24/2024 0959 by Umu Arreguin RN  Outcome: Progressing  11/24/2024 0113 by Hansel Kaur RN  Outcome: Progressing     Problem: Pain  Goal: Verbalizes/displays adequate comfort level or baseline comfort level  11/24/2024 0959 by Umu Arreguin RN  Outcome: Progressing  11/24/2024 0113 by Hansel Kaur RN  Outcome: Progressing  Flowsheets (Taken 11/24/2024 0113)  Verbalizes/displays adequate comfort level or baseline comfort level:   Encourage patient to monitor pain and request assistance   Assess pain using appropriate pain scale     Problem: ABCDS Injury Assessment  Goal: Absence of physical injury  11/24/2024 0959 by Umu Arreguin RN  Outcome: Progressing  11/24/2024 0113 by Hansel Kaur RN  Outcome: Progressing  Flowsheets (Taken 11/24/2024 0113)  Absence of Physical Injury: Implement safety measures based on 
  Problem: Chronic Conditions and Co-morbidities  Goal: Patient's chronic conditions and co-morbidity symptoms are monitored and maintained or improved  11/25/2024 1155 by Nitesh Carney RN  Outcome: Progressing     Problem: Discharge Planning  Goal: Discharge to home or other facility with appropriate resources  11/25/2024 1155 by Nitesh Carney, RN  Outcome: Progressing     Problem: Skin/Tissue Integrity  Goal: Absence of new skin breakdown  Description: 1.  Monitor for areas of redness and/or skin breakdown  2.  Assess vascular access sites hourly  3.  Every 4-6 hours minimum:  Change oxygen saturation probe site  4.  Every 4-6 hours:  If on nasal continuous positive airway pressure, respiratory therapy assess nares and determine need for appliance change or resting period.  11/25/2024 1155 by Nitesh Carney RN  Outcome: Progressing     Problem: Safety - Adult  Goal: Free from fall injury  11/25/2024 1155 by Nitesh Carney, RN  Outcome: Progressing     Problem: Pain  Goal: Verbalizes/displays adequate comfort level or baseline comfort level  11/25/2024 1155 by Nitesh Carney, RN  Outcome: Progressing     
  Problem: Chronic Conditions and Co-morbidities  Goal: Patient's chronic conditions and co-morbidity symptoms are monitored and maintained or improved  11/26/2024 1454 by Nitesh Carney RN  Outcome: Progressing     Problem: Discharge Planning  Goal: Discharge to home or other facility with appropriate resources  11/26/2024 1454 by Nitesh Carney RN  Outcome: Progressing     Problem: Skin/Tissue Integrity  Goal: Absence of new skin breakdown  Description: 1.  Monitor for areas of redness and/or skin breakdown  2.  Assess vascular access sites hourly  3.  Every 4-6 hours minimum:  Change oxygen saturation probe site  4.  Every 4-6 hours:  If on nasal continuous positive airway pressure, respiratory therapy assess nares and determine need for appliance change or resting period.  11/26/2024 1454 by Nitesh Carney RN  Outcome: Progressing     Problem: Safety - Adult  Goal: Free from fall injury  11/26/2024 1454 by Nitesh Carney RN  Outcome: Progressing     Problem: Pain  Goal: Verbalizes/displays adequate comfort level or baseline comfort level  11/26/2024 1454 by Nitesh Carney RN  Outcome: Progressing     Problem: ABCDS Injury Assessment  Goal: Absence of physical injury  11/26/2024 1454 by Nitesh Carney RN  Outcome: Progressing     
  Problem: Chronic Conditions and Co-morbidities  Goal: Patient's chronic conditions and co-morbidity symptoms are monitored and maintained or improved  11/27/2024 0423 by Daniela Carrasquillo RN  Outcome: Progressing     Problem: Discharge Planning  Goal: Discharge to home or other facility with appropriate resources  11/27/2024 0423 by Daniela Carrasquillo RN  Outcome: Progressing     Problem: Skin/Tissue Integrity  Goal: Absence of new skin breakdown  Description: 1.  Monitor for areas of redness and/or skin breakdown  2.  Assess vascular access sites hourly  3.  Every 4-6 hours minimum:  Change oxygen saturation probe site  4.  Every 4-6 hours:  If on nasal continuous positive airway pressure, respiratory therapy assess nares and determine need for appliance change or resting period.  11/27/2024 0423 by Daniela Carrasquillo RN  Outcome: Progressing     Problem: Safety - Adult  Goal: Free from fall injury  11/27/2024 0423 by Daniela Carrasquillo RN  Outcome: Progressing  Flowsheets (Taken 11/20/2024 1615 by Priti Pope, RN)  Free From Fall Injury: Instruct family/caregiver on patient safety     Problem: Pain  Goal: Verbalizes/displays adequate comfort level or baseline comfort level  11/27/2024 0423 by Daniela Carrasquillo RN  Outcome: Progressing     Problem: ABCDS Injury Assessment  Goal: Absence of physical injury  11/27/2024 0423 by Daniela Carrasquillo RN  Outcome: Progressing     
  Problem: Chronic Conditions and Co-morbidities  Goal: Patient's chronic conditions and co-morbidity symptoms are monitored and maintained or improved  11/27/2024 1607 by Hansel Zuñiga LPN  Outcome: Progressing  11/27/2024 0423 by Daniela Carrasquillo RN  Outcome: Progressing     Problem: Discharge Planning  Goal: Discharge to home or other facility with appropriate resources  11/27/2024 1607 by Hansel Zuñiga LPN  Outcome: Progressing  11/27/2024 0423 by Daniela Carrasquillo RN  Outcome: Progressing     Problem: Skin/Tissue Integrity  Goal: Absence of new skin breakdown  Description: 1.  Monitor for areas of redness and/or skin breakdown  2.  Assess vascular access sites hourly  3.  Every 4-6 hours minimum:  Change oxygen saturation probe site  4.  Every 4-6 hours:  If on nasal continuous positive airway pressure, respiratory therapy assess nares and determine need for appliance change or resting period.  11/27/2024 1607 by Hansel Zuñiga LPN  Outcome: Progressing  11/27/2024 0423 by Daniela Carrasquillo RN  Outcome: Progressing     Problem: Safety - Adult  Goal: Free from fall injury  11/27/2024 1607 by Hansel Zuñiga LPN  Outcome: Progressing  11/27/2024 0423 by Daniela Carrasquillo RN  Outcome: Progressing  Flowsheets (Taken 11/20/2024 1615 by Priti Pope, RN)  Free From Fall Injury: Instruct family/caregiver on patient safety     Problem: Pain  Goal: Verbalizes/displays adequate comfort level or baseline comfort level  11/27/2024 1607 by Hansel Zuñiga LPN  Outcome: Progressing  11/27/2024 0423 by Daniela Carrasquillo RN  Outcome: Progressing     Problem: ABCDS Injury Assessment  Goal: Absence of physical injury  11/27/2024 1607 by Hansel Zuñiga LPN  Outcome: Progressing  11/27/2024 0423 by Daniela Carrasquillo, RN  Outcome: Progressing     
  Problem: Chronic Conditions and Co-morbidities  Goal: Patient's chronic conditions and co-morbidity symptoms are monitored and maintained or improved  11/27/2024 2122 by Saw Trinidad RN  Outcome: Progressing  11/27/2024 1607 by Hansel Zuñiga LPN  Outcome: Progressing     Problem: Discharge Planning  Goal: Discharge to home or other facility with appropriate resources  11/27/2024 2122 by Saw Trinidad RN  Outcome: Progressing  11/27/2024 1607 by Hansel Zuñiga LPN  Outcome: Progressing     Problem: Skin/Tissue Integrity  Goal: Absence of new skin breakdown  Description: 1.  Monitor for areas of redness and/or skin breakdown  2.  Assess vascular access sites hourly  3.  Every 4-6 hours minimum:  Change oxygen saturation probe site  4.  Every 4-6 hours:  If on nasal continuous positive airway pressure, respiratory therapy assess nares and determine need for appliance change or resting period.  11/27/2024 2122 by Saw Trinidad RN  Outcome: Progressing  11/27/2024 1607 by Hansel Zuñiga LPN  Outcome: Progressing     Problem: Safety - Adult  Goal: Free from fall injury  11/27/2024 2122 by Saw Trinidad RN  Outcome: Progressing  Flowsheets (Taken 11/27/2024 2120)  Free From Fall Injury: Instruct family/caregiver on patient safety  11/27/2024 1607 by Hansel Zuñiga LPN  Outcome: Progressing     Problem: Pain  Goal: Verbalizes/displays adequate comfort level or baseline comfort level  11/27/2024 2122 by Saw Trinidad RN  Outcome: Progressing  11/27/2024 1607 by Hansel Zuñiga LPN  Outcome: Progressing     Problem: ABCDS Injury Assessment  Goal: Absence of physical injury  11/27/2024 2122 by Saw Trinidad RN  Outcome: Progressing  Flowsheets (Taken 11/27/2024 2120)  Absence of Physical Injury: Implement safety measures based on patient assessment  11/27/2024 1607 by Hansel Zuñiga LPN  Outcome: Progressing     
  Problem: Chronic Conditions and Co-morbidities  Goal: Patient's chronic conditions and co-morbidity symptoms are monitored and maintained or improved  11/30/2024 0937 by Priti Pope RN  Outcome: Progressing  Flowsheets (Taken 11/30/2024 0937)  Care Plan - Patient's Chronic Conditions and Co-Morbidity Symptoms are Monitored and Maintained or Improved:   Monitor and assess patient's chronic conditions and comorbid symptoms for stability, deterioration, or improvement   Collaborate with multidisciplinary team to address chronic and comorbid conditions and prevent exacerbation or deterioration   Update acute care plan with appropriate goals if chronic or comorbid symptoms are exacerbated and prevent overall improvement and discharge  11/30/2024 0408 by Daniela Carrasquillo RN  Outcome: Progressing     Problem: Discharge Planning  Goal: Discharge to home or other facility with appropriate resources  11/30/2024 0937 by Priti Pope RN  Outcome: Progressing  Flowsheets (Taken 11/30/2024 0937)  Discharge to home or other facility with appropriate resources:   Identify barriers to discharge with patient and caregiver   Arrange for needed discharge resources and transportation as appropriate   Identify discharge learning needs (meds, wound care, etc)  11/30/2024 0408 by Daniela Carrasquillo RN  Outcome: Progressing     Problem: Skin/Tissue Integrity  Goal: Absence of new skin breakdown  Description: 1.  Monitor for areas of redness and/or skin breakdown  2.  Assess vascular access sites hourly  3.  Every 4-6 hours minimum:  Change oxygen saturation probe site  4.  Every 4-6 hours:  If on nasal continuous positive airway pressure, respiratory therapy assess nares and determine need for appliance change or resting period.  11/30/2024 0937 by Priti Pope RN  Outcome: Progressing  Note: Assess and document any skin issues. Monitor heels every shift for soft spots and redness.  11/30/2024 0408 by 
  Problem: Chronic Conditions and Co-morbidities  Goal: Patient's chronic conditions and co-morbidity symptoms are monitored and maintained or improved  12/1/2024 0921 by Priti Pope RN  Outcome: Progressing  Flowsheets (Taken 12/1/2024 0921)  Care Plan - Patient's Chronic Conditions and Co-Morbidity Symptoms are Monitored and Maintained or Improved:   Monitor and assess patient's chronic conditions and comorbid symptoms for stability, deterioration, or improvement   Collaborate with multidisciplinary team to address chronic and comorbid conditions and prevent exacerbation or deterioration   Update acute care plan with appropriate goals if chronic or comorbid symptoms are exacerbated and prevent overall improvement and discharge  11/30/2024 1935 by Saw Trinidad RN  Outcome: Progressing     Problem: Discharge Planning  Goal: Discharge to home or other facility with appropriate resources  12/1/2024 0921 by Priti Pope RN  Outcome: Progressing  Flowsheets (Taken 12/1/2024 0921)  Discharge to home or other facility with appropriate resources:   Identify barriers to discharge with patient and caregiver   Arrange for needed discharge resources and transportation as appropriate   Identify discharge learning needs (meds, wound care, etc)  11/30/2024 1935 by Saw Trinidad RN  Outcome: Progressing     Problem: Skin/Tissue Integrity  Goal: Absence of new skin breakdown  Description: 1.  Monitor for areas of redness and/or skin breakdown  2.  Assess vascular access sites hourly  3.  Every 4-6 hours minimum:  Change oxygen saturation probe site  4.  Every 4-6 hours:  If on nasal continuous positive airway pressure, respiratory therapy assess nares and determine need for appliance change or resting period.  12/1/2024 0921 by Priti Pope RN  Outcome: Progressing  Note: Assess and document any skin issues every shift.  Monitor soft spot on left heel, Mepilex applied for prevention.  11/30/2024 1935 by Vivi 
  Problem: Chronic Conditions and Co-morbidities  Goal: Patient's chronic conditions and co-morbidity symptoms are monitored and maintained or improved  12/1/2024 1932 by Saw Trinidad RN  Outcome: Progressing  12/1/2024 0921 by Priti Pope RN  Outcome: Progressing  Flowsheets (Taken 12/1/2024 0921)  Care Plan - Patient's Chronic Conditions and Co-Morbidity Symptoms are Monitored and Maintained or Improved:   Monitor and assess patient's chronic conditions and comorbid symptoms for stability, deterioration, or improvement   Collaborate with multidisciplinary team to address chronic and comorbid conditions and prevent exacerbation or deterioration   Update acute care plan with appropriate goals if chronic or comorbid symptoms are exacerbated and prevent overall improvement and discharge     Problem: Discharge Planning  Goal: Discharge to home or other facility with appropriate resources  12/1/2024 1932 by Saw Trinidad RN  Outcome: Progressing  12/1/2024 0921 by Priti Pope RN  Outcome: Progressing  Flowsheets (Taken 12/1/2024 0921)  Discharge to home or other facility with appropriate resources:   Identify barriers to discharge with patient and caregiver   Arrange for needed discharge resources and transportation as appropriate   Identify discharge learning needs (meds, wound care, etc)     Problem: Skin/Tissue Integrity  Goal: Absence of new skin breakdown  Description: 1.  Monitor for areas of redness and/or skin breakdown  2.  Assess vascular access sites hourly  3.  Every 4-6 hours minimum:  Change oxygen saturation probe site  4.  Every 4-6 hours:  If on nasal continuous positive airway pressure, respiratory therapy assess nares and determine need for appliance change or resting period.  12/1/2024 1932 by Saw Trinidad RN  Outcome: Progressing  12/1/2024 0921 by Priti Pope RN  Outcome: Progressing  Note: Assess and document any skin issues every shift.  Monitor soft spot on left heel, 
  Problem: Chronic Conditions and Co-morbidities  Goal: Patient's chronic conditions and co-morbidity symptoms are monitored and maintained or improved  12/11/2024 1946 by Saw Trinidad RN  Outcome: Progressing  12/11/2024 1404 by Dyan Cooper LPN  Outcome: Progressing     Problem: Discharge Planning  Goal: Discharge to home or other facility with appropriate resources  12/11/2024 1946 by Saw Trinidad RN  Outcome: Progressing  12/11/2024 1404 by Dyan Cooper LPN  Outcome: Progressing     Problem: Skin/Tissue Integrity  Goal: Absence of new skin breakdown  Description: 1.  Monitor for areas of redness and/or skin breakdown  2.  Assess vascular access sites hourly  3.  Every 4-6 hours minimum:  Change oxygen saturation probe site  4.  Every 4-6 hours:  If on nasal continuous positive airway pressure, respiratory therapy assess nares and determine need for appliance change or resting period.  12/11/2024 1946 by Saw Trinidad RN  Outcome: Progressing  12/11/2024 1404 by Dyan Cooper LPN  Outcome: Progressing     Problem: Safety - Adult  Goal: Free from fall injury  12/11/2024 1946 by Saw Trinidad RN  Outcome: Progressing  Flowsheets (Taken 12/11/2024 1944)  Free From Fall Injury: Instruct family/caregiver on patient safety  12/11/2024 1404 by Dyan Cooper LPN  Outcome: Progressing     Problem: Pain  Goal: Verbalizes/displays adequate comfort level or baseline comfort level  12/11/2024 1946 by Saw Trinidad RN  Outcome: Progressing  12/11/2024 1404 by Dyan Cooper LPN  Outcome: Progressing     Problem: ABCDS Injury Assessment  Goal: Absence of physical injury  12/11/2024 1946 by Saw Trinidad RN  Outcome: Progressing  Flowsheets (Taken 12/11/2024 1944)  Absence of Physical Injury: Implement safety measures based on patient assessment  12/11/2024 1404 by Dyan Cooper LPN  Outcome: Progressing     
  Problem: Chronic Conditions and Co-morbidities  Goal: Patient's chronic conditions and co-morbidity symptoms are monitored and maintained or improved  12/12/2024 0940 by Dyan Cooper LPN  Outcome: Progressing     Problem: Discharge Planning  Goal: Discharge to home or other facility with appropriate resources  12/12/2024 0940 by Dyan Cooper LPN  Outcome: Progressing     Problem: Skin/Tissue Integrity  Goal: Absence of new skin breakdown  Description: 1.  Monitor for areas of redness and/or skin breakdown  2.  Assess vascular access sites hourly  3.  Every 4-6 hours minimum:  Change oxygen saturation probe site  4.  Every 4-6 hours:  If on nasal continuous positive airway pressure, respiratory therapy assess nares and determine need for appliance change or resting period.  12/12/2024 0940 by Dyan Cooper LPN  Outcome: Progressing     Problem: Safety - Adult  Goal: Free from fall injury  12/12/2024 0940 by Dyan Cooper LPN  Outcome: Progressing     Problem: Pain  Goal: Verbalizes/displays adequate comfort level or baseline comfort level  12/12/2024 0940 by Dyan Cooper LPN  Outcome: Progressing     Problem: ABCDS Injury Assessment  Goal: Absence of physical injury  12/12/2024 0940 by Dyan Cooper LPN  Outcome: Progressing     
  Problem: Chronic Conditions and Co-morbidities  Goal: Patient's chronic conditions and co-morbidity symptoms are monitored and maintained or improved  12/3/2024 0916 by Alysia Sanchez RN  Outcome: Progressing     Problem: Discharge Planning  Goal: Discharge to home or other facility with appropriate resources  12/3/2024 0916 by Alysia Sanchez RN  Outcome: Progressing     Problem: Skin/Tissue Integrity  Goal: Absence of new skin breakdown  Description: 1.  Monitor for areas of redness and/or skin breakdown  2.  Assess vascular access sites hourly  3.  Every 4-6 hours minimum:  Change oxygen saturation probe site  4.  Every 4-6 hours:  If on nasal continuous positive airway pressure, respiratory therapy assess nares and determine need for appliance change or resting period.  12/3/2024 0916 by Alysia Sanchez RN  Outcome: Progressing     Problem: Safety - Adult  Goal: Free from fall injury  Outcome: Progressing     Problem: Pain  Goal: Verbalizes/displays adequate comfort level or baseline comfort level  12/3/2024 0916 by Alysia Sanchez, RN  Outcome: Progressing     
  Problem: Chronic Conditions and Co-morbidities  Goal: Patient's chronic conditions and co-morbidity symptoms are monitored and maintained or improved  12/4/2024 1032 by Coco Bro RN  Outcome: Progressing  Flowsheets (Taken 12/4/2024 0830)  Care Plan - Patient's Chronic Conditions and Co-Morbidity Symptoms are Monitored and Maintained or Improved: Monitor and assess patient's chronic conditions and comorbid symptoms for stability, deterioration, or improvement  12/4/2024 0320 by Daniela Carrasquillo RN  Outcome: Progressing     Problem: Discharge Planning  Goal: Discharge to home or other facility with appropriate resources  12/4/2024 1032 by Coco Bro RN  Outcome: Progressing  Flowsheets (Taken 12/4/2024 0830)  Discharge to home or other facility with appropriate resources:   Identify barriers to discharge with patient and caregiver   Identify discharge learning needs (meds, wound care, etc)  12/4/2024 0320 by Daniela Carrasquillo RN  Outcome: Progressing     Problem: Skin/Tissue Integrity  Goal: Absence of new skin breakdown  12/4/2024 1032 by Coco Bro RN  Outcome: Progressing  12/4/2024 0320 by Daniela Carrasquillo RN  Outcome: Progressing     Problem: Safety - Adult  Goal: Free from fall injury  12/4/2024 1032 by Coco Bro RN  Outcome: Progressing  12/4/2024 0320 by Daniela Carrasquillo RN  Outcome: Progressing     Problem: Pain  Goal: Verbalizes/displays adequate comfort level or baseline comfort level  12/4/2024 1032 by Coco Bro RN  Outcome: Progressing  12/4/2024 0320 by Daniela Carrasquillo RN  Outcome: Progressing     Problem: ABCDS Injury Assessment  Goal: Absence of physical injury  12/4/2024 1032 by Coco Bro RN  Outcome: Progressing  12/4/2024 0320 by Daniela aCrrasquillo RN  Outcome: Progressing     
  Problem: Chronic Conditions and Co-morbidities  Goal: Patient's chronic conditions and co-morbidity symptoms are monitored and maintained or improved  12/7/2024 1206 by Coco Bro RN  Outcome: Progressing  Flowsheets (Taken 12/7/2024 0900)  Care Plan - Patient's Chronic Conditions and Co-Morbidity Symptoms are Monitored and Maintained or Improved: Monitor and assess patient's chronic conditions and comorbid symptoms for stability, deterioration, or improvement  12/7/2024 0324 by Sierra Sanchez LPN  Outcome: Progressing     Problem: Discharge Planning  Goal: Discharge to home or other facility with appropriate resources  12/7/2024 1206 by Coco Bro RN  Outcome: Progressing  Flowsheets (Taken 12/7/2024 0900)  Discharge to home or other facility with appropriate resources: Identify barriers to discharge with patient and caregiver  12/7/2024 0324 by Sierra Sanchez LPN  Outcome: Progressing     Problem: Skin/Tissue Integrity  Goal: Absence of new skin breakdown  12/7/2024 1206 by Coco Bro RN  Outcome: Progressing  12/7/2024 0324 by Sierra Sanchez LPN  Outcome: Progressing     Problem: Safety - Adult  Goal: Free from fall injury  12/7/2024 1206 by Coco Bro RN  Outcome: Progressing  12/7/2024 0324 by Sierra Sanchez LPN  Outcome: Progressing     Problem: Pain  Goal: Verbalizes/displays adequate comfort level or baseline comfort level  12/7/2024 1206 by Coco Bro RN  Outcome: Progressing  12/7/2024 0324 by Sierra Sanchez LPN  Outcome: Progressing     Problem: ABCDS Injury Assessment  Goal: Absence of physical injury  12/7/2024 1206 by Coco Bro RN  Outcome: Progressing  12/7/2024 0324 by Sierra Sanchez LPN  Outcome: Progressing     
  Problem: Chronic Conditions and Co-morbidities  Goal: Patient's chronic conditions and co-morbidity symptoms are monitored and maintained or improved  12/8/2024 1354 by Carina Bro RN  Outcome: Progressing  12/8/2024 0236 by Dina Saunders RN  Outcome: Progressing     Problem: Skin/Tissue Integrity  Goal: Absence of new skin breakdown  Description: 1.  Monitor for areas of redness and/or skin breakdown  2.  Assess vascular access sites hourly  3.  Every 4-6 hours minimum:  Change oxygen saturation probe site  4.  Every 4-6 hours:  If on nasal continuous positive airway pressure, respiratory therapy assess nares and determine need for appliance change or resting period.  12/8/2024 1354 by Carina Bro RN  Outcome: Progressing  12/8/2024 0236 by Dina Saunders RN  Outcome: Progressing     Problem: Pain  Goal: Verbalizes/displays adequate comfort level or baseline comfort level  12/8/2024 1354 by Carina Bro RN  Outcome: Progressing  12/8/2024 0236 by Dina Saunders RN  Outcome: Progressing     
  Problem: Chronic Conditions and Co-morbidities  Goal: Patient's chronic conditions and co-morbidity symptoms are monitored and maintained or improved  12/9/2024 0035 by Dina Saunders RN  Outcome: Progressing  12/8/2024 1354 by Carina Bro RN  Outcome: Progressing     Problem: Discharge Planning  Goal: Discharge to home or other facility with appropriate resources  12/9/2024 0035 by Dina Saunders RN  Outcome: Progressing  12/8/2024 1354 by Carina Bro RN  Outcome: Progressing     Problem: Skin/Tissue Integrity  Goal: Absence of new skin breakdown  Description: 1.  Monitor for areas of redness and/or skin breakdown  2.  Assess vascular access sites hourly  3.  Every 4-6 hours minimum:  Change oxygen saturation probe site  4.  Every 4-6 hours:  If on nasal continuous positive airway pressure, respiratory therapy assess nares and determine need for appliance change or resting period.  12/9/2024 0035 by Dina Saunders RN  Outcome: Progressing  12/8/2024 1354 by Carina Bro RN  Outcome: Progressing     Problem: Safety - Adult  Goal: Free from fall injury  12/9/2024 0035 by Dina Saunders RN  Outcome: Progressing  12/8/2024 1354 by Carina Bro RN  Outcome: Progressing     Problem: Pain  Goal: Verbalizes/displays adequate comfort level or baseline comfort level  12/9/2024 0035 by Dina Saunders RN  Outcome: Progressing  12/8/2024 1354 by Carina Bro RN  Outcome: Progressing     Problem: ABCDS Injury Assessment  Goal: Absence of physical injury  12/9/2024 0035 by Dina Saunders RN  Outcome: Progressing  12/8/2024 1354 by Carina Bro RN  Outcome: Progressing     
  Problem: Chronic Conditions and Co-morbidities  Goal: Patient's chronic conditions and co-morbidity symptoms are monitored and maintained or improved  Outcome: Progressing     Problem: Discharge Planning  Goal: Discharge to home or other facility with appropriate resources  Outcome: Progressing     Problem: Skin/Tissue Integrity  Goal: Absence of new skin breakdown  Description: 1.  Monitor for areas of redness and/or skin breakdown  2.  Assess vascular access sites hourly  3.  Every 4-6 hours minimum:  Change oxygen saturation probe site  4.  Every 4-6 hours:  If on nasal continuous positive airway pressure, respiratory therapy assess nares and determine need for appliance change or resting period.  Outcome: Progressing     Problem: Pain  Goal: Verbalizes/displays adequate comfort level or baseline comfort level  Outcome: Progressing     Problem: ABCDS Injury Assessment  Goal: Absence of physical injury  Outcome: Progressing     
  Problem: Chronic Conditions and Co-morbidities  Goal: Patient's chronic conditions and co-morbidity symptoms are monitored and maintained or improved  Outcome: Progressing     Problem: Discharge Planning  Goal: Discharge to home or other facility with appropriate resources  Outcome: Progressing     Problem: Skin/Tissue Integrity  Goal: Absence of new skin breakdown  Description: 1.  Monitor for areas of redness and/or skin breakdown  2.  Assess vascular access sites hourly  3.  Every 4-6 hours minimum:  Change oxygen saturation probe site  4.  Every 4-6 hours:  If on nasal continuous positive airway pressure, respiratory therapy assess nares and determine need for appliance change or resting period.  Outcome: Progressing     Problem: Pain  Goal: Verbalizes/displays adequate comfort level or baseline comfort level  Outcome: Progressing     Problem: ABCDS Injury Assessment  Goal: Absence of physical injury  Outcome: Progressing     
  Problem: Chronic Conditions and Co-morbidities  Goal: Patient's chronic conditions and co-morbidity symptoms are monitored and maintained or improved  Outcome: Progressing     Problem: Discharge Planning  Goal: Discharge to home or other facility with appropriate resources  Outcome: Progressing     Problem: Skin/Tissue Integrity  Goal: Absence of new skin breakdown  Description: 1.  Monitor for areas of redness and/or skin breakdown  2.  Assess vascular access sites hourly  3.  Every 4-6 hours minimum:  Change oxygen saturation probe site  4.  Every 4-6 hours:  If on nasal continuous positive airway pressure, respiratory therapy assess nares and determine need for appliance change or resting period.  Outcome: Progressing     Problem: Safety - Adult  Goal: Free from fall injury  Outcome: Progressing     Problem: ABCDS Injury Assessment  Goal: Absence of physical injury  Outcome: Progressing     
  Problem: Chronic Conditions and Co-morbidities  Goal: Patient's chronic conditions and co-morbidity symptoms are monitored and maintained or improved  Outcome: Progressing     Problem: Discharge Planning  Goal: Discharge to home or other facility with appropriate resources  Outcome: Progressing     Problem: Skin/Tissue Integrity  Goal: Absence of new skin breakdown  Description: 1.  Monitor for areas of redness and/or skin breakdown  2.  Assess vascular access sites hourly  3.  Every 4-6 hours minimum:  Change oxygen saturation probe site  4.  Every 4-6 hours:  If on nasal continuous positive airway pressure, respiratory therapy assess nares and determine need for appliance change or resting period.  Outcome: Progressing     Problem: Safety - Adult  Goal: Free from fall injury  Outcome: Progressing     Problem: Pain  Goal: Verbalizes/displays adequate comfort level or baseline comfort level  Outcome: Progressing     
  Problem: Chronic Conditions and Co-morbidities  Goal: Patient's chronic conditions and co-morbidity symptoms are monitored and maintained or improved  Outcome: Progressing     Problem: Discharge Planning  Goal: Discharge to home or other facility with appropriate resources  Outcome: Progressing     Problem: Skin/Tissue Integrity  Goal: Absence of new skin breakdown  Description: 1.  Monitor for areas of redness and/or skin breakdown  2.  Assess vascular access sites hourly  3.  Every 4-6 hours minimum:  Change oxygen saturation probe site  4.  Every 4-6 hours:  If on nasal continuous positive airway pressure, respiratory therapy assess nares and determine need for appliance change or resting period.  Outcome: Progressing     Problem: Safety - Adult  Goal: Free from fall injury  Outcome: Progressing     Problem: Pain  Goal: Verbalizes/displays adequate comfort level or baseline comfort level  Outcome: Progressing     Problem: ABCDS Injury Assessment  Goal: Absence of physical injury  Outcome: Progressing     
  Problem: Chronic Conditions and Co-morbidities  Goal: Patient's chronic conditions and co-morbidity symptoms are monitored and maintained or improved  Outcome: Progressing     Problem: Discharge Planning  Goal: Discharge to home or other facility with appropriate resources  Outcome: Progressing  Flowsheets (Taken 11/28/2024 0800 by Omayra Díaz, RN)  Discharge to home or other facility with appropriate resources:   Identify barriers to discharge with patient and caregiver   Arrange for needed discharge resources and transportation as appropriate   Identify discharge learning needs (meds, wound care, etc)   Refer to discharge planning if patient needs post-hospital services based on physician order or complex needs related to functional status, cognitive ability or social support system     Problem: Skin/Tissue Integrity  Goal: Absence of new skin breakdown  Description: 1.  Monitor for areas of redness and/or skin breakdown  2.  Assess vascular access sites hourly  3.  Every 4-6 hours minimum:  Change oxygen saturation probe site  4.  Every 4-6 hours:  If on nasal continuous positive airway pressure, respiratory therapy assess nares and determine need for appliance change or resting period.  Outcome: Progressing     Problem: Safety - Adult  Goal: Free from fall injury  Outcome: Progressing  Flowsheets (Taken 11/28/2024 2038)  Free From Fall Injury: Instruct family/caregiver on patient safety     Problem: Pain  Goal: Verbalizes/displays adequate comfort level or baseline comfort level  Outcome: Progressing     Problem: ABCDS Injury Assessment  Goal: Absence of physical injury  Outcome: Progressing  Flowsheets (Taken 11/28/2024 2038)  Absence of Physical Injury: Implement safety measures based on patient assessment     
  Problem: Chronic Conditions and Co-morbidities  Goal: Patient's chronic conditions and co-morbidity symptoms are monitored and maintained or improved  Outcome: Progressing     Problem: Discharge Planning  Goal: Discharge to home or other facility with appropriate resources  Outcome: Progressing  Flowsheets (Taken 12/10/2024 0900 by Omayra Díaz, RN)  Discharge to home or other facility with appropriate resources:   Identify barriers to discharge with patient and caregiver   Arrange for needed discharge resources and transportation as appropriate   Identify discharge learning needs (meds, wound care, etc)   Refer to discharge planning if patient needs post-hospital services based on physician order or complex needs related to functional status, cognitive ability or social support system     Problem: Skin/Tissue Integrity  Goal: Absence of new skin breakdown  Description: 1.  Monitor for areas of redness and/or skin breakdown  2.  Assess vascular access sites hourly  3.  Every 4-6 hours minimum:  Change oxygen saturation probe site  4.  Every 4-6 hours:  If on nasal continuous positive airway pressure, respiratory therapy assess nares and determine need for appliance change or resting period.  Outcome: Progressing     Problem: Safety - Adult  Goal: Free from fall injury  Outcome: Progressing  Flowsheets (Taken 12/10/2024 2012)  Free From Fall Injury:   Instruct family/caregiver on patient safety   Based on caregiver fall risk screen, instruct family/caregiver to ask for assistance with transferring infant if caregiver noted to have fall risk factors     Problem: Pain  Goal: Verbalizes/displays adequate comfort level or baseline comfort level  Outcome: Progressing     Problem: ABCDS Injury Assessment  Goal: Absence of physical injury  Outcome: Progressing  Flowsheets (Taken 12/10/2024 2012)  Absence of Physical Injury: Implement safety measures based on patient assessment     
  Problem: Chronic Conditions and Co-morbidities  Goal: Patient's chronic conditions and co-morbidity symptoms are monitored and maintained or improved  Outcome: Progressing  Flowsheets (Taken 11/29/2024 1210)  Care Plan - Patient's Chronic Conditions and Co-Morbidity Symptoms are Monitored and Maintained or Improved:   Monitor and assess patient's chronic conditions and comorbid symptoms for stability, deterioration, or improvement   Collaborate with multidisciplinary team to address chronic and comorbid conditions and prevent exacerbation or deterioration   Update acute care plan with appropriate goals if chronic or comorbid symptoms are exacerbated and prevent overall improvement and discharge     Problem: Discharge Planning  Goal: Discharge to home or other facility with appropriate resources  Outcome: Progressing  Flowsheets (Taken 11/29/2024 1210)  Discharge to home or other facility with appropriate resources:   Identify barriers to discharge with patient and caregiver   Arrange for needed discharge resources and transportation as appropriate   Identify discharge learning needs (meds, wound care, etc)     Problem: Skin/Tissue Integrity  Goal: Absence of new skin breakdown  Description: 1.  Monitor for areas of redness and/or skin breakdown  2.  Assess vascular access sites hourly  3.  Every 4-6 hours minimum:  Change oxygen saturation probe site  4.  Every 4-6 hours:  If on nasal continuous positive airway pressure, respiratory therapy assess nares and determine need for appliance change or resting period.  Outcome: Progressing  Note: Assess and document any skin issues     Problem: Safety - Adult  Goal: Free from fall injury  Outcome: Progressing  Flowsheets (Taken 11/29/2024 1210)  Free From Fall Injury: Instruct family/caregiver on patient safety     Problem: Pain  Goal: Verbalizes/displays adequate comfort level or baseline comfort level  Outcome: Progressing  Flowsheets (Taken 11/29/2024 
  Problem: Chronic Conditions and Co-morbidities  Goal: Patient's chronic conditions and co-morbidity symptoms are monitored and maintained or improved  Outcome: Progressing  Flowsheets (Taken 12/9/2024 1443)  Care Plan - Patient's Chronic Conditions and Co-Morbidity Symptoms are Monitored and Maintained or Improved:   Monitor and assess patient's chronic conditions and comorbid symptoms for stability, deterioration, or improvement   Collaborate with multidisciplinary team to address chronic and comorbid conditions and prevent exacerbation or deterioration     Problem: Discharge Planning  Goal: Discharge to home or other facility with appropriate resources  Outcome: Progressing  Flowsheets  Taken 12/9/2024 1443  Discharge to home or other facility with appropriate resources:   Identify barriers to discharge with patient and caregiver   Arrange for needed discharge resources and transportation as appropriate   Identify discharge learning needs (meds, wound care, etc)  Taken 12/9/2024 0900  Discharge to home or other facility with appropriate resources:   Identify barriers to discharge with patient and caregiver   Arrange for needed discharge resources and transportation as appropriate   Identify discharge learning needs (meds, wound care, etc)   Refer to discharge planning if patient needs post-hospital services based on physician order or complex needs related to functional status, cognitive ability or social support system     Problem: Pain  Goal: Verbalizes/displays adequate comfort level or baseline comfort level  Outcome: Progressing  Flowsheets (Taken 12/9/2024 1443)  Verbalizes/displays adequate comfort level or baseline comfort level:   Encourage patient to monitor pain and request assistance   Assess pain using appropriate pain scale   Administer analgesics based on type and severity of pain and evaluate response     Problem: Safety - Adult  Goal: Free from fall injury  Outcome: Progressing  Flowsheets 
Priti Pope RN  Outcome: Progressing  Note: Assess and document every shift  11/20/2024 0431 by Aguilar Hinds RN  Outcome: Progressing     Problem: Safety - Adult  Goal: Free from fall injury  11/20/2024 1615 by Priti Pope RN  Outcome: Progressing  Flowsheets (Taken 11/20/2024 1615)  Free From Fall Injury: Instruct family/caregiver on patient safety  11/20/2024 0431 by Aguilar Hinds RN  Outcome: Progressing     Problem: Pain  Goal: Verbalizes/displays adequate comfort level or baseline comfort level  11/20/2024 1615 by Priti Pope RN  Outcome: Progressing  Flowsheets (Taken 11/20/2024 1615)  Verbalizes/displays adequate comfort level or baseline comfort level:   Encourage patient to monitor pain and request assistance   Assess pain using appropriate pain scale   Administer analgesics based on type and severity of pain and evaluate response  11/20/2024 0431 by Aguilar Hinds RN  Outcome: Progressing     Problem: ABCDS Injury Assessment  Goal: Absence of physical injury  11/20/2024 1615 by Priti Pope RN  Outcome: Progressing  Flowsheets (Taken 11/20/2024 1615)  Absence of Physical Injury: Implement safety measures based on patient assessment  11/20/2024 0431 by Aguilar Hinds RN  Outcome: Progressing     
spots and redness.     Problem: Safety - Adult  Goal: Free from fall injury  11/30/2024 1935 by Saw Trinidad RN  Outcome: Progressing  Flowsheets (Taken 11/30/2024 1933)  Free From Fall Injury: Instruct family/caregiver on patient safety  11/30/2024 0937 by Priti Pope RN  Outcome: Progressing  Flowsheets (Taken 11/30/2024 0937)  Free From Fall Injury: Instruct family/caregiver on patient safety     Problem: Pain  Goal: Verbalizes/displays adequate comfort level or baseline comfort level  11/30/2024 0937 by Priti Pope RN  Outcome: Progressing  Flowsheets (Taken 11/30/2024 0937)  Verbalizes/displays adequate comfort level or baseline comfort level:   Encourage patient to monitor pain and request assistance   Assess pain using appropriate pain scale   Administer analgesics based on type and severity of pain and evaluate response   Implement non-pharmacological measures as appropriate and evaluate response     Problem: ABCDS Injury Assessment  Goal: Absence of physical injury  11/30/2024 1935 by Saw Trinidad RN  Outcome: Progressing  Flowsheets (Taken 11/30/2024 1933)  Absence of Physical Injury: Implement safety measures based on patient assessment  11/30/2024 0937 by Priti Pope RN  Outcome: Progressing  Flowsheets (Taken 11/30/2024 0937)  Absence of Physical Injury: Implement safety measures based on patient assessment     
MANAGEMENT:  Goals:   Assist patient/family with discharge planning, patient/family counseling,  and coordination with insurance during the inpatient rehabilitation stay.         Other members of the multidisciplinary rehabilitation team that will be involved in the patient's plan of care include recreational therapy, dietary, respiratory therapy, and neuropsychology.    Medical issues being managed closely and that require 24 hour availability of a physician:  Swallowing precautions, Bowel/Bladder function, Weight bearing precautions, Wound care, Pain management, Infection protection, DVT prophylaxis, Fall precautions, Fluid/Electrolyte balance, Nutritional status, Respiratory needs, Anemia, and History of heart disease                                           Physician anticipated functional outcomes: Improved independence with functional measures   Estimated length of stay for this admission 2 weeks  Medical Prognosis: Fair  Anticipated disposition: Home.      The potential to achieve the above medical and rehabilitative goals is fair.    This plan of care has been developed with the assistance and input of the multidisciplinary rehabilitation team.  The plan was reviewed with the patient on 11/21/2024.  The patient has had the opportunity to provide input to the therapy team.    I have reviewed this Individualized Plan of Care and agree with its contents.  Above documentation has been expanded, modified, adjusted to reflect the findings of my evaluations and goals for the patient.    Physician:  Electronically signed by Nitesh Ash MD on 11/21/24 at 10:01 AM EST

## 2024-12-12 NOTE — CARE COORDINATION
[facility] Acute Inpatient Rehabilitation  Case Management Assessment  Initial Evaluation    Date/Time of Evaluation: 11/20/2024 10:48 AM  Assessment Completed by: Keysha Palomino RN    If patient is discharged prior to next notation, then this note serves as note for discharge by case management.    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 Yes  Chart Reviewed: Yes      History Provided by: Patient  Patient Orientation: Alert and Oriented    Patient Cognition: Alert    Advance Directives:    Code Status: DNR-CCA   Patient's Primary Decision Maker is: Legal Next of Kin    Primary Decision Maker: Everardo Montes - Brother/Sister - [phone]    Secondary Decision Maker: Greer Schaeffer - Brother/Sister - [phone]    Current Services Prior to Admission:  Current Financial resources: Medicare  Current community resources: None  Current services prior to admission: Durable Medical Equipment  Type of Home Care services:  None  Current Home DME: Crutches  Do you have a wheelchair ramp/Plans to build? No  Handicap Placard: Needs    Discharge Planning:  Patient lives with: Alone   Type of Home: House  Primary Care Giver: Self  Marital Status:   Patient Support Systems include: Family Members, Friends/Neighbors   Family can provide assistance at DC: Yes  Does patient have 24 hour assistance at home:  Yes  Is patient agreeable to VNS or Outpatient therapy Yes  [place] of choice provided: Yes  List of Home Care Agencies/Outpatient therapy provided: Yes  VNS/Outpatient therapy chosen: Yes    Does patient go to outpatient 
  [facility] Acute Inpatient Rehabilitation  Case Management Discharge Note    Discharge Disposition: Home with brother/MACKENZIE    Discharge Transportation Method: family car ,  Time: tbd    IMM Letter Status: Patient/Responsible Party agreeable with discharge: Second Signature Obtained, Patient/Responsible Party offered four hours to make informed decision regarding appeal process - Completed Letter Placed in Patient Chart    Home Healthcare Services: Company: [employer] [facility]    Outpatient Therapy Services: Not Applicable    DME:  DME: WC - [facility]; URW, TTB - [facility]; AFO - [facility]    Current reconciled medication list sent to subsequent provider via: Electronic Health Record      Patient Health Questionnaire-9 (PHQ-2 to 9)   Over the last 2 weeks, how often have you been bothered by any of the following problems?    1. Little Interest or pleasure in doing things?   Never or 1 Day - Score 0    2. Feeling down, depressed or hopeless?   2-6 Days (Several Days) - Score 1    3. Trouble falling or staying asleep, or sleeping too much?   Never or 1 Day - Score 0    4. Feeling tired or having little energy?   Never or 1 Day - Score 0    5. Poor appetite or overeating?   Never or 1 Day - Score 0    6. Feeling bad about yourself-or that you are a failure or have let yourself or your family down?   Never or 1 Day - Score 0    7. Trouble concentrating on things, such as reading the newspaper or watching television?    Never or 1 Day - Score 0    8. Moving or speaking so slowly that other people could have noticed? Or the opposite-being so fidgety or restless that you have been moving around a lot more than usual?  Never or 1 Day - Score 0    9. Thoughts that you would be better off dead or of hurting yourself in some way?   Never or 1 Day - Score 0    Total Score: 1  If score is above 15, Notify PM&R Physician    If you checked off any problems, how difficult have these problems made it for you to do your work, take 
ARU CASE MANAGEMENT NOTE:    Admission Date:  11/19/2024 Nitesh Ren is a 69 y.o.  male    Admitted for : Hemiplegia and hemiparesis following cerebral infarction affecting left dominant side (HCC) [I69.352]    Called and left VM for patient's brother at his request. Patient would like RN CM to discuss discharge plans with his brother, Everardo, to see if he will have the ability to get the patient to and from OP therapy. Therapy is concerned that the patient will require HHC and he is unsure if his brother will be okay with that as he is staying with him at discharge. Awaiting return call at this time.    Outside appointments while in ARU: none    DME: wheelchair - order faxed to [facility]    Will continue to follow for additional discharge needs.    Electronically signed by Keysha Palomino RN on 12/5/2024 at 11:34 AM    
ARU CASE MANAGEMENT NOTE:    Admission Date:  11/19/2024 Nitesh Ren is a 69 y.o.  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    Spoke with patient regarding discharge plan: he is agreeable to RN CM speaking with MACKENZIE Phillips re: [facility] choices. LAUREN DISLA spoke with Jacqueline via phone - she would like a referral sent to [facility]. She stated that the home eval went well but they feel that the patient would do better with [facility] for the time being. LAUREN DISLA spoke with Macie at [facility] and they are able to accept.     Outside appointments while in ARU: none    DME:Wheelchair - [facility] ; Upright walker - faxed to [facility]    Will continue to follow for additional discharge needs.    Electronically signed by Keysha Palomino RN on 12/9/2024 at 3:32 PM    
ARU CASE MANAGEMENT NOTE:    Admission Date:  11/19/2024 Nitesh Ren is a 69 y.o.  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    Spoke with patient regarding discharge plan: patient feels that therapy is going very well. Plan remains to discharge home with brother and do OP therapy at [facility].    Outside appointments while in ARU: none    DME: tbd    Will continue to follow for additional discharge needs.    Electronically signed by Keysha Palomino RN on 12/2/2024 at 12:14 PM    
ARU CASE MANAGEMENT NOTE:    Admission Date:  11/19/2024 Nitesh Ren is a 69 y.o.  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    Spoke with patient regarding discharge plan: plan remains to discharge to his brother's home in [place] for a couple of weeks. He will do OP therapy at [facility].     Outside appointments while in ARU: none    DME: tbd    Will continue to follow for additional discharge needs.    Electronically signed by Keysha Palomino RN on 11/25/2024 at 12:08 PM    
Continuity of Care Form    Patient Name: Nitesh Ren   :  1955  MRN:  [id number]    Admit date:  2024  Discharge date:  24    Code Status Order: DNR-CCA   Advance Directives:   Advance Care Flowsheet Documentation             Admitting Physician:  Nitesh Ash MD  PCP: Bhupendra Hinds MD    Discharging Nurse: Dyan Cooper  Discharging Hospital Unit/Room#: 2638/2638-01  Discharging Unit Phone Number: [phone]    Emergency Contact:   Extended Emergency Contact Information  Primary Emergency Contact: Greer Schaeffer  Home Phone: [phone]  Relation: Brother/Sister  Secondary Emergency Contact: Everardo Montes  Home Phone: [phone]  Relation: Brother/Sister    Past Surgical History:  Past Surgical History:   Procedure Laterality Date    COLONOSCOPY  11    POLYPECTOMY    COLONOSCOPY  10/23/07    POLYPECTOMY    CYST REMOVAL      \"back side\"   and face and neck    POLYPECTOMY  11       Immunization History:   Immunization History   Administered Date(s) Administered    COVID-19, PFIZER Bivalent, DO NOT Dilute, (age 12y+), IM, 30 mcg/0.3 mL 01/10/2023    COVID-19, PFIZER PURPLE top, DILUTE for use, (age 12 y+), 30mcg/0.3mL 2021, 2021, 2021    COVID-19, PFIZER, (age 12y+), IM, 30mcg/0.3mL 10/12/2023    Influenza, FLUAD, (age 65 y+), IM, Quadv, 0.5mL 2022    TDaP, ADACEL (age 10y-64y), BOOSTRIX (age 10y+), IM, 0.5mL 2020    Zoster Recombinant (Shingrix) 2022, 2022       Active Problems:  Patient Active Problem List   Diagnosis Code    Abdominal pain R10.9    Colon polyp K63.5    Neurofibromatosis (HCC) Q85.00    Thyroid nodule E04.1    Pulmonary nodules R91.8    Lymphangitis I89.1    Cellulitis of left lower leg L03.116    Ex-cigarette smoker Z87.891    Polycythemia D75.1    Pneumococcal vaccination declined Z28.21    Colonoscopy refused Z53.20    Polycythemia vera (HCC) D45    Left-sided weakness R53.1    Cerebrovascular accident 
Faxed to [facility] via EPIC routing 24  @ 11:39am    [facility] Encounter Date/Time: 2024 1630    Hospital Account: [id number]    MRN: [id number]    Patient: Justin Atwood    Contact Serial #: [id number]      ENCOUNTER          Patient Class: IPREH Private Enc?  No Unit RM BD: [facility] REHAB 2638/2638-01   Hospital Service: FACUNDO   Encounter DX: Hemiplegia and hemipares*   ADM Provider: Justin Ash MD   Procedure:     ATT Provider: Justin Ash MD   REF Provider:        Admission DX: Hemiplegia and hemiparesis following cerebral infarction affecting left dominant side (HCC) and DX codes: I69.352      PATIENT             Name: Justin Atwood : 1955 (69 yrs)   Address: [address] [place] [address] Sex: Male   City: [place] [address]         Marital Status:    Employer: RETIRED         Mu-ism: [employer]   Primary Care Provider: Bhupendra Hinds MD         Primary Phone: [phone]   EMERGENCY CONTACT   Name Home Phone Work Phone Mobile Phone Relationship Lgl Grd   GAB BOLIVAR [phone]     Brother/Sis*     TAMAR ATWOOD [phone]     Brother/Sis*           GUARANTOR            Guarantor: Justin Atwood     : 1955   Address: [address] [place] [address] Sex: [place] [address]     Relation to Patient: Self       Home Phone: [phone]   Guarantor ID: [id number]       Work Phone: [phone]   Guarantor Employer: [employer]         Status: FULL TIME      COVERAGE        PRIMARY INSURANCE   Payor: AETNA MEDICARE Plan: AETNA MEDICARE ADVANTAGE*   Payor Address: [facility] [address],  [place] [address]       Group Number: [id number] Insurance Type: INDEMNITY   Subscriber Name: JUSTIN ATWOOD AKASH Subscriber : 1955   Subscriber ID: [id number] Pat. Rel. to Sub: Self   SECONDARY INSURANCE   Payor:   Plan:     Payor Address:  ,           Group Number:   Insurance Type:     Subscriber Name:   Subscriber :     Subscriber ID:   Pat. Rel. to 
Faxed to [facility] at patient's request 24 @ 1541        CSN                                    Req/Control # [Problem retrieving Specimen ID]                                   Order Date:  Dec 9, 2024  [id number]                                          Patient Information      Name:  Nitesh Atwood  :  1955  Age:  69 y.o.   Address:  [address] [place] [address]   Zip:  [address]  PCP: Bhupendra Hinds MD Sex:  M  SSN: [id number]  Home Phone: [phone]  Work Phone:    Patient MRN:  [id number]    Alt Patient ID:  [id number]  PCP Phone: [phone]       Authorizing Provider Information       AUTHORIZING PROVIDER: Ele Melendez MD  Physician ID: [id number]  NPI:  [id number]  Site:   Address: [address] [place] [address] Zip: [place] [address]  Phone: [phone]  Fax: [phone]             EQUIPMENT ORDERED  DME Order for Walker as OP [DME02] (ORD   #:   [id number]) Priority  Routine Class  Hospital Performed        Associated Diagnosis:          Comments:   You must complete the order parameters below and add the medical necessity documentation for this DME in a separate note.     Upright Walker with Wheels     Current patient weight: Weight - Scale: 75 kg (165 lb 5.5 oz)  Current patient height: Height: 177.8 cm (5' 10\")  Diagnosis: Stroke  Duration: Purchase            Scheduling Instructions:                                 Specimen Source             Collection Date    Collection Time    Order Status    Expected Date                 Electronically Signed By  Ele Melendez MD  NPI:  [id number] Date  Dec 9, 2024  1:12 PM               Responsible Party /Guarantor Information     Guar-ActID   Relationship Account Type Home Phone   NITESH ATWOOD P - [address]* [address] [place] [address] / [place] [address] Self P/F [phone]   Employer   Work Phone   [employer]   [phone]              Insurance & Policy Knott Information         Primary 
Rel. to Sub:              Order Requisition for Justin Atwood  CSN: [id number]   Order Date:  Dec 9, 2024  Dec 9, 2024             Patient Information: Justin Awtood       :  1955  Age:  69 y.o.  Sex:  M  Home Phone: [phone]  Work Phone:   SSN: [id number]  Address:  [address] [place] [address]  [address] MRN:  [id number]  Facility MRN:  [id number]  PCP: Bhupendra Hinds MD  PCP Phone: [phone]           Ordering Dept: [facility] Re[facility]     Site: [facility]  Ph: [phone] Fax:   Address: [address] [place] [address] Ordering User: Ele Melendez MD  Provider ID: [id number]  NPI:  [id number]               Test Ordered: Inpatient consult to Orthotist/Prosthetist [CON12]   Code: CON12   ORD #: [id number]  Associated Diagnosis:   Reason for Consult? Eval and treat for L AFO/foot drop after CVA Priority  Routine Class  Hospital Performed      Order Status  Standing Expected Date    Specimen Source    Collection Date    Collection Time    Occurrences Remaining    Interval  ONE TIME      Electronically Signed By  Ele Melendez MD  NPI:  [id number] Date  Dec 9, 2024  1:12 PM               Responsible Party /Guarantor Information     Guar-ActID   Relationship Account Type Home Phone   JUSTIN ATWOOD P - 102* [address] [facility] / [place] [address] Self P/F [phone]   Employer   Work Phone   [employer]   [phone]              Insurance & Policy Knott Information         Primary Insurance:  Insurance/Subscriber ID:  [id number]  Subscriber Name:  SHIVAM ATWOODODIN BUSTOS              Relationship to Patient: Self  Signed ABN: N       Payor Name:  AETNA MEDICARE   Plan:  AETNA MEDICARE ADVANTAGE HMO   Group: [id number]  Worker's Comp Date of Injury:

## 2024-12-12 NOTE — PROGRESS NOTES
[facility]   IN-PATIENT SERVICE   [facility]  Consult note            Date:   11/21/2024  Patient name:  Nitesh Ren  Date of admission:  11/19/2024  4:30 PM  MRN:   [id number]  Account:  [id number]  YOB: 1955  PCP:    Bhupendra Hinds MD  Room:   [address] [place] [address]  Code Status:    DNR-CCA    Chief Complaint:     No chief complaint on file.      History Obtained From:     patient    History of Present Illness:     The patient is a 69 y.o.  Non- / non  male who presents with No chief complaint on file.   and he is admitted to the hospital for the management of    Patient is 69-year-old male with multiple comorbidities including history of polycythemia, neurofibromatosis, with multiple neurofibromas, hypertension hyperlipidemia, initially came to hospital on 11/7, with left-sided weakness, found to have acute stroke,  Patient was ordered dual antiplatelet and was seen by neurology, on 11/11, patient had worsening left-sided weakness and facial droop, repeat CT showed enlargement of acute infarct,  Was also seen by hematology oncology for polycythemia recommended outpatient phlebotomy  Patient was complaining of leg spasms, started on baclofen which seemed to help.  Patient currently admitted at [facility] for further care      Past Medical History:     Past Medical History:   Diagnosis Date    Abdominal pain     Cerebrovascular accident (CVA) (HCC) 11/8/2024    Colon polyp     Pulmonary nodules 9/17/2013    Thyroid nodule 9/17/2013        Past Surgical History:     Past Surgical History:   Procedure Laterality Date    COLONOSCOPY  12/12/11    POLYPECTOMY    COLONOSCOPY  10/23/07    POLYPECTOMY    CYST REMOVAL      \"back side\"   and face and neck    POLYPECTOMY  12/12/11        Medications Prior to Admission:     Prior to Admission medications    Medication Sig Start Date End Date Taking? Authorizing Provider   baclofen (LIORESAL) 5 MG tablet Take 
    [facility]   IN-PATIENT SERVICE   [facility]  Consult note            Date:   12/6/2024  Patient name:  Nitesh Ren  Date of admission:  11/19/2024  4:30 PM  MRN:   [id number]  Account:  [id number]  YOB: 1955  PCP:    Bhupendra Hinds MD  Room:   [address] [place] [address]  Code Status:    DNR-CCA    Chief Complaint:     No chief complaint on file.  Debility from acute stroke    History Obtained From:     patient    History of Present Illness:     The patient is a 69 y.o.  Non- / non  male who presents with No chief complaint on file.   and he is admitted to the hospital for the management of    Patient is 69-year-old male with multiple comorbidities including history of polycythemia, neurofibromatosis, with multiple neurofibromas, hypertension hyperlipidemia, initially came to hospital on 11/7, with left-sided weakness, found to have acute stroke,  Patient was ordered dual antiplatelet and was seen by neurology, on 11/11, patient had worsening left-sided weakness and facial droop, repeat CT showed enlargement of acute infarct,  Was also seen by hematology oncology for polycythemia recommended outpatient phlebotomy  Patient was complaining of leg spasms, started on baclofen which seemed to help.  Patient currently admitted at [facility] for further care      Past Medical History:     Past Medical History:   Diagnosis Date    Abdominal pain     Cerebrovascular accident (CVA) (HCC) 11/8/2024    Colon polyp     Pulmonary nodules 9/17/2013    Thyroid nodule 9/17/2013        Past Surgical History:     Past Surgical History:   Procedure Laterality Date    COLONOSCOPY  12/12/11    POLYPECTOMY    COLONOSCOPY  10/23/07    POLYPECTOMY    CYST REMOVAL      \"back side\"   and face and neck    POLYPECTOMY  12/12/11        Medications Prior to Admission:     Prior to Admission medications    Medication Sig Start Date End Date Taking? Authorizing Provider   baclofen 
    [facility]   IN-PATIENT SERVICE   [facility]  Consult note            Date:   11/26/2024  Patient name:  Nitesh Ren  Date of admission:  11/19/2024  4:30 PM  MRN:   [id number]  Account:  [id number]  YOB: 1955  PCP:    Bhupendra Hinds MD  Room:   [address] [place] [address]  Code Status:    DNR-CCA    Chief Complaint:     No chief complaint on file.  Debility from acute stroke    History Obtained From:     patient    History of Present Illness:     The patient is a 69 y.o.  Non- / non  male who presents with No chief complaint on file.   and he is admitted to the hospital for the management of    Patient is 69-year-old male with multiple comorbidities including history of polycythemia, neurofibromatosis, with multiple neurofibromas, hypertension hyperlipidemia, initially came to hospital on 11/7, with left-sided weakness, found to have acute stroke,  Patient was ordered dual antiplatelet and was seen by neurology, on 11/11, patient had worsening left-sided weakness and facial droop, repeat CT showed enlargement of acute infarct,  Was also seen by hematology oncology for polycythemia recommended outpatient phlebotomy  Patient was complaining of leg spasms, started on baclofen which seemed to help.  Patient currently admitted at [facility] for further care      Past Medical History:     Past Medical History:   Diagnosis Date    Abdominal pain     Cerebrovascular accident (CVA) (HCC) 11/8/2024    Colon polyp     Pulmonary nodules 9/17/2013    Thyroid nodule 9/17/2013        Past Surgical History:     Past Surgical History:   Procedure Laterality Date    COLONOSCOPY  12/12/11    POLYPECTOMY    COLONOSCOPY  10/23/07    POLYPECTOMY    CYST REMOVAL      \"back side\"   and face and neck    POLYPECTOMY  12/12/11        Medications Prior to Admission:     Prior to Admission medications    Medication Sig Start Date End Date Taking? Authorizing Provider   baclofen 
    [facility]   IN-PATIENT SERVICE   [facility]  Consult note            Date:   11/30/2024  Patient name:  Nitesh Ren  Date of admission:  11/19/2024  4:30 PM  MRN:   [id number]  Account:  [id number]  YOB: 1955  PCP:    Bhupendra Hinds MD  Room:   [address] [place] [address]  Code Status:    DNR-CCA    Chief Complaint:     No chief complaint on file.  Debility from acute stroke    History Obtained From:     patient    History of Present Illness:     The patient is a 69 y.o.  Non- / non  male who presents with No chief complaint on file.   and he is admitted to the hospital for the management of    Patient is 69-year-old male with multiple comorbidities including history of polycythemia, neurofibromatosis, with multiple neurofibromas, hypertension hyperlipidemia, initially came to hospital on 11/7, with left-sided weakness, found to have acute stroke,  Patient was ordered dual antiplatelet and was seen by neurology, on 11/11, patient had worsening left-sided weakness and facial droop, repeat CT showed enlargement of acute infarct,  Was also seen by hematology oncology for polycythemia recommended outpatient phlebotomy  Patient was complaining of leg spasms, started on baclofen which seemed to help.  Patient currently admitted at [facility] for further care      Past Medical History:     Past Medical History:   Diagnosis Date    Abdominal pain     Cerebrovascular accident (CVA) (HCC) 11/8/2024    Colon polyp     Pulmonary nodules 9/17/2013    Thyroid nodule 9/17/2013        Past Surgical History:     Past Surgical History:   Procedure Laterality Date    COLONOSCOPY  12/12/11    POLYPECTOMY    COLONOSCOPY  10/23/07    POLYPECTOMY    CYST REMOVAL      \"back side\"   and face and neck    POLYPECTOMY  12/12/11        Medications Prior to Admission:     Prior to Admission medications    Medication Sig Start Date End Date Taking? Authorizing Provider   baclofen 
    [facility]   IN-PATIENT SERVICE   [facility]  Consult note            Date:   12/5/2024  Patient name:  Nitesh Ren  Date of admission:  11/19/2024  4:30 PM  MRN:   [id number]  Account:  [id number]  YOB: 1955  PCP:    Bhupendra Hinds MD  Room:   [address] [place] [address]  Code Status:    DNR-CCA    Chief Complaint:     No chief complaint on file.  Debility from acute stroke    History Obtained From:     patient    History of Present Illness:     The patient is a 69 y.o.  Non- / non  male who presents with No chief complaint on file.   and he is admitted to the hospital for the management of    Patient is 69-year-old male with multiple comorbidities including history of polycythemia, neurofibromatosis, with multiple neurofibromas, hypertension hyperlipidemia, initially came to hospital on 11/7, with left-sided weakness, found to have acute stroke,  Patient was ordered dual antiplatelet and was seen by neurology, on 11/11, patient had worsening left-sided weakness and facial droop, repeat CT showed enlargement of acute infarct,  Was also seen by hematology oncology for polycythemia recommended outpatient phlebotomy  Patient was complaining of leg spasms, started on baclofen which seemed to help.  Patient currently admitted at [facility] for further care      Past Medical History:     Past Medical History:   Diagnosis Date    Abdominal pain     Cerebrovascular accident (CVA) (HCC) 11/8/2024    Colon polyp     Pulmonary nodules 9/17/2013    Thyroid nodule 9/17/2013        Past Surgical History:     Past Surgical History:   Procedure Laterality Date    COLONOSCOPY  12/12/11    POLYPECTOMY    COLONOSCOPY  10/23/07    POLYPECTOMY    CYST REMOVAL      \"back side\"   and face and neck    POLYPECTOMY  12/12/11        Medications Prior to Admission:     Prior to Admission medications    Medication Sig Start Date End Date Taking? Authorizing Provider   baclofen 
    [facility]   IN-PATIENT SERVICE   [facility]  Consult note            Date:   12/1/2024  Patient name:  Nitesh Ren  Date of admission:  11/19/2024  4:30 PM  MRN:   [id number]  Account:  [id number]  YOB: 1955  PCP:    Bhupendra Hinds MD  Room:   [address] [place] [address]  Code Status:    DNR-CCA    Chief Complaint:     No chief complaint on file.  Debility from acute stroke    History Obtained From:     patient    History of Present Illness:     The patient is a 69 y.o.  Non- / non  male who presents with No chief complaint on file.   and he is admitted to the hospital for the management of    Patient is 69-year-old male with multiple comorbidities including history of polycythemia, neurofibromatosis, with multiple neurofibromas, hypertension hyperlipidemia, initially came to hospital on 11/7, with left-sided weakness, found to have acute stroke,  Patient was ordered dual antiplatelet and was seen by neurology, on 11/11, patient had worsening left-sided weakness and facial droop, repeat CT showed enlargement of acute infarct,  Was also seen by hematology oncology for polycythemia recommended outpatient phlebotomy  Patient was complaining of leg spasms, started on baclofen which seemed to help.  Patient currently admitted at [facility] for further care      Past Medical History:     Past Medical History:   Diagnosis Date    Abdominal pain     Cerebrovascular accident (CVA) (HCC) 11/8/2024    Colon polyp     Pulmonary nodules 9/17/2013    Thyroid nodule 9/17/2013        Past Surgical History:     Past Surgical History:   Procedure Laterality Date    COLONOSCOPY  12/12/11    POLYPECTOMY    COLONOSCOPY  10/23/07    POLYPECTOMY    CYST REMOVAL      \"back side\"   and face and neck    POLYPECTOMY  12/12/11        Medications Prior to Admission:     Prior to Admission medications    Medication Sig Start Date End Date Taking? Authorizing Provider   baclofen 
    [facility]   IN-PATIENT SERVICE   [facility]  Consult note            Date:   12/9/2024  Patient name:  Nitesh Ren  Date of admission:  11/19/2024  4:30 PM  MRN:   [id number]  Account:  [id number]  YOB: 1955  PCP:    Bhupendra Hinds MD  Room:   [address] [place] [address]  Code Status:    DNR-CCA    Chief Complaint:     No chief complaint on file.  Debility from acute stroke    History Obtained From:     patient    History of Present Illness:     The patient is a 69 y.o.  Non- / non  male who presents with No chief complaint on file.   and he is admitted to the hospital for the management of    Patient is 69-year-old male with multiple comorbidities including history of polycythemia, neurofibromatosis, with multiple neurofibromas, hypertension hyperlipidemia, initially came to hospital on 11/7, with left-sided weakness, found to have acute stroke,  Patient was ordered dual antiplatelet and was seen by neurology, on 11/11, patient had worsening left-sided weakness and facial droop, repeat CT showed enlargement of acute infarct,  Was also seen by hematology oncology for polycythemia recommended outpatient phlebotomy  Patient was complaining of leg spasms, started on baclofen which seemed to help.  Patient currently admitted at [facility] for further care      Past Medical History:     Past Medical History:   Diagnosis Date    Abdominal pain     Cerebrovascular accident (CVA) (HCC) 11/8/2024    Colon polyp     Pulmonary nodules 9/17/2013    Thyroid nodule 9/17/2013        Past Surgical History:     Past Surgical History:   Procedure Laterality Date    COLONOSCOPY  12/12/11    POLYPECTOMY    COLONOSCOPY  10/23/07    POLYPECTOMY    CYST REMOVAL      \"back side\"   and face and neck    POLYPECTOMY  12/12/11        Medications Prior to Admission:     Prior to Admission medications    Medication Sig Start Date End Date Taking? Authorizing Provider   baclofen 
    [facility]   IN-PATIENT SERVICE   [facility]  Consult note            Date:   11/24/2024  Patient name:  Nitesh Ren  Date of admission:  11/19/2024  4:30 PM  MRN:   [id number]  Account:  [id number]  YOB: 1955  PCP:    Bhupendra Hinds MD  Room:   [address] [place] [address]  Code Status:    DNR-CCA    Chief Complaint:     No chief complaint on file.  Debility from acute stroke    History Obtained From:     patient    History of Present Illness:     The patient is a 69 y.o.  Non- / non  male who presents with No chief complaint on file.   and he is admitted to the hospital for the management of    Patient is 69-year-old male with multiple comorbidities including history of polycythemia, neurofibromatosis, with multiple neurofibromas, hypertension hyperlipidemia, initially came to hospital on 11/7, with left-sided weakness, found to have acute stroke,  Patient was ordered dual antiplatelet and was seen by neurology, on 11/11, patient had worsening left-sided weakness and facial droop, repeat CT showed enlargement of acute infarct,  Was also seen by hematology oncology for polycythemia recommended outpatient phlebotomy  Patient was complaining of leg spasms, started on baclofen which seemed to help.  Patient currently admitted at [facility] for further care      Past Medical History:     Past Medical History:   Diagnosis Date    Abdominal pain     Cerebrovascular accident (CVA) (HCC) 11/8/2024    Colon polyp     Pulmonary nodules 9/17/2013    Thyroid nodule 9/17/2013        Past Surgical History:     Past Surgical History:   Procedure Laterality Date    COLONOSCOPY  12/12/11    POLYPECTOMY    COLONOSCOPY  10/23/07    POLYPECTOMY    CYST REMOVAL      \"back side\"   and face and neck    POLYPECTOMY  12/12/11        Medications Prior to Admission:     Prior to Admission medications    Medication Sig Start Date End Date Taking? Authorizing Provider   baclofen 
  ACUTE INPATIENT REHABILITATION ADMISSION  [facility]    Patient Name: Nitesh Ren  MRN: [id number]   Date of Admit: 11/19/2024  Time of Arrival: 1600    Patient admitted to the Acute Inpatient Rehabilitation Unit via Bed    Patient oriented to room, unit and fall prevention safety measures.  Education provided on the rehabilitation routine and therapy schedules.    Drug / Medication Regimen Review   Admitting medication orders compared with acute stay medications; home medication list reviewed with patient/family.      Medication Issues Identified ? No If Yes, Check All That Apply   []  Allergy to medication   []  Drug interactions (drug/drug, drug/food, drug/disease interactions)   []  Duplicate drug   []  Omission (drug missing from prescribed regimen)   []  Non adherence   []  Adverse reaction   []  Wrong patient, drug, dose, route, time error   []  Ineffective drug therapy       High-Risk Drug Classes: Use and Indication   Check if the patient is taking any medications by pharmacological classification If yes, check if there is an indication noted for all meds in the drug class   Antipsychotic No If yes: indication noted?  [] If no indication noted, follow up with provider for order clarification   Anticoagulant Yes If yes: indication noted?  []    Antibiotic No If yes: indication noted?  []    Opioid No If yes: indication noted?  []    Antiplatelet Yes If yes: indication noted?  []    Hypoglycemic (Including Insulin) No If yes: indication noted?  []      Attending Physical Medicine & Rehabilitation (PM&R) Admitting Order Review   Admission orders reviewed with Acute Inpatient Rehabilitation Attending PM&R Physician: Nitesh Ash MD     Skin Assessment   Skin assessment performed by two nurses: all active alterations in skin integrity, wounds, lines, drains and airways assessed, measured, recorded and reconciled. Refer to LDA avatar and LDA flowsheet for additional information.    Nurse 1 
  [facility] Acute Inpatient Rehabilitation  HOME EVALUATION  Date: 24  Patient Name: Nitesh Ren       Room: 2638/2638-01  MRN: [id number]       : 1955  (69 y.o.)     Gender: male       Home evaluation completed with patient, Grace Villegas, OTR/L, Nia Soriano, PT, and Neftali Redd, S/OT, patient's brother Everardo and brother's wife Jacqueline. Education provided throughout home evaluation with all questions answered at this time. Further education and training will be provided based on identified barriers from on home evaluation and set-up.       Equipment that the patient currently owns:  patient does not currently own equipment, but recommendations have been provided and patient/family are obtaining 16 inch manual w/c, diallo-walker, toilet riser w/out handrails, tub transfer bench, upright rollator for future use.   During performance of all mobility during home eval, a Left AFO was used. A Left AFO will be recommended for further mobility and mobility at discharge.      Description of home: Two-story but patient will reside on main floor       Are you able to live on the main level of your home? yes      How many steps are there between floors? N/A-patient will not be utilizing stairs      Are there handrails with the stairs? not applicable If so, what side?  N/A      Do you have a fire exit strategy? Yes - w/c bumping discussed with use of back/garage door . PT/OT Writers provided opportunity for family to practice this method however family declines as they are familiar with the technique and deny need for training.      Car transfer    Type of vehicle: SUV      Location: Font passenger side      Device used: Diallo walker and manual w/c      Additional information: Pt performs car transfers x3 with 1 person Assist however requires cues from PT for WC placement, set-up, and safety. Both Everardo and Jacqueline able to demonstrate effective assistive techniques with verbal cues.     -Recommending 
.tempa  
[facility]   Acute Rehabilitation Occupational Therapy Daily Treatment Note    Date: 24  Patient Name: Nitesh Ren       Room: 2638/2638-01  MRN: [id number]  Account: [id number]   : 1955  (69 y.o.) Gender: male       Referring Practitioner: Ele Melendez MD  Diagnosis: Hemiplegia and hemiparesis following cerebral infarct affecting left dominant side  Additional Pertinent Hx: 69 year old male with a history of hypertension, hyperlipidemia, tobacco use, polycythemia, neurofibromatosis and retinal detachment in May 2024 s/p reattachment who presented to the emergency department on 2024 with complaints of left sided weakness upon waking. He reports he was unable to pick things up with his left hand and was having difficulty with ambulation. Stroke alert was called in the ED and patient was loaded on dual antiplatelet. Initial CT head showed no acute abnormalities and CTA head/neck showed mild atherosclerotic disease. Subsequent brain MRI identified acute infarction in right basal ganglia. Patient evaluated by Neurology who ordered dual antiplatelet regimen x21 days followed by monotherapy. MRI : 2 small areas of restricted diffusion in the right basal ganglia consistent with acute areas of infarct. No other areas restricted diffusion are identified. ; CT head : 19 mm acute infarct in the right frontal corona radiata is redemonstrated involving a larger area than previously.    Treatment Diagnosis: Impaired self-care status    Past Medical History:  has a past medical history of Abdominal pain, Cerebrovascular accident (CVA) (HCC), Colon polyp, Pulmonary nodules, and Thyroid nodule.    Past Surgical History:   has a past surgical history that includes polypectomy (11); cyst removal; Colonoscopy (11); and Colonoscopy (10/23/07).    Restrictions  Restrictions/Precautions  Restrictions/Precautions: Fall Risk, General Precautions  Required Braces or 
CLINICAL PHARMACY NOTE: MEDS TO BEDS    Total # of Prescriptions Filled: 6   The following medications were delivered to the patient:  Aspirin 81MG Tablets  Rosuvastatin Calcium 40MG Tablets  Losartan Potassium 25MG Tablets  Amlodipine Besylate 10MG Tablets  Erythromycin 5MG Ointment  Baclofen 10MG Tablets   Additional Documentation:  Delivered to the room and signed for by Everardo Ren at 12:57PM 12/12/24  
[facility]   Acute Rehabilitation Occupational Therapy Daily Treatment Note    Date: 24  Patient Name: Nitesh Ren       Room: 2638/2638-01  MRN: [id number]  Account: [id number]   : 1955  (69 y.o.) Gender: male       Referring Practitioner: Ele Melendez MD  Diagnosis: Hemiplegia and hemiparesis following cerebral infarct affecting left dominant side  Additional Pertinent Hx: 69 year old male with a history of hypertension, hyperlipidemia, tobacco use, polycythemia, neurofibromatosis and retinal detachment in May 2024 s/p reattachment who presented to the emergency department on 2024 with complaints of left sided weakness upon waking. He reports he was unable to pick things up with his left hand and was having difficulty with ambulation. Stroke alert was called in the ED and patient was loaded on dual antiplatelet. Initial CT head showed no acute abnormalities and CTA head/neck showed mild atherosclerotic disease. Subsequent brain MRI identified acute infarction in right basal ganglia. Patient evaluated by Neurology who ordered dual antiplatelet regimen x21 days followed by monotherapy. MRI : 2 small areas of restricted diffusion in the right basal ganglia consistent with acute areas of infarct. No other areas restricted diffusion are identified. ; CT head : 19 mm acute infarct in the right frontal corona radiata is redemonstrated involving a larger area than previously.    Treatment Diagnosis: Impaired self-care status    Past Medical History:  has a past medical history of Abdominal pain, Cerebrovascular accident (CVA) (HCC), Colon polyp, Pulmonary nodules, and Thyroid nodule.    Past Surgical History:   has a past surgical history that includes polypectomy (11); cyst removal; Colonoscopy (11); and Colonoscopy (10/23/07).    Restrictions  Restrictions/Precautions  Restrictions/Precautions: Fall Risk, General Precautions  Required Braces or 
[facility]   Acute Rehabilitation Occupational Therapy Evaluation     Date: 24  Patient Name: Nitesh Ren       Room: 2638/2638-01  MRN: [id number]  Account: [id number]   : 1955  (69 y.o.) Gender: male       Referring Practitioner: Ele Melendez MD  Diagnosis: Hemiplegia and hemiparesis following cerebral infarct affecting left dominant side  Additional Pertinent Hx: 69 year old male with a history of hypertension, hyperlipidemia, tobacco use, polycythemia, neurofibromatosis and retinal detachment in May 2024 s/p reattachment who presented to the emergency department on 2024 with complaints of left sided weakness upon waking. He reports he was unable to pick things up with his left hand and was having difficulty with ambulation. Stroke alert was called in the ED and patient was loaded on dual antiplatelet. Initial CT head showed no acute abnormalities and CTA head/neck showed mild atherosclerotic disease. Subsequent brain MRI identified acute infarction in right basal ganglia. Patient evaluated by Neurology who ordered dual antiplatelet regimen x21 days followed by monotherapy. MRI : 2 small areas of restricted diffusion in the right basal ganglia consistent with acute areas of infarct. No other areas restricted diffusion are identified. ; CT head : 19 mm acute infarct in the right frontal corona radiata is redemonstrated involving a larger area than previously.    Treatment Diagnosis: Impaired self-care status    Past Medical History:  has a past medical history of Abdominal pain, Cerebrovascular accident (CVA) (HCC), Colon polyp, Pulmonary nodules, and Thyroid nodule.    Past Surgical History:   has a past surgical history that includes polypectomy (11); cyst removal; Colonoscopy (11); and Colonoscopy (10/23/07).    Restrictions     Restrictions/Precautions: Fall Risk, General Precautions  Required Braces or Orthoses?: No  Implants present? 
[facility]   Acute Rehabilitation Occupational Therapy Daily Treatment Note    Date: 24  Patient Name: Nitesh Ren       Room: 2638/2638-01  MRN: [id number]  Account: [id number]   : 1955  (69 y.o.) Gender: male       Referring Practitioner: Ele Melendez MD  Diagnosis: Hemiplegia and hemiparesis following cerebral infarct affecting left dominant side  Additional Pertinent Hx: 69 year old male with a history of hypertension, hyperlipidemia, tobacco use, polycythemia, neurofibromatosis and retinal detachment in May 2024 s/p reattachment who presented to the emergency department on 2024 with complaints of left sided weakness upon waking. He reports he was unable to pick things up with his left hand and was having difficulty with ambulation. Stroke alert was called in the ED and patient was loaded on dual antiplatelet. Initial CT head showed no acute abnormalities and CTA head/neck showed mild atherosclerotic disease. Subsequent brain MRI identified acute infarction in right basal ganglia. Patient evaluated by Neurology who ordered dual antiplatelet regimen x21 days followed by monotherapy. MRI : 2 small areas of restricted diffusion in the right basal ganglia consistent with acute areas of infarct. No other areas restricted diffusion are identified. ; CT head : 19 mm acute infarct in the right frontal corona radiata is redemonstrated involving a larger area than previously.    Treatment Diagnosis: Impaired self-care status    Past Medical History:  has a past medical history of Abdominal pain, Cerebrovascular accident (CVA) (HCC), Colon polyp, Pulmonary nodules, and Thyroid nodule.    Past Surgical History:   has a past surgical history that includes polypectomy (11); cyst removal; Colonoscopy (11); and Colonoscopy (10/23/07).    Restrictions  Restrictions/Precautions  Restrictions/Precautions: Fall Risk, General Precautions  Required Braces or 
Comprehensive Nutrition Assessment    Type and Reason for Visit:  Initial, Consult (ARU)    Nutrition Recommendations/Plan:   Continue current diet.  Provide Ensure Plus High Protein with breakfast tray.     Malnutrition Assessment:  Malnutrition Status:     none    Nutrition Assessment:    Pt admitted to ARU from PCU for hemiplegia and hemiparesis following cerebral infarction affecting left dominant side, with PMH of polycythemia vera, colon polyps. Pt reporting being able to eat most of the meals provided and fair intake of Ensure Plus High Protein twice daily.    Nutrition Related Findings:    No edema (11/19). BM 11/16. Glycolax. Labs reviewed. Wound Type: None       Current Nutrition Intake & Therapies:    Average Meal Intake: %  Average Supplements Intake: 51-75%  ADULT DIET; Regular  ADULT ORAL NUTRITION SUPPLEMENT; Breakfast; Standard High Calorie/High Protein Oral Supplement    Anthropometric Measures:  Height: 177.8 cm (5' 10\")  Ideal Body Weight (IBW): 166 lbs (75 kg)    Admission Body Weight: 71.8 kg (158 lb 4.6 oz)  Current Body Weight: 71.8 kg (158 lb 4.6 oz), 95.4 % IBW. Weight Source: Not specified  Current BMI (kg/m2): 22.7  Usual Body Weight: 79.4 kg (175 lb 0.7 oz) (1/29/2024 (~10mo.) Not Specified)     % Weight Change (Calculated): -9.6  Weight Adjustment For: No Adjustment                 BMI Categories: Normal Weight (BMI 22.0 to 24.9) age over 65    Estimated Daily Nutrient Needs:  Energy Requirements Based On: Formula  Weight Used for Energy Requirements: Current  Energy (kcal/day): 8901-4973 kcal/day based on [place]-St. Jeor 1.2-1.3 factor  Weight Used for Protein Requirements: Current  Protein (g/day): 72-86 g/day based on 1-1.2 g/kg  Method Used for Fluid Requirements: 1 ml/kcal  Fluid (ml/day): or per physician    Nutrition Diagnosis:   No nutrition diagnosis at this time   Nutrition Interventions:   Food and/or Nutrient Delivery: Continue Current Diet, Modify Oral Nutrition 
Comprehensive Nutrition Assessment    Type and Reason for Visit:  Reassess    Nutrition Recommendations/Plan:   Continue current diet.   Change supplement to lunch time.   Attempt to honor preferences.      Malnutrition Assessment:  Malnutrition Status:     No Malnutrition    Nutrition Assessment:    Pt states he has been eating well and likes the Ensure just once daily. Several preferences voiced.    Nutrition Related Findings:    No edema. Labs and meds reviewed. Wound Type: None       Current Nutrition Intake & Therapies:    Average Meal Intake: %  Average Supplements Intake: %  ADULT DIET; Regular  ADULT ORAL NUTRITION SUPPLEMENT; Breakfast; Standard High Calorie/High Protein Oral Supplement    Anthropometric Measures:  Height: 177.8 cm (5' 10\")  Ideal Body Weight (IBW): 166 lbs (75 kg)    Admission Body Weight: 71.8 kg (158 lb 4.6 oz)  Current Body Weight: 72.1 kg (158 lb 15.2 oz), 95.4 % IBW. Weight Source: Standing scale  Current BMI (kg/m2): 22.8  Usual Body Weight: 79.4 kg (175 lb 0.7 oz) (1/29/2024 (~10mo.) Not Specified)     % Weight Change (Calculated): -9.6  Weight Adjustment For: No Adjustment                 BMI Categories: Normal Weight (BMI 22.0 to 24.9) age over 65    Estimated Daily Nutrient Needs:  Energy Requirements Based On: Formula  Weight Used for Energy Requirements: Current  Energy (kcal/day): 7124-5222 kcal/day based on [place]-St. Jeor 1.2-1.3 factor  Weight Used for Protein Requirements: Current  Protein (g/day): 72-86 g/day based on 1-1.2 g/kg  Method Used for Fluid Requirements: 1 ml/kcal  Fluid (ml/day): or per physician    Nutrition Diagnosis:   No nutrition diagnosis at this time     Nutrition Interventions:   Food and/or Nutrient Delivery: Continue Current Diet, Continue Oral Nutrition Supplement  Nutrition Education/Counseling: No recommendation at this time  Coordination of Nutrition Care: Continue to monitor while inpatient       Goals:  Goals: Meet at least 75% of 
[facility]   Acute Rehabilitation Occupational Therapy Daily Treatment Note    Date: 24  Patient Name: Nitesh Ren       Room: 2638/2638-01  MRN: [id number]  Account: [id number]   : 1955  (69 y.o.) Gender: male       Referring Practitioner: Ele Melendez MD  Diagnosis: Hemiplegia and hemiparesis following cerebral infarct affecting left dominant side  Additional Pertinent Hx: 69 year old male with a history of hypertension, hyperlipidemia, tobacco use, polycythemia, neurofibromatosis and retinal detachment in May 2024 s/p reattachment who presented to the emergency department on 2024 with complaints of left sided weakness upon waking. He reports he was unable to pick things up with his left hand and was having difficulty with ambulation. Stroke alert was called in the ED and patient was loaded on dual antiplatelet. Initial CT head showed no acute abnormalities and CTA head/neck showed mild atherosclerotic disease. Subsequent brain MRI identified acute infarction in right basal ganglia. Patient evaluated by Neurology who ordered dual antiplatelet regimen x21 days followed by monotherapy. MRI : 2 small areas of restricted diffusion in the right basal ganglia consistent with acute areas of infarct. No other areas restricted diffusion are identified. ; CT head : 19 mm acute infarct in the right frontal corona radiata is redemonstrated involving a larger area than previously.    Treatment Diagnosis: Impaired self-care status    Past Medical History:  has a past medical history of Abdominal pain, Cerebrovascular accident (CVA) (HCC), Colon polyp, Pulmonary nodules, and Thyroid nodule.    Past Surgical History:   has a past surgical history that includes polypectomy (11); cyst removal; Colonoscopy (11); and Colonoscopy (10/23/07).    Restrictions  Restrictions/Precautions  Restrictions/Precautions: Fall Risk, General Precautions  Required Braces or 
Dr Henriquez notified of knot in left calf  
Facility/Department: [facility] ACUTE REHAB  Initial Speech/Language/Cognitive Assessment    NAME: Nitesh Ren  : 1955   MRN: [id number]  ADMISSION DATE: 2024  ADMITTING DIAGNOSIS: has Abdominal pain; Colon polyp; Neurofibromatosis (HCC); Thyroid nodule; Pulmonary nodules; Lymphangitis; Cellulitis of left lower leg; Ex-cigarette smoker; Polycythemia; Pneumococcal vaccination declined; Colonoscopy refused; Polycythemia vera (HCC); Left-sided weakness; Cerebrovascular accident (CVA) (HCC); Right-sided lacunar stroke (HCC); and Hemiplegia and hemiparesis following cerebral infarction affecting left dominant side (HCC) on their problem list.    Date of Eval: 2024   Evaluating Therapist: ANJEL Reinoso    RECENT RESULTS  CT OF HEAD/MRI:   CT 24: Slight enlargement of acute infarct in the right frontal corona radiata.     MRI 24: There are 2 small areas of restricted diffusion in the right basal ganglia  consistent with acute areas of infarct.  No other areas restricted diffusion  are identified.     Cerebral atrophy.  Mild chronic small vessel ischemic disease.     Multiple subcutaneous nodules in the soft tissues of the head and upper neck.    Primary Complaint:   Per ARU Pre-Admission Assessment: Nitesh Ren is a 69 y.o. LHD male admitted to [facility] on 2024.       69 year old male with a history of hypertension, hyperlipidemia, tobacco use, polycythemia, neurofibromatosis and retinal detachment in May 2024 s/p reattachment who presented to the emergency department on 2024 with complaints of left sided weakness upon waking. He reports he was unable to pick things up with his left hand and was having difficulty with ambulation. Stroke alert was called in the ED and patient was loaded on dual antiplatelet. Initial CT head showed no acute abnormalities and CTA head/neck showed mild atherosclerotic disease. Subsequent brain MRI identified acute infarction in right basal 
Facility/Department: [facility] ACUTE REHAB   CLINICAL BEDSIDE SWALLOW EVALUATION    NAME: Nitesh Ren  : 1955  MRN: [id number]    ADMISSION DATE: 2024  ADMITTING DIAGNOSIS: has Abdominal pain; Colon polyp; Neurofibromatosis (HCC); Thyroid nodule; Pulmonary nodules; Lymphangitis; Cellulitis of left lower leg; Ex-cigarette smoker; Polycythemia; Pneumococcal vaccination declined; Colonoscopy refused; Polycythemia vera (HCC); Left-sided weakness; Cerebrovascular accident (CVA) (HCC); Right-sided lacunar stroke (HCC); and Hemiplegia and hemiparesis following cerebral infarction affecting left dominant side (HCC) on their problem list.    Recent Chest Xray/CT of Chest: N/A    Date of Eval: 2024  Evaluating Therapist: ANJEL Reinoso    Current Diet level:  Current Diet : Regular  Current Liquid Diet : Thin    Primary Complaint  Per ARU Pre-Admission Assessment: Nitesh Ren is a 69 y.o. LHD male admitted to [facility] on 2024.       69 year old male with a history of hypertension, hyperlipidemia, tobacco use, polycythemia, neurofibromatosis and retinal detachment in May 2024 s/p reattachment who presented to the emergency department on 2024 with complaints of left sided weakness upon waking. He reports he was unable to pick things up with his left hand and was having difficulty with ambulation. Stroke alert was called in the ED and patient was loaded on dual antiplatelet. Initial CT head showed no acute abnormalities and CTA head/neck showed mild atherosclerotic disease. Subsequent brain MRI identified acute infarction in right basal ganglia. Patient evaluated by Neurology who ordered dual antiplatelet regimen x21 days followed by monotherapy.     Pain:  Pain Assessment  Pain Assessment: 0-10  Pain Level: 0  Patient's Stated Pain Goal: 0 - No pain  Pain Location: Neck  Pain Orientation: Right, Left, Posterior  Pain Descriptors: Aching  Functional Pain Assessment: Activities are not 
[facility]   Acute Rehabilitation Occupational Therapy Daily Treatment Note    Date: 12/3/24  Patient Name: Nitesh Ren       Room: 2638/2638-01  MRN: [id number]  Account: [id number]   : 1955  (69 y.o.) Gender: male       Referring Practitioner: Ele Melendez MD  Diagnosis: Hemiplegia and hemiparesis following cerebral infarct affecting left dominant side  Additional Pertinent Hx: 69 year old male with a history of hypertension, hyperlipidemia, tobacco use, polycythemia, neurofibromatosis and retinal detachment in May 2024 s/p reattachment who presented to the emergency department on 2024 with complaints of left sided weakness upon waking. He reports he was unable to pick things up with his left hand and was having difficulty with ambulation. Stroke alert was called in the ED and patient was loaded on dual antiplatelet. Initial CT head showed no acute abnormalities and CTA head/neck showed mild atherosclerotic disease. Subsequent brain MRI identified acute infarction in right basal ganglia. Patient evaluated by Neurology who ordered dual antiplatelet regimen x21 days followed by monotherapy. MRI : 2 small areas of restricted diffusion in the right basal ganglia consistent with acute areas of infarct. No other areas restricted diffusion are identified. ; CT head : 19 mm acute infarct in the right frontal corona radiata is redemonstrated involving a larger area than previously.    Treatment Diagnosis: Impaired self-care status    Past Medical History:  has a past medical history of Abdominal pain, Cerebrovascular accident (CVA) (HCC), Colon polyp, Pulmonary nodules, and Thyroid nodule.    Past Surgical History:   has a past surgical history that includes polypectomy (11); cyst removal; Colonoscopy (11); and Colonoscopy (10/23/07).    Restrictions  Restrictions/Precautions  Restrictions/Precautions: Fall Risk, General Precautions  Required Braces or 
[facility]   Acute Rehabilitation Occupational Therapy Daily Treatment Note    Date: 24  Patient Name: Nitesh Ren       Room: 2638/2638-01  MRN: [id number]  Account: [id number]   : 1955  (69 y.o.) Gender: male       Referring Practitioner: Ele Melendez MD  Diagnosis: Hemiplegia and hemiparesis following cerebral infarct affecting left dominant side  Additional Pertinent Hx: 69 year old male with a history of hypertension, hyperlipidemia, tobacco use, polycythemia, neurofibromatosis and retinal detachment in May 2024 s/p reattachment who presented to the emergency department on 2024 with complaints of left sided weakness upon waking. He reports he was unable to pick things up with his left hand and was having difficulty with ambulation. Stroke alert was called in the ED and patient was loaded on dual antiplatelet. Initial CT head showed no acute abnormalities and CTA head/neck showed mild atherosclerotic disease. Subsequent brain MRI identified acute infarction in right basal ganglia. Patient evaluated by Neurology who ordered dual antiplatelet regimen x21 days followed by monotherapy. MRI : 2 small areas of restricted diffusion in the right basal ganglia consistent with acute areas of infarct. No other areas restricted diffusion are identified. ; CT head : 19 mm acute infarct in the right frontal corona radiata is redemonstrated involving a larger area than previously.    Treatment Diagnosis: Impaired self-care status    Past Medical History:  has a past medical history of Abdominal pain, Cerebrovascular accident (CVA) (HCC), Colon polyp, Pulmonary nodules, and Thyroid nodule.    Past Surgical History:   has a past surgical history that includes polypectomy (11); cyst removal; Colonoscopy (11); and Colonoscopy (10/23/07).    Restrictions  Restrictions/Precautions  Restrictions/Precautions: Fall Risk, General Precautions  Required Braces or 
[facility]   Acute Rehabilitation Physical Therapy Treatment    Date: 12/3/24  Patient Name: Nitesh Ren       Room: 2638/2638-01  MRN: [id number]  Account: [id number]   : 1955  (69 y.o.) Gender: male       Referring Practitioner: Ele Melendez MD  Diagnosis: Hemiplegia and hemiparesis following cerebral infarct affecting left dominant side  Additional Pertinent Hx: Per PM&R note: History of Present Illness:  Nitesh Ren  is a 69 y.o. left-handed male admitted to the [facility] on 2024.  He was originally admitted to [facility] on 2024 for left-sided weakness.       Patient is 69-year-old male with a history of hypertension, hyperlipidemia, tobacco use, polycythemia, neurofibromatosis, and retinal detachment May 2024 s/p reattachment who presented to the emergency department on 2024 with complaints of left-sided weakness upon waking.  Stroke alert was called in the emergency department and patient was loaded on dual antiplatelet.  Initial head CT showed no acute abnormality and CTA head/neck showed mild atherosclerotic disease.  Subsequent brain MRI identified acute infarction of the right basal ganglia.  Patient was evaluated by neurology who ordered dual antiplatelet regimen x 21 days followed by monotherapy with aspirin.  There was concern for worsening left facial droop as well as worsening of left-sided weakness, repeat head CT 2024 showed enlargement of acute infarct.  Heme-onc was consulted due to polycythemia and recommended outpatient therapeutic phlebotomy.  Patient also complains of left ankle pain so internal medicine check uric acid level to rule out gout.     No acute events overnight.  Patient seen and examined resting in his bedside chair.  He endorses some left dorsal foot pain though overall feels this is improving.  Continues to endorse intermittent painful spasms to his left lower extremity though 
[facility]   ACUTE REHABILITATION  LUNCH GROUP NOTE     Date: 24  Patient Name: Nitesh Ren      Room: 2638/2638-01        MRN: [id number]    : 1955  (69 y.o.)  Gender: male   Referring Practitioner: Dr Ash  Diagnosis: Left sided weakness-CVA    Patient participated in the OT Program at  in the Dining Room on this date.  They were in group for  42      The patient  initiated conversation. OT practitioner facilitated therapeutic conversation regarding progression of personal symptoms that caused them to be here, pets, travel, weather, food here at the hospital, progress with therapy and family.     They expressed  enjoyment in the setting and people.      The patient's pain was monitored as they were  able to tolerate Group activity.   Pain level was 0/10.      They were Setup / SBA  with the activity.      GALI Pedroza     
Nutrition Note    Type and Reason for Visit:  Reassess    Nutrition Recommendations/Plan:   Continue current diet.  Provide Ensure Plus High Protein once daily.     Nutrition Assessment:    Pt reporting continued good intake of meals and Ensure Plus High Protein once daily. Pt reports that food comes with lots of vegetables and he does not normally eat so much of it.    Nutrition Related Findings:    No edema - refer to nursing flowsheet. BM 11/26. Glycolax. Wound Type: None       Current Nutrition Intake & Therapies:    Average Meal Intake: %  Average Supplements Intake: %  ADULT DIET; Regular  ADULT ORAL NUTRITION SUPPLEMENT; Breakfast; Standard High Calorie/High Protein Oral Supplement    Anthropometric Measures:  Height: 177.8 cm (5' 10\")  Ideal Body Weight (IBW): 166 lbs (75 kg)    Admission Body Weight: 71.8 kg (158 lb 4.6 oz)  Current Body Weight: 71.8 kg (158 lb 4.6 oz), 95.4 % IBW. Weight Source: Not specified  Current BMI (kg/m2): 22.7  Usual Body Weight: 79.4 kg (175 lb 0.7 oz) (1/29/2024 (~10mo.) Not Specified)     % Weight Change (Calculated): -9.6  Weight Adjustment For: No Adjustment                 BMI Categories: Normal Weight (BMI 22.0 to 24.9) age over 65    Nutrition Interventions:   Food and/or Nutrient Delivery: Continue Current Diet, Continue Oral Nutrition Supplement  Nutrition Education/Counseling: No recommendation at this time  Coordination of Nutrition Care: Continue to monitor while inpatient       Goals:  Goals: Meet at least 75% of estimated needs  Type of Goal: Continue current goal  Previous Goal Met: Goal(s) Achieved    Nutrition Monitoring and Evaluation:   Behavioral-Environmental Outcomes: None Identified  Food/Nutrient Intake Outcomes: Food and Nutrient Intake, Supplement Intake  Physical Signs/Symptoms Outcomes: Biochemical Data, GI Status, Fluid Status or Edema, Skin, Weight    Discharge Planning:    Continue current diet     Diana North, MS, DTR  Contact: 
[facility]   Acute Rehabilitation Occupational Therapy Daily Treatment Note    Date: 24  Patient Name: Nitesh Ren       Room: 2638/2638-01  MRN: [id number]  Account: [id number]   : 1955  (69 y.o.) Gender: male       Referring Practitioner: Ele Melendez MD  Diagnosis: Hemiplegia and hemiparesis following cerebral infarct affecting left dominant side  Additional Pertinent Hx: 69 year old male with a history of hypertension, hyperlipidemia, tobacco use, polycythemia, neurofibromatosis and retinal detachment in May 2024 s/p reattachment who presented to the emergency department on 2024 with complaints of left sided weakness upon waking. He reports he was unable to pick things up with his left hand and was having difficulty with ambulation. Stroke alert was called in the ED and patient was loaded on dual antiplatelet. Initial CT head showed no acute abnormalities and CTA head/neck showed mild atherosclerotic disease. Subsequent brain MRI identified acute infarction in right basal ganglia. Patient evaluated by Neurology who ordered dual antiplatelet regimen x21 days followed by monotherapy. MRI : 2 small areas of restricted diffusion in the right basal ganglia consistent with acute areas of infarct. No other areas restricted diffusion are identified. ; CT head : 19 mm acute infarct in the right frontal corona radiata is redemonstrated involving a larger area than previously.    Treatment Diagnosis: Impaired self-care status    Past Medical History:  has a past medical history of Abdominal pain, Cerebrovascular accident (CVA) (HCC), Colon polyp, Pulmonary nodules, and Thyroid nodule.    Past Surgical History:   has a past surgical history that includes polypectomy (11); cyst removal; Colonoscopy (11); and Colonoscopy (10/23/07).    Restrictions  Restrictions/Precautions  Restrictions/Precautions: Fall Risk, General Precautions  Required Braces or 
[facility]   Acute Rehabilitation Occupational Therapy Daily Treatment Note    Date: 24  Patient Name: Nitesh Ren       Room: 2638/2638-01  MRN: [id number]  Account: [id number]   : 1955  (69 y.o.) Gender: male       Referring Practitioner: Ele Melendez MD  Diagnosis: Hemiplegia and hemiparesis following cerebral infarct affecting left dominant side  Additional Pertinent Hx: 69 year old male with a history of hypertension, hyperlipidemia, tobacco use, polycythemia, neurofibromatosis and retinal detachment in May 2024 s/p reattachment who presented to the emergency department on 2024 with complaints of left sided weakness upon waking. He reports he was unable to pick things up with his left hand and was having difficulty with ambulation. Stroke alert was called in the ED and patient was loaded on dual antiplatelet. Initial CT head showed no acute abnormalities and CTA head/neck showed mild atherosclerotic disease. Subsequent brain MRI identified acute infarction in right basal ganglia. Patient evaluated by Neurology who ordered dual antiplatelet regimen x21 days followed by monotherapy. MRI : 2 small areas of restricted diffusion in the right basal ganglia consistent with acute areas of infarct. No other areas restricted diffusion are identified. ; CT head : 19 mm acute infarct in the right frontal corona radiata is redemonstrated involving a larger area than previously.    Treatment Diagnosis: Impaired self-care status    Past Medical History:  has a past medical history of Abdominal pain, Cerebrovascular accident (CVA) (HCC), Colon polyp, Pulmonary nodules, and Thyroid nodule.    Past Surgical History:   has a past surgical history that includes polypectomy (11); cyst removal; Colonoscopy (11); and Colonoscopy (10/23/07).    Restrictions  Restrictions/Precautions  Restrictions/Precautions: Fall Risk, General Precautions  Required Braces or 
Patient has requested Bengay on multiple areas of the body, writer informed him that it is written for every 8 hours (TID). Passed on to day shift to talk to Dr. Melendez regarding med.   
Patient is refusing to wear offloading heel protector boots. Patient education provided on why they were important. Patient stated that he understood but still refused to wear them. Patient has mepilex foam for heel protection and was agreeable to floating heels off of pillows instead.  
Physical Medicine & Rehabilitation  Progress Note      Subjective:      69 year-old male with L dominant hemiparesis secondary to ischemic CVA. Patient is doing well today but muscle spasms were increased last night and worse in his L leg. He also has intermittent chronic neck pain related to neurofibroma lesion. He has K pad prn heat and reports Tylenol prn is effective.     ROS:  Denies fevers, chills, sweats.  No chest pain, palpitations, lightheadedness.  Denies coughing, wheezing or shortness of breath.  Denies abdominal pain, nausea, diarrhea or constipation.  No new areas of joint pain.  Denies new areas of numbness or weakness.  Denies new anxiety or depression issues.  No new skin problems.    Rehabilitation:       PT:    Bed mobility  Bridging: Contact guard assistance (LLE placement and steading)  Rolling to Left: Contact guard assistance (LUE/LLE placement positioning)  Rolling to Right: Contact guard assistance (LUE/LLE placement positioning)  Supine to Sit: Minimal assistance (LUE/LLE placement positioning)  Sit to Supine: Contact guard assistance (LUE/LLE placement positioning)  Scooting: Contact guard assistance  Bed Mobility Comments: flat mat table with 2 pillows exit on left         Transfers  Sit to Stand: Moderate Assistance, 2 Person Assistance (2 assist for family training assist safety needs)  Stand to Sit: Moderate Assistance  Bed to Chair: Moderate assistance, 2 Person Assistance (2 assist for family training safety needs.)  Stand Pivot Transfers: Moderate Assistance (to strong side with LUE support on surface)  Squat Pivot Transfers: Moderate Assistance (to right and left  , VCs setup and technique, mod assist to left , min/CGA to right)  Comment: left arm sling used today d/t reported pain in shoulder; FAMILY TRAINING: demonstrated from wheelchair to chair , chair to wheelchair and bed to wheelchair, wheelchair to bed, from varied surfaces and heights. Multiple trials from varied sides 
Physical Medicine & Rehabilitation  Progress Note      Subjective:      69 year-old male with L dominant hemiparesis secondary to ischemic CVA. Patient is doing well today. No new issues with sleep, appetite, bowel, or bladder. He has progressed with therapies and achieved discharge goals.     ROS:  Denies fevers, chills, sweats.  No chest pain, palpitations, lightheadedness.  Denies coughing, wheezing or shortness of breath.  Denies abdominal pain, nausea, diarrhea or constipation.  No new areas of joint pain.  Denies new areas of numbness or weakness.  Denies new anxiety or depression issues.  No new skin problems.    Rehabilitation:       PT:    Bed mobility  Bridging: Minimal assistance (left foot placement)  Rolling to Left: Stand by assistance (uisng HR with RUE to assist)  Rolling to Right: Contact guard assistance (LUE/LLE placement positioning)  Supine to Sit: Minimal assistance (left LE)  Sit to Supine: Minimal assistance (left LE)  Scooting: Stand by assistance (hips lateral at EOB)  Bed Mobility Comments: bed flat , no handral , exit right         Transfers  Sit to Stand: Minimal Assistance (with posterior push, to CGA with good technique)  Stand to Sit: Stand by assistance (VCs for controlled descent and position .)  Bed to Chair: Moderate assistance (Fatige this date due to session focused on stair training for home DC.)  Stand Pivot Transfers: Minimal Assistance (HW, min assist to left CGA to right , VCs sequencing and left foot position, LOB transfering to left with assist to recover, pt positions WC, locks breaks supervision, minimal assist left leg rest.)  Squat Pivot Transfers: Contact guard assistance (ever <> right , right UE support at armrest , VCs 25% right hand placement .)  Car Transfer: Moderate Assistance (Car transfer completed twice with car simulator.  Patient entering in on passenger side.)  Comment: left arm sling used today during tx session while perfoming all transfers, amb, and 
Physical Medicine & Rehabilitation  Progress Note      Subjective:      69 year-old male with L dominant hemiparesis secondary to ischemic CVA. Patient is having problems with insomnia/difficulty sleeping and muscle spasms affecting L arm and leg which can interfere with therapy and sleep. No new issues with appetite, bowel, or bladder. He did have pain/issues with bed mobility last night in PRAFO boots.    ROS:  Denies fevers, chills, sweats.  No chest pain, palpitations, lightheadedness.  Denies coughing, wheezing or shortness of breath.  Denies abdominal pain, nausea, diarrhea or constipation.  No new areas of joint pain.  Denies new areas of numbness or weakness.  Denies new anxiety or depression issues.  No new skin problems.    Rehabilitation:       PT:    Bed mobility  Rolling to Left: Minimal assistance  Rolling to Right: Moderate assistance  Supine to Sit: Stand by assistance (HOB elevated, pt used HR exiting bed to his R.)  Sit to Supine: Maximum assistance  Scooting: Moderate assistance (left hip EOB)  Bed Mobility Comments: bed flat , no hand rail , exit right         Transfers  Sit to Stand: Contact guard assistance (vc's for correct L foot, technique)  Stand to Sit: Moderate Assistance (for safety and technique, lacks eccentric control)  Bed to Chair: 2 Person Assistance, Maximum assistance (pivot on strong side,)  Stand Pivot Transfers: Minimal Assistance (// bars with RUE support only)  Squat Pivot Transfers: Dependent/Total, 2 Person Assistance (to right ,arm rest removed , left UE sling donned , VCs for technique)  Comment: left UE sling donned         Ambulation  Surface: Level tile  Device:  (lite gait)  Other Apparatus: Left (UE sling, left lower cuff Bioness , shoes)  Assistance: Dependent/Total, 2 Person assistance  Quality of Gait: Pt lacking L hip flexion catching toes with swing phase. Also lackingTKE on LLE and requires assist from writer with proper wt shifting at hips and maintaining TKE 
Physical Medicine & Rehabilitation  Progress Note      Subjective:      69 year-old male with L dominant hemiparesis secondary to ischemic CVA. Patient is noting some fatigue after home eval today but otherwise feels it went well. He is noting controlled pain with prn medications and heat. He is progressing with therapies.     ROS:  Denies fevers, chills, sweats.  No chest pain, palpitations, lightheadedness.  Denies coughing, wheezing or shortness of breath.  Denies abdominal pain, nausea, diarrhea or constipation.  No new areas of joint pain.  Denies new areas of numbness or weakness.  Denies new anxiety or depression issues.  No new skin problems.    Rehabilitation:       PT:    Bed mobility  Bridging: Contact guard assistance (placement of LLE)  Rolling to Left: Stand by assistance (uisng HR with RUE to assist)  Rolling to Right: Contact guard assistance (LUE/LLE placement positioning)  Supine to Sit: Stand by assistance (use of strap anchored to foot of bed)  Sit to Supine: Minimal assistance (required assist with his LLE only, pt attmpting to self assist with RLE, however unsuccessfull x 3 attempts(LLE kept slipping off))  Scooting: Contact guard assistance  Bed Mobility Comments: flat bed         Transfers  Sit to Stand: Moderate Assistance  Stand to Sit: Moderate Assistance  Bed to Chair: Moderate assistance (Fatige this date due to session focused on stair training for home DC.)  Stand Pivot Transfers: Moderate Assistance (using Hemiwalker)  Squat Pivot Transfers: Moderate Assistance (pivot -no device)  Car Transfer: Moderate Assistance (Car transfer completed twice with car simulator.  Patient entering in on passenger side.)  Comment: left arm sling used today during tx session while perfoming all transfers, amb, and functional tasks for protection. Pt is capable of performing Sit >stand transfer with CGA only when feet are in proper placment and pt wt shifts fwd. VC's from writer to ensure he is 
Physical Medicine & Rehabilitation  Progress Note      Subjective:      69 year-old male with L dominant hemiparesis secondary to ischemic CVA. Patient is noting some increase in his chronic L shoulder aching pain this morning which responded well to prn Tylenol. He is having some L ankle/heel burning pain which improved this morning after nursing changed his dressing. No new issues with sleep, appetite, bowel, or bladder.    ROS:  Denies fevers, chills, sweats.  No chest pain, palpitations, lightheadedness.  Denies coughing, wheezing or shortness of breath.  Denies abdominal pain, nausea, diarrhea or constipation.  No new areas of joint pain.  Denies new areas of numbness or weakness.  Denies new anxiety or depression issues.  No new skin problems.    Rehabilitation:       PT:    Bed mobility  Bridging: Minimal assistance (assist left foot placement)  Rolling to Left: Minimal assistance  Rolling to Right: Contact guard assistance  Supine to Sit: Minimal assistance (assist left LE clearance .VCs left UE position pre transfer and technique .)  Sit to Supine: Contact guard assistance (right LE assist left)  Scooting: Contact guard assistance (left hip EOB, VCs for technique)  Bed Mobility Comments: mat table , 2 pillows , exit right         Transfers  Sit to Stand: Moderate Assistance (VCs anterior weight shift left foot position, minimal asist > moderate)  Stand to Sit: Moderate Assistance (eccentric control and right weight shift to avoid contact left elbow at arm rest, VCs for technique)  Bed to Chair: Maximum assistance (using platform RW,transfering to pt's L)  Stand Pivot Transfers: Moderate Assistance (HW left <> right , assist varries min<> mod, left posterior and left lean , VCs for technique/left foot placement , decreased VCs with increased trials .)  Squat Pivot Transfers: Moderate Assistance (to right and left  , VCs setup and technique, mod assist to left , min/CGA to right)  Comment: left Givmohr sling 
Physical Medicine & Rehabilitation  Progress Note      Subjective:      69 year-old male with L dominant hemiparesis secondary to ischemic CVA. Patient is observed in occupational therapy today receiving electrical stimulation to left upper extremity. He denies any new issues with sleep, appetite, bowel, or bladder. His aching joint pain is responding well to prn Tylenol and topical medications.     ROS:  Denies fevers, chills, sweats.  No chest pain, palpitations, lightheadedness.  Denies coughing, wheezing or shortness of breath.  Denies abdominal pain, nausea, diarrhea or constipation.  No new areas of joint pain.  Denies new areas of numbness or weakness.  Denies new anxiety or depression issues.  No new skin problems.    Rehabilitation:       PT:    Bed mobility  Bridging: Minimal assistance (assist left foot placement)  Rolling to Left: Minimal assistance  Rolling to Right: Contact guard assistance  Supine to Sit: Minimal assistance (assist left LE clearance .VCs left UE position pre transfer and technique .)  Sit to Supine: Minimal assistance  Scooting: Contact guard assistance  Bed Mobility Comments: mat table , 2 pillows , wedge, exit left         Transfers  Sit to Stand: Moderate Assistance  Stand to Sit: Moderate Assistance  Bed to Chair: Maximum assistance (using platform RW,transfering to pt's L)  Stand Pivot Transfers: Moderate Assistance (no device)  Squat Pivot Transfers: Moderate Assistance (to right and left  , VCs setup and technique, mod assist to left , min/CGA to right)  Comment: left Givmohr sling donned         Ambulation  Surface: Level tile  Device: Hemiwalker  Other Apparatus: Left, Wheelchair follow (left lower cuff Bioness , shoes, Givmohr sling left UE)  Assistance: Moderate assistance  Quality of Gait: Decreased left foot clearance , stance time , step width heel strike ,hip external rotation, posterior left pelvic rotation stance phase , lacks knee/hip extension heel strike to toe 
Physical Medicine & Rehabilitation  Progress Note      Subjective:      69-year-old male with left-sided hemiparesis due to right ischemic CVA.  No acute events overnight.  Patient seen and examined resting bedside chair.  He reports progress with therapy, though still requires significant assistance to ambulate via lite gait.  Continues to exhibit left upper extremity flaccidity.  Continues to report intermittent left lower extremity spasming.  He is having some bilateral heel pain and is requesting offloading boots.    ROS:  Denies fevers, chills, sweats.  No chest pain, palpitations, lightheadedness.  Denies coughing, wheezing or shortness of breath.  Denies abdominal pain, nausea, diarrhea or constipation.  No new areas of joint pain.  Denies new areas of numbness or weakness.  Denies new anxiety or depression issues.  No new skin problems.    Rehabilitation:       PT:    Bed mobility  Rolling to Left: Minimal assistance  Rolling to Right: Moderate assistance  Supine to Sit: Moderate assistance (left LE and trunk, VCs left UE positioning dependant)  Sit to Supine: Maximum assistance  Scooting: Moderate assistance (left hip EOB)  Bed Mobility Comments: bed flat , no hand rail , exit right         Transfers  Sit to Stand: Moderate Assistance (cammode to harman stedy mod assist , from recliner/WC minimal assist)  Stand to Sit: Moderate Assistance (from parallel bars to WC mod assist  VCs right hand placement ,from harman stedy to recliner and cammode minimal assist)  Bed to Chair: 2 Person Assistance, Maximum assistance (pivot on strong side,)  Stand Pivot Transfers: Dependent/Total (harman stedy, 1 assist , left UE sling donned)  Squat Pivot Transfers: Dependent/Total, 2 Person Assistance (to right ,arm rest removed , left UE sling donned , VCs for technique)  Comment: left UE sling donned         Ambulation  Surface: Level tile  Device:  (lite gait)  Other Apparatus: Left (UE sling, left lower cuff Bioness , 
Physical Medicine & Rehabilitation  Progress Note      Subjective:      Nitesh Ren is a 69 y.o. male with left hemiparesis secondary to CVA.    He reports doing okay today.  He states that he had a rough night due to muscle spasms in the left lower limb.  He denies any muscle spasms at the time of evaluation but does note a \"knot\" in the left calf.  He denies any other acute concerns.    ROS:  Denies fevers, chills, sweats.  No chest pain, palpitations, lightheadedness.  Denies coughing, wheezing or shortness of breath.  Denies abdominal pain, nausea, diarrhea or constipation.  No new areas of joint pain.  Denies new areas of numbness or weakness.  Denies new anxiety or depression issues.  No new skin problems.    Rehabilitation:     Physical Therapy    Restrictions/Precautions: Fall Risk, General Precautions  Implants present? :  (patient denies)  Other position/activity restrictions: L sided weakness, L sling for transfers, impulsive at times    Bed mobility  Bridging: Minimal assistance (assist left foot placement)  Rolling to Left: Minimal assistance  Rolling to Right: Contact guard assistance  Supine to Sit: Stand by assistance (VCs left UE position pre transfer and technique .)  Sit to Supine: Minimal assistance (left LE)  Scooting: Moderate assistance (left hip EOB)  Bed Mobility Comments: bed flat , no hadrail exit left    Transfers  Sit to Stand: Minimal Assistance (VCs for correct L foot placement)  Stand to Sit: Moderate Assistance (for safety and technique, lacks eccentric control)  Bed to Chair: Maximum assistance (using platform RW,transfering to pt's L)  Stand Pivot Transfers: Moderate Assistance (left platform RW , VCs and assist left foot placement and COG over JORDAN)  Squat Pivot Transfers: Dependent/Total, 2 Person Assistance (to right , VCs setup and technique ,second person for safety SBA. 1 assist to/from cammode , UE assist at grab bar , dependant donning/doffing pants .)  Comment: left 
Physical Medicine & Rehabilitation  Progress Note      Subjective:      Nitesh Ren is a 69 y.o. male with left hemiparesis secondary to CVA.    He reports doing well today.  He continues to note that he slept pretty well overnight.  He states that he noticed left heel pain and left lower limb muscle spasms; however, these symptoms lasted only about 5 minutes.  Will continue to monitor.  He denies any other acute concerns.    ROS:  Denies fevers, chills, sweats.  No chest pain, palpitations, lightheadedness.  Denies coughing, wheezing or shortness of breath.  Denies abdominal pain, nausea, diarrhea or constipation.  No new areas of joint pain.  Denies new areas of numbness or weakness.  Denies new anxiety or depression issues.  No new skin problems.    Rehabilitation:     Physical Therapy    Restrictions/Precautions: Fall Risk, General Precautions  Implants present? :  (patient denies)  Other position/activity restrictions: L sided weakness, L sling for transfers, impulsive at times    Bed mobility  Bridging: Minimal assistance (assist left foot placement)  Rolling to Left: Minimal assistance  Rolling to Right: Contact guard assistance  Supine to Sit: Stand by assistance (VCs left UE position pre transfer and technique .)  Sit to Supine: Minimal assistance (left LE)  Scooting: Minimal assistance (left hip EOB)  Bed Mobility Comments: bed flat , no hand rail , exit right    Transfers  Sit to Stand: Minimal Assistance (VCs anterior weight shift)  Stand to Sit: Moderate Assistance (eccentric control)  Bed to Chair: Maximum assistance (using platform RW,transfering to pt's L)  Stand Pivot Transfers: Moderate Assistance (left platform RW , VCs and assist left foot placement and COG over JORDAN)  Squat Pivot Transfers: Moderate Assistance (to right and left  , VCs setup and technique)  Comment: left Givmohr sling donned    Ambulation  Surface: Level tile  Device: Hemiwalker  Other Apparatus: Left, Wheelchair follow 
Physical Medicine & Rehabilitation  Progress Note      Subjective:      Nitesh Ren is a 69 y.o. male with left hemiparesis secondary to CVA.    He reports doing well today.  He notes that he slept pretty well overnight.  He states that he had a brief episode of painful tingling in the left heel.  He reports muscle spasms in the left upper and lower limbs, which are improved from previously.  He denies any other acute concerns.    ROS:  Denies fevers, chills, sweats.  No chest pain, palpitations, lightheadedness.  Denies coughing, wheezing or shortness of breath.  Denies abdominal pain, nausea, diarrhea or constipation.  No new areas of joint pain.  Denies new areas of numbness or weakness.  Denies new anxiety or depression issues.  No new skin problems.    Rehabilitation:     Physical Therapy    Restrictions/Precautions: Fall Risk, General Precautions  Implants present? :  (patient denies)  Other position/activity restrictions: L sided weakness, L sling for transfers, impulsive at times    Bed mobility  Bridging: Minimal assistance (assist left foot placement)  Rolling to Left: Minimal assistance  Rolling to Right: Contact guard assistance  Supine to Sit: Stand by assistance (VCs left UE position pre transfer and technique .)  Sit to Supine: Minimal assistance (left LE)  Scooting: Minimal assistance (left hip EOB)  Bed Mobility Comments: bed flat , no hand rail , exit right    Transfers  Sit to Stand: Minimal Assistance  Stand to Sit: Moderate Assistance (eccentric control)  Bed to Chair: Maximum assistance (using platform RW,transfering to pt's L)  Stand Pivot Transfers: Moderate Assistance (left platform RW , VCs and assist left foot placement and COG over JORDAN)  Squat Pivot Transfers: Moderate Assistance (to right and left  , VCs setup and technique)  Comment: left Givmohr sling donned    Ambulation  Surface: Level tile  Device: Platform Walker left, Rolling Walker  Other Apparatus: Left, Wheelchair follow 
Physical Therapy    [facility] [place]   Acute Rehabilitation Physical Therapy Evaluation     Date: 24  Patient Name: Nitesh Ren       Room: 2638/2638-01  MRN: [id number]  Account: [id number]   : 1955  (69 y.o.) Gender: male       Referring Practitioner: Ele Melendez MD  Diagnosis: Hemiplegia and hemiparesis following cerebral infarct affecting left dominant side  Additional Pertinent Hx: Per PM&R note: History of Present Illness:  Nitesh Ren  is a 69 y.o. left-handed male admitted to the [facility] on 2024.  He was originally admitted to [facility] on 2024 for left-sided weakness.       Patient is 69-year-old male with a history of hypertension, hyperlipidemia, tobacco use, polycythemia, neurofibromatosis, and retinal detachment May 2024 s/p reattachment who presented to the emergency department on 2024 with complaints of left-sided weakness upon waking.  Stroke alert was called in the emergency department and patient was loaded on dual antiplatelet.  Initial head CT showed no acute abnormality and CTA head/neck showed mild atherosclerotic disease.  Subsequent brain MRI identified acute infarction of the right basal ganglia.  Patient was evaluated by neurology who ordered dual antiplatelet regimen x 21 days followed by monotherapy with aspirin.  There was concern for worsening left facial droop as well as worsening of left-sided weakness, repeat head CT 2024 showed enlargement of acute infarct.  Heme-onc was consulted due to polycythemia and recommended outpatient therapeutic phlebotomy.  Patient also complains of left ankle pain so internal medicine check uric acid level to rule out gout.     No acute events overnight.  Patient seen and examined resting in his bedside chair.  He endorses some left dorsal foot pain though overall feels this is improving.  Continues to endorse intermittent painful spasms to his left 
Physical Therapy  Facility/Department: [facility] ACUTE REHAB  Rehabilitation Physical Therapy Progress Note     NAME: Nitesh Ren  : 1955 (69 y.o.)  MRN: [id number]  CODE STATUS: [id number]-CCA    Date of Service: 24        Additional Pertinent Hx: Per PM&R note: History of Present Illness:  Nitesh Ren  is a 69 y.o. left-handed male admitted to the [facility] on 2024.  He was originally admitted to [facility] on 2024 for left-sided weakness.       Patient is 69-year-old male with a history of hypertension, hyperlipidemia, tobacco use, polycythemia, neurofibromatosis, and retinal detachment May 2024 s/p reattachment who presented to the emergency department on 2024 with complaints of left-sided weakness upon waking.  Stroke alert was called in the emergency department and patient was loaded on dual antiplatelet.  Initial head CT showed no acute abnormality and CTA head/neck showed mild atherosclerotic disease.  Subsequent brain MRI identified acute infarction of the right basal ganglia.  Patient was evaluated by neurology who ordered dual antiplatelet regimen x 21 days followed by monotherapy with aspirin.  There was concern for worsening left facial droop as well as worsening of left-sided weakness, repeat head CT 2024 showed enlargement of acute infarct.  Heme-onc was consulted due to polycythemia and recommended outpatient therapeutic phlebotomy.  Patient also complains of left ankle pain so internal medicine check uric acid level to rule out gout.     No acute events overnight.  Patient seen and examined resting in his bedside chair.  He endorses some left dorsal foot pain though overall feels this is improving.  Continues to endorse intermittent painful spasms to his left lower extremity though this improves with as needed baclofen.  Continues to have no functional motor return and left upper extremity     He is currently requiring assistance for 
Physical Therapy  Facility/Department: [facility] ACUTE REHAB  Rehabilitation Physical Therapy Progress Note   NAME: Nitesh Ren  : 1955 (69 y.o.)  MRN: [id number]  CODE STATUS: DNR-CCA    Date of Service: 24    Additional Pertinent Hx: Per PM&R note: History of Present Illness:  Nitesh Ren  is a 69 y.o. left-handed male admitted to the [facility] on 2024.  He was originally admitted to [place] on 2024 for left-sided weakness.       Patient is 69-year-old male with a history of hypertension, hyperlipidemia, tobacco use, polycythemia, neurofibromatosis, and retinal detachment May 2024 s/p reattachment who presented to the emergency department on 2024 with complaints of left-sided weakness upon waking.  Stroke alert was called in the emergency department and patient was loaded on dual antiplatelet.  Initial head CT showed no acute abnormality and CTA head/neck showed mild atherosclerotic disease.  Subsequent brain MRI identified acute infarction of the right basal ganglia.  Patient was evaluated by neurology who ordered dual antiplatelet regimen x 21 days followed by monotherapy with aspirin.  There was concern for worsening left facial droop as well as worsening of left-sided weakness, repeat head CT 2024 showed enlargement of acute infarct.  Heme-onc was consulted due to polycythemia and recommended outpatient therapeutic phlebotomy.  Patient also complains of left ankle pain so internal medicine check uric acid level to rule out gout.     No acute events overnight.  Patient seen and examined resting in his bedside chair.  He endorses some left dorsal foot pain though overall feels this is improving.  Continues to endorse intermittent painful spasms to his left lower extremity though this improves with as needed baclofen.  Continues to have no functional motor return and left upper extremity     He is currently requiring assistance for self-care 
Physical Therapy  Facility/Department: [facility] ACUTE REHAB  Rehabilitation Physical Therapy Progress Note     NAME: Nitesh Ren  : 1955 (69 y.o.)  MRN: [id number]  CODE STATUS: DNR-CCA    Date of Service: 24      Additional Pertinent Hx: Per PM&R note: History of Present Illness:  Nitesh Ren  is a 69 y.o. left-handed male admitted to the [place] [facility] on 2024.  He was originally admitted to [place] on 2024 for left-sided weakness.       Patient is 69-year-old male with a history of hypertension, hyperlipidemia, tobacco use, polycythemia, neurofibromatosis, and retinal detachment May 2024 s/p reattachment who presented to the emergency department on 2024 with complaints of left-sided weakness upon waking.  Stroke alert was called in the emergency department and patient was loaded on dual antiplatelet.  Initial head CT showed no acute abnormality and CTA head/neck showed mild atherosclerotic disease.  Subsequent brain MRI identified acute infarction of the right basal ganglia.  Patient was evaluated by neurology who ordered dual antiplatelet regimen x 21 days followed by monotherapy with aspirin.  There was concern for worsening left facial droop as well as worsening of left-sided weakness, repeat head CT 2024 showed enlargement of acute infarct.  Heme-onc was consulted due to polycythemia and recommended outpatient therapeutic phlebotomy.  Patient also complains of left ankle pain so internal medicine check uric acid level to rule out gout.     No acute events overnight.  Patient seen and examined resting in his bedside chair.  He endorses some left dorsal foot pain though overall feels this is improving.  Continues to endorse intermittent painful spasms to his left lower extremity though this improves with as needed baclofen.  Continues to have no functional motor return and left upper extremity     He is currently requiring assistance for 
Physical Therapy  Facility/Department: [facility] ACUTE REHAB  Rehabilitation Physical Therapy Progress Note     NAME: Nitesh Ren  : 1955 (69 y.o.)  MRN: [id number]  CODE STATUS: DNR-CCA    Date of Service: 24      Additional Pertinent Hx: Per PM&R note: History of Present Illness:  Nitesh Ren  is a 69 y.o. left-handed male admitted to the [facility] on 2024.  He was originally admitted to [facility] on 2024 for left-sided weakness.       Patient is 69-year-old male with a history of hypertension, hyperlipidemia, tobacco use, polycythemia, neurofibromatosis, and retinal detachment May 2024 s/p reattachment who presented to the emergency department on 2024 with complaints of left-sided weakness upon waking.  Stroke alert was called in the emergency department and patient was loaded on dual antiplatelet.  Initial head CT showed no acute abnormality and CTA head/neck showed mild atherosclerotic disease.  Subsequent brain MRI identified acute infarction of the right basal ganglia.  Patient was evaluated by neurology who ordered dual antiplatelet regimen x 21 days followed by monotherapy with aspirin.  There was concern for worsening left facial droop as well as worsening of left-sided weakness, repeat head CT 2024 showed enlargement of acute infarct.  Heme-onc was consulted due to polycythemia and recommended outpatient therapeutic phlebotomy.  Patient also complains of left ankle pain so internal medicine check uric acid level to rule out gout.     No acute events overnight.  Patient seen and examined resting in his bedside chair.  He endorses some left dorsal foot pain though overall feels this is improving.  Continues to endorse intermittent painful spasms to his left lower extremity though this improves with as needed baclofen.  Continues to have no functional motor return and left upper extremity     He is currently requiring assistance for 
Physical Therapy  Facility/Department: [facility] ACUTE REHAB  Rehabilitation Physical Therapy     NAME: Nitesh Ren  : 1955 (69 y.o.)  MRN: [id number]  CODE STATUS: DNR-CCA    Date of Service: 24      Past Medical History:   Diagnosis Date    Abdominal pain     Cerebrovascular accident (CVA) (HCC) 2024    Colon polyp     Pulmonary nodules 2013    Thyroid nodule 2013     Past Surgical History:   Procedure Laterality Date    COLONOSCOPY  11    POLYPECTOMY    COLONOSCOPY  10/23/07    POLYPECTOMY    CYST REMOVAL      \"back side\"   and face and neck    POLYPECTOMY  11       Chart Reviewed: Yes  Patient assessed for rehabilitation services?: Yes  Additional Pertinent Hx: Per PM&R note: History of Present Illness:  Nitesh Ren  is a 69 y.o. left-handed male admitted to the [place] on 2024.  He was originally admitted to [place] on 2024 for left-sided weakness.       Patient is 69-year-old male with a history of hypertension, hyperlipidemia, tobacco use, polycythemia, neurofibromatosis, and retinal detachment May 2024 s/p reattachment who presented to the emergency department on 2024 with complaints of left-sided weakness upon waking.  Stroke alert was called in the emergency department and patient was loaded on dual antiplatelet.  Initial head CT showed no acute abnormality and CTA head/neck showed mild atherosclerotic disease.  Subsequent brain MRI identified acute infarction of the right basal ganglia.  Patient was evaluated by neurology who ordered dual antiplatelet regimen x 21 days followed by monotherapy with aspirin.  There was concern for worsening left facial droop as well as worsening of left-sided weakness, repeat head CT 2024 showed enlargement of acute infarct.  Heme-onc was consulted due to polycythemia and recommended outpatient therapeutic phlebotomy.  Patient also complains of left ankle pain so internal 
Physical Therapy  Facility/Department: [facility] ACUTE REHAB  Rehabilitation Physical Therapy Progress Note     NAME: Nitesh Ren  : 1955 (69 y.o.)  MRN: [id number]  CODE STATUS: DNR-CCA    Date of Service: 24      Additional Pertinent Hx: Per PM&R note: History of Present Illness:  Nitesh Ren  is a 69 y.o. left-handed male admitted to the [place] [facility] on 2024.  He was originally admitted to [place] on 2024 for left-sided weakness.       Patient is 69-year-old male with a history of hypertension, hyperlipidemia, tobacco use, polycythemia, neurofibromatosis, and retinal detachment May 2024 s/p reattachment who presented to the emergency department on 2024 with complaints of left-sided weakness upon waking.  Stroke alert was called in the emergency department and patient was loaded on dual antiplatelet.  Initial head CT showed no acute abnormality and CTA head/neck showed mild atherosclerotic disease.  Subsequent brain MRI identified acute infarction of the right basal ganglia.  Patient was evaluated by neurology who ordered dual antiplatelet regimen x 21 days followed by monotherapy with aspirin.  There was concern for worsening left facial droop as well as worsening of left-sided weakness, repeat head CT 2024 showed enlargement of acute infarct.  Heme-onc was consulted due to polycythemia and recommended outpatient therapeutic phlebotomy.  Patient also complains of left ankle pain so internal medicine check uric acid level to rule out gout.     No acute events overnight.  Patient seen and examined resting in his bedside chair.  He endorses some left dorsal foot pain though overall feels this is improving.  Continues to endorse intermittent painful spasms to his left lower extremity though this improves with as needed baclofen.  Continues to have no functional motor return and left upper extremity     He is currently requiring assistance for 
Physical Therapy  Facility/Department: [facility] ACUTE REHAB  Rehabilitation Physical Therapy Progress Note     NAME: Nitesh Ren  : 1955 (69 y.o.)  MRN: [id number]  CODE STATUS: DNR-CCA    Date of Service: 24        Additional Pertinent Hx: Per PM&R note: History of Present Illness:  Nitesh Ren  is a 69 y.o. left-handed male admitted to the [place] [facility] on 2024.  He was originally admitted to [place] on 2024 for left-sided weakness.       Patient is 69-year-old male with a history of hypertension, hyperlipidemia, tobacco use, polycythemia, neurofibromatosis, and retinal detachment May 2024 s/p reattachment who presented to the emergency department on 2024 with complaints of left-sided weakness upon waking.  Stroke alert was called in the emergency department and patient was loaded on dual antiplatelet.  Initial head CT showed no acute abnormality and CTA head/neck showed mild atherosclerotic disease.  Subsequent brain MRI identified acute infarction of the right basal ganglia.  Patient was evaluated by neurology who ordered dual antiplatelet regimen x 21 days followed by monotherapy with aspirin.  There was concern for worsening left facial droop as well as worsening of left-sided weakness, repeat head CT 2024 showed enlargement of acute infarct.  Heme-onc was consulted due to polycythemia and recommended outpatient therapeutic phlebotomy.  Patient also complains of left ankle pain so internal medicine check uric acid level to rule out gout.     No acute events overnight.  Patient seen and examined resting in his bedside chair.  He endorses some left dorsal foot pain though overall feels this is improving.  Continues to endorse intermittent painful spasms to his left lower extremity though this improves with as needed baclofen.  Continues to have no functional motor return and left upper extremity     He is currently requiring assistance for 
Physical Therapy  Facility/Department: [facility] ACUTE REHAB  Rehabilitation Physical Therapy     NAME: Nitesh Ren  : 1955 (69 y.o.)  MRN: [id number]  CODE STATUS: DNR-CCA    Date of Service: 24      Past Medical History:   Diagnosis Date    Abdominal pain     Cerebrovascular accident (CVA) (HCC) 2024    Colon polyp     Pulmonary nodules 2013    Thyroid nodule 2013     Past Surgical History:   Procedure Laterality Date    COLONOSCOPY  11    POLYPECTOMY    COLONOSCOPY  10/23/07    POLYPECTOMY    CYST REMOVAL      \"back side\"   and face and neck    POLYPECTOMY  11       Chart Reviewed: Yes  Additional Pertinent Hx: Per PM&R note: History of Present Illness:  Nitesh Ren  is a 69 y.o. left-handed male admitted to the [facility] on 2024.  He was originally admitted to [facility] on 2024 for left-sided weakness.       Patient is 69-year-old male with a history of hypertension, hyperlipidemia, tobacco use, polycythemia, neurofibromatosis, and retinal detachment May 2024 s/p reattachment who presented to the emergency department on 2024 with complaints of left-sided weakness upon waking.  Stroke alert was called in the emergency department and patient was loaded on dual antiplatelet.  Initial head CT showed no acute abnormality and CTA head/neck showed mild atherosclerotic disease.  Subsequent brain MRI identified acute infarction of the right basal ganglia.  Patient was evaluated by neurology who ordered dual antiplatelet regimen x 21 days followed by monotherapy with aspirin.  There was concern for worsening left facial droop as well as worsening of left-sided weakness, repeat head CT 2024 showed enlargement of acute infarct.  Heme-onc was consulted due to polycythemia and recommended outpatient therapeutic phlebotomy.  Patient also complains of left ankle pain so internal medicine check uric acid level to rule out gout.     No 
Physical Therapy  Facility/Department: [facility] ACUTE REHAB  Rehabilitation Physical Therapy Progress Note     NAME: Nitesh Ren  : 1955 (69 y.o.)  MRN: [id number]  CODE STATUS: DNR-CCA    Date of Service: 24      Additional Pertinent Hx: Per PM&R note: History of Present Illness:  Nitesh Ren  is a 69 y.o. left-handed male admitted to the [place] [facility] on 2024.  He was originally admitted to [place] on 2024 for left-sided weakness.       Patient is 69-year-old male with a history of hypertension, hyperlipidemia, tobacco use, polycythemia, neurofibromatosis, and retinal detachment May 2024 s/p reattachment who presented to the emergency department on 2024 with complaints of left-sided weakness upon waking.  Stroke alert was called in the emergency department and patient was loaded on dual antiplatelet.  Initial head CT showed no acute abnormality and CTA head/neck showed mild atherosclerotic disease.  Subsequent brain MRI identified acute infarction of the right basal ganglia.  Patient was evaluated by neurology who ordered dual antiplatelet regimen x 21 days followed by monotherapy with aspirin.  There was concern for worsening left facial droop as well as worsening of left-sided weakness, repeat head CT 2024 showed enlargement of acute infarct.  Heme-onc was consulted due to polycythemia and recommended outpatient therapeutic phlebotomy.  Patient also complains of left ankle pain so internal medicine check uric acid level to rule out gout.     No acute events overnight.  Patient seen and examined resting in his bedside chair.  He endorses some left dorsal foot pain though overall feels this is improving.  Continues to endorse intermittent painful spasms to his left lower extremity though this improves with as needed baclofen.  Continues to have no functional motor return and left upper extremity     He is currently requiring assistance for 
Physical Therapy  Facility/Department: [facility] ACUTE REHAB      NAME: Nitesh Ren  : 1955 (69 y.o.)  MRN: [id number]  CODE STATUS: DNR-CCA    Date of Service: 24      Past Medical History:   Diagnosis Date    Abdominal pain     Cerebrovascular accident (CVA) (HCC) 2024    Colon polyp     Pulmonary nodules 2013    Thyroid nodule 2013     Past Surgical History:   Procedure Laterality Date    COLONOSCOPY  11    POLYPECTOMY    COLONOSCOPY  10/23/07    POLYPECTOMY    CYST REMOVAL      \"back side\"   and face and neck    POLYPECTOMY  11       Chart Reviewed: Yes  Patient assessed for rehabilitation services?: Yes  Additional Pertinent Hx: Per PM&R note: History of Present Illness:  Nitesh Ren  is a 69 y.o. left-handed male admitted to the [facility] on 2024.  He was originally admitted to [place] on 2024 for left-sided weakness.       Patient is 69-year-old male with a history of hypertension, hyperlipidemia, tobacco use, polycythemia, neurofibromatosis, and retinal detachment May 2024 s/p reattachment who presented to the emergency department on 2024 with complaints of left-sided weakness upon waking.  Stroke alert was called in the emergency department and patient was loaded on dual antiplatelet.  Initial head CT showed no acute abnormality and CTA head/neck showed mild atherosclerotic disease.  Subsequent brain MRI identified acute infarction of the right basal ganglia.  Patient was evaluated by neurology who ordered dual antiplatelet regimen x 21 days followed by monotherapy with aspirin.  There was concern for worsening left facial droop as well as worsening of left-sided weakness, repeat head CT 2024 showed enlargement of acute infarct.  Heme-onc was consulted due to polycythemia and recommended outpatient therapeutic phlebotomy.  Patient also complains of left ankle pain so internal medicine check uric acid level to 
Physical Therapy  Facility/Department: [facility] ACUTE REHAB  Rehabilitation Physical Therapy Progress Note     NAME: Nitesh Ren  : 1955 (69 y.o.)  MRN: [id number]  CODE STATUS: DNR-CCA    Date of Service: 24        Additional Pertinent Hx: Per PM&R note: History of Present Illness:  Nitesh Ren  is a 69 y.o. left-handed male admitted to the [facility] on 2024.  He was originally admitted to [facility] on 2024 for left-sided weakness.       Patient is 69-year-old male with a history of hypertension, hyperlipidemia, tobacco use, polycythemia, neurofibromatosis, and retinal detachment May 2024 s/p reattachment who presented to the emergency department on 2024 with complaints of left-sided weakness upon waking.  Stroke alert was called in the emergency department and patient was loaded on dual antiplatelet.  Initial head CT showed no acute abnormality and CTA head/neck showed mild atherosclerotic disease.  Subsequent brain MRI identified acute infarction of the right basal ganglia.  Patient was evaluated by neurology who ordered dual antiplatelet regimen x 21 days followed by monotherapy with aspirin.  There was concern for worsening left facial droop as well as worsening of left-sided weakness, repeat head CT 2024 showed enlargement of acute infarct.  Heme-onc was consulted due to polycythemia and recommended outpatient therapeutic phlebotomy.  Patient also complains of left ankle pain so internal medicine check uric acid level to rule out gout.     No acute events overnight.  Patient seen and examined resting in his bedside chair.  He endorses some left dorsal foot pain though overall feels this is improving.  Continues to endorse intermittent painful spasms to his left lower extremity though this improves with as needed baclofen.  Continues to have no functional motor return and left upper extremity     He is currently requiring assistance for 
Physical Therapy  Facility/Department: [facility] ACUTE REHAB      NAME: Nitesh Ren  : 1955 (69 y.o.)  MRN: [id number]  CODE STATUS: DNR-CCA    Date of Service: 24      Past Medical History:   Diagnosis Date    Abdominal pain     Cerebrovascular accident (CVA) (HCC) 2024    Colon polyp     Pulmonary nodules 2013    Thyroid nodule 2013     Past Surgical History:   Procedure Laterality Date    COLONOSCOPY  11    POLYPECTOMY    COLONOSCOPY  10/23/07    POLYPECTOMY    CYST REMOVAL      \"back side\"   and face and neck    POLYPECTOMY  11       Chart Reviewed: Yes  Patient assessed for rehabilitation services?: Yes  Additional Pertinent Hx: Per PM&R note: History of Present Illness:  Nitesh Ren  is a 69 y.o. left-handed male admitted to the [facility] on 2024.  He was originally admitted to [place] on 2024 for left-sided weakness.       Patient is 69-year-old male with a history of hypertension, hyperlipidemia, tobacco use, polycythemia, neurofibromatosis, and retinal detachment May 2024 s/p reattachment who presented to the emergency department on 2024 with complaints of left-sided weakness upon waking.  Stroke alert was called in the emergency department and patient was loaded on dual antiplatelet.  Initial head CT showed no acute abnormality and CTA head/neck showed mild atherosclerotic disease.  Subsequent brain MRI identified acute infarction of the right basal ganglia.  Patient was evaluated by neurology who ordered dual antiplatelet regimen x 21 days followed by monotherapy with aspirin.  There was concern for worsening left facial droop as well as worsening of left-sided weakness, repeat head CT 2024 showed enlargement of acute infarct.  Heme-onc was consulted due to polycythemia and recommended outpatient therapeutic phlebotomy.  Patient also complains of left ankle pain so internal medicine check uric acid level to 
Physical Therapy  Facility/Department: [facility] ACUTE REHAB  Rehabilitation Physical Therapy Progress Note     NAME: Nitesh Ren  : 1955 (69 y.o.)  MRN: [id number]  CODE STATUS: DNR-CCA    Date of Service: 24      Additional Pertinent Hx: Per PM&R note: History of Present Illness:  Nitesh Ren  is a 69 y.o. left-handed male admitted to the [facility] on 2024.  He was originally admitted to [facility] on 2024 for left-sided weakness.       Patient is 69-year-old male with a history of hypertension, hyperlipidemia, tobacco use, polycythemia, neurofibromatosis, and retinal detachment May 2024 s/p reattachment who presented to the emergency department on 2024 with complaints of left-sided weakness upon waking.  Stroke alert was called in the emergency department and patient was loaded on dual antiplatelet.  Initial head CT showed no acute abnormality and CTA head/neck showed mild atherosclerotic disease.  Subsequent brain MRI identified acute infarction of the right basal ganglia.  Patient was evaluated by neurology who ordered dual antiplatelet regimen x 21 days followed by monotherapy with aspirin.  There was concern for worsening left facial droop as well as worsening of left-sided weakness, repeat head CT 2024 showed enlargement of acute infarct.  Heme-onc was consulted due to polycythemia and recommended outpatient therapeutic phlebotomy.  Patient also complains of left ankle pain so internal medicine check uric acid level to rule out gout.     No acute events overnight.  Patient seen and examined resting in his bedside chair.  He endorses some left dorsal foot pain though overall feels this is improving.  Continues to endorse intermittent painful spasms to his left lower extremity though this improves with as needed baclofen.  Continues to have no functional motor return and left upper extremity     He is currently requiring assistance for 
Spiritual Health History and Assessment/Progress Note  [facility]    (P) Spiritual/Emotional Needs,  ,  ,      Name: Nitesh Ren MRN: [id number]    Age: 69 y.o.     Sex: male   Language: English   Confucianism: Hinduism   Hemiplegia and hemiparesis following cerebral infarction affecting left dominant side (HCC)     Date: 11/21/2024            Total Time Calculated: (P) 23 min              Spiritual Assessment began in [email or web address] ACUTE REHAB        Referral/Consult From: (P) Rounding   Encounter Overview/Reason: (P) Spiritual/Emotional Needs  Service Provided For: (P) Patient    Fabi, Belief, Meaning:   Patient identifies as spiritual  Family/Friends No family/friends present      Importance and Influence:  Patient has spiritual/personal beliefs that influence decisions regarding their health  Family/Friends No family/friends present    Community:  Patient feels well-supported. Support system includes: Extended family  Family/Friends No family/friends present    Assessment and Plan of Care:     Patient Interventions include: Facilitated expression of thoughts and feelings and Affirmed coping skills/support systems  Family/Friends Interventions include: No family/friends present    Patient Plan of Care: Spiritual Care available upon further referral  Family/Friends Plan of Care: No family/friends present    Electronically signed by Chaplain Haleigh on 11/21/2024 at 1:13 PM   
[facility]   Acute Rehabilitation Occupational Therapy Daily Treatment Note    Date: 24  Patient Name: Nitesh Ren       Room: 2638/2638-01  MRN: [id number]  Account: [id number]   : 1955  (69 y.o.) Gender: male       Referring Practitioner: Ele Melendez MD  Diagnosis: Hemiplegia and hemiparesis following cerebral infarct affecting left dominant side  Additional Pertinent Hx: 69 year old male with a history of hypertension, hyperlipidemia, tobacco use, polycythemia, neurofibromatosis and retinal detachment in May 2024 s/p reattachment who presented to the emergency department on 2024 with complaints of left sided weakness upon waking. He reports he was unable to pick things up with his left hand and was having difficulty with ambulation. Stroke alert was called in the ED and patient was loaded on dual antiplatelet. Initial CT head showed no acute abnormalities and CTA head/neck showed mild atherosclerotic disease. Subsequent brain MRI identified acute infarction in right basal ganglia. Patient evaluated by Neurology who ordered dual antiplatelet regimen x21 days followed by monotherapy. MRI : 2 small areas of restricted diffusion in the right basal ganglia consistent with acute areas of infarct. No other areas restricted diffusion are identified. ; CT head : 19 mm acute infarct in the right frontal corona radiata is redemonstrated involving a larger area than previously.    Treatment Diagnosis: Impaired self-care status    Past Medical History:  has a past medical history of Abdominal pain, Cerebrovascular accident (CVA) (HCC), Colon polyp, Pulmonary nodules, and Thyroid nodule.    Past Surgical History:   has a past surgical history that includes polypectomy (11); cyst removal; Colonoscopy (11); and Colonoscopy (10/23/07).    Restrictions  Restrictions/Precautions  Restrictions/Precautions: Fall Risk, General Precautions  Required Braces or 
[facility]   Acute Rehabilitation Occupational Therapy Daily Treatment Note    Date: 24  Patient Name: Nitesh Ren       Room: 2638/2638-01  MRN: [id number]  Account: [id number]   : 1955  (69 y.o.) Gender: male       Referring Practitioner: Ele Melendez MD  Diagnosis: Hemiplegia and hemiparesis following cerebral infarct affecting left dominant side  Additional Pertinent Hx: 69 year old male with a history of hypertension, hyperlipidemia, tobacco use, polycythemia, neurofibromatosis and retinal detachment in May 2024 s/p reattachment who presented to the emergency department on 2024 with complaints of left sided weakness upon waking. He reports he was unable to pick things up with his left hand and was having difficulty with ambulation. Stroke alert was called in the ED and patient was loaded on dual antiplatelet. Initial CT head showed no acute abnormalities and CTA head/neck showed mild atherosclerotic disease. Subsequent brain MRI identified acute infarction in right basal ganglia. Patient evaluated by Neurology who ordered dual antiplatelet regimen x21 days followed by monotherapy. MRI : 2 small areas of restricted diffusion in the right basal ganglia consistent with acute areas of infarct. No other areas restricted diffusion are identified. ; CT head : 19 mm acute infarct in the right frontal corona radiata is redemonstrated involving a larger area than previously.    Treatment Diagnosis: Impaired self-care status    Past Medical History:  has a past medical history of Abdominal pain, Cerebrovascular accident (CVA) (HCC), Colon polyp, Pulmonary nodules, and Thyroid nodule.    Past Surgical History:   has a past surgical history that includes polypectomy (11); cyst removal; Colonoscopy (11); and Colonoscopy (10/23/07).    Restrictions  Restrictions/Precautions  Restrictions/Precautions: Fall Risk, General Precautions  Required Braces or 
[facility]   Acute Rehabilitation Occupational Therapy Daily Treatment Note    Date: 24  Patient Name: Nitesh Ren       Room: 2638/2638-01  MRN: [id number]  Account: [id number]   : 1955  (69 y.o.) Gender: male       Referring Practitioner: Ele Melendez MD  Diagnosis: Hemiplegia and hemiparesis following cerebral infarct affecting left dominant side  Additional Pertinent Hx: 69 year old male with a history of hypertension, hyperlipidemia, tobacco use, polycythemia, neurofibromatosis and retinal detachment in May 2024 s/p reattachment who presented to the emergency department on 2024 with complaints of left sided weakness upon waking. He reports he was unable to pick things up with his left hand and was having difficulty with ambulation. Stroke alert was called in the ED and patient was loaded on dual antiplatelet. Initial CT head showed no acute abnormalities and CTA head/neck showed mild atherosclerotic disease. Subsequent brain MRI identified acute infarction in right basal ganglia. Patient evaluated by Neurology who ordered dual antiplatelet regimen x21 days followed by monotherapy. MRI : 2 small areas of restricted diffusion in the right basal ganglia consistent with acute areas of infarct. No other areas restricted diffusion are identified. ; CT head : 19 mm acute infarct in the right frontal corona radiata is redemonstrated involving a larger area than previously.    Treatment Diagnosis: Impaired self-care status    Past Medical History:  has a past medical history of Abdominal pain, Cerebrovascular accident (CVA) (HCC), Colon polyp, Pulmonary nodules, and Thyroid nodule.    Past Surgical History:   has a past surgical history that includes polypectomy (11); cyst removal; Colonoscopy (11); and Colonoscopy (10/23/07).    Restrictions  Restrictions/Precautions  Restrictions/Precautions: Fall Risk, General Precautions  Required Braces or 
[facility]   Acute Rehabilitation Occupational Therapy Daily Treatment Note    Date: 24  Patient Name: Nitesh Ren       Room: 2638/2638-01  MRN: [id number]  Account: [id number]   : 1955  (69 y.o.) Gender: male       Referring Practitioner: Ele Melendez MD  Diagnosis: Hemiplegia and hemiparesis following cerebral infarct affecting left dominant side  Additional Pertinent Hx: 69 year old male with a history of hypertension, hyperlipidemia, tobacco use, polycythemia, neurofibromatosis and retinal detachment in May 2024 s/p reattachment who presented to the emergency department on 2024 with complaints of left sided weakness upon waking. He reports he was unable to pick things up with his left hand and was having difficulty with ambulation. Stroke alert was called in the ED and patient was loaded on dual antiplatelet. Initial CT head showed no acute abnormalities and CTA head/neck showed mild atherosclerotic disease. Subsequent brain MRI identified acute infarction in right basal ganglia. Patient evaluated by Neurology who ordered dual antiplatelet regimen x21 days followed by monotherapy. MRI : 2 small areas of restricted diffusion in the right basal ganglia consistent with acute areas of infarct. No other areas restricted diffusion are identified. ; CT head : 19 mm acute infarct in the right frontal corona radiata is redemonstrated involving a larger area than previously.    Treatment Diagnosis: Impaired self-care status    Past Medical History:  has a past medical history of Abdominal pain, Cerebrovascular accident (CVA) (HCC), Colon polyp, Pulmonary nodules, and Thyroid nodule.    Past Surgical History:   has a past surgical history that includes polypectomy (11); cyst removal; Colonoscopy (11); and Colonoscopy (10/23/07).    Restrictions  Restrictions/Precautions  Restrictions/Precautions: Fall Risk, General Precautions  Required Braces or 
      Pain Assessment   Over the past 5 days, how much of the time has pain made it hard for you to sleep at night?   Rarely or not at all   Over the past 5 days, how often have you limited your participation in rehabilitation therapy sessions due to pain?   Rarely or not at all   Over the past 5 days, how often have you limited your day-to-day activities (excluding rehabilitation therapy session)?   Rarely or not at all     Special Treatments, Procedures, and Programs   Check all of the following treatments, procedures, and programs that apply on admission.    Cancer Treatments   Chemotherapy No   If Yes, Check All That Apply   []IV Chemotherapy    []Oral Chemotherapy    [] Chemotherapy    Radiation No   Respiratory Therapies   Oxygen Therapy No  If Yes, Check All That Apply   []Continuous   []Intermittent    []High-Concentration    Suctioning No  If Yes, Check All That Apply   []Scheduled   []As Needed   Tracheostomy Care No   Invasive Mechanical Ventilator   (Ventilator or Respirator) No  If Yes, Check All That Apply   [] Non-invasive Mechanical Ventilator   []BiPAP   []CPAP   Other   IV Medications No  If Yes, Check All That Apply   [] IV Vasoactive Medications   []IV Antibiotics   []IV Anticoagulation   [] IV Medications   Transfusions No   Dialysis No  If Yes, Check All That Apply   []Hemodialysis   [] Dialysis   IV Access No  If Yes, Check All That Apply   []Peripheral   []Midline   [] (PICC, tunneled, port)        
(LIORESAL) 5 MG tablet Take 1 tablet by mouth every 8 hours as needed (spams) 11/19/24   Anusha Miller MD   aspirin 81 MG EC tablet Take 1 tablet by mouth daily 11/15/24   Edgardo Gordon MD   rosuvastatin (CRESTOR) 40 MG tablet Take 1 tablet by mouth nightly 11/14/24   Edgardo Gordon MD   lisinopril (PRINIVIL;ZESTRIL) 2.5 MG tablet Take 1 tablet by mouth daily 11/15/24   Edgardo Gordon MD   amLODIPine (NORVASC) 10 MG tablet Take 1 tablet by mouth daily 11/15/24   Edgardo Gordon MD   clopidogrel (PLAVIX) 75 MG tablet Take 1 tablet by mouth daily for 14 doses 11/15/24 11/29/24  Edgardo Gordon MD   carboxymethylcellulose (REFRESH PLUS) 0.5 % SOLN ophthalmic solution Place 1 drop into both eyes 3 times daily 11/14/24   Edgardo Gordon MD   erythromycin (ROMYCIN) 5 MG/GM ophthalmic ointment Please and right eye 4 times per day. 11/14/24 11/24/24  Edgardo Gordon MD   vitamin E 1000 units capsule Take 1 capsule by mouth daily    Fady Stacy MD   Vitamin D (CHOLECALCIFEROL) 25 MCG (1000 UT) TABS tablet Take 1 tablet by mouth daily    Fady Stacy MD   Omega-3 Fatty Acids (FISH OIL) 1000 MG capsule Take by mouth daily    ProviderFady MD   albuterol sulfate HFA (VENTOLIN HFA) 108 (90 Base) MCG/ACT inhaler Inhale 2 puffs into the lungs 4 times daily as needed for Wheezing 8/23/24   Bhupendra Hinds MD        Allergies:     Patient has no known allergies.    Social History:     Tobacco:    reports that he quit smoking about 23 years ago. His smoking use included cigarettes. He started smoking about 53 years ago. He has a 30 pack-year smoking history. He has never been exposed to tobacco smoke. He has never used smokeless tobacco.  Alcohol:      reports current alcohol use.  Drug Use:  reports no history of drug use.    Family History:     Family History   Problem Relation Age of Onset    Cancer Mother     Cancer Father     Heart Attack Father  
(LIORESAL) 5 MG tablet Take 1 tablet by mouth every 8 hours as needed (spams) 11/19/24   Anusha Miller MD   aspirin 81 MG EC tablet Take 1 tablet by mouth daily 11/15/24   Edgardo Gordon MD   rosuvastatin (CRESTOR) 40 MG tablet Take 1 tablet by mouth nightly 11/14/24   Edgardo Gordon MD   lisinopril (PRINIVIL;ZESTRIL) 2.5 MG tablet Take 1 tablet by mouth daily 11/15/24   Edgardo Gordon MD   amLODIPine (NORVASC) 10 MG tablet Take 1 tablet by mouth daily 11/15/24   Edgardo Gordon MD   clopidogrel (PLAVIX) 75 MG tablet Take 1 tablet by mouth daily for 14 doses 11/15/24 11/29/24  Edgardo Gordon MD   carboxymethylcellulose (REFRESH PLUS) 0.5 % SOLN ophthalmic solution Place 1 drop into both eyes 3 times daily 11/14/24   Edgardo Gordon MD   vitamin E 1000 units capsule Take 1 capsule by mouth daily    Fady Stacy MD   Vitamin D (CHOLECALCIFEROL) 25 MCG (1000 UT) TABS tablet Take 1 tablet by mouth daily    Fady Stacy MD   Omega-3 Fatty Acids (FISH OIL) 1000 MG capsule Take by mouth daily    Fady Stacy MD   albuterol sulfate HFA (VENTOLIN HFA) 108 (90 Base) MCG/ACT inhaler Inhale 2 puffs into the lungs 4 times daily as needed for Wheezing 8/23/24   Bhupendra Hinds MD        Allergies:     Patient has no known allergies.    Social History:     Tobacco:    reports that he quit smoking about 23 years ago. His smoking use included cigarettes. He started smoking about 53 years ago. He has a 30 pack-year smoking history. He has never been exposed to tobacco smoke. He has never used smokeless tobacco.  Alcohol:      reports current alcohol use.  Drug Use:  reports no history of drug use.    Family History:     Family History   Problem Relation Age of Onset    Cancer Mother     Cancer Father     Heart Attack Father        Review of Systems:     Positive and Negative as described in HPI.    CONSTITUTIONAL:  negative for fevers, chills, sweats, fatigue, 
Orthoses?: No  Implants present? :  (patient denies)     Position Activity Restriction  Other position/activity restrictions: L sided weakness, L sling for transfers, impulsive at times     Vitals      Subjective  Subjective  Subjective: AM: Pt in recliner chair in room upon arrival. Very pleasant and cooperative. PM: Pt in bed upon arrival. Motivated to participate.     Objective  Cognition  Overall Orientation Status: Within Functional Limits  Orientation Level: Oriented X4    Cognition  Overall Cognitive Status: Exceptions  Arousal/Alertness: Appears intact  Following Commands: Follows multistep commands with increased time;Follows multistep commands with repitition  Attention Span: Appears intact  Memory: Appears intact  Safety Judgement: Decreased awareness of need for assistance;Decreased awareness of need for safety  Problem Solving: Assistance required to implement solutions;Assistance required to correct errors made;Assistance required to identify errors made  Insights: Decreased awareness of deficits  Initiation: Requires cues for some  Sequencing: Requires cues for some    Activities of Daily Living  Feeding  Assistance Level: Set-up  Skilled Clinical Factors: Per pt report. Continued instruction and education regarding maynor techniques and other compensatory strategies for increased indep. G understanding.    Putting On/Taking Off Footwear  Assistance Level: Maximum assistance  Skilled Clinical Factors: Pt able to untie and doff shoes with CGA. Flexing forward at hips. Assist for TEDs. Required assist to vaishnavi and tie L shoe- foot funnel used, was able to vaishnavi R shoe easily with foot funnel and remove funnel, assist to tie.    Toileting  Assistance Level: Dependent  Skilled Clinical Factors: Pt requested to use harman stedy for toileting again this date. Plan to start stand pivots to toilet during upcoming sessions Completed during AM and PM sessions. Pt voided while seated on toilet. DEP for clothing 
      Review of Systems:     Positive and Negative as described in HPI.    CONSTITUTIONAL:  negative for fevers, chills, sweats, fatigue, weight loss  HEENT:  negative for vision, hearing changes, runny nose, throat pain  RESPIRATORY:  negative for shortness of breath, cough, congestion, wheezing.  CARDIOVASCULAR:  negative for chest pain, palpitations.  GASTROINTESTINAL:  negative for nausea, vomiting, diarrhea, constipation, change in bowel habits, abdominal pain   GENITOURINARY:  negative for difficulty of urination, burning with urination, frequency   INTEGUMENT:  negative for rash, skin lesions, easy bruising   HEMATOLOGIC/LYMPHATIC:  negative for swelling/edema   ALLERGIC/IMMUNOLOGIC:  negative for urticaria , itching  ENDOCRINE:  negative increase in drinking, increase in urination, hot or cold intolerance  MUSCULOSKELETAL:  negative joint pains, muscle aches, swelling of joints  NEUROLOGICAL:  negative for headaches, dizziness, lightheadedness, numbness, pain, tingling extremities  BEHAVIOR/PSYCH:  negative for depression, anxiety    Physical Exam:   BP (!) 140/90   Pulse 75   Temp 98.2 °F (36.8 °C) (Oral)   Resp 16   Ht 1.778 m (5' 10\")   Wt 71.8 kg (158 lb 4.6 oz)   SpO2 95%   BMI 22.71 kg/m²   Temp (24hrs), Av.2 °F (36.8 °C), Min:98.1 °F (36.7 °C), Max:98.2 °F (36.8 °C)    No results for input(s): \"POCGLU\" in the last 72 hours.    Intake/Output Summary (Last 24 hours) at 2024 1550  Last data filed at 2024 0000  Gross per 24 hour   Intake --   Output 450 ml   Net -450 ml       General Appearance:  alert, well appearing, and in no acute distress, multiple neurofibroma mental status: oriented to person, place, and time with normal affect  Head:  normocephalic, atraumatic.  Eye: no icterus, redness, pupils equal and reactive, extraocular eye movements intact, conjunctiva clear  Ear: normal external ear, no discharge, hearing intact  Nose:  no drainage noted  Mouth: mucous membranes 
(11/19/2024)    Housing Stability Vital Sign     Unable to Pay for Housing in the Last Year: No     Number of Times Moved in the Last Year: 0     Homeless in the Last Year: No       Current Facility-Administered Medications   Medication Dose Route Frequency Provider Last Rate Last Admin    0.9 % sodium chloride infusion   IntraVENous PRN Anusha Miller MD        polyethylene glycol (GLYCOLAX) packet 17 g  17 g Oral Daily PRN Anusha Miller MD        clopidogrel (PLAVIX) tablet 75 mg  75 mg Oral Daily Anusha Miller MD   75 mg at 11/21/24 0716    rosuvastatin (CRESTOR) tablet 40 mg  40 mg Oral Nightly Anusha Miller MD   40 mg at 11/20/24 2105    aspirin EC tablet 81 mg  81 mg Oral Daily Anusha Miller MD   81 mg at 11/21/24 0716    enoxaparin (LOVENOX) injection 40 mg  40 mg SubCUTAneous Daily Anusha Miller MD   40 mg at 11/21/24 0717    melatonin tablet 3 mg  3 mg Oral Nightly PRN Anusha Miller MD   3 mg at 11/20/24 2105    carboxymethylcellulose (REFRESH PLUS) 0.5 % ophthalmic solution 1 drop  1 drop Both Eyes TID Anusha Miller MD   1 drop at 11/21/24 0719    acetaminophen (TYLENOL) tablet 650 mg  650 mg Oral Q4H PRN Anusha Miller MD   650 mg at 11/20/24 2354    erythromycin (ROMYCIN) ophthalmic ointment   Right Eye 4 times per day Anusha Miller MD   Given at 11/21/24 1108    amLODIPine (NORVASC) tablet 10 mg  10 mg Oral Daily Anusha Miller MD   10 mg at 11/21/24 0716    baclofen (LIORESAL) tablet 5 mg  5 mg Oral Q8H PRN Anusha Miller MD   5 mg at 11/20/24 0415    polyethylene glycol (GLYCOLAX) packet 17 g  17 g Oral Daily Ele Melendez MD   17 g at 11/21/24 0825    senna (SENOKOT) tablet 17.2 mg  2 tablet Oral Daily PRN Ele Melendez MD        bisacodyl (DULCOLAX) suppository 10 mg  10 mg Rectal Daily PRN Ele Melendez MD           No Known Allergies    ROS:   Constitutional                  Negative for fever and chills   HEENT                            Negative for ear discharge, ear 
Assistance (left platform RW , VCs and assist left foot placement and COG over JORDAN)  Squat Pivot Transfers: Moderate Assistance (to right and left  , VCs setup and technique)  Comment: left Givmohr sling donned  Balance  Posture: Fair  Sitting - Static: Fair;-  Sitting - Dynamic: Fair;-  Standing - Static: Fair;- (RW left platform)  Standing - Dynamic: Poor;+ (RW left platform)    Environmental Mobility  Ambulation  Surface: Level tile  Device: Platform Walker left;Rolling Walker  Other Apparatus: Left;Wheelchair follow (left lower cuff Bioness , shoes, WC follow second person)  Assistance: Dependent/Total;2 Person assistance  Quality of Gait: Decreased left foot clearance , stance time , step width heel strike ,hip external rotation, posterior left pelvic rotation stance phase , lacks knee/hip extension heel strike to toe off, and step length right > left, deviated path to left  Gait Deviations: Slow Pat;Decreased step length;Decreased step height;Deviated path  Distance: 45 ft  Comments: VCs and assist moderate, correction gait deviations left LE step width, hip/knee ext stance phase, decreased left posterior pelvic rotation, VCs increased right step length .  Stairs/Curb  Stairs?: No (focus on Excite /FES and strength training)  Wheelchair Activities  Propulsion: Yes  Propulsion 1  Propulsion: Manual  Method: RLE;LLE (right UE/LE second trial assist straight path)  Level of Assistance: Minimal assistance  Description/ Details: straight path , VCs and TCs left LE assist and full ROM, limited by fatigue  Distance: 20 ft and 30 ft    PT Exercises  PROM Exercises: supine nemo gastroc, soleus, HS, modified HS, left external rotators , right side lying left quad and hip flexor (VCs decreased assist)  A/AROM Exercises: supine , hooklying left A/AROM  and isomctric x 10 and right side lying hip/knee flex <> ext on powder board and excite 5 minutes, good skin integrity under electrode site post. (VCs , TCs , and assist 
Tolerance  Activity Tolerance: Patient tolerated treatment well;Patient limited by fatigue;Patient limited by endurance    Assessment  Treatment Diagnosis: Impaired function related to deficts from CVA  Discharge Recommendations: Patient would benefit from continued therapy after discharge;Therapy recommended at discharge;Home with assist PRN  PT D/C Equipment  Other: TBD  PT Equipment Recommendations  Other: TBD    GOALS  Patient Goals   Patient Goals : Get left side stronger, be independent, walk again.  Short Term Goals  Time Frame for Short Term Goals: 8 to 9 days  Short Term Goal 1: Supine<>sit min A  Short Term Goal 2: Pt to dangle at EOB SBA, pt being aware of his posture, and able to maintain midline with minimal cues  Short Term Goal 3: Transfers consistantly at min a x 2  Short Term Goal 4: Gait with hemiwalker/pardeep walker, distance of 60 ft x 2, min A x 2, with ability to lift L LE to clear floor during Left swing phase > 50% of the time  Short Term Goal 5: Pt to tolerate standing with UE support for 2 to 4 minutes to work on standing balance  Short Term Goal 6: Pt able to perform 6\" steps up in // bars with R UE support, mod a x2  Short Term Goal 7: Propel w/c distance of 50 to 80 ft, CGA on level surfaces  Long Term Goals  Time Frame for Long Term Goals : By DC  Long Term Goal 1: Rolling/bed mobility Mod-I  Long Term Goal 2: Pt able to perform transfers CGA.  Long Term Goal 3: Pt able to ambulate with appropriate assistive device, distance of 50 to 150 ft on level surfaces, CGA.  Long Term Goal 4: Pt  able to ambulate on incline surfaces with a device, min/mod A  Long Term Goal 5: Pt able to perform 4 to 5 steps with UE support and or device, mod a x 1  Long term goal 6: Pt able  to propel w/c distance of 150 ft , mod-I on level surfaces  Long term goal 7: Pt able to perform car transfers at min A  Long term goal 8: Improve PASS Score from 8/36 to 21/36to atleast to improve function and 
assistance (placement of LLE)  Rolling to Left: Stand by assistance (assist with LUE safety awareness)  Rolling to Right: Contact guard assistance (assist with LUE safety awareness)  Supine to Sit: Stand by assistance (use of strap anchored to foot of bed)  Sit to Supine: Contact guard assistance (use of strap anchored to foot of bed)  Scooting: Contact guard assistance  Bed Mobility Comments: use of strap anchored to foot of bed to pull up from for supine <> seated assist. family plans to use same setup demonstrated during therapy.  Transfers  Sit to Stand: Contact guard assistance;Minimal Assistance;2 Person Assistance;Moderate Assistance (assistance varied based on fatigue, family training ability to recognize position support of trunk and feet placement and surface heights and varied surfaces as barrier as well.)  Stand to Sit: Contact guard assistance;Minimal Assistance;Moderate Assistance;2 Person Assistance (assistance varied based on fatigue, family training ability to recognize position support of trunk and feet placement and surface heights and varied surfaces as barrier as well.)  Bed to Chair: Contact guard assistance;Minimal assistance;Moderate assistance;2 Person Assistance (hemiwalker and also toilet transfer wc to toilet wc to bed with grab bar support.)  Stand Pivot Transfers: Moderate Assistance (pivot -no device)  Squat Pivot Transfers: Moderate Assistance (pivot -no device)  Comment: left arm sling used today during tx session while perfoming all transfers, amb, and functional tasks for protection. Pt is capable of performing Sit >stand transfer with CGA only when feet are in proper placment and pt wt shifts fwd. VC's from writer to ensure he is attempting to get himself in proper position before standing up every time. patient needed 90% cueing to demonstrate. MACKENZIE noting VC's consistantly. patient preformed toilet seat height 15.5 with grab bar to front of him to wc 3 trials, bed to wc 
complete remainder using maynor techniques.    Lower Extremity Bathing  Assistance Level: Contact guard assist  Skilled Clinical Factors: Completed all while seated. CGA/SBA weight shifting to L side in order to complete washing of buttocks using R UE.    Upper Extremity Dressing  Assistance Level: Moderate assistance  Skilled Clinical Factors: Requiring assist to fully/effectively thread L UE, pt is able to thread R UE, intermittent touching assist to ensure overhead and all the way down in back. Writer explaining that loose clothing is easier to manage.    Lower Extremity Dressing  Assistance Level: Maximum assistance  Skilled Clinical Factors: Pt requiring writers assist with clothing management down past hips and up over hips due to patient being unsteady and impatient. While seated pt was able to unthread clothing items with SBA and increased time. Pt verbalizing and demonstrating frustration during thread of clothing items- requiring intermittent Garfield's.    Putting On/Taking Off Footwear  Assistance Level: Maximum assistance  Skilled Clinical Factors: TA for TEDs, pt able to doff B shoes with incresed time. TA for donning L shoe, max A for donning R shoe.    Tub/Shower Transfers  Type: Shower  Transfer From: Wheelchair  Transfer To: Tub transfer bench  Assistance Level: Moderate assistance    Mobility     Bed To/From Chair  Technique: Stand pivot  Assistance Level: Minimal assistance  Skilled Clinical Factors: Towards R strong side. Verbal cuing for proper hand and foot placement and proper technique.    Functional Mobility  Device: Wheelchair  Activity: To/From bathroom;To/From therapy gym    Assessment  Assessment  Activity Tolerance: Patient tolerated treatment well;Patient limited by endurance  Discharge Recommendations: Continue to assess pending progress    Patient Education  Education  Education Given To: Patient  Education Provided: Role of Therapy;Plan of Care;Safety;ADL Function;Transfer Training;Fall 
placements, min/CGA for control and safe positioning     Stand Pivot  Assistance Level: Minimal assistance  Skilled Clinical Factors: min A to L side, CGA to R side, setup req with cuing for safety/tech, a for LUE support and LLE positioning              Functional Mobility  Device: Wheelchair  Activity: To/From bathroom (room mobility)  Assistance Level: Stand by assist  Skilled Clinical Factors: SBA/SUP with w/c mobility, cuing req for tech/safety and object awareness, fair carryover noted, LLE place don foot peddle    Neuromuscular Education  Neuromuscular education: Yes  Vibration: vibrations applied to LUE while use arm skateboard to increase strength and promote functional return, pt demonstrated fair movement in LUE for horizontal abduction/adduction with vibrations applied to corresponding muscle bellies, pt tolerated well with assist to reach full ROM, RB's as needed, vibrations applied to L shoulder to facilitate shoulder elevation with pt stating \"painful\" due to tumors on shoulder/back area, vibrations applied to LUE for wrist extension with fair return, good tolerance noted with Rb's as needed       OT Exercises  PROM Exercises: Melody provided PROM to LUE for decreased risk for contracture and optimal joint integrity. Pt Tolerates 15 reps each in all available planes of motion. Pt tolerates well with no c/o pain or discomfort, increased tightness noted in LUE with this writer providing prolonged stretching for release  Dynamic Standing Balance Exercises: dynamic standing to participate in tabletop task to address standing tolerance, balance and safety while engaging in a functional task, pt req min/CGA throughout standing with observed L lateral lean req cuing for correction, pt stood for 3 min x2 with seated RB's between, cuing for transfer safety and optimal positioning, education provided on JORDAN and correction due to narrow base and/or LLE placed in front of pt in standing, cuing req for weightshift 
weight loss  HEENT:  negative for vision, hearing changes, runny nose, throat pain  RESPIRATORY:  negative for shortness of breath, cough, congestion, wheezing.  CARDIOVASCULAR:  negative for chest pain, palpitations.  GASTROINTESTINAL:  negative for nausea, vomiting, diarrhea, constipation, change in bowel habits, abdominal pain   GENITOURINARY:  negative for difficulty of urination, burning with urination, frequency   INTEGUMENT:  negative for rash, skin lesions, easy bruising   HEMATOLOGIC/LYMPHATIC:  negative for swelling/edema   ALLERGIC/IMMUNOLOGIC:  negative for urticaria , itching  ENDOCRINE:  negative increase in drinking, increase in urination, hot or cold intolerance  MUSCULOSKELETAL:  negative joint pains, muscle aches, swelling of joints  NEUROLOGICAL:  negative for headaches, dizziness, lightheadedness, numbness, pain, tingling extremities  BEHAVIOR/PSYCH:  negative for depression, anxiety    Physical Exam:   /64   Pulse 74   Temp 97.8 °F (36.6 °C) (Oral)   Resp 18   Ht 1.778 m (5' 10\")   Wt 75 kg (165 lb 5.5 oz)   SpO2 98%   BMI 23.72 kg/m²   Temp (24hrs), Av.9 °F (37.2 °C), Min:97.8 °F (36.6 °C), Max:100 °F (37.8 °C)    No results for input(s): \"POCGLU\" in the last 72 hours.      Intake/Output Summary (Last 24 hours) at 12/10/2024 1635  Last data filed at 2024 2302  Gross per 24 hour   Intake --   Output 350 ml   Net -350 ml         General Appearance:  alert, well appearing, and in no acute distress, multiple neurofibroma mental status: oriented to person, place, and time with normal affect  Head:  normocephalic, atraumatic.  Eye: no icterus, redness, pupils equal and reactive, extraocular eye movements intact, conjunctiva clear  Ear: normal external ear, no discharge, hearing intact  Nose:  no drainage noted  Mouth: mucous membranes moist  Neck: supple, no carotid bruits, thyroid not palpable  Lungs: Bilateral equal air entry, clear to ausculation, no wheezing, rales or 
BLEs  SKIN: warm dry and intact with good turgor with diffusely scattered neurofibromatosis lesions  PSYCH: appropriately interactive. Affect WNL.        Diagnostics:     CBC:   Recent Labs     12/04/24  0629   WBC 7.2   RBC 5.66   HGB 15.9   HCT 46.8   MCV 82.7   RDW 15.9*        BMP:   Recent Labs     12/04/24  0629      K 4.2      CO2 27   BUN 26*   CREATININE 1.0   GLUCOSE 86       BNP: No results for input(s): \"BNP\" in the last 72 hours.  PT/INR: No results for input(s): \"PROTIME\", \"INR\" in the last 72 hours.  APTT: No results for input(s): \"APTT\" in the last 72 hours.  CARDIAC ENZYMES: No results for input(s): \"CKMB\", \"CKMBINDEX\", \"TROPONINT\" in the last 72 hours.    Invalid input(s): \"CKTOTAL;3\" troponins   FASTING LIPID PANEL:  Lab Results   Component Value Date    CHOL 134 11/08/2024    HDL 52 11/08/2024    TRIG 89 11/08/2024     LIVER PROFILE:   No results for input(s): \"AST\", \"ALT\", \"BILIDIR\", \"BILITOT\", \"ALKPHOS\" in the last 72 hours.    Invalid input(s): \"ALB\"       Current Medications:   Current Facility-Administered Medications: magnesium hydroxide (MILK OF MAGNESIA) 400 MG/5ML suspension 30 mL, 30 mL, Oral, Daily PRN  lidocaine 4 % external patch 1 patch, 1 patch, TransDERmal, Daily  baclofen (LIORESAL) tablet 5 mg, 5 mg, Oral, 2 times per day **AND** baclofen (LIORESAL) tablet 10 mg, 10 mg, Oral, Nightly  melatonin tablet 6 mg, 6 mg, Oral, Nightly PRN  losartan (COZAAR) tablet 25 mg, 25 mg, Oral, Daily  methyl salicylate-menthol (BLAKE GARCIA GREASELESS) 10-15 % cream CREA, , Apply externally, TID PRN  0.9 % sodium chloride infusion, , IntraVENous, PRN  polyethylene glycol (GLYCOLAX) packet 17 g, 17 g, Oral, Daily PRN  rosuvastatin (CRESTOR) tablet 40 mg, 40 mg, Oral, Nightly  aspirin EC tablet 81 mg, 81 mg, Oral, Daily  enoxaparin (LOVENOX) injection 40 mg, 40 mg, SubCUTAneous, Daily  carboxymethylcellulose (REFRESH PLUS) 0.5 % ophthalmic solution 1 drop, 1 drop, Both Eyes, 
Demonstration;Verbal  Barriers to Learning: None  Education Outcome: Verbalized understanding;Demonstrated understanding;Continued education needed    OT Equipment Recommendations  Other: TBD    Safety Devices  Safety Devices in place: Yes  Type of devices: All fall risk precautions in place     Family education  Completed    Goals     Short Term Goals  Time Frame for Short Term Goals: By week 1  Short Term Goal 1: patient to safely perform UB bathing/dressing with SBA with use of adaptive strategies with good safety  Short Term Goal 2: patient to safely perform LB bathing/dressing with min A with use of adaptive strategies with good safety  Short Term Goal 3: patient to safely perform functional transfers with mod A with use of least restrictive device during selfcare tasks  Short Term Goal 4: patient to safety perform toileting task with mod A with use of DME as needed with good safety  Short Term Goal 5: patient to participate 30+ min of therapeutic exercise/functional activity to increase independence with selfcare tasks  Additional Goals?: Yes  Short Term Goal 6: patient to tolerate 5+ min with CGA during functional activity of choice to increase independence/safety with selfcare task  Short Term Goal 7: Pt will participate in neuromuscular re-education activities (weight bearing, e-stim, SROM/PROM) as tolerated, to facilitate movement in LUE.  Short Term Goal 8: Pt will demonstrate improved awareness of L UE as demonstrated by ability to maintain safe positioning of L hand and wrist during ADL routine without assistance.    Long Term Goals  Time Frame for Long Term Goals : By discharge  Long Term Goal 1: patient to safely perform UB dressing/bathing with modifed independence with use of adaptive strategies and good safety  Long Term Goal 2: patient to safely perform LB dressing/bathing with CGA with use of AE/adaptive strategies with good safety  Long Term Goal 3: patient to safely perform functional 
gait belt, body positioning, w/c placement with demonstrating overall fair understanding/carryover    OT Equipment Recommendations  Other: Tub transfer bench, toilet riser w/out handrails,    Safety Devices  Safety Devices in place: Yes  Type of devices: All fall risk precautions in place;Call light within reach;Chair alarm in place;Gait belt;Left in chair       Family education  Ongoing- family training with sister in law after HE for safety with transfers/toileting task in patients room    Goals     Short Term Goals  Time Frame for Short Term Goals: By week 1  Short Term Goal 1: patient to safely perform UB bathing/dressing with SBA with use of adaptive strategies with good safety  Short Term Goal 2: patient to safely perform LB bathing/dressing with min A with use of adaptive strategies with good safety  Short Term Goal 3: patient to safely perform functional transfers with mod A with use of least restrictive device during selfcare tasks  Short Term Goal 4: patient to safety perform toileting task with mod A with use of DME as needed with good safety  Short Term Goal 5: patient to participate 30+ min of therapeutic exercise/functional activity to increase independence with selfcare tasks  Additional Goals?: Yes  Short Term Goal 6: patient to tolerate 5+ min with CGA during functional activity of choice to increase independence/safety with selfcare task  Short Term Goal 7: Pt will participate in neuromuscular re-education activities (weight bearing, e-stim, SROM/PROM) as tolerated, to facilitate movement in LUE.  Short Term Goal 8: Pt will demonstrate improved awareness of L UE as demonstrated by ability to maintain safe positioning of L hand and wrist during ADL routine without assistance.    Long Term Goals  Time Frame for Long Term Goals : By discharge  Long Term Goal 1: patient to safely perform UB dressing/bathing with modifed independence with use of adaptive strategies and good safety  Long Term Goal 2: 
in al available planes of motion. Pt tolerates well with no c/o pain or discomfort.    Electrical Stimulation  Electrical Stimulation Location: Left;Upper arm  Electrical Stimulation Action: Pad place on bicep and tricep muscles for nerumuscular re-education, tone and functional return. Pt tolerates for 10 minutes on first setting of each. Skin integrity intact after with no s/s redness or irritation.  Electrical Stimulation Parameters: 400 microseconds, 10 minutes  Electrical Stimulation Goals: Tone;Neuromuscular Facilitation    Assessment  Assessment  Activity Tolerance: Patient tolerated treatment well;Patient limited by fatigue;Patient limited by endurance  Discharge Recommendations: Continue to assess pending progress    Patient Education  Education  Education Given To: Patient  Education Provided: Role of Therapy;Plan of Care;Safety;ADL Function;Transfer Training;Energy Conservation;Fall Prevention Strategies  Education Method: Demonstration;Verbal  Barriers to Learning: None  Education Outcome: Verbalized understanding;Demonstrated understanding;Continued education needed    OT Equipment Recommendations  Other: TBD    Safety Devices  Safety Devices in place: Yes  Type of devices: Left in chair;Call light within reach;Chair alarm in place       Family education  To be arranged    Goals     Short Term Goals  Time Frame for Short Term Goals: By week 1  Short Term Goal 1: patient to safely perform UB bathing/dressing with SBA with use of adaptive strategies with good safety  Short Term Goal 2: patient to safely perform LB bathing/dressing with min A with use of adaptive strategies with good safety  Short Term Goal 3: patient to safely perform functional transfers with mod A with use of least restrictive device during selfcare tasks  Short Term Goal 4: patient to safety perform toileting task with mod A with use of DME as needed with good safety  Short Term Goal 5: patient to participate 30+ min of therapeutic 
nontender, nondistended, normal bowel sounds, no hepatomegaly or splenomegaly  Neurologic: Left-sided hemiparesis, left upper extremity 0/5 lower extremity,3/5 skin: No gross lesions, rashes, bruising or bleeding on exposed skin area  Extremities:  peripheral pulses palpable, no pedal edema or calf pain with palpation  t     Investigations:      Laboratory Testing:  No results found for this or any previous visit (from the past 24 hour(s)).            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)    Active Hospital Problems    Diagnosis Date Noted    Hemiplegia and hemiparesis following cerebral infarction affecting left dominant side (HCC) [I69.352] 11/19/2024       Plan:     Left-sided hemiparesis secondary to acute stroke, on aspirin Plavix, to continue for 21 days then continue with aspirin monotherapy, high intensity statin,  Hypertension, on lisinopril blood pressure has been stable  Polycythemia was followed up by oncology, phlebotomy as outpatient  Transaminitis, no abdominal symptoms, continue to monitor, will continue statin for now, will monitor in next couple of days  Neurofibromatosis    11/21  Patient seen and examined vital stable progressing with therapy  Denies any chest pain shortness of breath  Will repeat transaminitis in next couple of days.  No abdominal symptom no improvement will do further workup      11/22  Left-sided weakness from acute stroke, working with physical therapy  Heart rate blood sugar controlled  Labs reviewed and satisfactory, AST normalized, ALT downtrending      12/5  Seen and examined   Labs /meds/vitals reviewed   Working with physical therapy,  Sugars running well    12/7  Patient reports no new complaints. Engaging with physical therapy. Labs and vitals reviewed. No new issues. Continue with current care.         Consultations:   IP CONSULT TO INTERNAL 
writer russell FOLEY stockings and shoes for therapy session- no LOB  Postural Correction Exercises: static standing in // bars using mirror as visual feedback working on keeping L hip rotated fwd, keeping wt into LLE and TKE with L knee- no UE support- writer provided verbal and tactile cues as needed - 3 attempts  Standing Open/Closed Kinetic Chain Exercises: standing on LLE support provided prn by writer for safety, RLE elevated on cone [id number] ea, ball roll with RLE F/R x 5, S-S x 3-focusing on posture, wt shifting into LLE and maintaining TKE on L.  Disease-specific Exercises: PM: x-cite ID #[id number] ;Pin [id number] Worked on LLE strengthening stim to DF, HS,Quads, Hip flexors, Hip abd, and glutes on LLE only with ex .Pt positioned supine for all ex. AAROM prn by writer to increase ROM.      Activity Tolerance  Activity Tolerance: Patient tolerated treatment well    Assessment  Discharge Recommendations: Patient would benefit from continued therapy after discharge;Therapy recommended at discharge;Home with assist PRN      GOALS  Patient Goals   Patient Goals : Get left side stronger, be independent, walk again.  Short Term Goals  Time Frame for Short Term Goals: 8 to 9 days  Short Term Goal 1: Supine<>sit min A  Short Term Goal 2: Pt to dangle at EOB SBA, pt being aware of his posture, and able to maintain midline with minimal cues  Short Term Goal 3: Transfers consistantly at min a x 2  Short Term Goal 4: Gait with hemiwalker/pardeep walker, distance of 60 ft x 2, min A x 2, with ability to lift L LE to clear floor during Left swing phase > 50% of the time  Short Term Goal 5: Pt to tolerate standing with UE support for 2 to 4 minutes to work on standing balance  Short Term Goal 6: Pt able to perform 6\" steps up in // bars with R UE support, mod a x2  Short Term Goal 7: Propel w/c distance of 50 to 80 ft, CGA on level surfaces  Long Term Goals  Time Frame for Long Term Goals : By DC  Long Term Goal 1: Rolling/bed mobility 
    11/25  Blood pressure better controlled now  Patient reports no new complaints. Engaging with physical therapy. Labs and vitals reviewed. No new issues. Continue with current care.         Consultations:   IP CONSULT TO INTERNAL MEDICINE  IP CONSULT TO CASE MANAGEMENT  IP CONSULT TO NEUROLOGY  IP CONSULT TO DIETITIAN  IP CONSULT TO SOCIAL WORK  IP CONSULT TO INTERNAL MEDICINE     setting.    Jayashree Berman MD  11/25/2024  5:13 PM    Copy sent to Dr. Hinds, Bhupendra Monique MD    Please note that this chart was generated using voice recognition Dragon dictation software.  Although every effort was made to ensure the accuracy of this automated transcription, some errors in transcription may have occurred.   
    11/25  Blood pressure better controlled now  Patient reports no new complaints. Engaging with physical therapy. Labs and vitals reviewed. No new issues. Continue with current care.     11/29   Seen and examined   Labs /meds/vitals reviewed   Engaging with physical therapy. Labs and vitals reviewed. No new issues. Continue with current care.   LFTs getting better ALT 94->76      Consultations:   IP CONSULT TO INTERNAL MEDICINE  IP CONSULT TO CASE MANAGEMENT  IP CONSULT TO NEUROLOGY  IP CONSULT TO DIETITIAN  IP CONSULT TO SOCIAL WORK  IP CONSULT TO INTERNAL MEDICINE     setting.    Dee Rahman MD  11/29/2024  6:02 PM    Copy sent to Dr. Hinds, Bhupendra Monique MD    Please note that this chart was generated using voice recognition Dragon dictation software.  Although every effort was made to ensure the accuracy of this automated transcription, some errors in transcription may have occurred.   
Continue baclofen 5 mg every 8 hours as needed  Left ankle pain: Uric acid normal.  As needed Tylenol.  Hypertension: On amlodipine, lisinopril  Hyperlipidemia: On rosuvastatin  Polycythemia: Evaluated by heme-onc in acute care, planning for outpatient phlebotomy to maintain hemoglobin/hematocrit within normal limits.  History of neurofibromatosis  Tobacco use: Offered nicotine patch, declined at this time  History of retinal detachment: On carboxymethylcellulose drops, erythromycin ophthalmic ointment  Insomnia: Melatonin 3 mg nightly as needed  Bowel Management: Miralax daily, senokot prn, dulcolax prn.  DVT Prophylaxis:  low molecular weight heparin  Internal medicine for medical management      Electronically signed by Nitesh Ash MD on 11/21/2024 at 1:04 PM      This note is created with the assistance of a speech recognition program.  While intending to generate a document that actually reflects the content of the visit, the document can still have some errors including those of syntax and sound a like substitutions which may escape proof reading.  In such instances, actual meaning can be extrapolated by contextual diversion.            
Demonstration;Verbal;Printed Information/Hand-outs  Education Outcome: Verbalized understanding       12/12/24 1255   PT Individual Minutes   Time In 0931   Time Out 1026   Minutes 55             Faby Aguilar PTA, 12/12/24 at 12:56 PM         
Family to demonstrate safe technique to assist for mobility and car transfers.    PLAN OF CARE  Frequency: 1-2 treatment sessions per day, 5-7 days per week  Physical Therapy Plan  General Plan:  minutes of therapy at least 5 out of 7 days a week (1.5 hr/day, 5 to 7 days/week.)  Specific Instructions for Next Treatment: co-tx with OTR/SMITH, Transfer trianing, safety, ambulation with hemiwalker or left Platform rolling walker  Current Treatment Recommendations: Strengthening;Balance training;Functional mobility training;Transfer training;Cognitive/Perceptual training;Endurance training;Gait training;Neuromuscular re-education;Safety education & training;Patient/Caregiver education & training;Equipment evaluation, education, & procurement;Positioning;Therapeutic activities;ROM;Stair training;Wheelchair mobility training;Home exercise program;Modalities (Will use FES to faciliate neur- muscular re-education, Lite gait system to proress to upright activities and gait advancement.)  Safety Devices  Type of Devices: All fall risk precautions in place;Call light within reach;Gait belt;Left in chair;Chair alarm in place    EDUCATION  Education  Education Given To: Patient  Education Provided: Transfer Training;Mobility Training;Safety  Education Provided Comments: stair training, amb with L platform RW  Education Method: Demonstration;Verbal  Education Outcome: Continued education needed;Verbalized understanding      Therapy Time   11/23/24 0923 11/23/24 1539   PT Individual Minutes   Time In 0800 1305   Time Out 0910 1339   Minutes 70 34         Little Wyatt PTA, 11/23/24 at 3:53 PM         
Oral, Nightly PRN  carboxymethylcellulose (REFRESH PLUS) 0.5 % ophthalmic solution 1 drop, 1 drop, Both Eyes, TID  acetaminophen (TYLENOL) tablet 650 mg, 650 mg, Oral, Q4H PRN  erythromycin (ROMYCIN) ophthalmic ointment, , Right Eye, 4 times per day  amLODIPine (NORVASC) tablet 10 mg, 10 mg, Oral, Daily  polyethylene glycol (GLYCOLAX) packet 17 g, 17 g, Oral, Daily  senna (SENOKOT) tablet 17.2 mg, 2 tablet, Oral, Daily PRN  bisacodyl (DULCOLAX) suppository 10 mg, 10 mg, Rectal, Daily PRN      Impression/Plan:   Impaired ADLs, gait, and mobility due to:      Left-sided hemiparesis secondary to ischemic CVA right basal ganglia:  PT/OT for gait, mobility, strengthening, endurance, ADLs, and self care.  Dual antiplatelet therapy with aspirin and Plavix x 21 days (11/28/24), followed by aspirin monotherapy.  Offloading boots for comfort.  Spasticity: Changed baclofen 5 mg to routine every 8 hours on 11/23.  Left ankle pain: Uric acid normal.  As needed Tylenol.  Hypertension: On amlodipine, lisinopril  Hyperlipidemia: On rosuvastatin  Polycythemia: Evaluated by heme-onc in acute care, planning for outpatient phlebotomy to maintain hemoglobin/hematocrit within normal limits.  Continue to monitor  Neurofibromatosis: diffusely scattered lesions. Prn Tylenol, BenGay. Will add K pad prn for heat modality.   Tobacco use: Offered nicotine patch, declined at this time  History of retinal detachment: On carboxymethylcellulose drops, erythromycin ophthalmic ointment  Insomnia: Melatonin 3 mg nightly as needed - increased to 6 mg prn on 11/24.   Bowel Management: Miralax daily, senokot prn, dulcolax prn.  DVT Prophylaxis:  low molecular weight heparin  Internal medicine for medical management  Follow ups: Neurology, PCP, PM&R, Hem/Onc      Electronically signed by RUBY BARTH MD on 11/24/2024 at 12:32 PM      This note is created with the assistance of a speech recognition program.  While intending to generate a document that 
feeding/grooming tasks with modified independence with use of adaptive strategies/AE as needed.  Additional Goals?: Yes  Long Term Goal 6: Pt will be educated regarding L UE HEP including self ROM/ LUE shoulder mobility and demonstrate ability to complete with proper body mechanics by discharge.  Long Term Goal 7: patient to verbalize/demonstrate good understanding of AE/adaptive strategies/DME to increase independence with selfcare tasks  Long Term Goal 8: OT to further assess FMC/ strength as able and notify OTR to update goals    Plan  Occupational Therapy Plan  Times Per Week: 5-7x  Times Per Day: Twice a day  Current Treatment Recommendations: Strengthening, ROM, Balance training, Functional mobility training, Endurance training, Wheelchair mobility training, Neuromuscular re-education, Cognitive reorientation, Pain management, Safety education & training, Patient/Caregiver education & training, Equipment evaluation, education, & procurement, Modalities, Self-Care / ADL, Group Therapy, Coordination training, Positioning         12/02/24 1002   OT Individual Minutes   Time In 1002   Time Out 1110   Minutes 68   Minute Variance   Variance 22   Reason Refusal  (Pt politely declines this afternoon due to fatigue. Pt has met therapy minutes for the week.)         Electronically signed by GALI Pedroza on 12/2/24 at 3:35 PM EST    
hr/day, 5 to 7 days/week.)  Current Treatment Recommendations: Strengthening;Balance training;Functional mobility training;Transfer training;Cognitive/Perceptual training;Endurance training;Gait training;Neuromuscular re-education;Safety education & training;Patient/Caregiver education & training;Equipment evaluation, education, & procurement;Positioning;Therapeutic activities;ROM;Stair training;Wheelchair mobility training;Home exercise program;Modalities (Will use FES to faciliate neur- muscular re-education, Lite gait system to proress to upright activities and gait advancement.)  Safety Devices  Type of Devices: All fall risk precautions in place;Call light within reach;Gait belt;Patient at risk for falls;Chair alarm in place;Left in chair    EDUCATION  Education  Education Given To: Patient  Education Provided: Precautions;Safety;Transfer Training;Fall Prevention Strategies  Education Method: Demonstration;Verbal  Education Outcome: Continued education needed;Verbalized understanding       11/22/24 1217 11/22/24 1604   PT Individual Minutes   Time In 1103 1330   Time Out 1206 1410   Minutes 63 40             Faby Aguilar PTA, 11/22/24 at 4:04 PM         
issues. Continue with current care.         Consultations:   IP CONSULT TO INTERNAL MEDICINE  IP CONSULT TO CASE MANAGEMENT  IP CONSULT TO NEUROLOGY  IP CONSULT TO DIETITIAN  IP CONSULT TO SOCIAL WORK  IP CONSULT TO INTERNAL MEDICINE     setting.    Jayashree Berman MD  12/7/2024  6:41 PM    Copy sent to Dr. Hinds, Bhupendra Monique MD    Please note that this chart was generated using voice recognition Dragon dictation software.  Although every effort was made to ensure the accuracy of this automated transcription, some errors in transcription may have occurred.   
movement in LUE.  Short Term Goal 8: Pt will demonstrate improved awareness of L UE as demonstrated by ability to maintain safe positioning of L hand and wrist during ADL routine without assistance.    Long Term Goals  Time Frame for Long Term Goals : By discharge  Long Term Goal 1: patient to safely perform UB dressing/bathing with modifed independence with use of adaptive strategies and good safety  Long Term Goal 2: patient to safely perform LB dressing/bathing with CGA with use of AE/adaptive strategies with good safety  Long Term Goal 3: patient to safely perform functional transfers/mobility with CGA with use of least restrictive device during selfcare task  Long Term Goal 4: patient to perform tub transfer with CGA with use of DME as needed with good safety  Long Term Goal 5: patient to safely perform self feeding/grooming tasks with modified independence with use of adaptive strategies/AE as needed.  Additional Goals?: Yes  Long Term Goal 6: Pt will be educated regarding L UE HEP including self ROM/ LUE shoulder mobility and demonstrate ability to complete with proper body mechanics by discharge.  Long Term Goal 7: patient to verbalize/demonstrate good understanding of AE/adaptive strategies/DME to increase independence with selfcare tasks  Long Term Goal 8: OT to further assess FMC/ strength as able and notify OTR to update goals    Plan  Occupational Therapy Plan  Times Per Week: 5-7x  Times Per Day: Twice a day  Current Treatment Recommendations: Strengthening, ROM, Balance training, Functional mobility training, Endurance training, Wheelchair mobility training, Neuromuscular re-education, Cognitive reorientation, Pain management, Safety education & training, Patient/Caregiver education & training, Equipment evaluation, education, & procurement, Modalities, Self-Care / ADL, Group Therapy, Coordination training, Positioning       12/11/24 0750 12/11/24 1300   OT Individual Minutes   Time In 1105 1300 
secondary to acute stroke, on aspirin Plavix, to continue for 21 days then continue with aspirin monotherapy, high intensity statin,  Hypertension, on lisinopril blood pressure has been stable  Polycythemia was followed up by oncology, phlebotomy as outpatient  Transaminitis, no abdominal symptoms, continue to monitor, will continue statin for now, will monitor in next couple of days  Neurofibromatosis    11/21  Patient seen and examined vital stable progressing with therapy  Denies any chest pain shortness of breath  Will repeat transaminitis in next couple of days.  No abdominal symptom no improvement will do further workup      11/22  Left-sided weakness from acute stroke, working with physical therapy  Heart rate blood sugar controlled  Labs reviewed and satisfactory, AST normalized, ALT downtrending      12/5  Seen and examined   Labs /meds/vitals reviewed   Working with physical therapy,  Sugars running well    12/10  Patient reports no new complaints. Engaging with physical therapy. Labs and vitals reviewed. No new issues. Continue with current care.     12/11  Patient reports no new complaints. Engaging with physical therapy. Labs and vitals reviewed. No new issues. Continue with current care.   CBC results reviewed and satisfactory.           Consultations:   IP CONSULT TO INTERNAL MEDICINE  IP CONSULT TO CASE MANAGEMENT  IP CONSULT TO NEUROLOGY  IP CONSULT TO DIETITIAN  IP CONSULT TO SOCIAL WORK  IP CONSULT TO INTERNAL MEDICINE  INPATIENT CONSULT TO ORTHOTIST/PROSTHETIST     setting.    Jayashree Berman MD  12/11/2024  3:12 PM    Copy sent to Dr. Hinds, Bhupendra Monique MD    Please note that this chart was generated using voice recognition Dragon dictation software.  Although every effort was made to ensure the accuracy of this automated transcription, some errors in transcription may have occurred.   
training;Functional mobility training;Transfer training;Cognitive/Perceptual training;Endurance training;Gait training;Neuromuscular re-education;Safety education & training;Patient/Caregiver education & training;Equipment evaluation, education, & procurement;Positioning;Therapeutic activities;ROM;Stair training;Wheelchair mobility training;Home exercise program;Modalities  Safety Devices  Type of Devices: All fall risk precautions in place;Call light within reach;Left in bed    EDUCATION  Education  Education Given To: Patient  Education Provided: Safety;Transfer Training;Mobility Training;Role of Therapy;Plan of Care;Energy Conservation;Fall Prevention Strategies  Education Method: Demonstration;Verbal  Education Outcome: Continued education needed       12/10/24 1236 12/10/24 1501   PT Individual Minutes   Time In 0801 1404   Time Out 0942 1427   Minutes 101 23             Faby Aguilar PTA, 12/10/24 at 3:02 PM         
with good safety  Short Term Goal 3: patient to safely perform functional transfers with mod A with use of least restrictive device during selfcare tasks  Short Term Goal 4: patient to safety perform toileting task with mod A with use of DME as needed with good safety  Short Term Goal 5: patient to participate 30+ min of therapeutic exercise/functional activity to increase independence with selfcare tasks  Short Term Goal 6: patient to tolerate 5+ min with CGA during functional activity of choice to increase independence/safety with selfcare task  Short Term Goal 7: Pt will participate in neuromuscular re-education activities (weight bearing, e-stim, SROM/PROM) as tolerated, to facilitate movement in LUE.  Short Term Goal 8: Pt will demonstrate improved awareness of L UE as demonstrated by ability to maintain safe positioning of L hand and wrist during ADL routine without assistance.    Long Term Goals  Time Frame for Long Term Goals : By discharge  Long Term Goal 1: patient to safely perform UB dressing/bathing with modifed independence with use of adaptive strategies and good safety  Long Term Goal 2: patient to safely perform LB dressing/bathing with CGA with use of AE/adaptive strategies with good safety  Long Term Goal 3: patient to safely perform functional transfers/mobility with CGA with use of least restrictive device during selfcare task  Long Term Goal 4: patient to perform tub transfer with CGA with use of DME as needed with good safety  Long Term Goal 5: patient to safely perform self feeding/grooming tasks with modified independence with use of adaptive strategies/AE as needed.  Long Term Goal 6: Pt will be educated regarding L UE HEP including self ROM/ LUE shoulder mobility and demonstrate ability to complete with proper body mechanics by discharge.  Long Term Goal 7: patient to verbalize/demonstrate good understanding of AE/adaptive strategies/DME to increase independence with selfcare tasks  Long 
with improvement.   Bowel Management: Miralax daily, senokot prn, dulcolax prn.  DVT Prophylaxis:  low molecular weight heparin  Internal medicine for medical management  Follow ups: Neurology, PCP, PM&R, Hem/Onc      Electronically signed by Svetlana Henriquez MD on 11/30/2024 at 8:02 PM      
Positioning       11/28/24 0729   OT Individual Minutes   Time In 0932   Time Out 1102   Minutes 90       Electronically signed by GALI Parish on 11/28/24 at 11:33 AM EST   
Prophylaxis:  low molecular weight heparin  Internal medicine for medical management  Follow ups: Neurology, PCP, PM&R, Hem/Onc      Electronically signed by Svetlana Henriquez MD on 12/1/2024 at 5:54 PM      
ankle pain: Uric acid normal.  As needed Tylenol.  Hypertension: Continue amlodipine, losartan  Hyperlipidemia: Continue rosuvastatin  Polycythemia: Evaluated by heme-onc in acute care, planning for outpatient phlebotomy to maintain hemoglobin/hematocrit within normal limits.  Continue to monitor  Neurofibromatosis: diffusely scattered lesions. Prn Tylenol, BenGay.  K pad prn for heat modality.   Tobacco use: Offered nicotine patch, declined at this time  History of retinal detachment: On carboxymethylcellulose drops, erythromycin ophthalmic ointment  Insomnia: Melatonin 3 mg nightly as needed - increased to 6 mg prn on 11/24.   Bowel Management: Miralax daily, senokot prn, dulcolax prn.  DVT Prophylaxis:  low molecular weight heparin  Internal medicine for medical management  Follow ups: Neurology, PCP, PM&R, Hem/Onc  Discussed estimated discharge date of 12/12/24 at team meeting      Electronically signed by Svetlana Henriquez MD on 11/26/2024 at 1:38 PM      
muscle spasms at night - helping thus far.  He did received an extra 5-mg dose of baclofen last night - will continue to monitor.  Left ankle pain: Uric acid normal.  As needed Tylenol.  Hypertension: Continue amlodipine, losartan  Hyperlipidemia: Continue rosuvastatin  Polycythemia: Hemoglobin stable.  Evaluated by heme-onc in acute care, planning for outpatient phlebotomy to maintain hemoglobin/hematocrit within normal limits.  Continue to monitor  Neurofibromatosis: diffusely scattered lesions. Prn Tylenol, BenGay.  K pad prn for heat modality.   Tobacco use: Offered nicotine patch, declined at this time  History of retinal detachment: On carboxymethylcellulose drops, erythromycin ophthalmic ointment  Insomnia: Melatonin 3 mg nightly as needed - increased to 6 mg prn on 11/24.   Bowel Management: Miralax daily, senokot prn, dulcolax prn.  DVT Prophylaxis:  low molecular weight heparin  Internal medicine for medical management  Follow ups: Neurology, PCP, PM&R, Hem/Onc      Electronically signed by Svetlana Henriquez MD on 11/27/2024 at 10:02 PM      
outpatient phlebotomy to maintain hemoglobin/hematocrit within normal limits.  Internal medicine following - monitoring with Hb/HCT, RBCs and platelets WNL on 12/4.  Neurofibromatosis: diffusely scattered lesions. Pain management with prn Tylenol, BenGay. Lidoderm patch for L neck pain started 11/30. K pad prn for heat modality.   Chronic L shoulder and neck pain: responding to BenGay and prn Tylenol - will monitor.  Tobacco use: Offered nicotine patch, patient declined  History of retinal detachment: On carboxymethylcellulose drops, erythromycin ophthalmic ointment  Insomnia: Melatonin 3 mg nightly as needed - increased to 6 mg prn on 11/24.   Bowel Management: Miralax daily, senokot prn, dulcolax prn.  DVT Prophylaxis:  low molecular weight heparin  Internal medicine for medical management  Follow ups: Neurology, PCP, PM&R, Hem/Onc        FACE TO FACE EVALUATION    Patient is seen in face to face evaluation today and will require the following durable medical equipment     BATH/SHOWER SEAT MISC    Tub transfer bench and toilet riser    Weight: Weight - Scale: 75 kg (165 lb 5.5 oz)  Diagnosis: Stroke with L hemiparesis  Duration: Purchase     Electronically signed by RUBY BARTH MD on 12/10/2024 at 9:55 AM      This note is created with the assistance of a speech recognition program.  While intending to generate a document that actually reflects the content of the visit, the document can still have some errors including those of syntax and sound a like substitutions which may escape proof reading.  In such instances, actual meaning can be extrapolated by contextual diversion.            
to demonstrate safe technique to assist for mobility and car transfers.    PLAN OF CARE  Physical Therapy Plan  General Plan:  minutes of therapy at least 5 out of 7 days a week (1.5 hr/day, 5 to 7 days/week.)  Current Treatment Recommendations: Strengthening;Balance training;Functional mobility training;Transfer training;Cognitive/Perceptual training;Endurance training;Gait training;Neuromuscular re-education;Safety education & training;Patient/Caregiver education & training;Equipment evaluation, education, & procurement;Positioning;Therapeutic activities;ROM;Stair training;Wheelchair mobility training;Home exercise program;Modalities (Will use FES to faciliate neur- muscular re-education, Lite gait system to proress to upright activities and gait advancement.)  Safety Devices  Type of Devices: All fall risk precautions in place;Call light within reach;Gait belt;Left in chair;Chair alarm in place;Bed alarm in place;Left in bed    EDUCATION  Education  Education Given To: Patient  Education Provided: Transfer Training;Mobility Training;Safety  Education Method: Demonstration;Verbal  Education Outcome: Verbalized understanding;Demonstrated understanding;Continued education needed     11/29/24 1210 11/29/24 1525   PT Individual Minutes   Time In 0759 1331   Time Out 0932 1404   Minutes 93 33         Faby Aguilar PTA, 11/29/24 at 3:26 PM         
transcription, some errors in transcription may have occurred.   
trying to facilitate motor return.For home evaluation with therapy on Monday.  Spasticity and spasms: Worse in L leg. Improved on increased baclofen to 10 mg TID 12/5 - will titrate to effective dose as needed and tolerated.  Left ankle pain: Uric acid normal.  As needed Tylenol.  Hypertension: On amlodipine, lisinopril  Hyperlipidemia: On rosuvastatin  Polycythemia: Evaluated by heme-onc in acute care, planning for outpatient phlebotomy to maintain hemoglobin/hematocrit within normal limits.  Internal medicine following - monitoring with Hb/HCT, RBCs and platelets WNL on 12/4.  Neurofibromatosis: diffusely scattered lesions. Pain management with prn Tylenol, BenGay. Lidoderm patch for L neck pain started 11/30. K pad prn for heat modality.   Chronic L shoulder and neck pain: responding to BenGay and prn Tylenol - will monitor.  Tobacco use: Offered nicotine patch, patient declined  History of retinal detachment: On carboxymethylcellulose drops, erythromycin ophthalmic ointment  Insomnia: Melatonin 3 mg nightly as needed - increased to 6 mg prn on 11/24.   Bowel Management: Miralax daily, senokot prn, dulcolax prn.  DVT Prophylaxis:  low molecular weight heparin  Internal medicine for medical management  Follow ups: Neurology, PCP, PM&R, Hem/Onc        Electronically signed by RUBY BARTH MD on 12/7/2024 at 12:18 PM      This note is created with the assistance of a speech recognition program.  While intending to generate a document that actually reflects the content of the visit, the document can still have some errors including those of syntax and sound a like substitutions which may escape proof reading.  In such instances, actual meaning can be extrapolated by contextual diversion.            
heparin  Internal medicine for medical management  Follow ups: Neurology, PCP, PM&R, Hem/Onc        Electronically signed by RUBY BARTH MD on 12/6/2024 at 4:09 PM      This note is created with the assistance of a speech recognition program.  While intending to generate a document that actually reflects the content of the visit, the document can still have some errors including those of syntax and sound a like substitutions which may escape proof reading.  In such instances, actual meaning can be extrapolated by contextual diversion.

## 2024-12-13 ENCOUNTER — CARE COORDINATION (OUTPATIENT)
Dept: CARE COORDINATION | Age: 69
End: 2024-12-13

## 2024-12-13 DIAGNOSIS — I63.9 CEREBROVASCULAR ACCIDENT (CVA), UNSPECIFIED MECHANISM (HCC): Primary | ICD-10-CM

## 2024-12-13 PROCEDURE — 1111F DSCHRG MED/CURRENT MED MERGE: CPT | Performed by: FAMILY MEDICINE

## 2024-12-13 NOTE — CARE COORDINATION
prescriptions and are they filled?: Yes  Have you been contacted by a [facility] Pharmacist?: No  Have you scheduled your follow up appointment?: Yes  How are you going to get to your appointment?: Car - family or friend to transport  Do you feel like you have everything you need to keep you well at home?: Yes  Care Transitions Interventions          Follow Up Appointment:   Discussed follow up appointments. Patient has hospital follow up appointment scheduled within 7 days of discharge.   Future Appointments         Provider Specialty Dept Phone    12/16/2024 2:30 PM Maria Dolores Bass APRN - NP Family Medicine [phone]    1/22/2025 4:00 PM Nitesh Ash MD Physical Medicine and Rehab [phone]    2/4/2025 1:30 PM Bhupendra Hinds MD Family Medicine [phone]    2/17/2025 11:00 AM Coretta Garcia PA Neurology [phone]            Care Transition Nurse provided contact information.  Plan for follow-up call in 6-10 days based on severity of symptoms and risk factors.  Plan for next call:  How is therapy going? How are blood pressures doing? Any new CVA symptoms?       Lu Teague RN

## 2024-12-16 ENCOUNTER — OFFICE VISIT (OUTPATIENT)
Dept: FAMILY MEDICINE CLINIC | Age: 69
End: 2024-12-16

## 2024-12-16 VITALS
RESPIRATION RATE: 16 BRPM | TEMPERATURE: 97.7 F | HEART RATE: 84 BPM | OXYGEN SATURATION: 95 % | SYSTOLIC BLOOD PRESSURE: 110 MMHG | DIASTOLIC BLOOD PRESSURE: 70 MMHG

## 2024-12-16 DIAGNOSIS — D75.1 POLYCYTHEMIA: ICD-10-CM

## 2024-12-16 DIAGNOSIS — E78.2 MIXED HYPERLIPIDEMIA: ICD-10-CM

## 2024-12-16 DIAGNOSIS — I10 PRIMARY HYPERTENSION: ICD-10-CM

## 2024-12-16 DIAGNOSIS — I69.352 HEMIPLEGIA AND HEMIPARESIS FOLLOWING CEREBRAL INFARCTION AFFECTING LEFT DOMINANT SIDE (HCC): ICD-10-CM

## 2024-12-16 DIAGNOSIS — I63.02 CEREBROVASCULAR ACCIDENT (CVA) DUE TO THROMBOSIS OF BASILAR ARTERY (HCC): Primary | ICD-10-CM

## 2024-12-16 DIAGNOSIS — Z09 HOSPITAL DISCHARGE FOLLOW-UP: ICD-10-CM

## 2024-12-16 RX ORDER — DOCUSATE SODIUM 100 MG/1
100 CAPSULE, LIQUID FILLED ORAL 2 TIMES DAILY
COMMUNITY

## 2024-12-16 RX ORDER — OMEGA-3 FATTY ACIDS/FISH OIL 300-1000MG
CAPSULE ORAL
COMMUNITY

## 2024-12-16 ASSESSMENT — PATIENT HEALTH QUESTIONNAIRE - PHQ9
1. LITTLE INTEREST OR PLEASURE IN DOING THINGS: NOT AT ALL
SUM OF ALL RESPONSES TO PHQ QUESTIONS 1-9: 0
2. FEELING DOWN, DEPRESSED OR HOPELESS: NOT AT ALL
SUM OF ALL RESPONSES TO PHQ QUESTIONS 1-9: 0
SUM OF ALL RESPONSES TO PHQ9 QUESTIONS 1 & 2: 0

## 2024-12-16 NOTE — PROGRESS NOTES
Post-Discharge Transitional Care  Follow Up      Nitesh Ren   YOB: 1955    Date of Office Visit:  12/16/2024  Date of Hospital Admission: 11/19/24  Date of Hospital Discharge: 12/12/24  Risk of hospital readmission (high >=14%. 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 Continue current therapy and follow up with neurology as scheduled     Flaccid hemiplegia of left dominant side as late effect of cerebral infarction (HCC)  - unstable. Continue home health PT/OT and follow up with physical medicine as scheduled     Primary hypertension  - stable. Continue current bp therapy     Hyperlipidemia   - stable. Continue statin therapy     Polycythemia  - unstable. Follow up with hematology as scheduled     Hospital discharge follow-up  -     [facility] DISCHARGE MEDS RECONCILED W/ CURRENT OUTPATIENT MED LIST      Medical Decision Making: high complexity  Return for as scheduled .           Subjective:   HPI:  Follow up of Hospital problems/diagnosis(es):     69 year old male presents for follow up s/p hospital discharge for CVA, hemiplegia HTN polycythemia. Pt was initially admitted 11/7 for left-sided weakness and blood tests were unremarkable. CT /CTA /MRI brain showed  2 small areas of restricted diffusion in the right basal ganglia consistent with acute areas of infarct. Reports his symptoms were worsening on 11/11 and CT head showed Slight enlargement of acute infarct in the right frontal corona radiata resulting left hemiplegia. Currently pt was placed on norvasc, losartan and crestor and discharged to rehab. Pt went to

## 2024-12-17 PROBLEM — I10 PRIMARY HYPERTENSION: Status: ACTIVE | Noted: 2024-12-17

## 2024-12-17 PROBLEM — E78.2 MIXED HYPERLIPIDEMIA: Status: ACTIVE | Noted: 2024-12-17

## 2024-12-18 ENCOUNTER — CARE COORDINATION (OUTPATIENT)
Dept: CARE COORDINATION | Age: 69
End: 2024-12-18

## 2024-12-18 NOTE — CARE COORDINATION
Care Transitions Note    Follow Up Call     Attempted to reach patient for transitions of care follow up.  Unable to reach patient.      Outreach Attempts:   HIPAA compliant voicemail left for patient.     Care Summary Note: 1st attempt.    Follow Up Appointment:   Future Appointments         Provider Specialty Dept Phone    1/22/2025 4:00 PM Nitesh Ash MD Physical Medicine and Rehab [phone]    2/4/2025 1:30 PM Bhupendra Hinds MD Family Medicine [phone]    2/17/2025 11:00 AM Coretta Garcia PA Neurology [phone]            Plan for follow-up call in 2-5 days based on severity of symptoms and risk factors. Plan for next call:   How is therapy going? How are blood pressures doing? Any new CVA symptoms?     Nesha Dodge LPN

## 2024-12-18 NOTE — CARE COORDINATION
Writer received return voicemail from patient-attempted to return call and had to leave another .

## 2024-12-20 ENCOUNTER — CARE COORDINATION (OUTPATIENT)
Dept: CARE COORDINATION | Age: 69
End: 2024-12-20

## 2024-12-20 NOTE — CARE COORDINATION
Care Transitions Note    Follow Up Call     Patient Current Location:  Home: [address]  [facility] [address]    Care Transition Nurse contacted the patient by telephone. Verified name and  as identifiers.    Additional needs identified to be addressed with provider   No needs identified                 Method of communication with provider: none.    Care Summary Note: Spoke with patient for transitional follow up call. Patient being followed for CVA, he discharged to [facility] for additional rehab/therapy and then to home with home care.  He feels he is doing ok this week, denies having any new neurological deficits since last outreach.  He did have his hospital follow up with PCP, notes reviewed.  Patient has clear speech today, continues to work with therapy in the home. He has a floater in his visual field, this has been present since coming home.  No new visual deficits, blurry vision, headaches.  Left arm is flaccid, he continues to work with PT on this. He can get around the home with a cane.  He will be following up next with neurology that is scheduled for next month. They deny any needs or concerns before the weekend. Expressed will reach out the week after next for follow up but if he has any needs, issues or concerns can feel free to reach out next week.     Plan of care updates since last contact:  none       Advance Care Planning:   Does patient have an Advance Directive: reviewed during previous call, see note. .    Medication Review:  No changes since last call.     Remote Patient Monitoring:  Offered patient enrollment in the Remote Patient Monitoring (RPM) program for in-home monitoring: Patient is not eligible for RPM program because: patient does not have qualifying diagnosis.    Assessments:  Care Transitions Subsequent and Final Call    Schedule Follow Up Appointment with PCP: Completed  Subsequent and Final Calls  Do you have any ongoing symptoms?: Yes  Onset of Patient-reported symptoms: In the

## 2024-12-27 ENCOUNTER — TELEPHONE (OUTPATIENT)
Dept: FAMILY MEDICINE CLINIC | Age: 69
End: 2024-12-27

## 2024-12-27 NOTE — TELEPHONE ENCOUNTER
Just called and spoke with patient. He absolutely refuses to go to ER just to get an xr and be evaluated. States he feels completely fine. Other than some bruising. He is having no sob, no trouble moving, no broken bones, and feels much better than 2 days ago then when it happened.     States he will go to er over the weekend if any new symptoms arise or if he falls again to be evaluated.

## 2024-12-27 NOTE — TELEPHONE ENCOUNTER
Pt fell on [place] about 4 pm , pt denies hitting his head, but has swelling and bruising on his left side rib cage. Family witnessed but could not get to him in time to grab him    Pt refused to go to ED to be evaluated. States his pain is a 3/10 when PTA was there last night aroung 4 pm.     She was calling to let you know.

## 2025-01-02 ENCOUNTER — TELEPHONE (OUTPATIENT)
Dept: PHYSICAL MEDICINE AND REHAB | Age: 70
End: 2025-01-02

## 2025-01-02 NOTE — TELEPHONE ENCOUNTER
FYI:   Jacqueline Ren called and asked if patient Nitesh could be sent to SNF because he is not progressing as well at home with therapies.She thinks he is not working at it very hard and that perhaps if he was in a different setting he would respond better and work at it more.  [facility] was d[facility]c from [facility] on [facility]. [facility] was doing PT/OT but they need auth for more visits now so there is a pause in treatment.   Nitesh also fell at home on [place] Day. Looks like they called PCP ( Dr. Bass) on 12/27 but patient didn't want to go to ER.   Jacqueline was interested in patient going to [facility] and she was waiting for a call back from them. I explained to Jacqueline that a SNF would not accept a patient direct from home. She asked if he could come back to rehab and I told her that he cannot be admitted direct from home there either. She would have to take him to ER and discuss his options from there.   Nitesh is seeing Dr. Ash in follow up on 1/22.    If you have any other feedback or options for patient, please let me know and I will pass along to family. Otherwise, can address further at follow up.    Thank you.

## 2025-01-03 ENCOUNTER — CARE COORDINATION (OUTPATIENT)
Dept: CARE COORDINATION | Age: 70
End: 2025-01-03

## 2025-01-03 NOTE — CARE COORDINATION
Care Transitions Note    Follow Up Call     Patient Current Location:  Home: [address]  [place] [id number]    [facility] Care Coordinator contacted the patient by telephone. Verified name and  as identifiers.    Additional needs identified to be addressed with provider   No needs identified                 Method of communication with provider: none.    Care Summary Note: Writer spoke to Nitesh for follow up transitional care call. He stated that his  Lt arm has more movement. He continues to ambulate with cane. He stated that PT is on hold for now. OT will be out this AM he stated he has a few more visits/showers with them. He denied any more falls since fast. He stated he has soreness to chest pain not trouble breathing and some soreness to  LT shoulder swelling is down. He stated he did not go to the ER because he is fine and nothing is broken. He does not have anything new going on vision is the same. Appetite fine. B/B wnl. He voiced no other needs/concerns at this time.      Plan of care updates since last contact:  Discussed recent fall no new needs       Advance Care Planning:   Does patient have an Advance Directive: reviewed during previous call, see note. .    Medication Review:  No changes since last call.     Remote Patient Monitoring:  Offered patient enrollment in the Remote Patient Monitoring (RPM) program for in-home monitoring: Yes, but did not enroll at this time: not discussed .    Assessments:  Care Transitions 24 Hour Call    Do you have all of your prescriptions and are they filled?: Yes  Post Acute Services: Home Health  Care Transitions Interventions          Follow Up Appointment:   Reviewed upcoming appointment(s).  Future Appointments         Provider Specialty Dept Phone    2025 4:00 PM Nitesh Ash MD Physical Medicine and Rehab [phone]    2025 1:30 PM Bhupendra Hinds MD Family Medicine [phone]    2025 11:00 AM Coretta Garcia PA Neurology

## 2025-01-08 ENCOUNTER — TELEPHONE (OUTPATIENT)
Dept: PHYSICAL MEDICINE AND REHAB | Age: 70
End: 2025-01-08

## 2025-01-08 DIAGNOSIS — I69.352 HEMIPLEGIA AND HEMIPARESIS FOLLOWING CEREBRAL INFARCTION AFFECTING LEFT DOMINANT SIDE (HCC): Primary | ICD-10-CM

## 2025-01-08 NOTE — TELEPHONE ENCOUNTER
Jacqueline, Nitesh's sister in law, called and is requesting outpatient PT/OT at [facility].   [facility]h was at [facility] following CVA - d/c on 12/12/24   He has follow up with you on 1/22  He has been doing home therapy but wants to do outpatient now.     They mentioned going to [facility] [place] but I told them [facility] has a location there and they were good with that.     Thank you.     Doesn't look like there is OT referral available to [facility] in [facility]. I entered it for [facility]'s location for OT.   I will call [facility] location and see if they offer OT just to be sure.

## 2025-01-10 ENCOUNTER — CARE COORDINATION (OUTPATIENT)
Dept: CARE COORDINATION | Age: 70
End: 2025-01-10

## 2025-01-10 NOTE — CARE COORDINATION
Care Transitions Note    Final Call     Patient Current Location:  Home: [address] Patel  [place] [id number]    [facility] Care Coordinator contacted the patient by telephone. Verified name and  as identifiers.    Patient graduated from the Care Transitions program on 1/10/25.  Patient/family progressing towards self management. .      Advance Care Planning:   Does patient have an Advance Directive: reviewed during previous call, see note. .    Handoff:   Patient was not referred to the ACM team due to no additional needs identified.       Care Summary Note: Writer spoke to Nitesh for transitional care follow he stated that he is doing. He is speaking in a clear sentences. He stated he received a referral for out patient PT and will be starting that soon. He stated he is not seeing floaters as much last vision appointment and new glasses was in . Reminded him to make follow up appointment if he has new or concerning issues with eyes. He stated that swelling is down in Lt arm. He denies any new falls. Writer reviewed upcoming appointment to pt on  rehab follow up he stated that's new writer advised appointment in [facility] writer then heard someone in the background stating appointment date. He stated that is my MACKENZIE. She is currently making breakfast. He voiced no other needs or concerns and is okay with ending transitional care call. He does agree to reach out to  providers if he has any new needs or concerns. Thanked writer for calling.     Assessments:  Care Transitions Subsequent and Final Call    Subsequent and Final Calls  Do you have any ongoing symptoms?: No  Have your medications changed?: No  Do you have any questions related to your medications?: No  Do you currently have any active services?: Yes  Are you currently active with any services?: Home Health, Outpatient/Community Services  Care Transitions Interventions  Other Interventions:              Upcoming Appointments:    Future Appointments          no

## 2025-01-20 ENCOUNTER — HOSPITAL ENCOUNTER (OUTPATIENT)
Age: 70
Setting detail: THERAPIES SERIES
Discharge: HOME | End: 2025-01-20
Attending: GENERAL PRACTICE
Payer: MEDICARE

## 2025-01-20 PROCEDURE — 97162 PT EVAL MOD COMPLEX 30 MIN: CPT

## 2025-01-20 PROCEDURE — 97166 OT EVAL MOD COMPLEX 45 MIN: CPT

## 2025-01-20 NOTE — FLOWSHEET NOTE
far enough ahead that the heel of your forward foot is ahead of the  toes of the other foot. (To score 3 points, the length of the step should exceed  the length of the other foot and the width of the stance should approximate the  subject's normal stride width).  (4) able to place foot tandem independently and hold 30 seconds  (3) able to place foot ahead of other independently and hold 30 seconds  (2) able to take small step independently and hold 30 seconds  (1) needs help to step but can hold 15 seconds  (0) loses balance while stepping or standing  Score: 0    14. STANDING ON ONE LEG  INSTRUCTIONS: Stand on one leg as long as you can without holding.  (4) able to lift leg independently and hold >10 seconds  (3) able to lift leg independently and hold 5-10 seconds  (2) able to lift leg independently and hold = or >3 seconds  (1) tries to lift leg unable to hold 3 seconds but remains standing  independently  (0) unable to try or needs assist to prevent fall  Score:  1    Total Score:  27/56  Max score 56,a person scoring below 45 is considered to be at risk for falling.    Completed by: David Hawkins, PT

## 2025-01-20 NOTE — CONSULTS
[x] [facility]  Outpatient Rehabilitation &  Therapy  [address]  [phone]  [phone] [] [facility]  Outpatient Rehabilitation &  Therapy  [address]  P: [phone]  F: [phone] [] [facility]  Outpatient Rehabilitation &  Therapy  [address]  P: [phone]  F: [phone] [] [facility]  Outpatient Rehabilitation &  Therapy  [address]  [phone]  [phone] [] [facility]  Outpatient Rehabilitation &  Therapy  [address]   P: [phone]  F: [phone]  [] [facility]  Outpatient Rehabilitation &  Therapy  [address]  P: [phone]  F: [phone] [] [facility]  Outpatient Rehabilitation &  Therapy  [address]  P: [phone]  F: [phone] [] [facility]  Outpatient Rehabilitation &  Therapy  [address]  P: [phone]  F: [phone] [] [facility]  Outpatient Rehabilitation &  Therapy  [address]  P: [phone]  F: [phone]  [] [facility] Outpatient Rehabilitation &  Therapy  [address]  P: [phone]  F: [phone]       Occupational Therapy Neurological Evaluation    Date:  2025  Patient: Nitesh Escotoanthony  : 1955  MRN: [id number]  Physician: Nitesh Ash MD   Insurance: [employer] Premier HMO-POS -  BMN  Medical Diagnosis: hemiplegia and hemiparesis following cerebral infarction affecting left dominant side I69.352   Rehab Codes: pain in shoulder M25.51,, pain in wrist M25.53,, stiffness in shoulder M25.61,, stiffness in elbow M25.62,, stiffness in hand M25.64,, stiffness in wrist M25.63,, lack of coordination R27.8,, fine motor skills loss R29.818,, or muscle weakness generalized M62.81,  Next 's appt.: 25  Date of symptom

## 2025-01-20 NOTE — CONSULTS
Seizures  [] Aphasia  [] Fatigue  [] Headache/migraine  [] Mood disorder  [] Other:       Prior level of function Independent with all self care activities, living independently, driving    Current functional deficits reported Walking using hemicane household distances with difficulty occasionally, uses manual wheelchair at home as well for mobility independently on tile but difficulty on carpet, uses urinal seated, difficulty standing and completing UE tasks without holding on, no walking in community, difficulty dressing, independent with car transfers, not living at home and having to live with brother and sister in law, having assistance for keeping home outside (snow blowing, etc)           Tests: [] X-Ray: [x] MRI: brain MRI:  Result Date: 11/7/2024  EXAMINATION: MRI OF THE BRAIN WITHOUT CONTRAST  11/7/2024 1:21 pm TECHNIQUE: Multiplanar multisequence MRI of the brain was performed without the administration of intravenous contrast. COMPARISON: None. HISTORY: ORDERING SYSTEM PROVIDED HISTORY: left sided weakness TECHNOLOGIST PROVIDED HISTORY: left sided weakness Decision Support Exception - unselect if not a suspected or confirmed emergency medical condition->Emergency Medical Condition (MA) Reason for Exam: left sided weakness Additional signs and symptoms: stroke alert Initial evaluation FINDINGS: INTRACRANIAL STRUCTURES/VENTRICLES: There are 2 small areas of restricted diffusion in the right basal ganglia consistent with acute areas of infarct in the right middle cerebral artery territory.  No other areas of restricted diffusion are identified.   The ventricles and cisternal spaces are prominent consistent with cerebral atrophy. There are several punctate areas of high signal in the periventricular white matter and centrum semiovale  that are likely related to chronic small vessel ischemic disease.  There is no midline shift or mass effect. ORBITS: The visualized portion of the orbits demonstrate no acute  Repair Performed By Another Provider Text (Leave Blank If You Do Not Want): After the tissue was excised the defect was repaired by another provider.

## 2025-01-21 ENCOUNTER — OFFICE VISIT (OUTPATIENT)
Dept: FAMILY MEDICINE CLINIC | Age: 70
End: 2025-01-21

## 2025-01-21 VITALS
WEIGHT: 159 LBS | HEART RATE: 64 BPM | SYSTOLIC BLOOD PRESSURE: 110 MMHG | RESPIRATION RATE: 16 BRPM | DIASTOLIC BLOOD PRESSURE: 66 MMHG | BODY MASS INDEX: 22.81 KG/M2

## 2025-01-21 DIAGNOSIS — I49.9 CARDIAC ARRHYTHMIA, UNSPECIFIED CARDIAC ARRHYTHMIA TYPE: ICD-10-CM

## 2025-01-21 DIAGNOSIS — M25.512 ACUTE PAIN OF LEFT SHOULDER: Primary | ICD-10-CM

## 2025-01-21 DIAGNOSIS — I49.9 CARDIAC ARRHYTHMIA, UNSPECIFIED CARDIAC ARRHYTHMIA TYPE: Primary | ICD-10-CM

## 2025-01-21 DIAGNOSIS — Z91.81 HISTORY OF FALL: ICD-10-CM

## 2025-01-21 ASSESSMENT — ENCOUNTER SYMPTOMS
SHORTNESS OF BREATH: 0
ABDOMINAL PAIN: 0
CHEST TIGHTNESS: 0
BLOOD IN STOOL: 0

## 2025-01-21 ASSESSMENT — PATIENT HEALTH QUESTIONNAIRE - PHQ9
SUM OF ALL RESPONSES TO PHQ9 QUESTIONS 1 & 2: 1
SUM OF ALL RESPONSES TO PHQ QUESTIONS 1-9: 1
SUM OF ALL RESPONSES TO PHQ QUESTIONS 1-9: 1
2. FEELING DOWN, DEPRESSED OR HOPELESS: SEVERAL DAYS
SUM OF ALL RESPONSES TO PHQ QUESTIONS 1-9: 1
SUM OF ALL RESPONSES TO PHQ QUESTIONS 1-9: 1
1. LITTLE INTEREST OR PLEASURE IN DOING THINGS: NOT AT ALL

## 2025-01-21 NOTE — PROGRESS NOTES
[facility]      Date of Visit:  2025  Patient Name: Nitesh Ren   Patient :  1955     CHIEF COMPLAINT:     Nitesh Ren is a 69 y.o. male who presents today for an general visit to be evaluated for the following condition(s):  Chief Complaint   Patient presents with    Shoulder Pain     LEFT SHOULDER PAIN-FELL A  COUPLE WEEKS AGO    Health Maintenance     AWV, FLU VACCINE- DECLINED       HISTORY OF PRESENT ILLNESS:     Visit Information    Have you changed or started any medications since your last visit including any over-the-counter medicines, vitamins, or herbal medicines? no   Have you stopped taking any of your medications? Is so, why? -  no  Are you having any side effects from any of your medications? - no    Have you seen any other physician or provider since your last visit?  no   Have you had any other diagnostic tests since your last visit?  no   Have you been seen in the emergency room and/or had an admission in a hospital since we last saw you?  yes -    Have you had your routine dental cleaning in the past 6 months?  no     Do you have an active MyChart account? If no, what is the barrier?  No:     Patient Care Team:  Bhupendra Hinds MD as PCP - General (Family Medicine)  Bhupendra Hinds MD as PCP - Empaneled Provider  Kecia Shaw MD as Consulting Physician (Gastroenterology)  Nolan Prather MD (Endocrinology)  Vernon Vences MD as Consulting Physician (Pulmonology)    Medical History Review  Past Medical, Family, and Social History reviewed and does contribute to the patient presenting condition    Health Maintenance   Topic Date Due    Respiratory Syncytial Virus (RSV) Pregnant or age 60 yrs+ (1 - Risk 60-74 years 1-dose series) Never done    Pneumococcal 65+ years Vaccine (1 of 1 - PCV) Never done    Flu vaccine (1) 2024    COVID-19 Vaccine (2023- season) 2024    Annual Wellness Visit (Medicare Advantage)  2025    Lipids

## 2025-01-22 ENCOUNTER — HOSPITAL ENCOUNTER (OUTPATIENT)
Age: 70
Setting detail: THERAPIES SERIES
Discharge: HOME | End: 2025-01-22
Attending: GENERAL PRACTICE
Payer: MEDICARE

## 2025-01-22 ENCOUNTER — TELEPHONE (OUTPATIENT)
Dept: FAMILY MEDICINE CLINIC | Age: 70
End: 2025-01-22

## 2025-01-22 PROCEDURE — 97116 GAIT TRAINING THERAPY: CPT

## 2025-01-22 PROCEDURE — 97110 THERAPEUTIC EXERCISES: CPT

## 2025-01-22 NOTE — TELEPHONE ENCOUNTER
PATIENTS SISTER CALLED AND DOES NOT UNDERSTAND WHY JUSTIN NEEDS TO FOLLOW UP WITH PM&R (DR JUSTIN TANG-PT WAS SUPPOSED TO HAVE APPT WITH HIM THIS AFTERNOON AT 4PM BUT SHE CANCELLED THE APPT)    IF HE HAS ALREADY DONE REHAB AT HOME AND IS NOW CURRENTLY DOING OCC THERAPY 2 TIMES A WEEK OUTPATIENT AND PHYS THERAPY 3 TIMES A WEEK OUTPATIENT.     PLEASE ADVI

## 2025-01-22 NOTE — FLOWSHEET NOTE
[x] [facility]  Outpatient Rehabilitation &  Therapy  [address]  [phone]  [phone] [] [facility]  Outpatient Rehabilitation &  Therapy  [address]  P: [phone]  F: [phone] [] [facility]  Outpatient Rehabilitation &  Therapy  [address]  P: [phone]  F: [phone] [] [facility]  Outpatient Rehabilitation &  Therapy  [address]  [phone]  [phone] [] [facility]  Outpatient Rehabilitation &  Therapy  [address]   P: [phone]  F: [phone]  [] [facility]  Outpatient Rehabilitation &  Therapy  [address]  P: [phone]  F: [phone] [] [facility]  Outpatient Rehabilitation &  Therapy  [address]  P: [phone]  F: [phone] [] [facility]  Outpatient Rehabilitation &  Therapy  [address]  P: [phone]  F: [phone] [] [facility]  Outpatient Rehabilitation &  Therapy  [address]  P: [phone]  F: [phone]  [] [facility] Outpatient Rehabilitation &  Therapy  [address]  P: [phone]  F: [phone]     Physical Therapy Daily Treatment Note    Date:  2025  Patient Name:  Nitesh Ren    :  1955  MRN: [id number]  Physician: Nitesh Ash MD                                 Insurance: [facility] Medicare ([facility])  Medical Diagnosis: I69.352 (ICD-10-CM) - Hemiplegia and hemiparesis following cerebral infarction affecting left dominant side (HCC)   Rehab Codes: R26.89, M62.81, R29.3  Next 's appt.: 24  Date of symptom onset: 24  Visit# / total visits: ; Progress note for Medicare patient due at visit 10     Cancels/No Shows: 0    Subjective:    Pain:  [x] Yes  [] No Location: L wrist Pain Rating: (0-10 scale)

## 2025-01-23 ENCOUNTER — HOSPITAL ENCOUNTER (OUTPATIENT)
Age: 70
Discharge: HOME | End: 2025-01-23

## 2025-01-23 ENCOUNTER — OFFICE VISIT (OUTPATIENT)
Dept: ORTHOPEDIC SURGERY | Age: 70
End: 2025-01-23
Payer: MEDICARE

## 2025-01-23 VITALS — RESPIRATION RATE: 14 BRPM | WEIGHT: 159 LBS | BODY MASS INDEX: 22.76 KG/M2 | HEIGHT: 70 IN

## 2025-01-23 DIAGNOSIS — M25.512 LEFT SHOULDER PAIN, UNSPECIFIED CHRONICITY: Primary | ICD-10-CM

## 2025-01-23 DIAGNOSIS — I49.9 CARDIAC ARRHYTHMIA, UNSPECIFIED CARDIAC ARRHYTHMIA TYPE: ICD-10-CM

## 2025-01-23 DIAGNOSIS — S40.012A CONTUSION OF LEFT SHOULDER, INITIAL ENCOUNTER: ICD-10-CM

## 2025-01-23 PROCEDURE — 1123F ACP DISCUSS/DSCN MKR DOCD: CPT

## 2025-01-23 PROCEDURE — 99204 OFFICE O/P NEW MOD 45 MIN: CPT

## 2025-01-23 NOTE — PROGRESS NOTES
Orthopedic Shoulder Encounter Note - New Patient    Chief complaint: Left shoulder pain    HPI: Nitesh Ren is a 69 y.o.  left-hand dominant male who presents for evaluation of his left shoulder.  Patient states that he had a fall about 2 weeks ago.  He states that he was walking and lost his balance as he was trying to get up from his wheelchair to using his walker and fell onto flexed elbow and shoulder landed directly onto a piece of workout equipment.  Of note, patient had a stroke affecting his entire left side on November 7, 2024 and since that time has had minimal use with the left arm.  States that he has been attending therapy to try to regain some function into the left side of his body specifically left arm and left leg.  States that while attending therapy after the fall he is having trouble when they are trying to help lift his arm away from the body and caused him pain.  Has been taking Tylenol for pain control.  Denies any numbness or tingling down the arm and endorses the pain isolated to the shoulder region.    Previous treatment:    NSAIDs: Unable to take due to being on blood thinner; has tried Tylenol    Physical Therapy: Starting physical therapy to work on entire left hand side of the body due to loss of function secondary to a stroke    Injections: None    Surgeries: None    Review of Systems:   Constitutional: Negative for fever, chills, sweats.   Pain Score:   2  Neurological: Negative for headache, numbness, or weakness.   Musculoskeletal: As noted in HPI     Past Medical History  Nitesh  has a past medical history of Abdominal pain, Cerebrovascular accident (CVA) (HCC), Colon polyp, Mixed hyperlipidemia, Primary hypertension, Pulmonary nodules, and Thyroid nodule.    Past Surgical History  Nitesh  has a past surgical history that includes polypectomy (12/12/11); cyst removal; Colonoscopy (12/12/11); and Colonoscopy (10/23/07).    Current Medications  Current Outpatient Medications

## 2025-01-24 LAB
EKG ATRIAL RATE: 75 BPM
EKG P AXIS: 74 DEGREES
EKG P-R INTERVAL: 168 MS
EKG Q-T INTERVAL: 416 MS
EKG QRS DURATION: 90 MS
EKG QTC CALCULATION (BAZETT): 464 MS
EKG R AXIS: 78 DEGREES
EKG T AXIS: 70 DEGREES
EKG VENTRICULAR RATE: 75 BPM

## 2025-01-27 ENCOUNTER — HOSPITAL ENCOUNTER (OUTPATIENT)
Age: 70
Setting detail: THERAPIES SERIES
Discharge: HOME | End: 2025-01-27
Attending: GENERAL PRACTICE
Payer: MEDICARE

## 2025-01-27 PROCEDURE — 97110 THERAPEUTIC EXERCISES: CPT

## 2025-01-27 PROCEDURE — 97140 MANUAL THERAPY 1/> REGIONS: CPT

## 2025-01-27 NOTE — FLOWSHEET NOTE
[x] [facility]  Outpatient Rehabilitation &  Therapy  [address]  [phone]  F: [phone] [] [facility]  Outpatient Rehabilitation &  Therapy  [address]  P: [phone]  F: [phone] [] [facility]  Outpatient Rehabilitation &  Therapy  [address]  P: [phone]  F: [phone] [] [facility]  Outpatient Rehabilitation &  Therapy  [address]   P: [phone]  F: [phone] [] [facility]   Outpatient Rehabilitation   & Therapy  [address]  P: [phone]   F: [phone]     Occupational Therapy Daily Treatment Note    Date:  2025  Patient Name:  Nitesh Ren    :  1955  MRN: [id number]  Physician: Nitesh Ash MD                                 Insurance[facility]  N  Medical Diagnosis: hemiplegia and hemiparesis following cerebral infarction affecting left dominant side I69.352       Rehab Codes: pain in shoulder M25.51,, pain in wrist M25.53,, stiffness in shoulder M25.61,, stiffness in elbow M25.62,, stiffness in hand M25.64,, stiffness in wrist M25.63,, lack of coordination R27.8,, fine motor skills loss R29.818,, or muscle weakness generalized M62.81,  Next 's appt.: 25  Date of symptom onset: 24     Visit# / total visits: ; Progress note for patient due at visit 8    Cancels/No Shows: 0/0      Subjective:    Pain:  [x] Yes  [] No Location: L shoulder   Pain Rating: (0-10 scale) 2/10  Pain altered Tx:  [x] No  [] Yes  Action: NA  Pt Comments: \"It's right here in the socket area, but it's not excruciating.\" Reports he hasn't worn sling for a few days. Pt states he had another fall yesterday - he was walking back from the bathroom, using maynor walker and not sure what happened, feet got tangled up. States he fell to L side, but pain didn't last that long. States his brother and sister-in-law

## 2025-01-27 NOTE — FLOWSHEET NOTE
[x] [facility]  Outpatient Rehabilitation &  Therapy  [address]  [phone]  [phone] [] [facility]  Outpatient Rehabilitation &  Therapy  [address]  P: [phone]  F: [phone] [] [facility]  Outpatient Rehabilitation &  Therapy  [address]  P: [phone]  F: [phone] [] [facility]  Outpatient Rehabilitation &  Therapy  [address]  [phone]  [phone] [] [facility]  Outpatient Rehabilitation &  Therapy  [address]   P: [phone]  F: [phone]  [] [facility]  Outpatient Rehabilitation &  Therapy  [address]  P: [phone]  F: [phone] [] [facility]  Outpatient Rehabilitation &  Therapy  [address]  P: [phone]  F: [phone] [] [facility]  Outpatient Rehabilitation &  Therapy  [address]  P: [phone]  F: [phone] [] [facility]  Outpatient Rehabilitation &  Therapy  [address]  P: [phone]  F: [phone]  [] [facility] Outpatient Rehabilitation &  Therapy  [address]  P: [phone]  F: [phone]     Physical Therapy Daily Treatment Note    Date:  2025  Patient Name:  Nitesh Ren \"Wilber\"  :  1955  MRN: [id number]  Physician: Nitesh Ash MD                                 Insurance: [facility] Medicare ([facility])  Medical Diagnosis: I69.352 (ICD-10-CM) - Hemiplegia and hemiparesis following cerebral infarction affecting left dominant side (HCC)   Rehab Codes: R26.89, M62.81, R29.3  Next 's appt.: 24  Date of symptom onset: 24  Visit# / total visits: 3/20; Progress note for Medicare patient due at visit 10     Cancels/No Shows: 0    Subjective:    Pain:  [x] Yes  [] No Location: L wrist Pain Rating: (0-10 scale)

## 2025-01-29 ENCOUNTER — HOSPITAL ENCOUNTER (OUTPATIENT)
Age: 70
Setting detail: THERAPIES SERIES
Discharge: HOME | End: 2025-01-29
Attending: GENERAL PRACTICE
Payer: MEDICARE

## 2025-01-29 PROCEDURE — 97110 THERAPEUTIC EXERCISES: CPT

## 2025-01-29 PROCEDURE — 97140 MANUAL THERAPY 1/> REGIONS: CPT

## 2025-01-29 PROCEDURE — 97116 GAIT TRAINING THERAPY: CPT

## 2025-01-29 NOTE — FLOWSHEET NOTE
[x] [facility]  Outpatient Rehabilitation &  Therapy  [address]  [phone]  [phone] [] [facility]  Outpatient Rehabilitation &  Therapy  [address]  P: [phone]  F: [phone] [] [facility]  Outpatient Rehabilitation &  Therapy  [address]  P: [phone]  F: [phone] [] [facility]  Outpatient Rehabilitation &  Therapy  [address]  [phone]  [phone] [] [facility]  Outpatient Rehabilitation &  Therapy  [address]   P: [phone]  F: [phone]  [] [facility]  Outpatient Rehabilitation &  Therapy  [address]  P: [phone]  F: [phone] [] [facility]  Outpatient Rehabilitation &  Therapy  [address]  P: [phone]  F: [phone] [] [facility]  Outpatient Rehabilitation &  Therapy  [address]  P: [phone]  F: [phone] [] [facility]  Outpatient Rehabilitation &  Therapy  [address]  P: [phone]  F: [phone]  [] [facility] Outpatient Rehabilitation &  Therapy  [address]  P: [phone]  F: [phone]     Physical Therapy Daily Treatment Note    Date:  2025  Patient Name:  Nitesh Ren \"Wilber\"  :  1955  MRN: [id number]  Physician: Nitesh Ash MD                                 Insurance: [facility] Medicare ([facility])  Medical Diagnosis: I69.352 (ICD-10-CM) - Hemiplegia and hemiparesis following cerebral infarction affecting left dominant side (HCC)   Rehab Codes: R26.89, M62.81, R29.3  Next 's appt.: 24  Date of symptom onset: 24  Visit# / total visits: ; Progress note for Medicare patient due at visit 10     Cancels/No Shows: 0    Subjective:    Pain:  [] Yes  [x] No Location: NA  Pain Rating: (0-10 scale)

## 2025-01-29 NOTE — FLOWSHEET NOTE
[x] [facility]  Outpatient Rehabilitation &  Therapy  [address]  [phone]  F: [phone] [] [facility]  Outpatient Rehabilitation &  Therapy  [address]  P: [phone]  F: [phone] [] [facility]  Outpatient Rehabilitation &  Therapy  [address]  P: [phone]  F: [phone] [] [facility]  Outpatient Rehabilitation &  Therapy  [address]   P: [phone]  F: [phone] [] [facility]   Outpatient Rehabilitation   & Therapy  [address]  P: [phone]   F: [phone]     Occupational Therapy Daily Treatment Note    Date:  2025  Patient Name:  Nitesh Ren    :  1955  MRN: [id number]  Physician: Nitesh Ash MD                                 [facility]  Medical Diagnosis: hemiplegia and hemiparesis following cerebral infarction affecting left dominant side I69.352       Rehab Codes: pain in shoulder M25.51,, pain in wrist M25.53,, stiffness in shoulder M25.61,, stiffness in elbow M25.62,, stiffness in hand M25.64,, stiffness in wrist M25.63,, lack of coordination R27.8,, fine motor skills loss R29.818,, or muscle weakness generalized M62.81,  Next 's appt.: 25  Date of symptom onset: 24     Visit# / total visits: 3/16; Progress note for patient due at visit 8    Cancels/No Shows: 0/0      Subjective:    Pain:  [x] Yes  [] No Location: L shoulder   Pain Rating: (0-10 scale) 2/10  Pain altered Tx:  [x] No  [] Yes  Action: NA  Pt Comments: Pt reports brother and MACKENZIE don't seem willing to be engaged in his therapy and don't find it necessary to help him with stretching at home. States he wanted them to stay for session, but they declined. Reporting frustration around this topic. Reports he felt LUE felt looser following last therapy session.       Precautions [x] No  [] Yes: :  Surgery

## 2025-01-31 ENCOUNTER — HOSPITAL ENCOUNTER (OUTPATIENT)
Age: 70
Setting detail: THERAPIES SERIES
Discharge: HOME | End: 2025-01-31
Attending: GENERAL PRACTICE
Payer: MEDICARE

## 2025-01-31 PROCEDURE — 97110 THERAPEUTIC EXERCISES: CPT

## 2025-01-31 NOTE — FLOWSHEET NOTE
[x] [facility]  Outpatient Rehabilitation &  Therapy  [address]  [phone]  [phone] [] [facility]  Outpatient Rehabilitation &  Therapy  [address]  P: [phone]  F: [phone] [] [facility]  Outpatient Rehabilitation &  Therapy  [address]  P: [phone]  F: [phone] [] [facility]  Outpatient Rehabilitation &  Therapy  [address]  [phone]  [phone] [] [facility]  Outpatient Rehabilitation &  Therapy  [address]   P: [phone]  F: [phone]  [] [facility]  Outpatient Rehabilitation &  Therapy  [address]  P: [phone]  F: [phone] [] [facility]  Outpatient Rehabilitation &  Therapy  [address]  P: [phone]  F: [phone] [] [facility]  Outpatient Rehabilitation &  Therapy  [address]  P: [phone]  F: [phone] [] [facility]  Outpatient Rehabilitation &  Therapy  [address]  P: [phone]  F: [phone]  [] [facility] Outpatient Rehabilitation &  Therapy  [address]  P: [phone]  F: [phone]     Physical Therapy Daily Treatment Note    Date:  2025  Patient Name:  Nitesh Ren \"Wilber\"  :  1955  MRN: [id number]  Physician: Nitesh Ash MD                                 Insurance: [facility] Medicare ([facility])  Medical Diagnosis: I69.352 (ICD-10-CM) - Hemiplegia and hemiparesis following cerebral infarction affecting left dominant side (HCC)   Rehab Codes: R26.89, M62.81, R29.3  Next 's appt.: 24  Date of symptom onset: 24  Visit# / total visits: ; Progress note for Medicare patient due at visit 10     Cancels/No Shows: 0    Subjective:    Pain:  [] Yes  [x] No Location: NA  Pain Rating: (0-10 scale)

## 2025-02-03 ENCOUNTER — HOSPITAL ENCOUNTER (OUTPATIENT)
Age: 70
Setting detail: THERAPIES SERIES
Discharge: HOME | End: 2025-02-03
Attending: GENERAL PRACTICE
Payer: MEDICARE

## 2025-02-03 PROCEDURE — 97530 THERAPEUTIC ACTIVITIES: CPT

## 2025-02-03 PROCEDURE — 97110 THERAPEUTIC EXERCISES: CPT

## 2025-02-03 PROCEDURE — 97116 GAIT TRAINING THERAPY: CPT

## 2025-02-03 PROCEDURE — 97140 MANUAL THERAPY 1/> REGIONS: CPT

## 2025-02-03 NOTE — FLOWSHEET NOTE
returning to living on his own, at this current time. Discussed details of bathing, transfers, functional mobility, meal prep, laundry, etc and how/if he thinks these tasks can be completed independently, safely. Clinician big concern is frequent falls and pt not being able to get self back up. Max difficulty standing up from wheelchair. SBA for stand step tranfers with use of maynor-walker. LUE very tight and spastic throughout. Fair tolerance of passive stretching of shoulder, elbow, and wrist. Elbow and shoulder most painful and limited. Elbow did ease up after multiple repetitions. Shoulder remained quite painful, only tolerating ~30 degrees of passive abduction. No active movement of wrist or digits. Reviewed and provided pt with self-ROM HEP and caregiver ROM HEP if someone is able to assist him. No active scap movement. Utilized mirror for feedback on posture and movement patterns.     [] No change.  [x] Other: had xray of L shoulder, no acute fracture. Ortho did not recommend further intervention at this time.      [x] Patient would continue to benefit from skilled occupational therapy services in order to: improve functional movement, strength, and coordination skills with LUE in order to decrease caregiver assistance and improve quality of life.     STG: (to be met in 8 treatments)  ? Pain: L shoulder pain to 3/10 for improved tolerance of therapeutic exercise   ? ROM:  Demo trace active movement of scapula, wrist and digits for improved functional use of LUE  ? Function:  55% impairment with SIS to indicate subjective improvement of functional skills  15/80 with UEFI to indicate subjective improvement of functional skills   Patient to be independent with home exercise program as demonstrated by performance with correct form without cues.     LTG: (to be met in 16 treatments)  Pt will have received referral for driving evaluation, pending continued progress  Grasp/release various objects with L hand  45%

## 2025-02-03 NOTE — FLOWSHEET NOTE
[x] [facility]  Outpatient Rehabilitation &  Therapy  [address]  [phone]  [phone] [] [facility]  Outpatient Rehabilitation &  Therapy  [address]  P: [phone]  F: [phone] [] [facility]  Outpatient Rehabilitation &  Therapy  [address]  P: [phone]  F: [phone] [] [facility]  Outpatient Rehabilitation &  Therapy  [address]  [phone]  [phone] [] [facility]  Outpatient Rehabilitation &  Therapy  [address]   P: [phone]  F: [phone]  [] [facility]  Outpatient Rehabilitation &  Therapy  [address]  P: [phone]  F: [phone] [] [facility]  Outpatient Rehabilitation &  Therapy  [address]  P: [phone]  F: [phone] [] [facility]  Outpatient Rehabilitation &  Therapy  [address]  P: [phone]  F: [phone] [] [facility]  Outpatient Rehabilitation &  Therapy  [address]  P: [phone]  F: [phone]  [] [facility] Outpatient Rehabilitation &  Therapy  [address]  P: [phone]  F: [phone]     Physical Therapy Daily Treatment Note    Date:  2/3/2025  Patient Name:  Nitesh Ren \"Wilber\"  :  1955  MRN: [id number]  Physician: Nitesh Ash MD                                 Insurance: [facility] Medicare ([facility])  Medical Diagnosis: I69.352 (ICD-10-CM) - Hemiplegia and hemiparesis following cerebral infarction affecting left dominant side (HCC)   Rehab Codes: R26.89, M62.81, R29.3  Next 's appt.: 24  Date of symptom onset: 24  Visit# / total visits: ; Progress note for Medicare patient due at visit 10     Cancels/No Shows: 0    Subjective:    Pain:  [] Yes  [x] No Location: NA  Pain Rating: (0-10 scale)

## 2025-02-05 ENCOUNTER — HOSPITAL ENCOUNTER (OUTPATIENT)
Age: 70
Setting detail: THERAPIES SERIES
Discharge: HOME | End: 2025-02-05
Attending: GENERAL PRACTICE
Payer: MEDICARE

## 2025-02-05 PROCEDURE — 97116 GAIT TRAINING THERAPY: CPT

## 2025-02-05 PROCEDURE — 97530 THERAPEUTIC ACTIVITIES: CPT

## 2025-02-05 PROCEDURE — 97110 THERAPEUTIC EXERCISES: CPT

## 2025-02-05 PROCEDURE — 97112 NEUROMUSCULAR REEDUCATION: CPT

## 2025-02-05 NOTE — FLOWSHEET NOTE
[x] [facility]  Outpatient Rehabilitation &  Therapy  [address]  [phone]  F: [phone] [] [facility]  Outpatient Rehabilitation &  Therapy  [address]  P: [phone]  F: [phone] [] [facility]  Outpatient Rehabilitation &  Therapy  [address]  P: [phone]  F: [phone] [] [facility]  Outpatient Rehabilitation &  Therapy  [address]   P: [phone]  F: [phone] [] [facility]   Outpatient Rehabilitation   & Therapy  [address]  P: [phone]   F: [phone]     Occupational Therapy Daily Treatment Note    Date:  2025  Patient Name:  Nitesh Ren    :  1955  MRN: [id number]  Physician: Nitesh Ash MD                                 Insurance: [facility] -  N  Medical Diagnosis: hemiplegia and hemiparesis following cerebral infarction affecting left dominant side I69.352       Rehab Codes: pain in shoulder M25.51,, pain in wrist M25.53,, stiffness in shoulder M25.61,, stiffness in elbow M25.62,, stiffness in hand M25.64,, stiffness in wrist M25.63,, lack of coordination R27.8,, fine motor skills loss R29.818,, or muscle weakness generalized M62.81,  Next 's appt.: 25  Date of symptom onset: 24     Visit# / total visits: ; Progress note for patient due at visit 8    Cancels/No Shows: 0/0      Subjective:    Pain:  [x] Yes  [] No Location: L shoulder   Pain Rating: (0-10 scale) 3/10  Pain altered Tx:  [x] No  [] Yes  Action: NA  Pt Comments: States he had a little better success with completing HEP.     Precautions [x] No  [] Yes:   Surgery procedure and date: NA    Objective:  Today's Treatment:  Modalities: NA this date  Exercises:  EXERCISE    REPS/     TIME  WEIGHT/    LEVEL COMMENTS   PROM  25 minutes   Not Completed   Transfers    Stand step and stand pivot with maynor walker - SBA

## 2025-02-05 NOTE — FLOWSHEET NOTE
[x] [facility]  Outpatient Rehabilitation &  Therapy  [address]  [phone]  [phone] [] [facility]  Outpatient Rehabilitation &  Therapy  [address]  P: [phone]  F: [phone] [] [facility]  Outpatient Rehabilitation &  Therapy  [address]  P: [phone]  F: [phone] [] [facility]  Outpatient Rehabilitation &  Therapy  [address]  [phone]  [phone] [] [facility]  Outpatient Rehabilitation &  Therapy  [address]   P: [phone]  F: [phone]  [] [facility]  Outpatient Rehabilitation &  Therapy  [address]  P: [phone]  F: [phone] [] [facility]  Outpatient Rehabilitation &  Therapy  [address]  P: [phone]  F: [phone] [] [facility]  Outpatient Rehabilitation &  Therapy  [address]  P: [phone]  F: [phone] [] [facility]  Outpatient Rehabilitation &  Therapy  [address]  P: [phone]  F: [phone]  [] [facility] Outpatient Rehabilitation &  Therapy  [address]  P: [phone]  F: [phone]     Physical Therapy Daily Treatment Note    Date:  2025  Patient Name:  Nitesh Ren \"Wilber\"  :  1955  MRN: [id number]  Physician: Nitesh Ash MD                                 Insurance: [facility] Medicare ([facility])  Medical Diagnosis: I69.352 (ICD-10-CM) - Hemiplegia and hemiparesis following cerebral infarction affecting left dominant side (HCC)   Rehab Codes: R26.89, M62.81, R29.3  Next 's appt.: 24  Date of symptom onset: 24  Visit# / total visits: ; Progress note for Medicare patient due at visit 10     Cancels/No Shows: 0    Subjective:    Pain:  [] Yes  [x] No Location: NA  Pain Rating: (0-10 scale)

## 2025-02-07 ENCOUNTER — HOSPITAL ENCOUNTER (OUTPATIENT)
Age: 70
Setting detail: THERAPIES SERIES
Discharge: HOME | End: 2025-02-07
Attending: GENERAL PRACTICE
Payer: MEDICARE

## 2025-02-07 PROCEDURE — 97112 NEUROMUSCULAR REEDUCATION: CPT

## 2025-02-07 PROCEDURE — 97116 GAIT TRAINING THERAPY: CPT

## 2025-02-07 PROCEDURE — 97110 THERAPEUTIC EXERCISES: CPT

## 2025-02-07 NOTE — FLOWSHEET NOTE
[x] [facility]  Outpatient Rehabilitation &  Therapy  [address]  [phone]  [phone] [] [facility]  Outpatient Rehabilitation &  Therapy  [address]  P: [phone]  F: [phone] [] [facility]  Outpatient Rehabilitation &  Therapy  [address]  P: [phone]  F: [phone] [] [facility]  Outpatient Rehabilitation &  Therapy  [address]  [phone]  [phone] [] [facility]  Outpatient Rehabilitation &  Therapy  [address]   P: [phone]  F: [phone]  [] [facility]  Outpatient Rehabilitation &  Therapy  [address]  P: [phone]  F: [phone] [] [facility]  Outpatient Rehabilitation &  Therapy  [address]  P: [phone]  F: [phone] [] [facility]  Outpatient Rehabilitation &  Therapy  [address]  P: [phone]  F: [phone] [] [facility]  Outpatient Rehabilitation &  Therapy  [address]  P: [phone]  F: [phone]  [] [facility] Outpatient Rehabilitation &  Therapy  [address]  P: [phone]  F: [phone]     Physical Therapy Daily Treatment Note    Date:  2025  Patient Name:  Nitesh Ren \"Wilber\"  :  1955  MRN: [id number]  Physician: Nitesh Ash MD                                 Insurance: [facility] Medicare ([facility])  Medical Diagnosis: I69.352 (ICD-10-CM) - Hemiplegia and hemiparesis following cerebral infarction affecting left dominant side (HCC)   Rehab Codes: R26.89, M62.81, R29.3  Next 's appt.: 24  Date of symptom onset: 24  Visit# / total visits: ; Progress note for Medicare patient due at visit 10     Cancels/No Shows: 0    Subjective:    Pain:  [] Yes  [x] No Location: NA  Pain Rating: (0-10 scale)

## 2025-02-10 ENCOUNTER — HOSPITAL ENCOUNTER (OUTPATIENT)
Age: 70
Setting detail: THERAPIES SERIES
Discharge: HOME | End: 2025-02-10
Attending: GENERAL PRACTICE
Payer: MEDICARE

## 2025-02-10 PROCEDURE — 97116 GAIT TRAINING THERAPY: CPT

## 2025-02-10 PROCEDURE — 97112 NEUROMUSCULAR REEDUCATION: CPT

## 2025-02-10 PROCEDURE — 97110 THERAPEUTIC EXERCISES: CPT

## 2025-02-10 RX ORDER — AMLODIPINE BESYLATE 10 MG/1
10 TABLET ORAL DAILY
Qty: 90 TABLET | Refills: 0 | Status: SHIPPED | OUTPATIENT
Start: 2025-02-10

## 2025-02-10 NOTE — FLOWSHEET NOTE
[x] [facility]  Outpatient Rehabilitation &  Therapy  [address]  [phone]  [phone] [] [facility]  Outpatient Rehabilitation &  Therapy  [address]  P: [phone]  F: [phone] [] [facility]  Outpatient Rehabilitation &  Therapy  [address]  P: [phone]  F: [phone] [] [facility]  Outpatient Rehabilitation &  Therapy  [address]  [phone]  [phone] [] [facility]  Outpatient Rehabilitation &  Therapy  [address]   P: [phone]  F: [phone]  [] [facility]  Outpatient Rehabilitation &  Therapy  [address]  P: [phone]  F: [phone] [] [facility]  Outpatient Rehabilitation &  Therapy  [address]  P: [phone]  F: [phone] [] [facility]  Outpatient Rehabilitation &  Therapy  [address]  P: [phone]  F: [phone] [] [facility]  Outpatient Rehabilitation &  Therapy  [address]  P: [phone]  F: [phone]  [] [facility] Outpatient Rehabilitation &  Therapy  [address]  P: [phone]  F: [phone]     Physical Therapy Daily Treatment Note    Date:  2/10/2025  Patient Name:  Nitesh Ren \"Wilber\"  :  1955  MRN: [id number]  Physician: Nitesh Ash MD                                 Insurance: [facility] Medicare ([facility])  Medical Diagnosis: I69.352 (ICD-10-CM) - Hemiplegia and hemiparesis following cerebral infarction affecting left dominant side (HCC)   Rehab Codes: R26.89, M62.81, R29.3  Next 's appt.: 24  Date of symptom onset: 24  Visit# / total visits: ; Progress note for Medicare patient due at visit 10     Cancels/No Shows: 0    Subjective:    Pain:  [] Yes  [x] No   Location: NA    Pain Rating: (0-10 scale)

## 2025-02-10 NOTE — FLOWSHEET NOTE
[x] [facility]  Outpatient Rehabilitation &  Therapy  [address]  [phone]  F: [phone] [] [facility]  Outpatient Rehabilitation &  Therapy  [address]   [address]  P: [phone]  F: [phone] [] [facility]  Outpatient Rehabilitation &  Therapy  [address]  P: [phone]  F: [phone] [] [facility]  Outpatient Rehabilitation &  Therapy  [address]   P: [phone]  F: [phone] [] [facility]   Outpatient Rehabilitation   & Therapy  [address]  P: [phone]   F: [phone]     Occupational Therapy Daily Treatment Note    Date:  2/10/2025  Patient Name:  Nitesh Ren    :  1955  MRN: [id number]  Physician: Nitseh Ash MD                                 Insurance[facility]  N  Medical Diagnosis: hemiplegia and hemiparesis following cerebral infarction affecting left dominant side I69.352       Rehab Codes: pain in shoulder M25.51,, pain in wrist M25.53,, stiffness in shoulder M25.61,, stiffness in elbow M25.62,, stiffness in hand M25.64,, stiffness in wrist M25.63,, lack of coordination R27.8,, fine motor skills loss R29.818,, or muscle weakness generalized M62.81,  Next 's appt.: 25  Date of symptom onset: 24     Visit# / total visits: ; Progress note for patient due at visit 8    Cancels/No Shows: 0/0      Subjective:    Pain:  [x] Yes  [] No Location: L shoulder   Pain Rating: (0-10 scale) 0/10 at rest   Pain altered Tx:  [x] No  [] Yes  Action: NA  Pt Comments: States he went to a family reunion over the weekend. States he hasn't been able to get anyone to help him with stretching at home, but he does what he is able to independently. Reporting some R shoulder discomfort.     Precautions [x] No  [] Yes:   Surgery procedure and date: NA    Objective:  Today's Treatment:  Modalities: NA this

## 2025-02-12 ENCOUNTER — HOSPITAL ENCOUNTER (OUTPATIENT)
Age: 70
Setting detail: THERAPIES SERIES
Discharge: HOME | End: 2025-02-12
Attending: GENERAL PRACTICE
Payer: MEDICARE

## 2025-02-12 PROCEDURE — 97112 NEUROMUSCULAR REEDUCATION: CPT

## 2025-02-12 PROCEDURE — 97530 THERAPEUTIC ACTIVITIES: CPT

## 2025-02-12 PROCEDURE — 97116 GAIT TRAINING THERAPY: CPT

## 2025-02-12 NOTE — FLOWSHEET NOTE
needs supervision  (2) unable to  but reaches 2-5cm (1-2 inches) from slipper and keeps  balance independently  (1) unable to  and needs supervision while trying  (0) unable to try/needs assist to keep from losing balance or falling  Score: 3    10. TURNING TO LOOK BEHIND OVER LEFT AND RIGHT  SHOULDERS WHILE STANDING  INSTRUCTIONS: Turn to look directly behind you over toward left shoulder.  Repeat to the right. Examiner may pick an object to look at directly behind the  subject to encourage a better twist turn.  (4) looks behind from both sides and weight shifts well  (3) looks behind one side only other side shows less weight shift  (2) turns sideways only but maintains balance  (1) needs supervision when turning  (0) needs assist to keep from losing balance or falling  Score: 2    11. TURN 360 DEGREES  INSTRUCTIONS: Turn completely around in a full [place]. Pause. Then turn a  full [place] in the other direction.  (4) able to turn 360 degrees safely in 4 seconds or less  (3) able to turn 360 degrees safely one side only in 4 seconds or less  (2) able to turn 360 degrees safely but slowly  (1) needs close supervision or verbal cueing  (0) needs assistance while turning  Score: 0    12. PLACING ALTERNATE FOOT ON STEP OR STOOL WHILE  STANDING UNSUPPORTED  INSTRUCTIONS: Place each foot alternately on the step/stool. Continue until  each foot has touched the step/stool four times.  (4) able to stand independently and safely and complete 8 steps in 20 seconds  (3) able to stand independently and complete 8 steps >20 seconds  (2) able to complete 4 steps without aid with supervision  (1) able to complete >2 steps needs minimal assist  (0) needs assistance to keep from falling/unable to try  Score: 0    13. STANDING UNSUPPORTED ONE FOOT IN FRONT  INSTRUCTIONS: (DEMONSTRATE TO SUBJECT) Place one foot directly in  front of the other. If you feel that you cannot place your foot directly in front,  try to step 
understanding.  [] Needs review.  [] Demonstrates/verbalizes HEP/Ed previously given.   [employer] HEP written issued to pt but did not document reference code in error: issued standing hip abd, hip ext. Sitting DF, LAQ, bridging, SL hip abduction and clamshells.    Verbal and demon of clasped UE supine exercises: chest press, shdlr flexion,. Sitting scap. Retraction, sthrugs.  Will clarify with OT on scapular work and discipline.   Access Code: [email or web address]  URL: [email or web address]  Date: 01/31/2025  Prepared by: Veronica Adam    Exercises  - Long Sitting Calf Stretch with Strap  - 2-3 x daily - 7 x weekly - 1 sets - 3 reps - 20 hold  - Supine Piriformis Stretch with Leg Straight  - 2-3 x daily - 7 x weekly - 1 sets - 3 reps - 20 hold   -fall prevention HO.     Plan: [x] Continue current frequency toward long and short term goals.    [x] Specific Instructions for subsequent treatments: LiteGait with error augmented limb advancement, hip flexion in/out of tub, bed mobility power/speed      Time In: 11:06 am          Time Out: 12:03 pm     Electronically signed by:  David Hawkins, PT

## 2025-02-14 ENCOUNTER — HOSPITAL ENCOUNTER (OUTPATIENT)
Age: 70
Setting detail: THERAPIES SERIES
Discharge: HOME | End: 2025-02-14
Attending: GENERAL PRACTICE
Payer: MEDICARE

## 2025-02-14 PROCEDURE — 97530 THERAPEUTIC ACTIVITIES: CPT

## 2025-02-14 PROCEDURE — 97116 GAIT TRAINING THERAPY: CPT

## 2025-02-14 PROCEDURE — 97110 THERAPEUTIC EXERCISES: CPT

## 2025-02-14 NOTE — FLOWSHEET NOTE
[x] [facility]  Outpatient Rehabilitation &  Therapy  [address]  [phone]  [phone] [] [facility]  Outpatient Rehabilitation &  Therapy  [address]  P: [phone]  F: [phone] [] [facility]  Outpatient Rehabilitation &  Therapy  [address]  P: [phone]  F: [phone] [] [facility]  Outpatient Rehabilitation &  Therapy  [address]  [phone]  [phone] [] [facility]  Outpatient Rehabilitation &  Therapy  [address]   P: [phone]  F: [phone]  [] [facility]  Outpatient Rehabilitation &  Therapy  [address]  P: [phone]  F: [phone] [] [facility]  Outpatient Rehabilitation &  Therapy  [address]  P: [phone]  F: [phone] [] [facility]  Outpatient Rehabilitation &  Therapy  [address]  P: [phone]  F: [phone] [] [facility]  Outpatient Rehabilitation &  Therapy  [address]  P: [phone]  F: [phone]  [] [facility] Outpatient Rehabilitation &  Therapy  [address]  P: [phone]  F: [phone]     Physical Therapy Daily Treatment Note    Date:  2025  Patient Name:  Nitesh Ren \"Wilber\"  :  1955  MRN: [id number]  Physician: Nitesh Ash MD                                 Insurance: [facility] Medicare ([facility])  Medical Diagnosis: I69.352 (ICD-10-CM) - Hemiplegia and hemiparesis following cerebral infarction affecting left dominant side (HCC)   Rehab Codes: R26.89, M62.81, R29.3  Next 's appt.: 24  Date of symptom onset: 24  Visit# / total visits: ; Progress note for Medicare patient due at visit 10     Cancels/No Shows: 0    Subjective:    Pain:  [x] Yes  [] No   Location: NA    Pain Rating: (0-10 scale)

## 2025-02-17 ENCOUNTER — TELEPHONE (OUTPATIENT)
Dept: NEUROLOGY | Age: 70
End: 2025-02-17

## 2025-02-17 NOTE — TELEPHONE ENCOUNTER
Np - appt. Was cancelled by Coretta GUTHRIE  on 2/17/25 - Patient Sister -In law  states he doing fine and at this time they did not want  to reschedule . .

## 2025-02-19 ENCOUNTER — HOSPITAL ENCOUNTER (OUTPATIENT)
Age: 70
Setting detail: THERAPIES SERIES
Discharge: HOME | End: 2025-02-19
Attending: GENERAL PRACTICE
Payer: MEDICARE

## 2025-02-19 NOTE — FLOWSHEET NOTE
[x] [facility]  Outpatient Rehabilitation &  Therapy  [address]  [phone]  [phone]     Therapy Cancel/No Show note    Date: 2025  Patient: Nitesh Ren  : 1955  MRN: [id number]    Cancels/No Shows to date:     For today's appointment patient:    [x]  Cancelled, pt left VM to inform us  he wants to discontinue PT/OT at this time.     [] Rescheduled appointment    [] No-show     Reason given by patient:    []  Patient ill    []  Conflicting appointment    [] No transportation      [] Conflict with work    [] No reason given    [] Weather related    [] COVID-19    [x] Other:      Comments:  Discharge per pt request.       [] Next appointment was confirmed    Electronically signed by: VÍCTOR ALMEIDA, PTA

## 2025-02-19 NOTE — FLOWSHEET NOTE
[x] [facility]       Occupational Therapy       [address]       Phone: [phone]       Fax: [phone] [] [facility] Occupational  Therapy at [place]       [address]       Phone: [phone]       Fax: [phone] [] [facility]  Outpatient Rehabilitation &  Therapy  [address]  P: [phone]  F: [phone]           Occupational Therapy Cancel/No Show note    Date: 2025  Patient: Nitesh Ren  : 1955  MRN: [id number]  Cancels/No Shows to date:     For today's appointment patient:  [x]  Cancelled  []  Rescheduled appointment  []  No-show     Reason given by patient:  []  Patient ill  []  Conflicting appointment  []  No transportation    []  Conflict with work  []  No reason given  [x]  Other:  LVM to cancel all future therapy appts, no reason provided    Comments:      Electronically signed by: JOSE Acharya/L

## 2025-02-19 NOTE — DISCHARGE SUMMARY
[x] [facility]  Outpatient Rehabilitation &  Therapy  [address]  [phone]  F: [phone] [] [facility]  Outpatient Rehabilitation &  Therapy  [address]  P: [phone]  F: [phone] [] [facility]  Outpatient Rehabilitation &  Therapy  [address]  P: [phone]  F: [phone] [] [facility]  Outpatient Rehabilitation &  Therapy  [address]   P: [phone]  F: [phone] [] [facility]   Outpatient Rehabilitation   & Therapy  [address]  P: [phone]   F: [phone]         Occupational Therapy Discharge Note    Date: 2025      Patient: Nitesh Ren  : 1955  MRN: [id number]    Physician: Nitesh Ash MD                                 Insurance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 I69.352       Rehab Codes: pain in shoulder M25.51,, pain in wrist M25.53,, stiffness in shoulder M25.61,, stiffness in elbow M25.62,, stiffness in hand M25.64,, stiffness in wrist M25.63,, lack of coordination R27.8,, fine motor skills loss R29.818,, or muscle weakness generalized M62.81,  Next 's appt.: 25  Date of symptom onset: 24          Visit# / total visits: ; Progress note for patient due at visit 8                  Cancels/No Shows:   Date of initial visit: 25                Date of final visit: 2/10/25      Subjective:  Refer to initial evaluation and last flowsheet note for status.     Objective:  Refer to initial evaluation and last flowsheet note for status.     Assessment:  Refer to initial evaluation and last flowsheet not for status.     Treatment to Date:     [x]  Therapeutic Exercise   56188              []  Iontophoresis: 4 mg/mL Dexamethasone Sodium Phosphate  mAmin  04941    [x]  Therapeutic

## 2025-02-21 ENCOUNTER — APPOINTMENT (OUTPATIENT)
Age: 70
End: 2025-02-21
Attending: GENERAL PRACTICE
Payer: MEDICARE

## 2025-03-06 ENCOUNTER — OFFICE VISIT (OUTPATIENT)
Dept: PHYSICAL MEDICINE AND REHAB | Age: 70
End: 2025-03-06
Payer: MEDICARE

## 2025-03-06 VITALS — HEART RATE: 75 BPM | SYSTOLIC BLOOD PRESSURE: 115 MMHG | TEMPERATURE: 97.5 F | DIASTOLIC BLOOD PRESSURE: 77 MMHG

## 2025-03-06 DIAGNOSIS — I69.352 HEMIPLEGIA AND HEMIPARESIS FOLLOWING CEREBRAL INFARCTION AFFECTING LEFT DOMINANT SIDE (HCC): ICD-10-CM

## 2025-03-06 DIAGNOSIS — R25.2 SPASTICITY AS LATE EFFECT OF CEREBROVASCULAR ACCIDENT (CVA): Primary | ICD-10-CM

## 2025-03-06 DIAGNOSIS — I69.398 SPASTICITY AS LATE EFFECT OF CEREBROVASCULAR ACCIDENT (CVA): Primary | ICD-10-CM

## 2025-03-06 PROCEDURE — 1123F ACP DISCUSS/DSCN MKR DOCD: CPT | Performed by: GENERAL PRACTICE

## 2025-03-06 PROCEDURE — 3078F DIAST BP <80 MM HG: CPT | Performed by: GENERAL PRACTICE

## 2025-03-06 PROCEDURE — 99213 OFFICE O/P EST LOW 20 MIN: CPT | Performed by: GENERAL PRACTICE

## 2025-03-06 PROCEDURE — 3074F SYST BP LT 130 MM HG: CPT | Performed by: GENERAL PRACTICE

## 2025-03-06 RX ORDER — TIZANIDINE 2 MG/1
2 TABLET ORAL 2 TIMES DAILY
Qty: 60 TABLET | Refills: 2 | Status: SHIPPED | OUTPATIENT
Start: 2025-03-06 | End: 2025-06-04

## 2025-03-06 NOTE — PROGRESS NOTES
spasticity to LUE flexors and LLE knee and ankle. Also has left shoulder pain.   -Begin Tizanidine 2 mg BID - concerns for drowsiness so advised beginning with HS dose x1 week and then introduce morning dose.  -If patient cannot tolerate can trial alternative medication or Botox.   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   -Advised wearing nighttime wrist splint to maintain neutral hand position to prevent contracture  -Continue home exercise program  -Follow up in 4 weeks to evaluation efficacy of medication.         No orders of the defined types were placed in this encounter.    Orders Placed This Encounter   Medications    tiZANidine (ZANAFLEX) 2 MG tablet     Sig: Take 1 tablet by mouth in the morning and at bedtime     Dispense:  60 tablet     Refill:  2     Return in about 4 weeks (around 4/3/2025).       Electronically signedby Nitesh Ash MD on 3/6/2025 at 3:09 PM.     The patient (or guardian, if applicable) and other individuals in attendance with the patient were advised that Artificial Intelligence will be utilized during this visit to record, process the conversation to generate a clinical note and to support improvement of the AI technology. The patient (or guardian, if applicable) and other individuals in attendance at the appointment consented to the use of AI, including the recording.

## 2025-03-06 NOTE — PATIENT INSTRUCTIONS
Begin Tizanidine - ordered as 2 mg twice daily. Begin taking only at night for 1 weeks, then introduce morning dose as well. May cause drowsiness.

## 2025-03-10 ENCOUNTER — TELEPHONE (OUTPATIENT)
Dept: PHYSICAL MEDICINE AND REHAB | Age: 70
End: 2025-03-10

## 2025-03-10 NOTE — TELEPHONE ENCOUNTER
Patient's sister Jacqueline Ren called in to ask to cancel follow up with you. She states the patient is already on baclofen and the pharmacy refused to fill the tizanidine due that. I told her I would relay this information to you and see if there's anything you'd like to relay back. Did you want to follow up with him to offer other options or just cancel as requested? As you can remember we seen him on Thursday in [place] alone in visit with brother in law providing transportation. Please advise. Thank you!

## 2025-03-10 NOTE — TELEPHONE ENCOUNTER
Spoke to sister to offer Botox Injections for spasticity of left side as suggested and patient is wanting to proceed with that option. Jacqueline will be requesting authorization and he is rescheduled for 4/9 for first procedure.

## 2025-03-17 RX ORDER — LOSARTAN POTASSIUM 25 MG/1
25 TABLET ORAL DAILY
Qty: 100 TABLET | Refills: 1 | Status: SHIPPED | OUTPATIENT
Start: 2025-03-17

## 2025-03-17 RX ORDER — ROSUVASTATIN CALCIUM 40 MG/1
40 TABLET, COATED ORAL DAILY
Qty: 100 TABLET | Refills: 1 | Status: SHIPPED | OUTPATIENT
Start: 2025-03-17

## 2025-03-17 NOTE — TELEPHONE ENCOUNTER
[facility] CLINICAL PHARMACY: ADHERENCE REVIEW  Identified care gap per [employer]: fills at [facility]: Statin adherence    Patient also appears to be prescribed: ACE/ARB    ASSESSMENT    ACE/ARB ADHERENCE    Insurance Records claims through 3/7/25 (Prior Year PDC = not reported; YTD PDC = 100%; Potential Fail Date: 25):   LOSARTAN POT TAB 25MG last filled on 3/5/25 for 30 day supply. Next refill due: 25    Prescribed si tablet/capsule daily    Per Reconcile Dispense History: 0 refills remain    BP Readings from Last 3 Encounters:   25 115/77   25 110/66   24 110/70     Estimated Creatinine Clearance: 80 mL/min (based on SCr of 0.9 mg/dL).  Lab Results   Component Value Date    CREATININE 0.9 2024     Lab Results   Component Value Date    K 4.2 2024     STATIN ADHERENCE    Insurance Records claims through 3/7/25 (Prior Year PDC = not reported; YTD PDC = 75%; Potential Fail Date: 25):   ROSUVASTATIN TAB 40MG last filled on 25 for 25 day supply. Next refill due: 3/19/25    Prescribed si tablet/capsule daily    Lab Results   Component Value Date    CHOL 134 2024    TRIG 89 2024    HDL 52 2024     Lab Results   Component Value Date    LDL 64 2024      ALT   Date Value Ref Range Status   2024 76 (H) 10 - 50 U/L Final     AST   Date Value Ref Range Status   2024 42 10 - 50 U/L Final     The ASCVD Risk score (Kelsi DK, et al., 2019) failed to calculate for the following reasons:    Risk score cannot be calculated because patient has a medical history suggesting prior/existing ASCVD     PLAN  The following are interventions that have been identified:   Patient NEEDS REFILLS for losartan 25mg daily when due to refill in April  Patient eligible for 100 day supply of losartan and rosuvastatin (fills 90ds amlodipine)    Will pend 100 -day supply request to PCP (prescribed at December hospital discharge).    Last Visit: 25  Next

## 2025-03-17 NOTE — TELEPHONE ENCOUNTER
Bhupendra Hinds MD, patient out of refills for losartan and patient eligible for up to 100-day supply of both, if appropriate (were new rxs at December hospital discharge). Rx(s) pended for your signature/modification as appropriate      Last Visit: 1/21/25  Next Visit: 4/29/25    Thank you,  Svetlana Cunningham, PharmD, [facility]  Population Health Pharmacist  [facility] Clinical Pharmacy  Department, toll free: [phone], option 1

## 2025-03-18 NOTE — TELEPHONE ENCOUNTER
Noted rosuvastatin and losartan rx for 100ds approved, thank you!    Per [facility]: left message asking any remaining 30ds fills of rosuvastatin and losartan rx be canceled, and to use 100ds rx and fill of both going forward.    [employer] message sent to patient.     =======================================================   For Pharmacy Admin Tracking Only    Program: Quibly  CPA in place:  No  Recommendation Provided To: Provider: 2 via Note to Provider  Intervention Detail: New Rx: 2, reason: Improve Adherence  Intervention Accepted By: Provider: 2  Gap Closed?: Yes   Time Spent (min): 10

## 2025-04-09 ENCOUNTER — PROCEDURE VISIT (OUTPATIENT)
Dept: PHYSICAL MEDICINE AND REHAB | Age: 70
End: 2025-04-09
Payer: MEDICARE

## 2025-04-09 VITALS
BODY MASS INDEX: 22.24 KG/M2 | HEART RATE: 71 BPM | WEIGHT: 155 LBS | TEMPERATURE: 97.7 F | SYSTOLIC BLOOD PRESSURE: 136 MMHG | DIASTOLIC BLOOD PRESSURE: 75 MMHG

## 2025-04-09 DIAGNOSIS — I69.352 SPASTIC HEMIPLEGIA OF LEFT DOMINANT SIDE AS LATE EFFECT OF CEREBRAL INFARCTION (HCC): Primary | ICD-10-CM

## 2025-04-09 PROCEDURE — 64643 CHEMODENERV 1 EXTREM 1-4 EA: CPT | Performed by: GENERAL PRACTICE

## 2025-04-09 PROCEDURE — 64642 CHEMODENERV 1 EXTREMITY 1-4: CPT | Performed by: GENERAL PRACTICE

## 2025-04-09 PROCEDURE — 95874 GUIDE NERV DESTR NEEDLE EMG: CPT | Performed by: GENERAL PRACTICE

## 2025-04-09 NOTE — PROGRESS NOTES
[facility] PHYSICAL MEDICINE & REHABILITATION  [address] [address]  Dept: [phone]  Dept Fax: [phone]    Outpatient Followup Note    Nitesh Ren, 69 y.o., male, presents for follow up c/o of spasticity (Left arm and left leg;  here for botox injections today)  .     HPI:       HPI  69 year old male presenting for follow up of spastic left sided hemiparesis from stroke and Botox injection. Patient continues to experience left-sided spasticity in elbow flexors, pronator, and wrist/finger flexors, as well as knee flexion and ankle plantarflexors. Uses Baclofen though does not offer too much relief and cannot tolerate increased dose due to centrally acting side effects.     Past Medical History:   Diagnosis Date    Abdominal pain     Cerebrovascular accident (CVA) (HCC) 2024    Colon polyp     Mixed hyperlipidemia 2024    Primary hypertension 2024    Pulmonary nodules 2013    Thyroid nodule 2013      Past Surgical History:   Procedure Laterality Date    COLONOSCOPY  11    POLYPECTOMY    COLONOSCOPY  10/23/07    POLYPECTOMY    CYST REMOVAL      \"back side\"   and face and neck    POLYPECTOMY  11     Family History   Problem Relation Age of Onset    Cancer Mother     Cancer Father     Heart Attack Father      Social History     Socioeconomic History    Marital status:      Spouse name: None    Number of children: None    Years of education: None    Highest education level: None   Tobacco Use    Smoking status: Former     Current packs/day: 0.00     Average packs/day: 1 pack/day for 30.0 years (30.0 ttl pk-yrs)     Types: Cigarettes     Start date: 1971     Quit date: 2001     Years since quittin.7     Passive exposure: Never    Smokeless tobacco: Never   Vaping Use    Vaping status: Never Used   Substance and Sexual Activity    Alcohol use: Yes     Comment: WEEKLY    Drug

## 2025-05-05 RX ORDER — AMLODIPINE BESYLATE 10 MG/1
10 TABLET ORAL DAILY
Qty: 90 TABLET | Refills: 0 | Status: SHIPPED | OUTPATIENT
Start: 2025-05-05

## 2025-06-02 ENCOUNTER — OFFICE VISIT (OUTPATIENT)
Dept: PHYSICAL MEDICINE AND REHAB | Age: 70
End: 2025-06-02
Payer: MEDICARE

## 2025-06-02 VITALS
WEIGHT: 155 LBS | SYSTOLIC BLOOD PRESSURE: 126 MMHG | BODY MASS INDEX: 22.19 KG/M2 | DIASTOLIC BLOOD PRESSURE: 82 MMHG | HEIGHT: 70 IN | HEART RATE: 60 BPM

## 2025-06-02 DIAGNOSIS — R25.2 SPASTICITY AS LATE EFFECT OF CEREBROVASCULAR ACCIDENT (CVA): ICD-10-CM

## 2025-06-02 DIAGNOSIS — I69.398 SPASTICITY AS LATE EFFECT OF CEREBROVASCULAR ACCIDENT (CVA): ICD-10-CM

## 2025-06-02 DIAGNOSIS — I69.352 SPASTIC HEMIPLEGIA OF LEFT DOMINANT SIDE AS LATE EFFECT OF CEREBRAL INFARCTION (HCC): Primary | ICD-10-CM

## 2025-06-02 PROCEDURE — 3079F DIAST BP 80-89 MM HG: CPT | Performed by: GENERAL PRACTICE

## 2025-06-02 PROCEDURE — 3074F SYST BP LT 130 MM HG: CPT | Performed by: GENERAL PRACTICE

## 2025-06-02 PROCEDURE — 1123F ACP DISCUSS/DSCN MKR DOCD: CPT | Performed by: GENERAL PRACTICE

## 2025-06-02 PROCEDURE — 99213 OFFICE O/P EST LOW 20 MIN: CPT | Performed by: GENERAL PRACTICE

## 2025-06-02 RX ORDER — BACLOFEN 10 MG/1
10 TABLET ORAL 3 TIMES DAILY
Qty: 90 TABLET | Refills: 2 | Status: SHIPPED | OUTPATIENT
Start: 2025-06-02

## 2025-06-03 NOTE — PROGRESS NOTES
record, process the conversation to generate a clinical note and to support improvement of the AI technology. The patient (or guardian, if applicable) and other individuals in attendance at the appointment consented to the use of AI, including the recording.

## 2025-06-17 ENCOUNTER — TELEPHONE (OUTPATIENT)
Dept: FAMILY MEDICINE CLINIC | Age: 70
End: 2025-06-17

## 2025-06-17 NOTE — TELEPHONE ENCOUNTER
FYI:  Patient had an in home health assessment 6-13-25.   Spirometry screening results abnormal at 0.68.   Patient to schedule for follow up visit with PCP.  His last visit was on 1-21-25.  Patient sees Dr. Awan (pulmonary).

## 2025-06-30 ENCOUNTER — OFFICE VISIT (OUTPATIENT)
Dept: FAMILY MEDICINE CLINIC | Age: 70
End: 2025-06-30

## 2025-06-30 VITALS
RESPIRATION RATE: 16 BRPM | WEIGHT: 176.6 LBS | BODY MASS INDEX: 25.34 KG/M2 | DIASTOLIC BLOOD PRESSURE: 72 MMHG | OXYGEN SATURATION: 96 % | SYSTOLIC BLOOD PRESSURE: 124 MMHG | TEMPERATURE: 98.3 F | HEART RATE: 76 BPM

## 2025-06-30 DIAGNOSIS — I69.352 HEMIPLEGIA AND HEMIPARESIS FOLLOWING CEREBRAL INFARCTION AFFECTING LEFT DOMINANT SIDE (HCC): ICD-10-CM

## 2025-06-30 DIAGNOSIS — M79.89 LEG SWELLING: Primary | ICD-10-CM

## 2025-06-30 DIAGNOSIS — I10 PRIMARY HYPERTENSION: ICD-10-CM

## 2025-06-30 DIAGNOSIS — E78.2 MIXED HYPERLIPIDEMIA: ICD-10-CM

## 2025-06-30 DIAGNOSIS — Q85.00 NEUROFIBROMATOSIS, UNSPECIFIED (HCC): ICD-10-CM

## 2025-06-30 DIAGNOSIS — Z12.5 SCREENING FOR PROSTATE CANCER: ICD-10-CM

## 2025-06-30 RX ORDER — HYDROCHLOROTHIAZIDE 12.5 MG/1
12.5 CAPSULE ORAL EVERY MORNING
Qty: 90 CAPSULE | Refills: 0 | Status: SHIPPED | OUTPATIENT
Start: 2025-06-30

## 2025-06-30 ASSESSMENT — PATIENT HEALTH QUESTIONNAIRE - PHQ9
1. LITTLE INTEREST OR PLEASURE IN DOING THINGS: NOT AT ALL
SUM OF ALL RESPONSES TO PHQ QUESTIONS 1-9: 0
2. FEELING DOWN, DEPRESSED OR HOPELESS: NOT AT ALL
SUM OF ALL RESPONSES TO PHQ QUESTIONS 1-9: 0

## 2025-06-30 ASSESSMENT — ENCOUNTER SYMPTOMS
ABDOMINAL PAIN: 0
SHORTNESS OF BREATH: 0
COUGH: 0
NAUSEA: 0

## 2025-06-30 NOTE — PROGRESS NOTES
[facility]     Date of Visit:  2025  Patient Name: Nitesh Ren   Patient :  1955     CHIEF COMPLAINT:     Nitesh Ren is a 69 y.o. male who presents today for an general visit to be evaluated for the following condition(s):  Chief Complaint   Patient presents with    Edema     Chronic - Left lower leg.     Toe Pain     The patient is complaining of occasional left toe pain. He does have a splint that he wears, but he cannot put it on by himself. The foot was very discolored on 25 and his nephew called 911 but the patient was not taken to the RD.       HISTORY OF PRESENT ILLNESS:     Visit Information    Have you changed or started any medications since your last visit including any over-the-counter medicines, vitamins, or herbal medicines? no   Are you having any side effects from any of your medications? -  no  Have you stopped taking any of your medications? Is so, why? -  no    Have you seen any other physician or provider since your last visit? Yes - Records Obtained  Have you had any other diagnostic tests since your last visit? Yes - Records Obtained  Have you been seen in the emergency room and/or had an admission to a hospital since we last saw you? No  Have you had your routine dental cleaning in the past 6 months? no    Have you activated your [employer] account? If not, what are your barriers? Yes     Patient Care Team:  Bhupendra Hinds MD as PCP - General (Family Medicine)  [facility]  Kecia Shaw MD as Consulting Physician (Gastroenterology)  Nolan Prather MD (Endocrinology)  Vernon Vences MD as Consulting Physician (Pulmonology)    Medical History Review  Past Medical, Family, and Social History reviewed and does contribute to the patient presenting condition    Health Maintenance   Topic Date Due    Pneumococcal 50+ years Vaccine ( - PCV) Never done    Respiratory Syncytial Virus (RSV) Pregnant or age

## 2025-07-10 ENCOUNTER — PROCEDURE VISIT (OUTPATIENT)
Dept: PHYSICAL MEDICINE AND REHAB | Age: 70
End: 2025-07-10

## 2025-07-10 VITALS — TEMPERATURE: 98.3 F | SYSTOLIC BLOOD PRESSURE: 113 MMHG | DIASTOLIC BLOOD PRESSURE: 71 MMHG | HEART RATE: 98 BPM

## 2025-07-10 DIAGNOSIS — R25.2 SPASTICITY AS LATE EFFECT OF CEREBROVASCULAR ACCIDENT (CVA): ICD-10-CM

## 2025-07-10 DIAGNOSIS — I69.398 SPASTICITY AS LATE EFFECT OF CEREBROVASCULAR ACCIDENT (CVA): ICD-10-CM

## 2025-07-10 DIAGNOSIS — I69.352 HEMIPLEGIA AND HEMIPARESIS FOLLOWING CEREBRAL INFARCTION AFFECTING LEFT DOMINANT SIDE (HCC): ICD-10-CM

## 2025-07-10 DIAGNOSIS — I69.352 SPASTIC HEMIPLEGIA OF LEFT DOMINANT SIDE AS LATE EFFECT OF CEREBRAL INFARCTION (HCC): Primary | ICD-10-CM

## 2025-07-10 NOTE — PROGRESS NOTES
normal saline in a 100 unit to 1 mL solution mixture.  Iodine and alcohol swabs were used to cleanse the skin before injections were performed.  Back pressure was placed on the syringe to avoid any intravascular injection.  Botox was then injected using EMG guidance in the following muscles:    Left biceps   75 units  Left pronator teres  25 units  Left FDS   50 units  Left FDP   50 units  Left medial hamstring  50 units   Left lateral hamstring  50 units  Left medial gastroc  50 units  Left lateral gastroc  50 units    Total 400 units      WASTE  Wasted medication this visit:   No    Amount Wasted:  0 units  Reason for Waste:  Appropriate dose given, remainder of vial mixture wasted  Disposal Method: Designated medication waste sharps container    The patient tolerated the procedure well with no immediate complications.  The patient and/or guardian and/or facility should call with concerns or questions over the coming days.        Assessment:       Diagnosis Orders   1. Spastic hemiplegia of left dominant side as late effect of cerebral infarction (HCC)  onabotulinumtoxinA (BOTOX) injection 400 Units    NEEDLE EMG GUIDANCE FOR CHEMODENERVATION    CHEMODENERVATION ONE EXTREMITY 1-4 MUSCLE    CHEMODENERVATION 1 EXTREMITY EA ADDL 1-4 MUSCLE      2. Spasticity as late effect of cerebrovascular accident (CVA)  onabotulinumtoxinA (BOTOX) injection 400 Units    NEEDLE EMG GUIDANCE FOR CHEMODENERVATION    CHEMODENERVATION ONE EXTREMITY 1-4 MUSCLE    CHEMODENERVATION 1 EXTREMITY EA ADDL 1-4 MUSCLE      3. Hemiplegia and hemiparesis following cerebral infarction affecting left dominant side (HCC)             Plan:       Assessment & Plan     1. Spastic hemiplegia of left dominant side as late effect of cerebral infarction (HCC)  2. Spasticity as late effect of cerebrovascular accident (CVA)  3. Hemiplegia and hemiparesis following cerebral infarction affecting left dominant side (HCC)  -Repeat Botox injections performed in

## 2025-08-21 DIAGNOSIS — I69.398 SPASTICITY AS LATE EFFECT OF CEREBROVASCULAR ACCIDENT (CVA): ICD-10-CM

## 2025-08-21 DIAGNOSIS — R25.2 SPASTICITY AS LATE EFFECT OF CEREBROVASCULAR ACCIDENT (CVA): ICD-10-CM

## 2025-08-21 DIAGNOSIS — I69.352 SPASTIC HEMIPLEGIA OF LEFT DOMINANT SIDE AS LATE EFFECT OF CEREBRAL INFARCTION (HCC): ICD-10-CM

## 2025-08-21 RX ORDER — BACLOFEN 10 MG/1
10 TABLET ORAL 3 TIMES DAILY
Qty: 90 TABLET | Refills: 0 | Status: SHIPPED | OUTPATIENT
Start: 2025-08-21